# Patient Record
Sex: FEMALE | Race: WHITE | NOT HISPANIC OR LATINO | Employment: OTHER | URBAN - METROPOLITAN AREA
[De-identification: names, ages, dates, MRNs, and addresses within clinical notes are randomized per-mention and may not be internally consistent; named-entity substitution may affect disease eponyms.]

---

## 2017-01-20 RX ORDER — MELOXICAM 7.5 MG/1
TABLET ORAL
Qty: 90 TABLET | Refills: 1 | Status: SHIPPED | OUTPATIENT
Start: 2017-01-20 | End: 2017-06-28 | Stop reason: SDUPTHER

## 2017-06-28 RX ORDER — MELOXICAM 7.5 MG/1
TABLET ORAL
Qty: 90 TABLET | Refills: 1 | Status: SHIPPED | OUTPATIENT
Start: 2017-06-28 | End: 2018-01-15 | Stop reason: SDUPTHER

## 2017-07-19 ENCOUNTER — OFFICE VISIT (OUTPATIENT)
Dept: SURGERY | Facility: CLINIC | Age: 67
End: 2017-07-19
Payer: COMMERCIAL

## 2017-07-19 VITALS
DIASTOLIC BLOOD PRESSURE: 66 MMHG | BODY MASS INDEX: 34.01 KG/M2 | WEIGHT: 204.38 LBS | SYSTOLIC BLOOD PRESSURE: 148 MMHG | HEART RATE: 70 BPM

## 2017-07-19 DIAGNOSIS — Z85.038 HISTORY OF COLON CANCER: Primary | ICD-10-CM

## 2017-07-19 DIAGNOSIS — K43.2 INCISIONAL HERNIA, WITHOUT OBSTRUCTION OR GANGRENE: ICD-10-CM

## 2017-07-19 DIAGNOSIS — T81.89XA WOUND, SURGICAL, NONHEALING, INITIAL ENCOUNTER: ICD-10-CM

## 2017-07-19 PROCEDURE — 99214 OFFICE O/P EST MOD 30 MIN: CPT | Mod: S$GLB,,, | Performed by: COLON & RECTAL SURGERY

## 2017-07-19 PROCEDURE — 99999 PR PBB SHADOW E&M-EST. PATIENT-LVL III: CPT | Mod: PBBFAC,,, | Performed by: COLON & RECTAL SURGERY

## 2017-07-19 RX ORDER — ACETAMINOPHEN 325 MG/1
325 TABLET ORAL EVERY 6 HOURS PRN
COMMUNITY
End: 2018-05-23

## 2017-07-19 NOTE — PROGRESS NOTES
HISTORY OF PRESENT ILLNESS:  66 year old female patient presents today for f/u of rectal cancer and umbilical hernia    Had repair and wound excision by Dr Muse 2/2014 and 7/2105. 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   Colonoscopy 3/30/07, 2010  and 8/13/16 .  normal post op   CEA Feb 0.7 CEA 1.0 11/12/09 = 2.0                                Has history of constipation. Takes Miralax  1 capful daily and fiber tablets. Ducolax weekly to initiate BM.   PAST SURGICAL HISTORY: TAHBSO, Needle biopsy of breast. As above    SOCIAL HISTORY: Lives with spouse.    FAMILY HISTORY: Denies family history of colon cancer.     ROS:  GENERAL: No fever, chills, has had weight gain.  CHEST: Denies SAN, cyanosis, wheezing, cough and sputum production.  CARDIOVASCULAR: Denies chest pain, PND, orthopnea or reduced exercise tolerance.    PE:   APPEARANCE: Well nourished, well developed, in no acute distress.   CHEST: Lungs clear. Normal respiratory effort.  CARDIOVASCULAR: Normal S1, S2. No rubs, murmurs or gallops. No edema.  ABDOMEN: Soft. Healed incision. No masses or tenderness. Small umbilical hernia, reducable, slightly larger than last visit  RECTAL : NST no masses or tenderness    IMP: History of rectal cancer Stage 2, umbilical hernia and non healing wound  PLAN;DIScusse doptions. Recommend wound excision and repair. Patient will call back to schedule.

## 2017-08-22 RX ORDER — ESTRADIOL 1 MG/1
TABLET ORAL
Qty: 30 TABLET | Refills: 2 | Status: SHIPPED | OUTPATIENT
Start: 2017-08-22 | End: 2018-01-15 | Stop reason: SDUPTHER

## 2018-01-15 ENCOUNTER — OFFICE VISIT (OUTPATIENT)
Dept: INTERNAL MEDICINE | Facility: CLINIC | Age: 68
End: 2018-01-15
Attending: INTERNAL MEDICINE
Payer: MEDICARE

## 2018-01-15 ENCOUNTER — LAB VISIT (OUTPATIENT)
Dept: LAB | Facility: OTHER | Age: 68
End: 2018-01-15
Attending: INTERNAL MEDICINE
Payer: MEDICARE

## 2018-01-15 VITALS
SYSTOLIC BLOOD PRESSURE: 120 MMHG | HEIGHT: 65 IN | HEART RATE: 98 BPM | WEIGHT: 196.88 LBS | DIASTOLIC BLOOD PRESSURE: 78 MMHG | BODY MASS INDEX: 32.8 KG/M2

## 2018-01-15 DIAGNOSIS — Z12.39 BREAST CANCER SCREENING: ICD-10-CM

## 2018-01-15 DIAGNOSIS — R79.9 ABNORMAL BLOOD CHEMISTRY: ICD-10-CM

## 2018-01-15 DIAGNOSIS — M79.642 BILATERAL HAND PAIN: ICD-10-CM

## 2018-01-15 DIAGNOSIS — H53.2 DIPLOPIA: ICD-10-CM

## 2018-01-15 DIAGNOSIS — Z01.419 WELL WOMAN EXAM: ICD-10-CM

## 2018-01-15 DIAGNOSIS — S01.01XS LACERATION OF SCALP, SEQUELA: ICD-10-CM

## 2018-01-15 DIAGNOSIS — Z00.00 ANNUAL PHYSICAL EXAM: ICD-10-CM

## 2018-01-15 DIAGNOSIS — N95.1 HOT FLASHES DUE TO MENOPAUSE: ICD-10-CM
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
## 2018-01-15 DIAGNOSIS — Z00.00 ANNUAL PHYSICAL EXAM: Primary | ICD-10-CM

## 2018-01-15 DIAGNOSIS — M79.641 BILATERAL HAND PAIN: ICD-10-CM

## 2018-01-15 LAB
ALBUMIN SERPL BCP-MCNC: 4.2 G/DL
ALP SERPL-CCNC: 87 U/L
ALT SERPL W/O P-5'-P-CCNC: 19 U/L
ANION GAP SERPL CALC-SCNC: 10 MMOL/L
AST SERPL-CCNC: 26 U/L
BASOPHILS # BLD AUTO: 0.01 K/UL
BASOPHILS NFR BLD: 0.2 %
BILIRUB SERPL-MCNC: 0.4 MG/DL
BUN SERPL-MCNC: 17 MG/DL
CALCIUM SERPL-MCNC: 9.8 MG/DL
CCP AB SER IA-ACNC: <0.5 U/ML
CHLORIDE SERPL-SCNC: 103 MMOL/L
CHOLEST SERPL-MCNC: 195 MG/DL
CHOLEST/HDLC SERPL: 3.8 {RATIO}
CO2 SERPL-SCNC: 26 MMOL/L
CREAT SERPL-MCNC: 1 MG/DL
DIFFERENTIAL METHOD: ABNORMAL
EOSINOPHIL # BLD AUTO: 0.1 K/UL
EOSINOPHIL NFR BLD: 2.6 %
ERYTHROCYTE [DISTWIDTH] IN BLOOD BY AUTOMATED COUNT: 13.3 %
EST. GFR  (AFRICAN AMERICAN): >60 ML/MIN/1.73 M^2
EST. GFR  (NON AFRICAN AMERICAN): 59 ML/MIN/1.73 M^2
ESTIMATED AVG GLUCOSE: 105 MG/DL
GLUCOSE SERPL-MCNC: 103 MG/DL
HBA1C MFR BLD HPLC: 5.3 %
HCT VFR BLD AUTO: 44.1 %
HDLC SERPL-MCNC: 51 MG/DL
HDLC SERPL: 26.2 %
HGB BLD-MCNC: 14.1 G/DL
LDLC SERPL CALC-MCNC: 124.6 MG/DL
LYMPHOCYTES # BLD AUTO: 0.8 K/UL
LYMPHOCYTES NFR BLD: 17.3 %
MCH RBC QN AUTO: 28.8 PG
MCHC RBC AUTO-ENTMCNC: 32 G/DL
MCV RBC AUTO: 90 FL
MONOCYTES # BLD AUTO: 0.5 K/UL
MONOCYTES NFR BLD: 10.2 %
NEUTROPHILS # BLD AUTO: 3.2 K/UL
NEUTROPHILS NFR BLD: 69.7 %
NONHDLC SERPL-MCNC: 144 MG/DL
PLATELET # BLD AUTO: 172 K/UL
PMV BLD AUTO: 10.6 FL
POTASSIUM SERPL-SCNC: 4.7 MMOL/L
PROT SERPL-MCNC: 7.7 G/DL
RBC # BLD AUTO: 4.89 M/UL
RHEUMATOID FACT SERPL-ACNC: <10 IU/ML
SODIUM SERPL-SCNC: 139 MMOL/L
TRIGL SERPL-MCNC: 97 MG/DL
TSH SERPL DL<=0.005 MIU/L-ACNC: 3.85 UIU/ML
WBC # BLD AUTO: 4.62 K/UL

## 2018-01-15 PROCEDURE — 99397 PER PM REEVAL EST PAT 65+ YR: CPT | Mod: S$GLB,,, | Performed by: INTERNAL MEDICINE

## 2018-01-15 PROCEDURE — 80061 LIPID PANEL: CPT

## 2018-01-15 PROCEDURE — 84443 ASSAY THYROID STIM HORMONE: CPT

## 2018-01-15 PROCEDURE — 85025 COMPLETE CBC W/AUTO DIFF WBC: CPT

## 2018-01-15 PROCEDURE — 99999 PR PBB SHADOW E&M-EST. PATIENT-LVL IV: CPT | Mod: PBBFAC,,, | Performed by: INTERNAL MEDICINE

## 2018-01-15 PROCEDURE — 36415 COLL VENOUS BLD VENIPUNCTURE: CPT

## 2018-01-15 PROCEDURE — 86200 CCP ANTIBODY: CPT

## 2018-01-15 PROCEDURE — 80053 COMPREHEN METABOLIC PANEL: CPT

## 2018-01-15 PROCEDURE — 83036 HEMOGLOBIN GLYCOSYLATED A1C: CPT

## 2018-01-15 PROCEDURE — 86431 RHEUMATOID FACTOR QUANT: CPT

## 2018-01-15 RX ORDER — ESTRADIOL 1 MG/1
1 TABLET ORAL EVERY MORNING
Qty: 90 TABLET | Refills: 0 | Status: SHIPPED | OUTPATIENT
Start: 2018-01-15 | End: 2018-04-16 | Stop reason: SDUPTHER

## 2018-01-15 RX ORDER — MELOXICAM 7.5 MG/1
TABLET ORAL
Qty: 90 TABLET | Refills: 0 | Status: SHIPPED | OUTPATIENT
Start: 2018-01-15 | End: 2018-04-12 | Stop reason: SDUPTHER

## 2018-01-15 NOTE — PROGRESS NOTES
Subjective:       Patient ID: Tanesha Davis is a 67 y.o. female.    Chief Complaint: Fall (left head stitches); Head Injury (x 1 week); Abdominal Pain; Temporomandibular Joint Pain; and Dizziness    Here for annual visit    1 week ago after she stopped taking her PO estrogen she developed frequent hot flashes and in the middle of one became dizzy and fell and struck her head and left eye. She went to ED and had CT scan and sutures placed. She reports HA on and off since then. They are becoming less intense, respond longer to OTC meds. She denies any current blurry vision, eye pain, dizziness. Hot flashes continue. Her OBGYN retired and she was unable to get her estrogen refilled so she was taking days off to help mitigate this.     Her nocturnal muscle cramps of bilateral calves were much improved but then have become more frequent the past 6-8 weeks. Was once a month now once a week. She denies any change in water intake, medication adherence. She reports she performs daily stretches of calves with resistance bands daily.     Pain of bilateral hands that has become worse lately. All 5 fingers and wrist pain. Pain is most concentrated at base of thumb, MP and PIP. She denies significant swelling erythema, changes to overlying skin, stiffess > 1 hr, family hx of RA/auto immune disorder. She denies nocturnal symptoms of numbness/tingling. She has not had OT for this in past.           Review of Systems   Constitutional: Positive for activity change and unexpected weight change.   HENT: Negative for hearing loss, rhinorrhea and trouble swallowing.    Eyes: Negative for discharge and visual disturbance.   Respiratory: Negative for chest tightness and wheezing.    Cardiovascular: Negative for chest pain and palpitations.   Gastrointestinal: Positive for constipation. Negative for blood in stool, diarrhea and vomiting.   Endocrine: Positive for polyuria. Negative for polydipsia.   Genitourinary: Negative for difficulty  "urinating, dysuria, hematuria and menstrual problem.   Musculoskeletal: Positive for arthralgias, joint swelling and neck pain.   Neurological: Positive for weakness and headaches.   Psychiatric/Behavioral: Negative for confusion and dysphoric mood.       Objective:      Vitals:    01/15/18 1003   BP: 120/78   BP Location: Left arm   Patient Position: Sitting   BP Method: Large (Manual)   Pulse: 98   Weight: 89.3 kg (196 lb 13.9 oz)   Height: 5' 5" (1.651 m)      Physical Exam   Constitutional: She is oriented to person, place, and time. She appears well-developed and well-nourished. No distress.   HENT:   Head: Normocephalic and atraumatic.   Mouth/Throat: Oropharynx is clear and moist. No oropharyngeal exudate.   Eyes: Conjunctivae and EOM are normal. Pupils are equal, round, and reactive to light. No scleral icterus.   Neck: No thyromegaly present.   Cardiovascular: Normal rate, regular rhythm and normal heart sounds.    No murmur heard.  Pulmonary/Chest: Effort normal and breath sounds normal. She has no wheezes. She has no rales.   Abdominal: Soft. She exhibits no distension. There is no tenderness.   Musculoskeletal: She exhibits no edema or tenderness.        Right hand: She exhibits no tenderness, no bony tenderness, normal capillary refill, no deformity and no swelling.        Left hand: She exhibits no tenderness, no bony tenderness, normal capillary refill, no deformity and no swelling.   Lymphadenopathy:     She has no cervical adenopathy.   Neurological: She is alert and oriented to person, place, and time.   Skin: Skin is warm and dry.   Psychiatric: She has a normal mood and affect. Her behavior is normal.       Assessment:       1. Annual physical exam    2. Fall, sequela    3. Bilateral hand pain    4. Hot flashes due to menopause    5. Abnormal blood chemistry    6. Well woman exam    7. Diplopia     8. Breast cancer screening    9. Laceration of scalp, sequela        Plan:       Tanesha was seen " today for fall, head injury, abdominal pain, temporomandibular joint pain and dizziness.    Diagnoses and all orders for this visit:    Annual physical exam  -     Comprehensive metabolic panel; Future  -     Lipid panel; Future  -     TSH; Future  -     CBC auto differential; Future  -     Hemoglobin A1c; Future    Fall, sequela    Bilateral hand pain  -     CYCLIC CITRUL PEPTIDE ANTIBODY, IGG; Future  -     RHEUMATOID FACTOR; Future  -     JUAN ALBERTO; Future  -     Ambulatory consult to Occupational Therapy  -     JUAN ALBERTO    Hot flashes due to menopause  -     Ambulatory Referral to Obstetrics / Gynecology  -     TSH; Future   --     estradiol (ESTRACE) 1 MG tablet; Take 1 tablet (1 mg total) by mouth every morning.    Abnormal blood chemistry  -     Comprehensive metabolic panel; Future  -     Lipid panel; Future  -     TSH; Future  -     CBC auto differential; Future  -     Hemoglobin A1c; Future    Well woman exam  -     Ambulatory Referral to Obstetrics / Gynecology    Diplopia   -     TSH; Future    Breast cancer screening  -     Mammo Digital Screening Bilat with CAD; Future    Laceration of scalp, sequela  -4 sutures removed without complication                     Side effects of medication(s) were discussed in detail and patient voiced understanding.  Patient will call back for any issues or complications.

## 2018-01-30 ENCOUNTER — CLINICAL SUPPORT (OUTPATIENT)
Dept: REHABILITATION | Facility: HOSPITAL | Age: 68
End: 2018-01-30
Attending: INTERNAL MEDICINE
Payer: MEDICARE

## 2018-01-30 DIAGNOSIS — M79.642 BILATERAL HAND PAIN: Primary | ICD-10-CM

## 2018-01-30 DIAGNOSIS — M79.641 BILATERAL HAND PAIN: Primary | ICD-10-CM

## 2018-01-30 PROCEDURE — G8985 CARRY GOAL STATUS: HCPCS | Mod: CJ,PN

## 2018-01-30 PROCEDURE — G8984 CARRY CURRENT STATUS: HCPCS | Mod: CJ,PN

## 2018-01-30 PROCEDURE — 97165 OT EVAL LOW COMPLEX 30 MIN: CPT | Mod: PN

## 2018-01-30 NOTE — PATIENT INSTRUCTIONS
"Flexor Tendon Gliding (Active Hook Fist)        With fingers and knuckles straight, bend middle and tip joints. Do not bend large knuckles.  Repeat __10__ times. Do _1___ sessions per day.    Copyright © I. All rights reserved.   Flexor Tendon Gliding (Active Full Fist)        Straighten all fingers, then make a fist, bending all joints.  Repeat __10__ times. Do _1___ sessions per day.    Copyright © I. All rights reserved.   Flexor Tendon Gliding (Active Straight Fist)        Start with fingers straight. Bend knuckles and middle joints. Keep fingertip joints straight to touch base of palm.  Repeat __10__ times. Do _3___ sessions per day.    Copyright © I. All rights reserved.     Opposition (Active)        Touch tip of thumb to nail tip of SMALL finger in turn, making an "O" shape.  Repeat _10___ times. Do _3___ sessions per day.    Copyright © [facility]. All rights reserved.   Composite Flexion (Active)        Bend both joints of thumb as far as possible. Try to touch base of little finger.  Repeat __10__ times. Do __3__ sessions per day.    Copyright © I. All rights reserved.   Abduction / Adduction (Active)        With hand flat on table, spread all fingers apart, then bring them together as close as possible.  Repeat _10__ times. Do _3___ sessions per day.    Copyright © I. All rights reserved.   Extension (Active With Finger Extension)        With forearm on table and wrist over edge, lift hand with fingers straight. Hold __2__ seconds.  Repeat _10___ times. Do __3__ sessions per day.    Copyright © I. All rights reserved.   Extension (Active With Finger Flexion)        With fingers curled, bend hand back at wrist. Hold _2___ seconds.  Repeat _10___ times. Do __3__ sessions per day.    Copyright © I. All rights reserved.     "

## 2018-01-30 NOTE — PLAN OF CARE
Occupational Therapy Evaluation    Patient: Tanesha Davis  Date of Evaluation: 01/30/2018  Referring Physician: Caleb Hernandez MD  Diagnosis:   Encounter Diagnosis   Name Primary?    Bilateral hand pain Yes       Referral Orders: Eval and treat    Start Time: 10:05 am  End Time: 11:15 am  Total Time: 70 min  Group Time: -    Age: 67 y.o.  Sex: female  Hand dominance: right    Occupation: retired dance teacher- doing administrative work  Working presently: Yes  Last time worked: yesterday  Workmen's Compensation: No    Date of Injury: 3 weeks ago  Date of Surgery: n/a  Involved areas: right and left thumb    Mechanism of Injury: Pt fell and irritated her thumbs by a fall she had landing on her hands.  Past Medical History:   Diagnosis Date    Anemia     Arthritis     Cancer     colon cancer    Colon polyp     Draining cutaneous sinus tract     Headache(784.0)     PONV (postoperative nausea and vomiting)     with last surgery per patient    Rectosigmoid cancer      Current Outpatient Prescriptions   Medication Sig    acetaminophen (TYLENOL) 325 MG tablet Take 325 mg by mouth every 6 (six) hours as needed for Pain.    bisacodyl (DULCOLAX) 10 mg Supp Place 1 suppository (10 mg total) rectally daily as needed.    cyanocobalamin 500 MCG tablet Take 500 mcg by mouth once daily.      docusate sodium (COLACE) 100 MG capsule Take 1 capsule (100 mg total) by mouth 2 (two) times daily.    ERGOCALCIFEROL, VITAMIN D2, (VITAMIN D ORAL) Take by mouth once daily.     estradiol (ESTRACE) 1 MG tablet Take 1 tablet (1 mg total) by mouth every morning.    fiber Chew Take 2 tablets by mouth once daily.      L GASSERI/B BIFIDUM/B LONGUM ([facility] ORAL) Take 1 tablet by mouth every evening.     magnesium oxide-pyridoxine (BEELITH) 362-20 mg Tab Take 1 tablet by mouth once daily.    meloxicam (MOBIC) 7.5 MG tablet TAKE ONE TABLET BY MOUTH EVERY DAY    VITAMIN E ACETATE (VITAMIN E ORAL) Take by  mouth.     No current facility-administered medications for this visit.      Review of patient's allergies indicates:   Allergen Reactions    Amoxil [amoxicillin] Hives    Penicillins Hives and Swelling    Sulfa (sulfonamide antibiotics) Hives and Itching     X-Ray Results: Both hands show mild arthritic change in the fingers and thumbs and wrists  MRI Results: n/a  EMG Results: n/a    Subjective  Tanesha reports the pain is getting better over the past 3 weeks.  Pt reports she has had more pain in her thumbs over the past year and the meloxicam does not seem to help.  Pt reports numbness in her small fingers    Functional Pain Scale Rating 0-10: 4/10 at present, 2-3/10 at best, 9/10 at worst  Location: thumb MP joints  Description: Burning and stiffness  Activities which increase pain: to much rest or to much use  Activities which decrease pain: nothing    Tanesha's goals for therapy are: To learn how to manage my pain without taking pain medicine.    Objective    Observation: enlarged MP and CMC  joints of bilateral thumbs  Sensation: Median and ulnar Nerve Intact  [place] Celina Monofilament Test: Yes  Results: 2.83  Stereognosis: n/t    Special Tests:   Tinels  negative and Phalens  Positive but at small fingers    Edema: Circumferential measurements: no overt swelling noted    Range of Motion: right and left Active  (Ext/Flex) Thumb left Thumb right   MP 0/60 0/55   DIP +10/40 +10/60     Thumb Opposition: to all tips and 5th MCP  Palmar Abduction: left 45*  Right 40*  Radial Abduction: left 40*  Right 35*    Wrist Ext/Flex: WFL/WFL  Wrist RD/UD: WFL/WFL  Supination/Pronation: WFL/WFL     Strength: (HAYDEE Dynamometer in lbs.) Average 3 trials, Position II  Right: 35, 35 , 30  avg 33.3#  Left: 25, 25, 30 avg 26.6#    Pinch Strength: (Pinch Gauge in psi's), Average 3 trials  [place] Pinch R) 8,9,7  avg 8psi's   L) 3,4,4 avg 3.6psi's  3pt Pinch   R) 6,7,8  avg 7psi's  L) 5,3,4  avg 4  psi's    Cultural/Spiritual/Education Needs: none noted or reported  Barriers to Learning: none noted or reported    Functional Limitations: Patient presents with the following functional Limitations:   Self Care / ADL: able to complete self care    Work/Activities: difficulty with opening jars, lifting and carrying, lifting pots, able to sweep and mop with rest between    Leisure: movies, dinner, enjoys the children at the studio    Treatment included: OT evaluation and instruction in written HEP including (Hand Therapy/Finger Exercises) tendon glides, opposition of thumb to small, composite thumb flexion, finger abd/adduction, wrist ext/flexion with fingers flexed and extended.    Repetitions 10 each   Frequency 3* per day    Education re: joint protection, written information re: joint protection and energy conservation provided.    Assessment  Treatment Diagnosis/ Problem List BUE Decreased  strength, Decreased pinch strength, Increased pain and Joint Stiffness    Goals to be met in 4 weeks:   Patient to be IND with HEP and modalities for pain/edema managment.   Decrease complaints of pain to  3 out of 10 to increase functional hand use for ADL/work/leisure activities.    Pt to report joint protection techniques with her home and work activiites    G CODE TOOL: FOTO  handling  Current Score  = 65 or 35% impaired   Goal at Discharge Score 68 or 32% impaired   Discharge status Score = - or -% impaired     Score interpretation is as follows:     TEST SCORE  Modifier  Impairment Limitation Restriction    0%  CH  0 % impaired, limited or restricted    1-19%  CI  @ least 1% but less than 20% impaired, limited or restricted    20-39%  CJ  @ least 20%<40% impaired, limited or restricted    40-59%  CK  @ least 40%<60% impaired, limited or restricted    60-79%  CL  @ least 60% <80% impaired, limited or restricted    80-99%  CM  @ least 80%<100% impaired limited or restricted    100%  CN  100% impaired,  limited or restricted     Profile and History Assessment of Occupational Performance Level of Clinical Decision Making Complexity Score   Occupational Profile:   Tanesha Davis is a 67 y.o. female who lives with their spouse and is currently self-employed as dance instructor. Tanesha Davis has difficulty with  opening jars, bottles  housework/household chores  affecting his/her daily functional abilities. His/her main goal for therapy is To learn how to manage my pain without taking pain medicine..     Comorbidities:   none noted    Medical and Therapy History Review:   Brief               Performance Deficits    Physical:  Pain  hand weakness    Cognitive:  No Deficits    Psychosocial:    No Deficits     Clinical Decision Making:  low    Assessment Process:  Problem-Focused Assessments    Modification/Need for Assistance:  Not Necessary    Intervention Selection:  Several Treatment Options       low  Based on PMHX, co morbidities , data from assessments and functional level of assistance required with task and clinical presentation directly impacting function.       Plan  Tanesha Davis to be treated by Occupational Therapy 1 times per week for prn weeks during the certification period from 1/30/18 to 2/27/18 to achieve the established goals.     Treatment to include: Therapeutic exercises/activities., Joint Protection and Energy Conservation, as well as any other treatments deemed necessary based on the patient's needs or progress.     I certify the need for these services furnished under this plan of treatment and while under my care.     ____________________________________                         __________________  Physician/Referring Practitioner                                               Date of Signature

## 2018-02-06 ENCOUNTER — CLINICAL SUPPORT (OUTPATIENT)
Dept: REHABILITATION | Facility: HOSPITAL | Age: 68
End: 2018-02-06
Attending: INTERNAL MEDICINE
Payer: MEDICARE

## 2018-02-06 DIAGNOSIS — M79.642 BILATERAL HAND PAIN: Primary | ICD-10-CM

## 2018-02-06 DIAGNOSIS — M79.641 BILATERAL HAND PAIN: Primary | ICD-10-CM

## 2018-02-06 PROCEDURE — 97110 THERAPEUTIC EXERCISES: CPT | Mod: PN

## 2018-02-06 PROCEDURE — 97018 PARAFFIN BATH THERAPY: CPT | Mod: PN

## 2018-02-20 ENCOUNTER — CLINICAL SUPPORT (OUTPATIENT)
Dept: REHABILITATION | Facility: HOSPITAL | Age: 68
End: 2018-02-20
Attending: INTERNAL MEDICINE
Payer: MEDICARE

## 2018-02-20 DIAGNOSIS — M79.642 BILATERAL HAND PAIN: Primary | ICD-10-CM

## 2018-02-20 DIAGNOSIS — M79.641 BILATERAL HAND PAIN: Primary | ICD-10-CM

## 2018-02-20 PROCEDURE — G8985 CARRY GOAL STATUS: HCPCS | Mod: CJ,PN

## 2018-02-20 PROCEDURE — 97110 THERAPEUTIC EXERCISES: CPT | Mod: PN

## 2018-02-20 PROCEDURE — 97018 PARAFFIN BATH THERAPY: CPT | Mod: PN

## 2018-02-20 PROCEDURE — G8986 CARRY D/C STATUS: HCPCS | Mod: CJ,PN

## 2018-02-20 NOTE — PROGRESS NOTES
Progress Note/ D/C Summary    Patient:  Tanesha Davis  Clinic #:  [id number]   Date of Note: 2/20/2018  Referring Physician: Caleb Hernandez MD    Diagnosis:    Encounter Diagnosis   Name Primary?    Bilateral hand pain Yes     Start Time: 10:10 am  End Time: 10:50am  Total Time: 40  min  Group Time: -    3 of 20 visits expires 12/31/18    Subjective:   Pt reports her hands are still very painful  Pain: 7 out of 10     Objective:   Patient seen by OT this session. Treatment  consist of the following:  Paraffin and Therapeutic exercises    Treatment: Paraffin x 8 min and Therapeutic exercises x 32 min  Patient received paraffin bath to bilateral hand(s) for 8 minutes to increase blood flow, circulation, pain management and for tissue elasticity prior to therex. Pt was engaged in isolated hook, flat and regular fists x 10 reps. Pt provided with yellow therapy putty to engage in  strengthening at home.  Pt demonstrated proper gripping with putty in the clinic.   Pt was able to express joint protection principles to therapist in clinic and reports her hands do feel somewhat better for a little while after paraffin application.  Pt was provided with information to acquire paraffin to begin using her home unit.     Patient is a 67 y.o. female referred to Occupational Therapy by  with a referral for eval and treat.  Patient received initial evaluation on 1/30/18 and returned for 2 treatment sessions. Patient had 0 missed visits. Patient's treatment included paraffin and education with provision of educational materials and HEP.     Assessment:  Pt with slight decrease in hand discomfort after therapy. Pt ellen to express joint protection principles.  Goals to be met in 4 weeks:   Patient to be IND with HEP and modalities for pain/edema management- met   Decrease complaints of pain to  3 out of 10 to increase functional hand use for ADL/work/leisure activities- met at most times   Pt to report joint  protection techniques with her home and work activiites- met    G CODE TOOL: FOTO  handling  Goal at Discharge Score 68 or 32% impaired  exceeded  Discharge status Score = 69 or 31% impaired     Plan:  Pt will be d/c from formal therapy at this time due to meeting established goals.  Pt instructed to contact therapist with any additional questions that may arise.

## 2018-02-20 NOTE — PROGRESS NOTES
Progress Note/ D/C Summary    Patient:  Tanesha Davis  Clinic #:  [id number]   Date of Note: 2/20/2018  Referring Physician: Caleb Hernandez MD    Diagnosis:    Encounter Diagnosis   Name Primary?    Bilateral hand pain Yes     Start Time: 10:10 am  End Time: 10:50am  Total Time: 40  min  Group Time: -    3 of 20 visits expires 12/31/18    Subjective:   Pt reports her hands are still very painful  Pain: 7 out of 10     Objective:   Patient seen by OT this session. Treatment  consist of the following:  Paraffin and Therapeutic exercises    Treatment: Paraffin x 8 min and Therapeutic exercises x 32 min  Patient received paraffin bath to bilateral hand(s) for 8 minutes to increase blood flow, circulation, pain management and for tissue elasticity prior to therex. Pt was engaged in isolated hook, flat and regular fists x 10 reps. Pt provided with yellow therapy putty to engage in  strengthening at home.  Pt demonstrated proper gripping with putty in the clinic.   Pt was able to express joint protection principles to therapist in clinic and reports her hands do feel somewhat better for a little while after paraffin application.  Pt was provided with information to acquire paraffin to begin using her home unit.     Patient is a 67 y.o. female referred to Occupational Therapy by  with a referral for eval and treat.  Patient received initial evaluation on 1/30/18 and returned for 2 treatment sessions. Patient had 0 missed visits. Patient's treatment included paraffin and education with provision of educational materials and HEP.     Assessment:  Pt with slight decrease in hand discomfort after therapy. Pt ellen to express joint protection principles.  Goals to be met in 4 weeks:   Patient to be IND with HEP and modalities for pain/edema management- met   Decrease complaints of pain to  3 out of 10 to increase functional hand use for ADL/work/leisure activities- met at most times   Pt to report joint  protection techniques with her home and work activiites- met    G CODE TOOL: FOTO  handling  Goal at Discharge Score 68 or 32% impaired  exceeded  Discharge status Score = 69 or 31% impaired     Plan:  Pt will be d/c from formal therapy at this time due to meeting established goals.  Pt instructed to contact therapist with any additional questions that may arise.

## 2018-03-07 ENCOUNTER — TELEPHONE (OUTPATIENT)
Dept: SURGERY | Facility: CLINIC | Age: 68
End: 2018-03-07

## 2018-03-07 NOTE — TELEPHONE ENCOUNTER
----- Message from Vania Clark sent at 3/7/2018  1:28 PM CST -----  Contact: Pt:[phone] or Cell:[phone]  Pt called and states she was told to call the office when she was ready to schedule her hernia repair surgery. Pt states she is ready to schedule her surgery. Pt would like to speak with the nurse.

## 2018-03-22 DIAGNOSIS — K43.2 INCISIONAL HERNIA WITHOUT OBSTRUCTION OR GANGRENE: ICD-10-CM

## 2018-03-22 DIAGNOSIS — K43.2 INCISIONAL HERNIA, WITHOUT OBSTRUCTION OR GANGRENE: Primary | ICD-10-CM

## 2018-03-22 RX ORDER — METRONIDAZOLE 500 MG/100ML
500 INJECTION, SOLUTION INTRAVENOUS
Status: CANCELLED | OUTPATIENT
Start: 2018-03-22

## 2018-03-22 RX ORDER — ACETAMINOPHEN 10 MG/ML
1000 INJECTION, SOLUTION INTRAVENOUS
Status: CANCELLED | OUTPATIENT
Start: 2018-03-22 | End: 2018-03-22

## 2018-03-22 RX ORDER — MUPIROCIN 20 MG/G
OINTMENT TOPICAL
Status: CANCELLED | OUTPATIENT
Start: 2018-03-22

## 2018-03-22 RX ORDER — SODIUM CHLORIDE 9 MG/ML
INJECTION, SOLUTION INTRAVENOUS CONTINUOUS
Status: CANCELLED | OUTPATIENT
Start: 2018-03-22

## 2018-03-22 NOTE — H&P
HISTORY OF PRESENT ILLNESS:  66 year old female patient presents today for f/u of rectal cancer and umbilical hernia     Had repair and wound excision by Dr Muse 2/2014 and 7/2105. 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   Colonoscopy 3/30/07, 2010  and 8/13/16 .  normal post op   CEA Feb 0.7 CEA 1.0 11/12/09 = 2.0     Has history of constipation. Takes Miralax  1 capful daily and fiber tablets. Ducolax weekly to initiate BM.   PAST SURGICAL HISTORY: TAHBSO, Needle biopsy of breast. As above    SOCIAL HISTORY: Lives with spouse.    FAMILY HISTORY: Denies family history of colon cancer.     ROS:  GENERAL: No fever, chills, has had weight gain.  CHEST: Denies ASN, cyanosis, wheezing, cough and sputum production.  CARDIOVASCULAR: Denies chest pain, PND, orthopnea or reduced exercise tolerance.    PE:   APPEARANCE: Well nourished, well developed, in no acute distress.   CHEST: Lungs clear. Normal respiratory effort.  CARDIOVASCULAR: Normal S1, S2. No rubs, murmurs or gallops. No edema.  ABDOMEN: Soft. Healed incision. No masses or tenderness. Small umbilical hernia, reducable, slightly larger than last visit  RECTAL : NST no masses or tenderness    IMP: History of rectal cancer Stage 2, umbilical hernia and non healing wound  PLAN;DIScusse doptions. Recommend wound excision and repair.  I have explained the procedure including indications, alternatives, expected outcomes and potential complications. The patient appears to understand and gives informed consent. The patient is medically ready for surgery.

## 2018-03-23 DIAGNOSIS — K43.9 HERNIA OF ABDOMINAL WALL: Primary | ICD-10-CM

## 2018-03-26 ENCOUNTER — HOSPITAL ENCOUNTER (OUTPATIENT)
Facility: HOSPITAL | Age: 68
LOS: 1 days | Discharge: HOME | End: 2018-03-27
Attending: COLON & RECTAL SURGERY | Admitting: COLON & RECTAL SURGERY
Payer: MEDICARE

## 2018-03-26 ENCOUNTER — ANESTHESIA (OUTPATIENT)
Dept: SURGERY | Facility: HOSPITAL | Age: 68
End: 2018-03-26
Payer: MEDICARE

## 2018-03-26 ENCOUNTER — DOCUMENTATION ONLY (OUTPATIENT)
Dept: REHABILITATION | Facility: HOSPITAL | Age: 68
End: 2018-03-26

## 2018-03-26 ENCOUNTER — SURGERY (OUTPATIENT)
Age: 68
End: 2018-03-26

## 2018-03-26 ENCOUNTER — ANESTHESIA EVENT (OUTPATIENT)
Dept: SURGERY | Facility: HOSPITAL | Age: 68
End: 2018-03-26
Payer: MEDICARE

## 2018-03-26 DIAGNOSIS — K43.2 INCISIONAL HERNIA, WITHOUT OBSTRUCTION OR GANGRENE: ICD-10-CM

## 2018-03-26 DIAGNOSIS — K43.2 INCISIONAL HERNIA WITHOUT OBSTRUCTION OR GANGRENE: ICD-10-CM

## 2018-03-26 PROCEDURE — 49565 PR REPAIR RECURR INCIS HERNIA,REDUC: CPT | Mod: ,,, | Performed by: COLON & RECTAL SURGERY

## 2018-03-26 PROCEDURE — 25000003 PHARM REV CODE 250: Performed by: COLON & RECTAL SURGERY

## 2018-03-26 PROCEDURE — 71000039 HC RECOVERY, EACH ADD'L HOUR: Performed by: COLON & RECTAL SURGERY

## 2018-03-26 PROCEDURE — 63600175 PHARM REV CODE 636 W HCPCS: Performed by: COLON & RECTAL SURGERY

## 2018-03-26 PROCEDURE — C9290 INJ, BUPIVACAINE LIPOSOME: HCPCS | Performed by: COLON & RECTAL SURGERY

## 2018-03-26 PROCEDURE — 63600175 PHARM REV CODE 636 W HCPCS: Performed by: NURSE ANESTHETIST, CERTIFIED REGISTERED

## 2018-03-26 PROCEDURE — 37000008 HC ANESTHESIA 1ST 15 MINUTES: Performed by: COLON & RECTAL SURGERY

## 2018-03-26 PROCEDURE — D9220A PRA ANESTHESIA: Mod: ANES,,, | Performed by: ANESTHESIOLOGY

## 2018-03-26 PROCEDURE — 88300 SURGICAL PATH GROSS: CPT | Performed by: PATHOLOGY

## 2018-03-26 PROCEDURE — 37000009 HC ANESTHESIA EA ADD 15 MINS: Performed by: COLON & RECTAL SURGERY

## 2018-03-26 PROCEDURE — G0378 HOSPITAL OBSERVATION PER HR: HCPCS

## 2018-03-26 PROCEDURE — 36000707: Performed by: COLON & RECTAL SURGERY

## 2018-03-26 PROCEDURE — 11005 DBRDMT SKIN ABDOMINAL WALL: CPT | Mod: 51,,, | Performed by: COLON & RECTAL SURGERY

## 2018-03-26 PROCEDURE — 36000706: Performed by: COLON & RECTAL SURGERY

## 2018-03-26 PROCEDURE — 25000003 PHARM REV CODE 250: Performed by: NURSE ANESTHETIST, CERTIFIED REGISTERED

## 2018-03-26 PROCEDURE — 71000033 HC RECOVERY, INTIAL HOUR: Performed by: COLON & RECTAL SURGERY

## 2018-03-26 PROCEDURE — 94761 N-INVAS EAR/PLS OXIMETRY MLT: CPT

## 2018-03-26 PROCEDURE — D9220A PRA ANESTHESIA: Mod: CRNA,,, | Performed by: NURSE ANESTHETIST, CERTIFIED REGISTERED

## 2018-03-26 PROCEDURE — 27000221 HC OXYGEN, UP TO 24 HOURS

## 2018-03-26 PROCEDURE — 88300 SURGICAL PATH GROSS: CPT | Mod: 26,,, | Performed by: PATHOLOGY

## 2018-03-26 PROCEDURE — 11008 RMV PRSTC MTRL/MESH ABD WALL: CPT | Mod: ,,, | Performed by: COLON & RECTAL SURGERY

## 2018-03-26 PROCEDURE — 63600175 PHARM REV CODE 636 W HCPCS: Performed by: ANESTHESIOLOGY

## 2018-03-26 PROCEDURE — S0030 INJECTION, METRONIDAZOLE: HCPCS | Performed by: COLON & RECTAL SURGERY

## 2018-03-26 RX ORDER — NEOSTIGMINE METHYLSULFATE 1 MG/ML
INJECTION, SOLUTION INTRAVENOUS
Status: DISCONTINUED | OUTPATIENT
Start: 2018-03-26 | End: 2018-03-26

## 2018-03-26 RX ORDER — OXYCODONE HYDROCHLORIDE 5 MG/1
TABLET ORAL
Status: DISPENSED
Start: 2018-03-26 | End: 2018-03-27

## 2018-03-26 RX ORDER — SODIUM CHLORIDE 9 MG/ML
INJECTION, SOLUTION INTRAVENOUS CONTINUOUS
Status: DISCONTINUED | OUTPATIENT
Start: 2018-03-26 | End: 2018-03-26

## 2018-03-26 RX ORDER — CALCIUM CARBONATE 200(500)MG
1000 TABLET,CHEWABLE ORAL EVERY 8 HOURS PRN
Status: DISCONTINUED | OUTPATIENT
Start: 2018-03-26 | End: 2018-03-27 | Stop reason: HOSPADM

## 2018-03-26 RX ORDER — ACETAMINOPHEN 10 MG/ML
1000 INJECTION, SOLUTION INTRAVENOUS EVERY 8 HOURS
Status: COMPLETED | OUTPATIENT
Start: 2018-03-26 | End: 2018-03-27

## 2018-03-26 RX ORDER — NALOXONE HCL 0.4 MG/ML
0.02 VIAL (ML) INJECTION
Status: DISCONTINUED | OUTPATIENT
Start: 2018-03-26 | End: 2018-03-27 | Stop reason: HOSPADM

## 2018-03-26 RX ORDER — MELOXICAM 7.5 MG/1
TABLET ORAL
Status: DISPENSED
Start: 2018-03-26 | End: 2018-03-27

## 2018-03-26 RX ORDER — BUPIVACAINE HYDROCHLORIDE 2.5 MG/ML
INJECTION, SOLUTION EPIDURAL; INFILTRATION; INTRACAUDAL
Status: DISCONTINUED | OUTPATIENT
Start: 2018-03-26 | End: 2018-03-26

## 2018-03-26 RX ORDER — DIPHENHYDRAMINE HYDROCHLORIDE 50 MG/ML
12.5 INJECTION INTRAMUSCULAR; INTRAVENOUS EVERY 6 HOURS PRN
Status: DISCONTINUED | OUTPATIENT
Start: 2018-03-26 | End: 2018-03-27 | Stop reason: HOSPADM

## 2018-03-26 RX ORDER — SODIUM CHLORIDE 9 MG/ML
INJECTION, SOLUTION INTRAVENOUS CONTINUOUS PRN
Status: DISCONTINUED | OUTPATIENT
Start: 2018-03-26 | End: 2018-03-26

## 2018-03-26 RX ORDER — ONDANSETRON 2 MG/ML
4 INJECTION INTRAMUSCULAR; INTRAVENOUS EVERY 6 HOURS PRN
Status: DISCONTINUED | OUTPATIENT
Start: 2018-03-26 | End: 2018-03-27 | Stop reason: HOSPADM

## 2018-03-26 RX ORDER — METOCLOPRAMIDE HYDROCHLORIDE 5 MG/ML
5 INJECTION INTRAMUSCULAR; INTRAVENOUS EVERY 6 HOURS PRN
Status: DISCONTINUED | OUTPATIENT
Start: 2018-03-26 | End: 2018-03-27 | Stop reason: HOSPADM

## 2018-03-26 RX ORDER — IBUPROFEN 400 MG/1
800 TABLET ORAL EVERY 8 HOURS
Status: DISCONTINUED | OUTPATIENT
Start: 2018-03-26 | End: 2018-03-27 | Stop reason: HOSPADM

## 2018-03-26 RX ORDER — FENTANYL CITRATE 50 UG/ML
INJECTION, SOLUTION INTRAMUSCULAR; INTRAVENOUS
Status: DISPENSED
Start: 2018-03-26 | End: 2018-03-27

## 2018-03-26 RX ORDER — LIDOCAINE HYDROCHLORIDE 10 MG/ML
INJECTION, SOLUTION INTRAVENOUS
Status: DISCONTINUED | OUTPATIENT
Start: 2018-03-26 | End: 2018-03-26

## 2018-03-26 RX ORDER — ONDANSETRON 2 MG/ML
INJECTION INTRAMUSCULAR; INTRAVENOUS
Status: DISCONTINUED | OUTPATIENT
Start: 2018-03-26 | End: 2018-03-26

## 2018-03-26 RX ORDER — DOCUSATE SODIUM 100 MG/1
100 CAPSULE, LIQUID FILLED ORAL 2 TIMES DAILY
Status: DISCONTINUED | OUTPATIENT
Start: 2018-03-26 | End: 2018-03-27 | Stop reason: HOSPADM

## 2018-03-26 RX ORDER — MELOXICAM 7.5 MG/1
7.5 TABLET ORAL DAILY
Status: DISCONTINUED | OUTPATIENT
Start: 2018-03-26 | End: 2018-03-26

## 2018-03-26 RX ORDER — ACETAMINOPHEN 10 MG/ML
1000 INJECTION, SOLUTION INTRAVENOUS
Status: COMPLETED | OUTPATIENT
Start: 2018-03-26 | End: 2018-03-26

## 2018-03-26 RX ORDER — PROPOFOL 10 MG/ML
VIAL (ML) INTRAVENOUS
Status: DISCONTINUED | OUTPATIENT
Start: 2018-03-26 | End: 2018-03-26

## 2018-03-26 RX ORDER — MIDAZOLAM HYDROCHLORIDE 1 MG/ML
INJECTION, SOLUTION INTRAMUSCULAR; INTRAVENOUS
Status: DISCONTINUED | OUTPATIENT
Start: 2018-03-26 | End: 2018-03-26

## 2018-03-26 RX ORDER — MEPERIDINE HYDROCHLORIDE 50 MG/ML
25 INJECTION INTRAMUSCULAR; INTRAVENOUS; SUBCUTANEOUS EVERY 4 HOURS PRN
Status: DISCONTINUED | OUTPATIENT
Start: 2018-03-26 | End: 2018-03-27 | Stop reason: HOSPADM

## 2018-03-26 RX ORDER — GLYCOPYRROLATE 0.2 MG/ML
INJECTION INTRAMUSCULAR; INTRAVENOUS
Status: DISCONTINUED | OUTPATIENT
Start: 2018-03-26 | End: 2018-03-26

## 2018-03-26 RX ORDER — OXYCODONE HYDROCHLORIDE 5 MG/1
5 TABLET ORAL EVERY 4 HOURS PRN
Status: DISCONTINUED | OUTPATIENT
Start: 2018-03-26 | End: 2018-03-27 | Stop reason: HOSPADM

## 2018-03-26 RX ORDER — SODIUM CHLORIDE, SODIUM LACTATE, POTASSIUM CHLORIDE, CALCIUM CHLORIDE 600; 310; 30; 20 MG/100ML; MG/100ML; MG/100ML; MG/100ML
INJECTION, SOLUTION INTRAVENOUS CONTINUOUS
Status: DISCONTINUED | OUTPATIENT
Start: 2018-03-26 | End: 2018-03-27 | Stop reason: HOSPADM

## 2018-03-26 RX ORDER — SIMETHICONE 80 MG
1 TABLET,CHEWABLE ORAL EVERY 8 HOURS PRN
Status: DISCONTINUED | OUTPATIENT
Start: 2018-03-26 | End: 2018-03-27 | Stop reason: HOSPADM

## 2018-03-26 RX ORDER — OXYCODONE HYDROCHLORIDE 5 MG/1
10 TABLET ORAL EVERY 4 HOURS PRN
Status: DISCONTINUED | OUTPATIENT
Start: 2018-03-26 | End: 2018-03-27 | Stop reason: HOSPADM

## 2018-03-26 RX ORDER — FENTANYL CITRATE 50 UG/ML
25 INJECTION, SOLUTION INTRAMUSCULAR; INTRAVENOUS EVERY 5 MIN PRN
Status: DISCONTINUED | OUTPATIENT
Start: 2018-03-26 | End: 2018-03-26 | Stop reason: HOSPADM

## 2018-03-26 RX ORDER — ONDANSETRON 2 MG/ML
4 INJECTION INTRAMUSCULAR; INTRAVENOUS DAILY PRN
Status: DISCONTINUED | OUTPATIENT
Start: 2018-03-26 | End: 2018-03-26 | Stop reason: HOSPADM

## 2018-03-26 RX ORDER — ROCURONIUM BROMIDE 10 MG/ML
INJECTION, SOLUTION INTRAVENOUS
Status: DISCONTINUED | OUTPATIENT
Start: 2018-03-26 | End: 2018-03-26

## 2018-03-26 RX ORDER — MUPIROCIN 20 MG/G
OINTMENT TOPICAL
Status: DISCONTINUED | OUTPATIENT
Start: 2018-03-26 | End: 2018-03-26

## 2018-03-26 RX ORDER — METRONIDAZOLE 500 MG/100ML
500 INJECTION, SOLUTION INTRAVENOUS
Status: COMPLETED | OUTPATIENT
Start: 2018-03-26 | End: 2018-03-26

## 2018-03-26 RX ORDER — FENTANYL CITRATE 50 UG/ML
INJECTION, SOLUTION INTRAMUSCULAR; INTRAVENOUS
Status: DISCONTINUED | OUTPATIENT
Start: 2018-03-26 | End: 2018-03-26

## 2018-03-26 RX ADMIN — FENTANYL CITRATE 150 MCG: 50 INJECTION, SOLUTION INTRAMUSCULAR; INTRAVENOUS at 11:03

## 2018-03-26 RX ADMIN — SODIUM CHLORIDE, POTASSIUM CHLORIDE, SODIUM LACTATE AND CALCIUM CHLORIDE: 600; 310; 30; 20 INJECTION, SOLUTION INTRAVENOUS at 12:03

## 2018-03-26 RX ADMIN — LIDOCAINE HYDROCHLORIDE 100 MG: 10 INJECTION, SOLUTION INTRAVENOUS at 11:03

## 2018-03-26 RX ADMIN — OXYCODONE HYDROCHLORIDE 10 MG: 5 TABLET ORAL at 12:03

## 2018-03-26 RX ADMIN — SODIUM CHLORIDE: 0.9 INJECTION, SOLUTION INTRAVENOUS at 08:03

## 2018-03-26 RX ADMIN — MUPIROCIN: 20 OINTMENT TOPICAL at 08:03

## 2018-03-26 RX ADMIN — ACETAMINOPHEN 1000 MG: 10 INJECTION, SOLUTION INTRAVENOUS at 03:03

## 2018-03-26 RX ADMIN — IBUPROFEN 800 MG: 400 TABLET, FILM COATED ORAL at 10:03

## 2018-03-26 RX ADMIN — METRONIDAZOLE 500 MG: 500 SOLUTION INTRAVENOUS at 11:03

## 2018-03-26 RX ADMIN — FENTANYL CITRATE 50 MCG: 50 INJECTION, SOLUTION INTRAMUSCULAR; INTRAVENOUS at 11:03

## 2018-03-26 RX ADMIN — FENTANYL CITRATE 25 MCG: 50 INJECTION, SOLUTION INTRAMUSCULAR; INTRAVENOUS at 12:03

## 2018-03-26 RX ADMIN — GENTAMICIN SULFATE 349.5 MG: 40 INJECTION, SOLUTION INTRAMUSCULAR; INTRAVENOUS at 11:03

## 2018-03-26 RX ADMIN — DIPHENHYDRAMINE HYDROCHLORIDE 12.5 MG: 50 INJECTION, SOLUTION INTRAMUSCULAR; INTRAVENOUS at 09:03

## 2018-03-26 RX ADMIN — BUPIVACAINE 20 ML: 13.3 INJECTION, SUSPENSION, LIPOSOMAL INFILTRATION at 11:03

## 2018-03-26 RX ADMIN — PROPOFOL 50 MG: 10 INJECTION, EMULSION INTRAVENOUS at 11:03

## 2018-03-26 RX ADMIN — PROPOFOL 150 MG: 10 INJECTION, EMULSION INTRAVENOUS at 11:03

## 2018-03-26 RX ADMIN — DOCUSATE SODIUM 100 MG: 100 CAPSULE, LIQUID FILLED ORAL at 09:03

## 2018-03-26 RX ADMIN — IBUPROFEN 800 MG: 800 INJECTION INTRAVENOUS at 08:03

## 2018-03-26 RX ADMIN — MIDAZOLAM HYDROCHLORIDE 2 MG: 1 INJECTION, SOLUTION INTRAMUSCULAR; INTRAVENOUS at 11:03

## 2018-03-26 RX ADMIN — GLYCOPYRROLATE 0.4 MG: 0.2 INJECTION, SOLUTION INTRAMUSCULAR; INTRAVENOUS at 12:03

## 2018-03-26 RX ADMIN — ROCURONIUM BROMIDE 40 MG: 10 INJECTION, SOLUTION INTRAVENOUS at 11:03

## 2018-03-26 RX ADMIN — FENTANYL CITRATE 50 MCG: 50 INJECTION, SOLUTION INTRAMUSCULAR; INTRAVENOUS at 12:03

## 2018-03-26 RX ADMIN — SODIUM CHLORIDE: 0.9 INJECTION, SOLUTION INTRAVENOUS at 11:03

## 2018-03-26 RX ADMIN — ONDANSETRON 4 MG: 2 INJECTION INTRAMUSCULAR; INTRAVENOUS at 11:03

## 2018-03-26 RX ADMIN — BUPIVACAINE HYDROCHLORIDE 30 ML: 2.5 INJECTION, SOLUTION EPIDURAL; INFILTRATION; INTRACAUDAL; PERINEURAL at 11:03

## 2018-03-26 RX ADMIN — OXYCODONE HYDROCHLORIDE 5 MG: 5 TABLET ORAL at 04:03

## 2018-03-26 RX ADMIN — ACETAMINOPHEN 1000 MG: 10 INJECTION, SOLUTION INTRAVENOUS at 08:03

## 2018-03-26 RX ADMIN — MELOXICAM 7.5 MG: 7.5 TABLET ORAL at 12:03

## 2018-03-26 RX ADMIN — NEOSTIGMINE METHYLSULFATE 4 MG: 1 INJECTION INTRAVENOUS at 12:03

## 2018-03-26 RX ADMIN — SODIUM CHLORIDE, SODIUM GLUCONATE, SODIUM ACETATE, POTASSIUM CHLORIDE, MAGNESIUM CHLORIDE, SODIUM PHOSPHATE, DIBASIC, AND POTASSIUM PHOSPHATE: .53; .5; .37; .037; .03; .012; .00082 INJECTION, SOLUTION INTRAVENOUS at 11:03

## 2018-03-26 NOTE — INTERVAL H&P NOTE
The patient has been examined and the H&P has been reviewed:    I concur with the findings and no changes have occurred since H&P was written.    Anesthesia/Surgery risks, benefits and alternative options discussed and understood by patient/family.          Active Hospital Problems    Diagnosis  POA    Incisional hernia without obstruction or gangrene [K43.2]  Yes      Resolved Hospital Problems    Diagnosis Date Resolved POA   No resolved problems to display.

## 2018-03-26 NOTE — NURSING TRANSFER
Nursing Transfer Note      3/26/2018     Transfer To: [address]B    Transfer via stretcher    Transfer with IV pole    Transported by PCT    Medicines sent: none    Chart send with patient: Yes    Notified: unable to reach - sent text notifying of room number    Patient reassessed at: 0722

## 2018-03-26 NOTE — ANESTHESIA PREPROCEDURE EVALUATION
03/26/2018  Tanesha Davis is a 67 y.o., female here for hernia repair    Patient Active Problem List   Diagnosis    Insomnia    Reflux    Postmenopausal status    Postmenopausal hormone replacement therapy    History of colon cancer    Functional constipation    Incisional hernia    Draining cutaneous sinus tract    Bilateral knee pain    Nocturnal leg cramps    Knee pain, bilateral    Screening    Wound, surgical, nonhealing    Incisional hernia without obstruction or gangrene         Anesthesia Evaluation    I have reviewed the Patient Summary Reports.    I have reviewed the Nursing Notes.   I have reviewed the Medications.     Review of Systems  Anesthesia Hx:  Hx of Anesthetic complications PONV   Cardiovascular:  Cardiovascular Normal     Pulmonary:  Pulmonary Normal    Neurological:   Headaches    Endocrine:  Endocrine Normal        Physical Exam  General:  Well nourished    Airway/Jaw/Neck:  Airway Findings: Mouth Opening: Normal Tongue: Normal  General Airway Assessment: Adult  Mallampati: II  TM Distance: Normal, at least 6 cm       Chest/Lungs:  Chest/Lungs Findings: Clear to auscultation, Normal Respiratory Rate     Heart/Vascular:  Heart Findings: Rate: Normal  Rhythm: Regular Rhythm             Anesthesia Plan  Type of Anesthesia, risks & benefits discussed:  Anesthesia Type:  general  Patient's Preference:   Intra-op Monitoring Plan: standard ASA monitors  Intra-op Monitoring Plan Comments:   Post Op Pain Control Plan: IV/PO Opioids PRN  Post Op Pain Control Plan Comments:   Induction:   IV  Beta Blocker:  Patient is not currently on a Beta-Blocker (No further documentation required).       Informed Consent: Patient understands risks and agrees with Anesthesia plan.  Questions answered. Anesthesia consent signed with patient.  ASA Score: 2     Day of Surgery Review of History &  Physical:        Anesthesia Plan Notes: Given sublingual ondansetron and scopolamine patch in DOSC        Ready For Surgery From Anesthesia Perspective.

## 2018-03-26 NOTE — TRANSFER OF CARE
"Anesthesia Transfer of Care Note    Patient: Tanesha Davis    Procedure(s) Performed: Procedure(s) (LRB):  REPAIR-HERNIA-INCISIONAL-RECURRENT (N/A)    Patient location: PACU    Anesthesia Type: general    Transport from OR: Transported from OR on room air with adequate spontaneous ventilation. Transported from OR on 100% O2 by closed face mask with adequate spontaneous ventilation    Post pain: adequate analgesia    Post assessment: tolerated procedure well and no apparent anesthetic complications    Post vital signs: stable    Level of consciousness: awake and alert    Nausea/Vomiting: no nausea/vomiting    Complications: none    Transfer of care protocol was followed      Last vitals:   Visit Vitals  BP (!) 143/63 (BP Location: Right arm, Patient Position: Lying)   Pulse 75   Temp 36.5 °C (97.7 °F) (Oral)   Resp 18   Ht 5' 5" (1.651 m)   Wt 88.5 kg (195 lb)   SpO2 98%   Breastfeeding? No   BMI 32.45 kg/m²     "

## 2018-03-26 NOTE — H&P (VIEW-ONLY)
HISTORY OF PRESENT ILLNESS:  66 year old female patient presents today for f/u of rectal cancer and umbilical hernia     Had repair and wound excision by Dr Muse 2/2014 and 7/2105. 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   Colonoscopy 3/30/07, 2010  and 8/13/16 .  normal post op   CEA Feb 0.7 CEA 1.0 11/12/09 = 2.0     Has history of constipation. Takes Miralax  1 capful daily and fiber tablets. Ducolax weekly to initiate BM.   PAST SURGICAL HISTORY: TAHBSO, Needle biopsy of breast. As above    SOCIAL HISTORY: Lives with spouse.    FAMILY HISTORY: Denies family history of colon cancer.     ROS:  GENERAL: No fever, chills, has had weight gain.  CHEST: Denies SAN, cyanosis, wheezing, cough and sputum production.  CARDIOVASCULAR: Denies chest pain, PND, orthopnea or reduced exercise tolerance.    PE:   APPEARANCE: Well nourished, well developed, in no acute distress.   CHEST: Lungs clear. Normal respiratory effort.  CARDIOVASCULAR: Normal S1, S2. No rubs, murmurs or gallops. No edema.  ABDOMEN: Soft. Healed incision. No masses or tenderness. Small umbilical hernia, reducable, slightly larger than last visit  RECTAL : NST no masses or tenderness    IMP: History of rectal cancer Stage 2, umbilical hernia and non healing wound  PLAN;DIScusse doptions. Recommend wound excision and repair.  I have explained the procedure including indications, alternatives, expected outcomes and potential complications. The patient appears to understand and gives informed consent. The patient is medically ready for surgery.

## 2018-03-26 NOTE — ANESTHESIA POSTPROCEDURE EVALUATION
"Anesthesia Post Evaluation    Patient: Tanesha Davis    Procedure(s) Performed: Procedure(s) (LRB):  REPAIR-HERNIA-INCISIONAL-RECURRENT (N/A)    Final Anesthesia Type: general  Patient location during evaluation: PACU  Patient participation: Yes- Able to Participate  Level of consciousness: awake and alert  Post-procedure vital signs: reviewed and stable  Pain management: adequate  Airway patency: patent  PONV status at discharge: No PONV  Anesthetic complications: no      Cardiovascular status: blood pressure returned to baseline  Respiratory status: nasal airway  Hydration status: euvolemic  Follow-up not needed.        Visit Vitals  BP (!) 117/56   Pulse 71   Temp 36.2 °C (97.2 °F) (Temporal)   Resp 16   Ht 5' 5" (1.651 m)   Wt 88.5 kg (195 lb)   SpO2 100%   Breastfeeding? No   BMI 32.45 kg/m²       Pain/Alondra Score: Pain Assessment Performed: Yes (3/26/2018  2:00 PM)  Presence of Pain: complains of pain/discomfort (3/26/2018  2:00 PM)  Pain Rating Prior to Med Admin: 7 (3/26/2018 12:55 PM)  Alondra Score: 9 (3/26/2018  2:00 PM)      "

## 2018-03-26 NOTE — BRIEF OP NOTE
[facility]  Colon and Rectal Surgery  Operative Note    SUMMARY     Date of Procedure: 3/26/2018     Procedure: Procedure(s) (LRB):  REPAIR-HERNIA-INCISIONAL-RECURRENT (N/A)     Surgeon(s) and Role:     * Cuco Meraz MD - Primary    Assisting Surgeon: None    Pre-Operative Diagnosis: Incisional hernia, without obstruction or gangrene [K43.2]    Post-Operative Diagnosis: Post-Op Diagnosis Codes:     * Incisional hernia, without obstruction or gangrene [K43.2]    Anesthesia: General, Exparel 266 mg, Marcaine 0.25% (75 mg) Saline 10 cc preperironeal injection      Technical Procedures Used: excison of suture granuloma and infected mesh, primary closure  Description of the Findings of the Procedure:  suture granuloma and infected mesh, imncisonal hernia 4 cm latera;l defecte with omentum incarcerated  Significant Surgical Tasks Conducted by the Assistant(s), if Applicable: n/a    Complications: No    Estimated Blood Loss (EBL): 150 mL           Implants: * No implants in log *    Specimens:   Specimen (12h ago through future)    Start     Ordered    Pending  Specimen to Pathology - Surgery  Once     Comments:  1. Old Mesh - perm      Pending           Condition: Good    Disposition: PACU - hemodynamically stable.    Attestation: I was present and scrubbed for the entire procedure.

## 2018-03-27 VITALS
TEMPERATURE: 97 F | SYSTOLIC BLOOD PRESSURE: 120 MMHG | DIASTOLIC BLOOD PRESSURE: 58 MMHG | HEART RATE: 75 BPM | RESPIRATION RATE: 15 BRPM | HEIGHT: 65 IN | OXYGEN SATURATION: 95 % | BODY MASS INDEX: 31.65 KG/M2 | WEIGHT: 190 LBS

## 2018-03-27 PROCEDURE — 25000003 PHARM REV CODE 250: Performed by: COLON & RECTAL SURGERY

## 2018-03-27 PROCEDURE — 63600175 PHARM REV CODE 636 W HCPCS: Performed by: COLON & RECTAL SURGERY

## 2018-03-27 RX ORDER — OXYCODONE HYDROCHLORIDE 5 MG/1
5 CAPSULE ORAL EVERY 4 HOURS PRN
Qty: 30 CAPSULE | Refills: 0 | Status: SHIPPED | OUTPATIENT
Start: 2018-03-27 | End: 2018-04-04 | Stop reason: SDUPTHER

## 2018-03-27 RX ADMIN — OXYCODONE HYDROCHLORIDE 10 MG: 5 TABLET ORAL at 01:03

## 2018-03-27 RX ADMIN — ACETAMINOPHEN 1000 MG: 10 INJECTION, SOLUTION INTRAVENOUS at 08:03

## 2018-03-27 RX ADMIN — ACETAMINOPHEN 1000 MG: 10 INJECTION, SOLUTION INTRAVENOUS at 12:03

## 2018-03-27 RX ADMIN — DIPHENHYDRAMINE HYDROCHLORIDE 12.5 MG: 50 INJECTION, SOLUTION INTRAMUSCULAR; INTRAVENOUS at 11:03

## 2018-03-27 RX ADMIN — OXYCODONE HYDROCHLORIDE 10 MG: 5 TABLET ORAL at 09:03

## 2018-03-27 RX ADMIN — IBUPROFEN 800 MG: 400 TABLET, FILM COATED ORAL at 05:03

## 2018-03-27 RX ADMIN — OXYCODONE HYDROCHLORIDE 10 MG: 5 TABLET ORAL at 05:03

## 2018-03-27 RX ADMIN — DOCUSATE SODIUM 100 MG: 100 CAPSULE, LIQUID FILLED ORAL at 08:03

## 2018-03-27 NOTE — HOSPITAL COURSE
She had a normal post op course, on discharge day she is maryam low fiber diet, voiding without diff, positive for flatus,, inc line healing well, amb in [place] without diff, adequate pain control with oral meds, VS stable and AF, fu 2 weeks with Dr. Meraz

## 2018-03-27 NOTE — PLAN OF CARE
Problem: Patient Care Overview  Goal: Plan of Care Review  Outcome: Ongoing (interventions implemented as appropriate)  Pt AAOX4, VSS. Surgical site CDI. No family at bedside. Pt is resting quietly. IVF infusing. No s/s infection, skin breakdown/pressure ulcers - pt moves independently well. Pain managed with scheduled medications. Breathing exercises encouraged. SCD's on and functioning. Fall precautions maintained. Will monitor.

## 2018-03-27 NOTE — DISCHARGE SUMMARY
[facility]  Colorectal Surgery  Discharge Summary      Patient Name: Tanesha Davis  MRN: [id number]  Admission Date: 3/26/2018  Hospital Length of Stay: 1 days  Discharge Date and Time: 3/27/2018  1:52 PM  Attending Physician: No att. providers found   Discharging Provider: Jenni Hess NP  Primary Care Provider: Caleb Hernandez MD     HPI:  The patient is a 67-year-old female who originally   had a rectal cancer, T3N0, underwent a low anterior resection in 2006.  She   subsequently had incisional hernia and underwent a repair, wound excision by Dr. Howard in 2014 and 2015.  She now has a chronically draining sinus in her   midline wound.  She presented to us for possible repair of her hernia.  Risks,   benefits and alternatives were discussed with the patient.  Consent was obtained   for repair of incisional hernia.    Procedure(s) (LRB):  REPAIR-HERNIA-INCISIONAL-RECURRENT (N/A)     Hospital Course:  She had a normal post op course, on discharge day she is maryam low fiber diet, voiding without diff, positive for flatus,, inc line healing well, amb in [place] without diff, adequate pain control with oral meds, VS stable and AF, fu 2 weeks with Dr. Meraz        Significant Diagnostic Studies: Labs: BMP: No results for input(s): GLU, NA, K, CL, CO2, BUN, CREATININE, CALCIUM, MG in the last 48 hours. and CBC No results for input(s): WBC, HGB, HCT, PLT in the last 48 hours.    Pending Diagnostic Studies:     None        Final Active Diagnoses:    Diagnosis Date Noted POA    PRINCIPAL PROBLEM:  Incisional hernia without obstruction or gangrene [K43.2] 03/26/2018 Yes      Problems Resolved During this Admission:    Diagnosis Date Noted Date Resolved POA      Discharged Condition: good    Disposition: Home or Self Care    Follow Up:  Follow-up Information     Cuco Meraz MD In 2 weeks.    Specialty:  Colon and Rectal Surgery  Contact information:  [address]  [facility]  [id number]  [phone]                 Patient Instructions:     Diet Adult Regular   Order Comments: Low fiber diet, no fresh fruit or fresh vegetables. No nuts, popcorn, grapes or raisins for approx 6 weeks     Lifting restrictions   Order Comments: No lifting anything greater than 5 pounds     Call MD for:  persistent nausea and vomiting or diarrhea     Call MD for:  severe uncontrolled pain     Call MD for:  redness, tenderness, or signs of infection (pain, swelling, redness, odor or green/yellow discharge around incision site)     Call MD for:  difficulty breathing or increased cough     Call MD for:  severe persistent headache     Call MD for:  worsening rash     Call MD for:  persistent dizziness, light-headedness, or visual disturbances     Call MD for:  increased confusion or weakness     Call MD for:   Order Comments: Call for temp above 101       Medications:  Reconciled Home Medications:      Medication List      START taking these medications    oxyCODONE 5 mg Cap  Commonly known as:  OXY-IR  Take 1 capsule (5 mg total) by mouth every 4 (four) hours as needed for Pain.        CONTINUE taking these medications    acetaminophen 325 MG tablet  Commonly known as:  TYLENOL     bisacodyl 10 mg Supp  Commonly known as:  DULCOLAX  Place 1 suppository (10 mg total) rectally daily as needed.     cyanocobalamin 500 MCG tablet     docusate sodium 100 MG capsule  Commonly known as:  COLACE  Take 1 capsule (100 mg total) by mouth 2 (two) times daily.     estradiol 1 MG tablet  Commonly known as:  ESTRACE  Take 1 tablet (1 mg total) by mouth every morning.     fiber Chew     magnesium oxide-pyridoxine 362-20 mg Tab  Commonly known as:  BEELITH  Take 1 tablet by mouth once daily.     meloxicam 7.5 MG tablet  Commonly known as:  MOBIC  TAKE ONE TABLET BY MOUTH EVERY DAY     [facility] ORAL     VITAMIN D ORAL     VITAMIN E ORAL           Where to Get Your Medications      You can get these medications from any  pharmacy    Bring a paper prescription for each of these medications  · oxyCODONE 5 mg Cap         Jenni Hess NP  Colorectal Surgery  [facility]

## 2018-03-27 NOTE — OP NOTE
DATE OF PROCEDURE:  03/26/2018.    PREOPERATIVE DIAGNOSIS:  Incisional hernia with chronic draining sinus.    POSTOPERATIVE DIAGNOSIS:  Incisional hernia with chronic draining sinus.    PROCEDURES PERFORMED:  Exploratory laparotomy, lysis of adhesions, excision of   mesh, primary repair of incisional hernia.    SURGEON:  Cuco Meraz M.D.    ASSISTANTS:  Ray Christian M.D., Fellow, and Winston Gonzales, PGY4.    ANESTHESIA:  General.    ESTIMATED BLOOD LOSS:  150 mL.    SPECIMENS:  Old mesh.    FINDINGS:  1.  Chronic draining sinus at the patient's midline incision around the   umbilicus.  2.  Old permanent mesh found with middle of mesh watered up and communicating   with draining sinus.  3.  Lateral edges of the mesh looked normal and uninfected.    INDICATIONS FOR PROCEDURE:  The patient is a 67-year-old female who originally   had a rectal cancer, T3N0, underwent a low anterior resection in 2006.  She   subsequently had incisional hernia and underwent a repair, wound excision by Dr. Howard in 2014 and 2015.  She now has a chronically draining sinus in her   midline wound.  She presented to us for possible repair of her hernia.  Risks,   benefits and alternatives were discussed with the patient.  Consent was obtained   for repair of incisional hernia.    DETAILS OF PROCEDURE:  The patient was brought to the Operating Room and was   placed under general anesthesia after SCDs were on and pumping.  A Jeffrey   catheter was placed in the sterile manner.  The patient was prepped and draped   in the standard sterile technique, and prior to incision, a timeout was   performed confirming the correct patient, procedure, surgeon and perioperative   medications.  Using a #10 blade scalpel, the patient's midline scar was excised   and this was brought down to the level of the fascia.  The patient had a   preexisting chronic draining sinus just inferior to the umbilicus.  The   abdominal cavity was entered and dense adhesions of  the patient's omentum were   taken down off of the anterior abdominal wall.  The mesh was visualized and the   associated portion of mesh with the sinus was excised out to lateral edges,   which were found to be clean and without evidence of infection.  We continued to   clear off the anterior abdominal wall and  the subcutaneous tissues   off of the anterior fascia to create laxity to aid with our repair.  Once we had   shaved off the infected-appearing mesh back to healthy-appearing tissue, we   then went about beginning to reapproximate the fascial edges to repair the   hernia.  Prior to this, we did do a lap count, which was correct and also did   bilateral TAP blocks using Exparel solution.  We also ensured that we had good   hemostasis in the abdominal cavity.  Once this was done, we then closed the   midline defect using #1 non-looped PDS sutures from the superior and inferior   aspect in a running fashion.  The skin was then closed using 4-0 Monocryl in a   subcuticular fashion.  Dermabond was applied, and the patient was transferred to   the PACU in stable condition.  Dr. Meraz was present for all aspects of the   surgery.    DICTATED BY:  Ray Christian M.D., Fellow.      GRACIELA  dd: 03/27/2018 08:14:55 (CDT)  td: 03/27/2018 10:51:05 (CDT)  Doc ID   #[id number]  Job ID #[id number]    CC:

## 2018-03-27 NOTE — PROGRESS NOTES
Pt discharged to home via wheelchair. Pt denies pain at the present time. Condition stable. Follows commands. Pt given prescriptions and copy of discharge home care instructions. Pt verbalized understanding of activity limitations and s/s of when to call the Dr. Pt also given information on followup appointment. All belongings with the pt. Pt verbalized understanding of all discharge instructions.     Iv removed and catheter intact. Pt waiting for transport. Will cont to monitor.

## 2018-03-27 NOTE — HPI
The patient is a 67-year-old female who originally   had a rectal cancer, T3N0, underwent a low anterior resection in 2006.  She   subsequently had incisional hernia and underwent a repair, wound excision by Dr. Howard in 2014 and 2015.  She now has a chronically draining sinus in her   midline wound.  She presented to us for possible repair of her hernia.  Risks,   benefits and alternatives were discussed with the patient.  Consent was obtained   for repair of incisional hernia.

## 2018-04-02 ENCOUNTER — PATIENT MESSAGE (OUTPATIENT)
Dept: SURGERY | Facility: CLINIC | Age: 68
End: 2018-04-02

## 2018-04-02 ENCOUNTER — TELEPHONE (OUTPATIENT)
Dept: SURGERY | Facility: CLINIC | Age: 68
End: 2018-04-02

## 2018-04-02 NOTE — TELEPHONE ENCOUNTER
----- Message from Grecia Faith MA sent at 4/2/2018  9:58 AM CDT -----  Contact: [phone]  Post op pt has an incision that oozing bloody mucus and would like to come in and have it looked at by Dr Meraz. No open time available till 4/26.  Please advise.     [phone]

## 2018-04-02 NOTE — TELEPHONE ENCOUNTER
Spoke with pt and scheduled appt on 4/4. Pt denies fever. Instructed pt to go to the ER if temp 101 or >. Pt verbalized understanding.

## 2018-04-04 ENCOUNTER — OFFICE VISIT (OUTPATIENT)
Dept: SURGERY | Facility: CLINIC | Age: 68
End: 2018-04-04
Payer: MEDICARE

## 2018-04-04 VITALS
WEIGHT: 196 LBS | HEART RATE: 83 BPM | SYSTOLIC BLOOD PRESSURE: 147 MMHG | HEIGHT: 65 IN | DIASTOLIC BLOOD PRESSURE: 67 MMHG | BODY MASS INDEX: 32.65 KG/M2

## 2018-04-04 DIAGNOSIS — K43.2 INCISIONAL HERNIA, WITHOUT OBSTRUCTION OR GANGRENE: Primary | ICD-10-CM

## 2018-04-04 PROCEDURE — 99999 PR PBB SHADOW E&M-EST. PATIENT-LVL III: CPT | Mod: PBBFAC,,, | Performed by: COLON & RECTAL SURGERY

## 2018-04-04 PROCEDURE — 99024 POSTOP FOLLOW-UP VISIT: CPT | Mod: S$GLB,,, | Performed by: COLON & RECTAL SURGERY

## 2018-04-04 RX ORDER — OXYCODONE HYDROCHLORIDE 5 MG/1
5 CAPSULE ORAL EVERY 4 HOURS PRN
Qty: 30 CAPSULE | Refills: 0 | Status: SHIPPED | OUTPATIENT
Start: 2018-04-04 | End: 2018-05-23

## 2018-04-04 RX ORDER — CLINDAMYCIN HYDROCHLORIDE 150 MG/1
150 CAPSULE ORAL 3 TIMES DAILY
Qty: 30 CAPSULE | Refills: 0 | Status: SHIPPED | OUTPATIENT
Start: 2018-04-04 | End: 2018-05-23

## 2018-04-04 NOTE — PROGRESS NOTES
HISTORY OF PRESENT ILLNESS:  66 year old female patient presents today for f/u of rectal cancer and umbilical hernia    3/26/18 Umbilical hernia repair., has some dtainage    Had repair and wound excision by Dr Muse 2/2014 and 7/2105. 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   Colonoscopy 3/30/07, 2010  and 8/13/16 .  normal post op   CEA Feb 0.7 CEA 1.0 11/12/09 = 2.0                                Has history of constipation. Takes Miralax  1 capful daily and fiber tablets. Ducolax weekly to initiate BM.   PAST SURGICAL HISTORY: TAHBSO, Needle biopsy of breast. As above    SOCIAL HISTORY: Lives with spouse.    FAMILY HISTORY: Denies family history of colon cancer.     ROS:  GENERAL: No fever, chills, has had weight gain.  CHEST: Denies SAN, cyanosis, wheezing, cough and sputum production.  CARDIOVASCULAR: Denies chest pain, PND, orthopnea or reduced exercise tolerance.    PE:   APPEARANCE: Well nourished, well developed, in no acute distress.   CHEST: Lungs clear. Normal respiratory effort.  CARDIOVASCULAR: Normal S1, S2. No rubs, murmurs or gallops. No edema.  ABDOMEN: Soft. Healing incision. Small amount pf sengous drainage, minimal eerythremia around incision  RECTAL :normal skin  IMP: s/p hernia repair  PLAN;Clindomycin for 10 days, wound care, RTc 3-4 weeks

## 2018-04-12 RX ORDER — MELOXICAM 7.5 MG/1
TABLET ORAL
Qty: 90 TABLET | Refills: 1 | Status: SHIPPED | OUTPATIENT
Start: 2018-04-12 | End: 2018-10-04 | Stop reason: SDUPTHER

## 2018-04-12 RX ORDER — ESTRADIOL 1 MG/1
TABLET ORAL
Qty: 90 TABLET | OUTPATIENT
Start: 2018-04-12

## 2018-04-16 ENCOUNTER — PATIENT MESSAGE (OUTPATIENT)
Dept: INTERNAL MEDICINE | Facility: CLINIC | Age: 68
End: 2018-04-16

## 2018-04-16 DIAGNOSIS — Z79.890 HORMONE REPLACEMENT THERAPY: Primary | ICD-10-CM

## 2018-04-16 RX ORDER — ESTRADIOL 1 MG/1
1 TABLET ORAL EVERY MORNING
Qty: 90 TABLET | Refills: 0 | Status: SHIPPED | OUTPATIENT
Start: 2018-04-16 | End: 2018-07-25 | Stop reason: ALTCHOICE

## 2018-05-02 ENCOUNTER — OFFICE VISIT (OUTPATIENT)
Dept: SURGERY | Facility: CLINIC | Age: 68
End: 2018-05-02
Payer: MEDICARE

## 2018-05-02 VITALS
HEIGHT: 65 IN | DIASTOLIC BLOOD PRESSURE: 63 MMHG | HEART RATE: 68 BPM | BODY MASS INDEX: 32.21 KG/M2 | SYSTOLIC BLOOD PRESSURE: 147 MMHG | WEIGHT: 193.31 LBS

## 2018-05-02 DIAGNOSIS — T81.89XD NON-HEALING SURGICAL WOUND, SUBSEQUENT ENCOUNTER: ICD-10-CM

## 2018-05-02 DIAGNOSIS — K43.2 INCISIONAL HERNIA, WITHOUT OBSTRUCTION OR GANGRENE: Primary | ICD-10-CM

## 2018-05-02 PROCEDURE — 99024 POSTOP FOLLOW-UP VISIT: CPT | Mod: S$GLB,,, | Performed by: COLON & RECTAL SURGERY

## 2018-05-02 PROCEDURE — 99999 PR PBB SHADOW E&M-EST. PATIENT-LVL III: CPT | Mod: PBBFAC,,, | Performed by: COLON & RECTAL SURGERY

## 2018-05-02 NOTE — PROGRESS NOTES
HISTORY OF PRESENT ILLNESS:  66 year old female patient presents today for f/u of rectal cancer and umbilical hernia    3/26/18 Umbilical hernia repair., has some dtainage. Small area not healed some drainage. No fevers, no errythremia    Had repair and wound excision by Dr Muse 2/2014 and 7/2105. 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   Colonoscopy 3/30/07, 2010  and 8/13/16 .  normal post op   CEA Feb 0.7 CEA 1.0 11/12/09 = 2.0                                Has history of constipation. Takes Miralax  1 capful daily and fiber tablets. Ducolax weekly to initiate BM.   PAST SURGICAL HISTORY: TAHBSO, Needle biopsy of breast. As above    SOCIAL HISTORY: Lives with spouse.    FAMILY HISTORY: Denies family history of colon cancer.     ROS:  GENERAL: No fever, chills, has had weight gain.  CHEST: Denies SAN, cyanosis, wheezing, cough and sputum production.  CARDIOVASCULAR: Denies chest pain, PND, orthopnea or reduced exercise tolerance.    PE:   APPEARANCE: Well nourished, well developed, in no acute distress.   CHEST: Lungs clear. Normal respiratory effort.  CARDIOVASCULAR: Normal S1, S2. No rubs, murmurs or gallops. No edema.  ABDOMEN: Soft. Healing incision. Small amount pf sengous drainage,no erythremia around incision  RECTAL :normal skin  IMP: s/p hernia repair  PLAN;wound care. RTC 3 weeks if no improvement may need reexcision of mesh

## 2018-05-23 ENCOUNTER — OFFICE VISIT (OUTPATIENT)
Dept: SURGERY | Facility: CLINIC | Age: 68
End: 2018-05-23
Payer: MEDICARE

## 2018-05-23 VITALS
BODY MASS INDEX: 32.87 KG/M2 | SYSTOLIC BLOOD PRESSURE: 150 MMHG | DIASTOLIC BLOOD PRESSURE: 67 MMHG | WEIGHT: 197.56 LBS | HEART RATE: 71 BPM

## 2018-05-23 DIAGNOSIS — T81.89XD NON-HEALING SURGICAL WOUND, SUBSEQUENT ENCOUNTER: Primary | ICD-10-CM

## 2018-05-23 PROCEDURE — 99999 PR PBB SHADOW E&M-EST. PATIENT-LVL III: CPT | Mod: PBBFAC,,, | Performed by: COLON & RECTAL SURGERY

## 2018-05-23 PROCEDURE — 99024 POSTOP FOLLOW-UP VISIT: CPT | Mod: S$GLB,,, | Performed by: COLON & RECTAL SURGERY

## 2018-05-23 PROCEDURE — 3008F BODY MASS INDEX DOCD: CPT | Mod: CPTII,S$GLB,, | Performed by: COLON & RECTAL SURGERY

## 2018-05-23 RX ORDER — SULFAMETHOXAZOLE AND TRIMETHOPRIM 800; 160 MG/1; MG/1
1 TABLET ORAL 2 TIMES DAILY
Qty: 60 TABLET | Refills: 0 | Status: SHIPPED | OUTPATIENT
Start: 2018-05-23 | End: 2018-06-28

## 2018-05-23 NOTE — PROGRESS NOTES
HISTORY OF PRESENT ILLNESS:  67 year old female patient presents today for f/u of rectal cancer and umbilical hernia. Continues with low grade infection and drainage from middle of midline wound. Offered operaton but patient would like to try  another course of antibiotics    3/26/18 Umbilical hernia repair., has some dtainage. Small area not healed some drainage. No fevers, no errythremia    Had repair and wound excision by Dr Muse 2/2014 and 7/2105. 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   Colonoscopy 3/30/07, 2010  and 8/13/16 .  normal post op   CEA Feb 0.7 CEA 1.0 11/12/09 = 2.0                                Has history of constipation. Takes Miralax  1 capful daily and fiber tablets. Ducolax weekly to initiate BM.   PAST SURGICAL HISTORY: TAHBSO, Needle biopsy of breast. As above    SOCIAL HISTORY: Lives with spouse.    FAMILY HISTORY: Denies family history of colon cancer.     ROS:  GENERAL: No fever, chills, has had weight gain.  CHEST: Denies SAN, cyanosis, wheezing, cough and sputum production.  CARDIOVASCULAR: Denies chest pain, PND, orthopnea or reduced exercise tolerance.    PE:   APPEARANCE: Well nourished, well developed, in no acute distress.   CHEST: Lungs clear. Normal respiratory effort.  CARDIOVASCULAR: Normal S1, S2. No rubs, murmurs or gallops. No edema.  ABDOMEN: Soft. Healing incision. Small amount pf sengous drainage,no erythremia around incision  RECTAL :normal skin  IMP: s/p hernia repair  PLAN;wound care . Bactrim RTC 3 weeks if no improvement may need reexcision of mesh

## 2018-06-28 ENCOUNTER — OFFICE VISIT (OUTPATIENT)
Dept: SURGERY | Facility: CLINIC | Age: 68
End: 2018-06-28
Payer: MEDICARE

## 2018-06-28 VITALS
BODY MASS INDEX: 32.76 KG/M2 | HEART RATE: 70 BPM | SYSTOLIC BLOOD PRESSURE: 140 MMHG | WEIGHT: 196.63 LBS | DIASTOLIC BLOOD PRESSURE: 56 MMHG | HEIGHT: 65 IN

## 2018-06-28 DIAGNOSIS — T81.89XD NON-HEALING SURGICAL WOUND, SUBSEQUENT ENCOUNTER: Primary | ICD-10-CM

## 2018-06-28 DIAGNOSIS — T81.89XA: ICD-10-CM

## 2018-06-28 PROCEDURE — 99024 POSTOP FOLLOW-UP VISIT: CPT | Mod: S$GLB,,, | Performed by: COLON & RECTAL SURGERY

## 2018-06-28 PROCEDURE — 99999 PR PBB SHADOW E&M-EST. PATIENT-LVL III: CPT | Mod: PBBFAC,,, | Performed by: COLON & RECTAL SURGERY

## 2018-06-28 RX ORDER — MUPIROCIN 20 MG/G
OINTMENT TOPICAL
Status: CANCELLED | OUTPATIENT
Start: 2018-06-28

## 2018-06-28 RX ORDER — METRONIDAZOLE 500 MG/100ML
500 INJECTION, SOLUTION INTRAVENOUS
Status: CANCELLED | OUTPATIENT
Start: 2018-06-28

## 2018-06-28 RX ORDER — ACETAMINOPHEN 10 MG/ML
1000 INJECTION, SOLUTION INTRAVENOUS
Status: CANCELLED | OUTPATIENT
Start: 2018-06-28 | End: 2018-06-28

## 2018-06-28 RX ORDER — SODIUM CHLORIDE 9 MG/ML
INJECTION, SOLUTION INTRAVENOUS CONTINUOUS
Status: CANCELLED | OUTPATIENT
Start: 2018-06-28

## 2018-06-28 NOTE — H&P
HISTORY OF PRESENT ILLNESS:  67 year old female patient presents today for f/u of rectal cancer and umbilical hernia. Continues with low grade infection and drainage from middle of midline wound.  HAs not healed despite antibiotics3/26/18 Umbilical hernia repair., has some dtainage. Small area not healed some drainage. No fevers, no errythremia    Had repair and wound excision by Dr Muse 2/2014 and 7/2105. 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   Colonoscopy 3/30/07, 2010  and 8/13/16 .  normal post op   CEA Feb 0.7 CEA 1.0 11/12/09 = 2.0                                Has history of constipation. Takes Miralax  1 capful daily and fiber tablets. Ducolax weekly to initiate BM.   PAST SURGICAL HISTORY: TAHBSO, Needle biopsy of breast. As above  Past Surgical History:   Procedure Laterality Date    BREAST BIOPSY      CHOLECYSTECTOMY      COLECTOMY      COLON SURGERY      COLONOSCOPY N/A 8/23/2016    Procedure: COLONOSCOPY;  Surgeon: Cuco Meraz MD;  Location: [address];  Service: Endoscopy;  Laterality: N/A;    HERNIA REPAIR      HYSTERECTOMY      KNEE ARTHROSCOPY      KNEE SURGERY      right and left knee repair    OOPHORECTOMY           SOCIAL HISTORY: Lives with spouse.    FAMILY HISTORY: Denies family history of colon cancer.     ROS:  GENERAL: No fever, chills, has had weight gain.  CHEST: Denies SAN, cyanosis, wheezing, cough and sputum production.  CARDIOVASCULAR: Denies chest pain, PND, orthopnea or reduced exercise tolerance.    PE:   APPEARANCE: Well nourished, well developed, in no acute distress.   CHEST: Lungs clear. Normal respiratory effort.  CARDIOVASCULAR: Normal S1, S2. No rubs, murmurs or gallops. No edema.  ABDOMEN: Soft. Healing incision. 1-2 cm pocket above umbilicus    RECTAL :normal skin  IMP: non healing wound possible   PLAN;wound exploration and possible  reexcision of mesh.  I have explained the procedure including indications, alternatives, expected outcomes and potential complications. The patient appears to understand and gives informed consent. The patient is medically ready for surgery.

## 2018-06-28 NOTE — PROGRESS NOTES
HISTORY OF PRESENT ILLNESS:  67 year old female patient presents today for f/u of rectal cancer and umbilical hernia. Continues with low grade infection and drainage from middle of midline wound.  HAs not healed despite antibiotics3/26/18 Umbilical hernia repair., has some dtainage. Small area not healed some drainage. No fevers, no errythremia    Had repair and wound excision by Dr Muse 2/2014 and 7/2105. 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   Colonoscopy 3/30/07, 2010  and 8/13/16 .  normal post op   CEA Feb 0.7 CEA 1.0 11/12/09 = 2.0                                Has history of constipation. Takes Miralax  1 capful daily and fiber tablets. Ducolax weekly to initiate BM.   PAST SURGICAL HISTORY: TAHBSO, Needle biopsy of breast. As above  Past Surgical History:   Procedure Laterality Date    BREAST BIOPSY      CHOLECYSTECTOMY      COLECTOMY      COLON SURGERY      COLONOSCOPY N/A 8/23/2016    Procedure: COLONOSCOPY;  Surgeon: Cuco Meraz MD;  Location: [address];  Service: Endoscopy;  Laterality: N/A;    HERNIA REPAIR      HYSTERECTOMY      KNEE ARTHROSCOPY      KNEE SURGERY      right and left knee repair    OOPHORECTOMY           SOCIAL HISTORY: Lives with spouse.    FAMILY HISTORY: Denies family history of colon cancer.     ROS:  GENERAL: No fever, chills, has had weight gain.  CHEST: Denies SAN, cyanosis, wheezing, cough and sputum production.  CARDIOVASCULAR: Denies chest pain, PND, orthopnea or reduced exercise tolerance.    PE:   APPEARANCE: Well nourished, well developed, in no acute distress.   CHEST: Lungs clear. Normal respiratory effort.  CARDIOVASCULAR: Normal S1, S2. No rubs, murmurs or gallops. No edema.  ABDOMEN: Soft. Healing incision. 1-2 cm pocket above umbilicus    RECTAL :normal skin  IMP: non healing wound possible   PLAN;wound exploration and possible  reexcision of mesh.  I have explained the procedure including indications, alternatives, expected outcomes and potential complications. The patient appears to understand and gives informed consent. The patient is medically ready for surgery.

## 2018-07-09 ENCOUNTER — ANESTHESIA EVENT (OUTPATIENT)
Dept: SURGERY | Facility: HOSPITAL | Age: 68
DRG: 857 | End: 2018-07-09
Payer: MEDICARE

## 2018-07-09 ENCOUNTER — TELEPHONE (OUTPATIENT)
Dept: SURGERY | Facility: CLINIC | Age: 68
End: 2018-07-09

## 2018-07-09 NOTE — PRE-PROCEDURE INSTRUCTIONS
"Spoke with Patient.  NPO, medication, and pre-op instructions reviewed.  Arrival time 1100.  Instructed that she can eat and drink until 0300, have clear liquids between 0300 - 0830, and then to remain NPO after 0830.  Had "severe" PONV with Anesthesia 2-27-14.  Has had surgery since without incident.  Stated that the skin around the hernia is sensitive, "turns rashy, then peels, and stays red."  Vitamins and Mobic are on Hold.  Verbalized understanding of instructions.    "

## 2018-07-09 NOTE — ANESTHESIA PREPROCEDURE EVALUATION
"                                                                                                             07/09/2018  Tanesha Davis is a 67 y.o., female.    Anesthesia Evaluation         Review of Systems  Anesthesia Hx:  Personal Hx of Anesthesia complications, Post-Operative Nausea/Vomiting, in the past, but not with recent anesthetics / prophylaxis       Physical Exam  General:  Well nourished    Airway/Jaw/Neck:  Airway Findings: Mouth Opening: Normal Tongue: Normal  General Airway Assessment: Adult  Mallampati: II  Improves to II with phonation.  TM Distance: Normal, at least 6 cm      Dental:  Dental Findings: In tact   Chest/Lungs:  Chest/Lungs Findings: Clear to auscultation     Heart/Vascular:  Heart Findings: Rate: Normal  Rhythm: Regular Rhythm  Sounds: Normal        Mental Status:  Mental Status Findings:  Cooperative, Alert and Oriented      Had "severe" PONV with Anesthesia 2-27-14.  Has had surgery since without incident.      Anesthesia Plan  Type of Anesthesia, risks & benefits discussed:  Anesthesia Type:  general  Patient's Preference: General  Intra-op Monitoring Plan: standard ASA monitors  Intra-op Monitoring Plan Comments: Standard ASA monitors.   Post Op Pain Control Plan: per primary service following discharge from PACU  Post Op Pain Control Plan Comments: Per primary service.     Induction:   IV  Beta Blocker:  Patient is not currently on a Beta-Blocker (No further documentation required).       Informed Consent: Patient understands risks and agrees with Anesthesia plan.  Questions answered. Anesthesia consent signed with patient.  ASA Score: 2     Day of Surgery Review of History & Physical:    H&P update referred to the surgeon.     Anesthesia Plan Notes: Chart reviewed, patient interviewed and examined.  The plan for general anesthesia was explained.  Questions were answered and the consent was signed.  Mariah GARCÍA         Ready For Surgery From Anesthesia Perspective.       "

## 2018-07-10 ENCOUNTER — HOSPITAL ENCOUNTER (INPATIENT)
Facility: HOSPITAL | Age: 68
LOS: 1 days | Discharge: HOME | DRG: 857 | End: 2018-07-11
Attending: COLON & RECTAL SURGERY | Admitting: COLON & RECTAL SURGERY
Payer: MEDICARE

## 2018-07-10 ENCOUNTER — ANESTHESIA (OUTPATIENT)
Dept: SURGERY | Facility: HOSPITAL | Age: 68
DRG: 857 | End: 2018-07-10
Payer: MEDICARE

## 2018-07-10 DIAGNOSIS — T81.89XA: Primary | ICD-10-CM

## 2018-07-10 DIAGNOSIS — T81.89XD NON-HEALING SURGICAL WOUND, SUBSEQUENT ENCOUNTER: ICD-10-CM

## 2018-07-10 PROCEDURE — 71000039 HC RECOVERY, EACH ADD'L HOUR: Performed by: COLON & RECTAL SURGERY

## 2018-07-10 PROCEDURE — 63600175 PHARM REV CODE 636 W HCPCS: Performed by: COLON & RECTAL SURGERY

## 2018-07-10 PROCEDURE — D9220A PRA ANESTHESIA: Mod: ANES,,, | Performed by: ANESTHESIOLOGY

## 2018-07-10 PROCEDURE — 36000706: Performed by: COLON & RECTAL SURGERY

## 2018-07-10 PROCEDURE — 88305 TISSUE EXAM BY PATHOLOGIST: CPT | Mod: 26,,, | Performed by: PATHOLOGY

## 2018-07-10 PROCEDURE — 25000003 PHARM REV CODE 250: Performed by: STUDENT IN AN ORGANIZED HEALTH CARE EDUCATION/TRAINING PROGRAM

## 2018-07-10 PROCEDURE — 63600175 PHARM REV CODE 636 W HCPCS: Performed by: ANESTHESIOLOGY

## 2018-07-10 PROCEDURE — D9220A PRA ANESTHESIA: Mod: CRNA,,, | Performed by: NURSE ANESTHETIST, CERTIFIED REGISTERED

## 2018-07-10 PROCEDURE — 20600001 HC STEP DOWN PRIVATE ROOM

## 2018-07-10 PROCEDURE — 25000003 PHARM REV CODE 250: Performed by: COLON & RECTAL SURGERY

## 2018-07-10 PROCEDURE — 88305 TISSUE EXAM BY PATHOLOGIST: CPT | Performed by: PATHOLOGY

## 2018-07-10 PROCEDURE — 37000008 HC ANESTHESIA 1ST 15 MINUTES: Performed by: COLON & RECTAL SURGERY

## 2018-07-10 PROCEDURE — 25000003 PHARM REV CODE 250: Performed by: NURSE ANESTHETIST, CERTIFIED REGISTERED

## 2018-07-10 PROCEDURE — 27000221 HC OXYGEN, UP TO 24 HOURS

## 2018-07-10 PROCEDURE — 0WQF0ZZ REPAIR ABDOMINAL WALL, OPEN APPROACH: ICD-10-PCS | Performed by: COLON & RECTAL SURGERY

## 2018-07-10 PROCEDURE — 22999 UNLISTED PX ABDOMEN MUSCSKEL: CPT | Mod: ,,, | Performed by: COLON & RECTAL SURGERY

## 2018-07-10 PROCEDURE — C9290 INJ, BUPIVACAINE LIPOSOME: HCPCS | Performed by: COLON & RECTAL SURGERY

## 2018-07-10 PROCEDURE — 71000033 HC RECOVERY, INTIAL HOUR: Performed by: COLON & RECTAL SURGERY

## 2018-07-10 PROCEDURE — 63600175 PHARM REV CODE 636 W HCPCS: Performed by: NURSE ANESTHETIST, CERTIFIED REGISTERED

## 2018-07-10 PROCEDURE — S0030 INJECTION, METRONIDAZOLE: HCPCS | Performed by: COLON & RECTAL SURGERY

## 2018-07-10 PROCEDURE — 94761 N-INVAS EAR/PLS OXIMETRY MLT: CPT

## 2018-07-10 PROCEDURE — 36000707: Performed by: COLON & RECTAL SURGERY

## 2018-07-10 PROCEDURE — 37000009 HC ANESTHESIA EA ADD 15 MINS: Performed by: COLON & RECTAL SURGERY

## 2018-07-10 PROCEDURE — 0WPF0JZ REMOVAL OF SYNTHETIC SUBSTITUTE FROM ABDOMINAL WALL, OPEN APPROACH: ICD-10-PCS | Performed by: COLON & RECTAL SURGERY

## 2018-07-10 PROCEDURE — G0378 HOSPITAL OBSERVATION PER HR: HCPCS

## 2018-07-10 RX ORDER — SODIUM CHLORIDE 9 MG/ML
INJECTION, SOLUTION INTRAVENOUS CONTINUOUS
Status: DISCONTINUED | OUTPATIENT
Start: 2018-07-10 | End: 2018-07-10

## 2018-07-10 RX ORDER — DIPHENHYDRAMINE HYDROCHLORIDE 50 MG/ML
25 INJECTION INTRAMUSCULAR; INTRAVENOUS EVERY 6 HOURS PRN
Status: DISCONTINUED | OUTPATIENT
Start: 2018-07-10 | End: 2018-07-10

## 2018-07-10 RX ORDER — RAMELTEON 8 MG/1
8 TABLET ORAL NIGHTLY PRN
Status: DISCONTINUED | OUTPATIENT
Start: 2018-07-10 | End: 2018-07-11 | Stop reason: HOSPADM

## 2018-07-10 RX ORDER — SODIUM CHLORIDE 9 MG/ML
INJECTION, SOLUTION INTRAVENOUS CONTINUOUS
Status: DISCONTINUED | OUTPATIENT
Start: 2018-07-10 | End: 2018-07-11

## 2018-07-10 RX ORDER — OXYCODONE HYDROCHLORIDE 5 MG/1
5 TABLET ORAL EVERY 4 HOURS PRN
Status: DISCONTINUED | OUTPATIENT
Start: 2018-07-10 | End: 2018-07-11 | Stop reason: HOSPADM

## 2018-07-10 RX ORDER — ROCURONIUM BROMIDE 10 MG/ML
INJECTION, SOLUTION INTRAVENOUS
Status: DISCONTINUED | OUTPATIENT
Start: 2018-07-10 | End: 2018-07-10

## 2018-07-10 RX ORDER — ACETAMINOPHEN 325 MG/1
650 TABLET ORAL EVERY 4 HOURS PRN
Status: DISCONTINUED | OUTPATIENT
Start: 2018-07-10 | End: 2018-07-11 | Stop reason: HOSPADM

## 2018-07-10 RX ORDER — DIPHENHYDRAMINE HYDROCHLORIDE 50 MG/ML
25 INJECTION INTRAMUSCULAR; INTRAVENOUS EVERY 4 HOURS PRN
Status: DISCONTINUED | OUTPATIENT
Start: 2018-07-10 | End: 2018-07-11 | Stop reason: HOSPADM

## 2018-07-10 RX ORDER — DOCUSATE SODIUM 100 MG/1
100 CAPSULE, LIQUID FILLED ORAL 2 TIMES DAILY
Status: DISCONTINUED | OUTPATIENT
Start: 2018-07-10 | End: 2018-07-11 | Stop reason: HOSPADM

## 2018-07-10 RX ORDER — ONDANSETRON 8 MG/1
8 TABLET, ORALLY DISINTEGRATING ORAL EVERY 8 HOURS PRN
Status: DISCONTINUED | OUTPATIENT
Start: 2018-07-10 | End: 2018-07-11 | Stop reason: HOSPADM

## 2018-07-10 RX ORDER — FENTANYL CITRATE 50 UG/ML
INJECTION, SOLUTION INTRAMUSCULAR; INTRAVENOUS
Status: DISCONTINUED | OUTPATIENT
Start: 2018-07-10 | End: 2018-07-10

## 2018-07-10 RX ORDER — OXYCODONE HYDROCHLORIDE 5 MG/1
5 TABLET ORAL EVERY 4 HOURS PRN
Status: DISCONTINUED | OUTPATIENT
Start: 2018-07-10 | End: 2018-07-10

## 2018-07-10 RX ORDER — FENTANYL CITRATE 50 UG/ML
25 INJECTION, SOLUTION INTRAMUSCULAR; INTRAVENOUS EVERY 5 MIN PRN
Status: COMPLETED | OUTPATIENT
Start: 2018-07-10 | End: 2018-07-10

## 2018-07-10 RX ORDER — ACETAMINOPHEN 10 MG/ML
INJECTION, SOLUTION INTRAVENOUS
Status: DISCONTINUED | OUTPATIENT
Start: 2018-07-10 | End: 2018-07-10

## 2018-07-10 RX ORDER — BUPIVACAINE HYDROCHLORIDE 2.5 MG/ML
INJECTION, SOLUTION EPIDURAL; INFILTRATION; INTRACAUDAL
Status: DISCONTINUED | OUTPATIENT
Start: 2018-07-10 | End: 2018-07-10 | Stop reason: HOSPADM

## 2018-07-10 RX ORDER — CYANOCOBALAMIN (VITAMIN B-12) 250 MCG
500 TABLET ORAL EVERY MORNING
Status: DISCONTINUED | OUTPATIENT
Start: 2018-07-11 | End: 2018-07-11 | Stop reason: HOSPADM

## 2018-07-10 RX ORDER — DEXAMETHASONE SODIUM PHOSPHATE 4 MG/ML
INJECTION, SOLUTION INTRA-ARTICULAR; INTRALESIONAL; INTRAMUSCULAR; INTRAVENOUS; SOFT TISSUE
Status: DISCONTINUED | OUTPATIENT
Start: 2018-07-10 | End: 2018-07-10

## 2018-07-10 RX ORDER — OXYCODONE HYDROCHLORIDE 5 MG/1
5 TABLET ORAL EVERY 4 HOURS PRN
Qty: 30 TABLET | Refills: 0 | Status: SHIPPED | OUTPATIENT
Start: 2018-07-10 | End: 2018-07-18

## 2018-07-10 RX ORDER — ONDANSETRON 2 MG/ML
INJECTION INTRAMUSCULAR; INTRAVENOUS
Status: DISCONTINUED | OUTPATIENT
Start: 2018-07-10 | End: 2018-07-10

## 2018-07-10 RX ORDER — ESTRADIOL 0.5 MG/1
1 TABLET ORAL EVERY MORNING
Status: DISCONTINUED | OUTPATIENT
Start: 2018-07-11 | End: 2018-07-11 | Stop reason: HOSPADM

## 2018-07-10 RX ORDER — ACETAMINOPHEN 10 MG/ML
1000 INJECTION, SOLUTION INTRAVENOUS
Status: COMPLETED | OUTPATIENT
Start: 2018-07-10 | End: 2018-07-10

## 2018-07-10 RX ORDER — RAMELTEON 8 MG/1
8 TABLET ORAL NIGHTLY PRN
Status: DISCONTINUED | OUTPATIENT
Start: 2018-07-10 | End: 2018-07-10 | Stop reason: SDUPTHER

## 2018-07-10 RX ORDER — SODIUM CHLORIDE 0.9 % (FLUSH) 0.9 %
3 SYRINGE (ML) INJECTION
Status: DISCONTINUED | OUTPATIENT
Start: 2018-07-10 | End: 2018-07-11 | Stop reason: HOSPADM

## 2018-07-10 RX ORDER — GLYCOPYRROLATE 0.2 MG/ML
INJECTION INTRAMUSCULAR; INTRAVENOUS
Status: DISCONTINUED | OUTPATIENT
Start: 2018-07-10 | End: 2018-07-10

## 2018-07-10 RX ORDER — BISACODYL 10 MG
10 SUPPOSITORY, RECTAL RECTAL DAILY PRN
Status: DISCONTINUED | OUTPATIENT
Start: 2018-07-10 | End: 2018-07-11 | Stop reason: HOSPADM

## 2018-07-10 RX ORDER — MIDAZOLAM HYDROCHLORIDE 1 MG/ML
INJECTION, SOLUTION INTRAMUSCULAR; INTRAVENOUS
Status: DISCONTINUED | OUTPATIENT
Start: 2018-07-10 | End: 2018-07-10

## 2018-07-10 RX ORDER — ACETAMINOPHEN 325 MG/1
650 TABLET ORAL EVERY 8 HOURS PRN
Status: DISCONTINUED | OUTPATIENT
Start: 2018-07-10 | End: 2018-07-11 | Stop reason: HOSPADM

## 2018-07-10 RX ORDER — SODIUM CHLORIDE 0.9 % (FLUSH) 0.9 %
3 SYRINGE (ML) INJECTION
Status: DISCONTINUED | OUTPATIENT
Start: 2018-07-10 | End: 2018-07-10

## 2018-07-10 RX ORDER — PROPOFOL 10 MG/ML
VIAL (ML) INTRAVENOUS
Status: DISCONTINUED | OUTPATIENT
Start: 2018-07-10 | End: 2018-07-10

## 2018-07-10 RX ORDER — ACETAMINOPHEN 325 MG/1
650 TABLET ORAL EVERY 8 HOURS PRN
Status: DISCONTINUED | OUTPATIENT
Start: 2018-07-10 | End: 2018-07-10

## 2018-07-10 RX ORDER — NEOSTIGMINE METHYLSULFATE 1 MG/ML
INJECTION, SOLUTION INTRAVENOUS
Status: DISCONTINUED | OUTPATIENT
Start: 2018-07-10 | End: 2018-07-10

## 2018-07-10 RX ORDER — ONDANSETRON 8 MG/1
8 TABLET, ORALLY DISINTEGRATING ORAL EVERY 8 HOURS PRN
Status: DISCONTINUED | OUTPATIENT
Start: 2018-07-10 | End: 2018-07-10 | Stop reason: SDUPTHER

## 2018-07-10 RX ORDER — LIDOCAINE HCL/PF 100 MG/5ML
SYRINGE (ML) INTRAVENOUS
Status: DISCONTINUED | OUTPATIENT
Start: 2018-07-10 | End: 2018-07-10

## 2018-07-10 RX ORDER — DIPHENHYDRAMINE HYDROCHLORIDE 50 MG/ML
25 INJECTION INTRAMUSCULAR; INTRAVENOUS EVERY 4 HOURS PRN
Status: DISCONTINUED | OUTPATIENT
Start: 2018-07-10 | End: 2018-07-10 | Stop reason: SDUPTHER

## 2018-07-10 RX ORDER — MUPIROCIN 20 MG/G
OINTMENT TOPICAL
Status: DISCONTINUED | OUTPATIENT
Start: 2018-07-10 | End: 2018-07-10

## 2018-07-10 RX ORDER — OXYCODONE HYDROCHLORIDE 5 MG/1
10 TABLET ORAL EVERY 4 HOURS PRN
Status: DISCONTINUED | OUTPATIENT
Start: 2018-07-10 | End: 2018-07-11 | Stop reason: HOSPADM

## 2018-07-10 RX ORDER — MELOXICAM 7.5 MG/1
7.5 TABLET ORAL DAILY
Status: DISCONTINUED | OUTPATIENT
Start: 2018-07-11 | End: 2018-07-11 | Stop reason: HOSPADM

## 2018-07-10 RX ORDER — METRONIDAZOLE 500 MG/100ML
500 INJECTION, SOLUTION INTRAVENOUS
Status: COMPLETED | OUTPATIENT
Start: 2018-07-10 | End: 2018-07-10

## 2018-07-10 RX ADMIN — PROPOFOL 150 MG: 10 INJECTION, EMULSION INTRAVENOUS at 02:07

## 2018-07-10 RX ADMIN — LIDOCAINE HYDROCHLORIDE 100 MG: 20 INJECTION, SOLUTION INTRAVENOUS at 02:07

## 2018-07-10 RX ADMIN — DOCUSATE SODIUM 100 MG: 100 CAPSULE, LIQUID FILLED ORAL at 09:07

## 2018-07-10 RX ADMIN — NEOSTIGMINE METHYLSULFATE 5 MG: 1 INJECTION INTRAVENOUS at 03:07

## 2018-07-10 RX ADMIN — FENTANYL CITRATE 25 MCG: 50 INJECTION, SOLUTION INTRAMUSCULAR; INTRAVENOUS at 04:07

## 2018-07-10 RX ADMIN — IBUPROFEN 800 MG: 800 INJECTION INTRAVENOUS at 11:07

## 2018-07-10 RX ADMIN — DEXAMETHASONE SODIUM PHOSPHATE 4 MG: 4 INJECTION, SOLUTION INTRAMUSCULAR; INTRAVENOUS at 02:07

## 2018-07-10 RX ADMIN — ACETAMINOPHEN 1000 MG: 10 INJECTION, SOLUTION INTRAVENOUS at 11:07

## 2018-07-10 RX ADMIN — FENTANYL CITRATE 100 MCG: 50 INJECTION, SOLUTION INTRAMUSCULAR; INTRAVENOUS at 02:07

## 2018-07-10 RX ADMIN — ONDANSETRON 4 MG: 2 INJECTION INTRAMUSCULAR; INTRAVENOUS at 02:07

## 2018-07-10 RX ADMIN — GENTAMICIN SULFATE 349.5 MG: 40 INJECTION, SOLUTION INTRAMUSCULAR; INTRAVENOUS at 02:07

## 2018-07-10 RX ADMIN — ROCURONIUM BROMIDE 40 MG: 10 INJECTION, SOLUTION INTRAVENOUS at 02:07

## 2018-07-10 RX ADMIN — OXYCODONE HYDROCHLORIDE 5 MG: 5 TABLET ORAL at 06:07

## 2018-07-10 RX ADMIN — GLYCOPYRROLATE 0.4 MG: 0.2 INJECTION, SOLUTION INTRAMUSCULAR; INTRAVENOUS at 03:07

## 2018-07-10 RX ADMIN — ACETAMINOPHEN 1000 MG: 10 INJECTION, SOLUTION INTRAVENOUS at 03:07

## 2018-07-10 RX ADMIN — GLYCOPYRROLATE 0.2 MG: 0.2 INJECTION, SOLUTION INTRAMUSCULAR; INTRAVENOUS at 02:07

## 2018-07-10 RX ADMIN — SODIUM CHLORIDE: 0.9 INJECTION, SOLUTION INTRAVENOUS at 09:07

## 2018-07-10 RX ADMIN — METRONIDAZOLE 500 MG: 500 SOLUTION INTRAVENOUS at 02:07

## 2018-07-10 RX ADMIN — MUPIROCIN: 20 OINTMENT TOPICAL at 11:07

## 2018-07-10 RX ADMIN — OXYCODONE HYDROCHLORIDE 10 MG: 5 TABLET ORAL at 09:07

## 2018-07-10 RX ADMIN — MIDAZOLAM HYDROCHLORIDE 2 MG: 1 INJECTION, SOLUTION INTRAMUSCULAR; INTRAVENOUS at 02:07

## 2018-07-10 RX ADMIN — SODIUM CHLORIDE: 0.9 INJECTION, SOLUTION INTRAVENOUS at 11:07

## 2018-07-10 NOTE — H&P (VIEW-ONLY)
HISTORY OF PRESENT ILLNESS:  67 year old female patient presents today for f/u of rectal cancer and umbilical hernia. Continues with low grade infection and drainage from middle of midline wound.  HAs not healed despite antibiotics3/26/18 Umbilical hernia repair., has some dtainage. Small area not healed some drainage. No fevers, no errythremia    Had repair and wound excision by Dr Muse 2/2014 and 7/2105. 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   Colonoscopy 3/30/07, 2010  and 8/13/16 .  normal post op   CEA Feb 0.7 CEA 1.0 11/12/09 = 2.0                                Has history of constipation. Takes Miralax  1 capful daily and fiber tablets. Ducolax weekly to initiate BM.   PAST SURGICAL HISTORY: TAHBSO, Needle biopsy of breast. As above  Past Surgical History:   Procedure Laterality Date    BREAST BIOPSY      CHOLECYSTECTOMY      COLECTOMY      COLON SURGERY      COLONOSCOPY N/A 8/23/2016    Procedure: COLONOSCOPY;  Surgeon: Cuco Meraz MD;  Location: [address];  Service: Endoscopy;  Laterality: N/A;    HERNIA REPAIR      HYSTERECTOMY      KNEE ARTHROSCOPY      KNEE SURGERY      right and left knee repair    OOPHORECTOMY           SOCIAL HISTORY: Lives with spouse.    FAMILY HISTORY: Denies family history of colon cancer.     ROS:  GENERAL: No fever, chills, has had weight gain.  CHEST: Denies SAN, cyanosis, wheezing, cough and sputum production.  CARDIOVASCULAR: Denies chest pain, PND, orthopnea or reduced exercise tolerance.    PE:   APPEARANCE: Well nourished, well developed, in no acute distress.   CHEST: Lungs clear. Normal respiratory effort.  CARDIOVASCULAR: Normal S1, S2. No rubs, murmurs or gallops. No edema.  ABDOMEN: Soft. Healing incision. 1-2 cm pocket above umbilicus    RECTAL :normal skin  IMP: non healing wound possible   PLAN;wound exploration and possible  reexcision of mesh.  I have explained the procedure including indications, alternatives, expected outcomes and potential complications. The patient appears to understand and gives informed consent. The patient is medically ready for surgery.

## 2018-07-10 NOTE — BRIEF OP NOTE
[facility]  Colon and Rectal Surgery  Operative Note    SUMMARY     Date of Procedure: 7/10/2018     Procedure: Procedure(s):  REPAIR, HERNIA, INCISIONAL, RECURRENT ,. Repair enterotomy (inherent in procedure)    Surgeon(s) and Role:     * Cuco Meraz MD - Primary    Assisting Surgeon: None    Pre-Operative Diagnosis: Non-healing surgical wound, subsequent encounter [T81.89XD]    Post-Operative Diagnosis: Post-Op Diagnosis Codes:     * Non-healing surgical wound, subsequent encounter [T81.89XD]    Anesthesia: GET, Exparel 266 mg, Marcaine 0.25% (75 mg)  preperironeal injection    Technical Procedures Used:removal infectewd mesh and small incisional hernia  Description of the Findings of the Procedure: Infected mesh with sutures adherent small bowel, small enterotomt repaired  Significant Surgical Tasks Conducted by the Assistant(s), if Applicable:n/a  Complications: No    Estimated Blood Loss (EBL): 30 cc         Implants: * No implants in log *    Specimens:   Specimen (12h ago through future)    Start     Ordered    07/10/18 1528  Specimen to Pathology - Surgery  Once     Comments:  1. Incision and infected mesh- permanent      07/10/18 1527           Condition: Good    Disposition: PACU - hemodynamically stable.    Attestation: I was present and scrubbed for the entire procedure.

## 2018-07-10 NOTE — INTERVAL H&P NOTE
The patient has been examined and the H&P has been reviewed:    I concur with the findings and no changes have occurred since H&P was written.    Anesthesia/Surgery risks, benefits and alternative options discussed and understood by patient/family.          Active Hospital Problems    Diagnosis  POA    Wound, surgical, nonhealing [T81.89XA]  Yes      Resolved Hospital Problems    Diagnosis Date Resolved POA   No resolved problems to display.

## 2018-07-10 NOTE — TRANSFER OF CARE
"Anesthesia Transfer of Care Note    Patient: Tanesha Davis    Procedure(s) Performed: Procedure(s):  REPAIR, HERNIA, INCISIONAL, RECURRENT    Patient location: PACU    Anesthesia Type: general    Transport from OR: Transported from OR on 6-10 L/min O2 by face mask with adequate spontaneous ventilation    Post pain: adequate analgesia    Post assessment: no apparent anesthetic complications and tolerated procedure well    Post vital signs: stable    Level of consciousness: sedated and responds to stimulation    Nausea/Vomiting: no nausea/vomiting    Complications: none    Transfer of care protocol was followed      Last vitals:   Visit Vitals  BP (!) 149/69 (BP Location: Right arm, Patient Position: Lying)   Pulse 68   Temp 36.5 °C (97.7 °F) (Temporal)   Resp 16   Ht 5' 5" (1.651 m)   Wt 88.5 kg (195 lb)   SpO2 98%   Breastfeeding? No   BMI 32.45 kg/m²     "

## 2018-07-11 VITALS
RESPIRATION RATE: 16 BRPM | HEIGHT: 65 IN | SYSTOLIC BLOOD PRESSURE: 122 MMHG | WEIGHT: 195 LBS | HEART RATE: 57 BPM | OXYGEN SATURATION: 91 % | DIASTOLIC BLOOD PRESSURE: 58 MMHG | TEMPERATURE: 96 F | BODY MASS INDEX: 32.49 KG/M2

## 2018-07-11 LAB
ANION GAP SERPL CALC-SCNC: 7 MMOL/L
ANION GAP SERPL CALC-SCNC: 7 MMOL/L
BASOPHILS # BLD AUTO: 0.04 K/UL
BASOPHILS # BLD AUTO: 0.04 K/UL
BASOPHILS NFR BLD: 0.4 %
BASOPHILS NFR BLD: 0.4 %
BUN SERPL-MCNC: 10 MG/DL
BUN SERPL-MCNC: 10 MG/DL
CALCIUM SERPL-MCNC: 9 MG/DL
CALCIUM SERPL-MCNC: 9 MG/DL
CHLORIDE SERPL-SCNC: 106 MMOL/L
CHLORIDE SERPL-SCNC: 106 MMOL/L
CO2 SERPL-SCNC: 26 MMOL/L
CO2 SERPL-SCNC: 26 MMOL/L
CREAT SERPL-MCNC: 0.7 MG/DL
CREAT SERPL-MCNC: 0.7 MG/DL
DIFFERENTIAL METHOD: ABNORMAL
DIFFERENTIAL METHOD: ABNORMAL
EOSINOPHIL # BLD AUTO: 0 K/UL
EOSINOPHIL # BLD AUTO: 0 K/UL
EOSINOPHIL NFR BLD: 0.1 %
EOSINOPHIL NFR BLD: 0.1 %
ERYTHROCYTE [DISTWIDTH] IN BLOOD BY AUTOMATED COUNT: 14 %
ERYTHROCYTE [DISTWIDTH] IN BLOOD BY AUTOMATED COUNT: 14 %
EST. GFR  (AFRICAN AMERICAN): >60 ML/MIN/1.73 M^2
EST. GFR  (AFRICAN AMERICAN): >60 ML/MIN/1.73 M^2
EST. GFR  (NON AFRICAN AMERICAN): >60 ML/MIN/1.73 M^2
EST. GFR  (NON AFRICAN AMERICAN): >60 ML/MIN/1.73 M^2
GLUCOSE SERPL-MCNC: 103 MG/DL
GLUCOSE SERPL-MCNC: 103 MG/DL
HCT VFR BLD AUTO: 36.5 %
HCT VFR BLD AUTO: 36.5 %
HGB BLD-MCNC: 11.7 G/DL
HGB BLD-MCNC: 11.7 G/DL
IMM GRANULOCYTES # BLD AUTO: 0.03 K/UL
IMM GRANULOCYTES # BLD AUTO: 0.03 K/UL
IMM GRANULOCYTES NFR BLD AUTO: 0.3 %
IMM GRANULOCYTES NFR BLD AUTO: 0.3 %
LYMPHOCYTES # BLD AUTO: 1.2 K/UL
LYMPHOCYTES # BLD AUTO: 1.2 K/UL
LYMPHOCYTES NFR BLD: 11.9 %
LYMPHOCYTES NFR BLD: 11.9 %
MAGNESIUM SERPL-MCNC: 2.4 MG/DL
MAGNESIUM SERPL-MCNC: 2.4 MG/DL
MCH RBC QN AUTO: 28.8 PG
MCH RBC QN AUTO: 28.8 PG
MCHC RBC AUTO-ENTMCNC: 32.1 G/DL
MCHC RBC AUTO-ENTMCNC: 32.1 G/DL
MCV RBC AUTO: 90 FL
MCV RBC AUTO: 90 FL
MONOCYTES # BLD AUTO: 0.6 K/UL
MONOCYTES # BLD AUTO: 0.6 K/UL
MONOCYTES NFR BLD: 5.4 %
MONOCYTES NFR BLD: 5.4 %
NEUTROPHILS # BLD AUTO: 8.3 K/UL
NEUTROPHILS # BLD AUTO: 8.3 K/UL
NEUTROPHILS NFR BLD: 81.9 %
NEUTROPHILS NFR BLD: 81.9 %
NRBC BLD-RTO: 0 /100 WBC
NRBC BLD-RTO: 0 /100 WBC
PHOSPHATE SERPL-MCNC: 3.3 MG/DL
PHOSPHATE SERPL-MCNC: 3.3 MG/DL
PLATELET # BLD AUTO: 210 K/UL
PLATELET # BLD AUTO: 210 K/UL
PMV BLD AUTO: 10.6 FL
PMV BLD AUTO: 10.6 FL
POTASSIUM SERPL-SCNC: 4.5 MMOL/L
POTASSIUM SERPL-SCNC: 4.5 MMOL/L
RBC # BLD AUTO: 4.06 M/UL
RBC # BLD AUTO: 4.06 M/UL
SODIUM SERPL-SCNC: 139 MMOL/L
SODIUM SERPL-SCNC: 139 MMOL/L
WBC # BLD AUTO: 10.17 K/UL
WBC # BLD AUTO: 10.17 K/UL

## 2018-07-11 PROCEDURE — 25000003 PHARM REV CODE 250: Performed by: STUDENT IN AN ORGANIZED HEALTH CARE EDUCATION/TRAINING PROGRAM

## 2018-07-11 PROCEDURE — 85025 COMPLETE CBC W/AUTO DIFF WBC: CPT

## 2018-07-11 PROCEDURE — 84100 ASSAY OF PHOSPHORUS: CPT

## 2018-07-11 PROCEDURE — 36415 COLL VENOUS BLD VENIPUNCTURE: CPT

## 2018-07-11 PROCEDURE — 80048 BASIC METABOLIC PNL TOTAL CA: CPT

## 2018-07-11 PROCEDURE — 83735 ASSAY OF MAGNESIUM: CPT

## 2018-07-11 RX ORDER — OXYCODONE HYDROCHLORIDE 10 MG/1
10 TABLET ORAL EVERY 4 HOURS PRN
Qty: 30 TABLET | Refills: 0 | Status: SHIPPED | OUTPATIENT
Start: 2018-07-11 | End: 2018-07-18

## 2018-07-11 RX ORDER — ONDANSETRON 8 MG/1
8 TABLET, ORALLY DISINTEGRATING ORAL EVERY 8 HOURS PRN
Qty: 30 TABLET | Refills: 1 | Status: SHIPPED | OUTPATIENT
Start: 2018-07-11 | End: 2018-07-18

## 2018-07-11 RX ADMIN — MELOXICAM 7.5 MG: 7.5 TABLET ORAL at 09:07

## 2018-07-11 RX ADMIN — OXYCODONE HYDROCHLORIDE 10 MG: 5 TABLET ORAL at 10:07

## 2018-07-11 RX ADMIN — CYANOCOBALAMIN TAB 250 MCG 500 MCG: 250 TAB at 07:07

## 2018-07-11 RX ADMIN — OXYCODONE HYDROCHLORIDE 10 MG: 5 TABLET ORAL at 06:07

## 2018-07-11 RX ADMIN — DOCUSATE SODIUM 100 MG: 100 CAPSULE, LIQUID FILLED ORAL at 09:07

## 2018-07-11 RX ADMIN — ESTRADIOL 1 MG: 0.5 TABLET ORAL at 06:07

## 2018-07-11 NOTE — HOSPITAL COURSE
Pt underwent above procedure, she had a normal post op course.  On discharge day she is maryam low fiber diet, inc line healing well, adequate pain control with oral meds, amb in [place] without diff, VS stable and AF, fu Dr. Meraz 2-3 weeks

## 2018-07-11 NOTE — NURSING
Patient d/c to home today accompanied by . D/C teaching performed with patient and .  Follow up appointments confirmed with patient and . IV d/c prior to discharge, no bleeding, redness, swelling noted, site dressing CDI. Medication teaching performed, med list confirmed with and hard copy Rx to patient. Instructed on activity restrictions in d/c plan such as no lifting.  Instructed on s/s which warrant calling MD such as temp greater than 100.4, headache, new onset and/or severe pain, swelling/ redness at incision sites. [facility] in [facility]/c instructions. Family questions answered. Both patient and  verbalized understanding.

## 2018-07-11 NOTE — HPI
The patient is a 67-year-old female who originally   had a rectal cancer, T3N0, underwent a low anterior resection in 2006.  She   subsequently had incisional hernia and underwent a repair, wound excision by Dr. Howard in 2014 and 2015.  She then developed a chronically draining sinus in her   midline wound which was repaired with mesh in March 2018..  She continued to have drainage and chronic wound infection, here to discuss surgical intervention.

## 2018-07-11 NOTE — DISCHARGE SUMMARY
[facility]  Colorectal Surgery  Discharge Summary      Patient Name: Tanesha Davis  MRN: [id number]  Admission Date: 7/10/2018  Hospital Length of Stay: 1 days  Discharge Date and Time: 7/11/2018 11:23 AM  Attending Physician: No att. providers found   Discharging Provider: Jenni Hess NP  Primary Care Provider: Caleb Hernandez MD     HPI:  The patient is a 67-year-old female who originally   had a rectal cancer, T3N0, underwent a low anterior resection in 2006.  She   subsequently had incisional hernia and underwent a repair, wound excision by Dr. Howard in 2014 and 2015.  She then developed a chronically draining sinus in her   midline wound which was repaired with mesh in March 2018..  She continued to have drainage and chronic wound infection, here to discuss surgical intervention.    Procedure(s):  REPAIR, HERNIA, INCISIONAL, RECURRENT     Hospital Course:  Pt underwent above procedure, she had a normal post op course.  On discharge day she is maryam low fiber diet, inc line healing well, adequate pain control with oral meds, amb in [place] without diff, VS stable and AF, fu Dr. Meraz 2-3 weeks        Significant Diagnostic Studies: Labs:   CBC   Recent Labs  Lab 07/11/18  0518   WBC 10.17  10.17   HGB 11.7*  11.7*   HCT 36.5*  36.5*     210       Pending Diagnostic Studies:     None        Final Active Diagnoses:    Diagnosis Date Noted POA    PRINCIPAL PROBLEM:  Wound, surgical, nonhealing [T81.89XA] 07/19/2017 Yes      Problems Resolved During this Admission:    Diagnosis Date Noted Date Resolved POA      Discharged Condition: good    Disposition: Home or Self Care    Follow Up:  Follow-up Information     Cuco Meraz MD In 3 weeks.    Specialty:  Colon and Rectal Surgery  Why:  For wound re-check  Contact information:  [address]  [facility] [id number] [phone]                 Patient Instructions:     Diet Adult Regular     Diet Adult Regular     Diet  Adult Regular   Order Comments: Low fiber diet, no fresh fruit or fresh vegetables. No nuts, popcorn, grapes or raisins for approx 6 weeks     Notify your health care provider if you experience any of the following:  temperature >100.4     Notify your health care provider if you experience any of the following:  persistent nausea and vomiting or diarrhea     Notify your health care provider if you experience any of the following:  severe uncontrolled pain     Notify your health care provider if you experience any of the following:  redness, tenderness, or signs of infection (pain, swelling, redness, odor or green/yellow discharge around incision site)     No dressing needed     Lifting restrictions   Order Comments: No lifting heavier than 10 lbs     Notify your health care provider if you experience any of the following:  temperature >100.4     Notify your health care provider if you experience any of the following:  persistent nausea and vomiting or diarrhea     Notify your health care provider if you experience any of the following:  severe uncontrolled pain     Notify your health care provider if you experience any of the following:  redness, tenderness, or signs of infection (pain, swelling, redness, odor or green/yellow discharge around incision site)     No dressing needed     Lifting restrictions   Order Comments: No lifting anything greater than 5 pounds     Call MD for:  persistent nausea and vomiting or diarrhea     Call MD for:  severe uncontrolled pain     Call MD for:  redness, tenderness, or signs of infection (pain, swelling, redness, odor or green/yellow discharge around incision site)     Call MD for:  difficulty breathing or increased cough     Call MD for:  severe persistent headache     Call MD for:  worsening rash     Call MD for:  persistent dizziness, light-headedness, or visual disturbances     Call MD for:  increased confusion or weakness     Call MD for:   Order Comments: Call for temp  above 101     Activity as tolerated     Shower on day dressing removed (No bath)   Order Comments: Ok to shower. Do not submerge wound (bath, hot tub, pool, etc) for 2 weeks.     Activity as tolerated     Shower on day dressing removed (No bath)   Order Comments: Ok to shower. Do not submerge wound in bath, hot tub, pool, etc for two weeks.       Medications:  Reconciled Home Medications:      Medication List      START taking these medications    ondansetron 8 MG Tbdl  Commonly known as:  ZOFRAN-ODT  Take 1 tablet (8 mg total) by mouth every 8 (eight) hours as needed.     * oxyCODONE 5 MG immediate release tablet  Commonly known as:  ROXICODONE  Take 1 tablet (5 mg total) by mouth every 4 (four) hours as needed (for moderate to severe pain).     * oxyCODONE 10 mg Tab immediate release tablet  Commonly known as:  ROXICODONE  Take 1 tablet (10 mg total) by mouth every 4 (four) hours as needed.        * This list has 2 medication(s) that are the same as other medications prescribed for you. Read the directions carefully, and ask your doctor or other care provider to review them with you.            CHANGE how you take these medications    docusate sodium 100 MG capsule  Commonly known as:  COLACE  Take 1 capsule (100 mg total) by mouth 2 (two) times daily.  What changed:  · when to take this  · reasons to take this     magnesium oxide-pyridoxine 362-20 mg Tab  Commonly known as:  BEELITH  Take 1 tablet by mouth once daily.  What changed:  when to take this     meloxicam 7.5 MG tablet  Commonly known as:  MOBIC  TAKE ONE TABLET BY MOUTH EVERY DAY  What changed:  See the new instructions.        CONTINUE taking these medications    bisacodyl 10 mg Supp  Commonly known as:  DULCOLAX  Place 1 suppository (10 mg total) rectally daily as needed.     cyanocobalamin 500 MCG tablet  Take 500 mcg by mouth every morning.     estradiol 1 MG tablet  Commonly known as:  ESTRACE  Take 1 tablet (1 mg total) by mouth every morning.      MENDENHALL COLON HEALTH ORAL  Take 1 tablet by mouth 2 (two) times daily.     VITAMIN D ORAL  Take 1 capsule by mouth every morning.     VITAMIN E ORAL  Take 1 capsule by mouth every morning.            Jenni Hess NP  Colorectal Surgery  [facility]

## 2018-07-11 NOTE — PROGRESS NOTES
Assisted Patient to restroom, patient voided independently, dressing to Midline CDI, patient visited with  and Son. Pending transfer to room. tm

## 2018-07-11 NOTE — PROGRESS NOTES
Report called to ABBY Barksdale on the 6th floor. ABBY Stock to assume care in PACU until room 602 is clean and ready. Report given; questions answered. Safety maintained.

## 2018-07-11 NOTE — PLAN OF CARE
Problem: Patient Care Overview  Goal: Plan of Care Review  Plan of care reviewed with patient. Patient verbalized understanding. Patient is oriented x4. Patient tolerated clear liquid diet. Patient ambulated to bathroom with no assist. Patient received prn pain meds per mar. Patient remained free of any falls or acute events. VSS, will continue to monitor.

## 2018-07-11 NOTE — NURSING TRANSFER
Nursing Transfer Note      7/10/2018     Transfer To: 602 from PACU    Transfer via stretcher    Transfer with IV pole    Transported by Transport    Medicines sent: NS infusing    Chart send with patient: Yes    Notified: ABBY Barksdale    Patient reassessed at: 7/10/18 23:00

## 2018-07-12 NOTE — OP NOTE
[facility]  Colon and Rectal Surgery  Operative Note       Surgery Date: 7/10/2018     Surgeon(s) and Role:     * Cuco Meraz MD - Primary    Pre-op Diagnosis:  Non-healing surgical wound, subsequent encounter [T81.89XD]    Post-op Diagnosis:  Post-Op Diagnosis Codes:     * Non-healing surgical wound, subsequent encounter [T81.89XD]    Procedure(s):  REPAIR, HERNIA, INCISIONAL, RECURRENT    Anesthesia: GET, Exparel 266 mg, Marcaine 0.25% (75 mg)  preperironeal injection    Procedure in Detail/Findings: Patient was identified and time out performed. Patient was marked with skin marker around site of draining wounds. Initial incision made with #10 blade, hemostasis achieved with electrocautery. Infected skin tissue was dissected and removed. Dissection was made down to the infected mesh. Upon opening of the mesh, small bowel was found to be adhered to the mesh. Upon dissection, a small enterotomy was formed. There was no spillage of enteral contents. The enterotomy was primarily closed with vicryl suture and over-sewn with 3-0 ethibond. Using short scissors, the edges of the frayed mesh were trimmed approximately 5mm. Running suture with 2-0 PDS was used to close the mesh defect. The wound was irrigated with approximately 100cc of normal saline. Skin was closed with 4-0 monocryl running suture and dermabond.     Complications: No    Estimated Blood Loss: 30cc    Specimens: Incision and infected mesh - permanent    Implants: * No implants in log *           Disposition: PACU - hemodynamically stable. Admit to Obs.           Condition: Good    Jeanette Chavez MD  General Surgery Resident, PGY 1  Pager [phone]      I was present and serubbed for the entire case.    Cuco Meraz MD

## 2018-07-13 NOTE — ANESTHESIA POSTPROCEDURE EVALUATION
"Anesthesia Post Evaluation    Patient: Tanesha Davis    Procedure(s) Performed: Procedure(s):  REPAIR, HERNIA, INCISIONAL, RECURRENT    Final Anesthesia Type: general  Patient location during evaluation: PACU  Patient participation: Yes- Able to Participate  Level of consciousness: awake and alert  Post-procedure vital signs: reviewed and stable  Pain management: adequate  Airway patency: patent  PONV status at discharge: No PONV  Anesthetic complications: no      Cardiovascular status: blood pressure returned to baseline  Respiratory status: unassisted  Hydration status: euvolemic  Follow-up not needed.        Visit Vitals  BP (!) 122/58 (BP Location: Left arm, Patient Position: Lying)   Pulse (!) 57   Temp 35.6 °C (96 °F) (Oral)   Resp 16   Ht 5' 5" (1.651 m)   Wt 88.5 kg (195 lb)   SpO2 (!) 91%   Breastfeeding? No   BMI 32.45 kg/m²       Pain/Alondra Score: No Data Recorded      "

## 2018-07-13 NOTE — ANESTHESIA RELEASE NOTE
"Anesthesia Release from PACU Note    Patient: Tanesha Davis    Procedure(s) Performed: Procedure(s):  REPAIR, HERNIA, INCISIONAL, RECURRENT    Anesthesia type: general    Post pain: Adequate analgesia    Post assessment: no apparent anesthetic complications    Last Vitals:   Visit Vitals  BP (!) 122/58 (BP Location: Left arm, Patient Position: Lying)   Pulse (!) 57   Temp 35.6 °C (96 °F) (Oral)   Resp 16   Ht 5' 5" (1.651 m)   Wt 88.5 kg (195 lb)   SpO2 (!) 91%   Breastfeeding? No   BMI 32.45 kg/m²       Post vital signs: stable    Level of consciousness: awake, alert  and oriented    Nausea/Vomiting: no nausea/no vomiting    Complications: none    Airway Patency: patent    Respiratory: unassisted    Cardiovascular: stable and blood pressure at baseline    Hydration: euvolemic  "

## 2018-07-16 ENCOUNTER — TELEPHONE (OUTPATIENT)
Dept: SURGERY | Facility: CLINIC | Age: 68
End: 2018-07-16

## 2018-07-16 NOTE — TELEPHONE ENCOUNTER
----- Message from Rashmi Hernandez sent at 7/16/2018  3:06 PM CDT -----  Contact: pt#[phone] or [phone]  Needs Advice    Reason for call:    Pt states that incision has not stop draining blood since surgery   Communication Preference:callback   Additional Information:

## 2018-07-18 ENCOUNTER — OFFICE VISIT (OUTPATIENT)
Dept: SURGERY | Facility: CLINIC | Age: 68
End: 2018-07-18
Payer: MEDICARE

## 2018-07-18 VITALS
HEIGHT: 65 IN | SYSTOLIC BLOOD PRESSURE: 137 MMHG | HEART RATE: 86 BPM | BODY MASS INDEX: 32.29 KG/M2 | WEIGHT: 193.81 LBS | DIASTOLIC BLOOD PRESSURE: 60 MMHG

## 2018-07-18 DIAGNOSIS — T81.89XD NON-HEALING SURGICAL WOUND, SUBSEQUENT ENCOUNTER: Primary | ICD-10-CM

## 2018-07-18 PROCEDURE — 99024 POSTOP FOLLOW-UP VISIT: CPT | Mod: S$GLB,,, | Performed by: COLON & RECTAL SURGERY

## 2018-07-18 PROCEDURE — 99999 PR PBB SHADOW E&M-EST. PATIENT-LVL III: CPT | Mod: PBBFAC,,, | Performed by: COLON & RECTAL SURGERY

## 2018-07-18 NOTE — PROGRESS NOTES
HISTORY OF PRESENT ILLNESS:  67 year old female patient presents today for f/u of rectal cancer and umbilical hernia.     7/10/18 Wound exploration removal of mesh and repair of enterotomy for low grade infection and drainage from middle of midline wound.  Post op had area near umbilicus open and hematoma expressed.      HAs not healed despite antibiotics3/26/18 Umbilical hernia repair., has some dtainage. Small area not healed some drainage. No fevers, no errythremia    Had repair and wound excision by Dr Muse 2/2014 and 7/2105. 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   Colonoscopy 3/30/07, 2010  and 8/13/16 .  normal post op   CEA Feb 0.7 CEA 1.0 11/12/09 = 2.0                                Has history of constipation. Takes Miralax  1 capful daily and fiber tablets. Ducolax weekly to initiate BM.   PAST SURGICAL HISTORY: TAHBSO, Needle biopsy of breast. As above  Past Surgical History:   Procedure Laterality Date    BREAST BIOPSY      CHOLECYSTECTOMY      COLECTOMY      COLON SURGERY      COLONOSCOPY N/A 8/23/2016    Procedure: COLONOSCOPY;  Surgeon: Cuco Meraz MD;  Location: [facility] (4TH FLR);  Service: Endoscopy;  Laterality: N/A;    HERNIA REPAIR      HYSTERECTOMY      KNEE ARTHROSCOPY      KNEE SURGERY      right and left knee repair    OOPHORECTOMY      REPAIR OF RECURRENT INCISIONAL HERNIA  7/10/2018    Procedure: REPAIR, HERNIA, INCISIONAL, RECURRENT;  Surgeon: Cuco Meraz MD;  [facility];  Service: Colon and Rectal;;         SOCIAL HISTORY: Lives with spouse.    FAMILY HISTORY: Denies family history of colon cancer.     ROS:  GENERAL: No fever, chills, has had weight gain.  CHEST: Denies SAN, cyanosis, wheezing, cough and sputum production.  CARDIOVASCULAR: Denies chest pain, PND, orthopnea or reduced exercise tolerance.    PE:   APPEARANCE: Well nourished,  well developed, in no acute distress.   CHEST: Lungs clear. Normal respiratory effort.  CARDIOVASCULAR: Normal S1, S2. No rubs, murmurs or gallops. No edema.  ABDOMEN: Soft. Healing incision. 1 cm pocket at umbilicus, granulating, no induration or errythrema    RECTAL :normal skin  IMP: healing wound    PLAN; wound care. Keep scheduled appointment.

## 2018-07-18 NOTE — LETTER
July 18, 2018      Caleb Hernandez MD  [address]  [address]  [facility] [address]           [facility]  [address]  [facility] [address]  Phone: [phone]          Patient: Tanesha Davis   MR Number: [id number]   YOB: 1950   Date of Visit: 7/18/2018       Dear Dr. Caleb Hernandez:    Thank you for referring Tanesha Davis to me for evaluation. Attached you will find relevant portions of my assessment and plan of care.    If you have questions, please do not hesitate to call me. I look forward to following Tanesha Davis along with you.    Sincerely,    Cuco Meraz MD    Enclosure  CC:  No Recipients    If you would like to receive this communication electronically, please contact [email or web address] or [phone] to request more information on [facility] Link access.    For providers and/or their staff who would like to refer a patient to [facility], please contact us through our one-stop-shop provider referral line, [facility], at [phone].    If you feel you have received this communication in error or would no longer like to receive these types of communications, please e-mail [email or web address]

## 2018-07-25 ENCOUNTER — OFFICE VISIT (OUTPATIENT)
Dept: OBSTETRICS AND GYNECOLOGY | Facility: CLINIC | Age: 68
End: 2018-07-25
Payer: MEDICARE

## 2018-07-25 VITALS — WEIGHT: 195.31 LBS | SYSTOLIC BLOOD PRESSURE: 128 MMHG | BODY MASS INDEX: 32.5 KG/M2 | DIASTOLIC BLOOD PRESSURE: 76 MMHG

## 2018-07-25 DIAGNOSIS — Z01.419 WELL WOMAN EXAM WITH ROUTINE GYNECOLOGICAL EXAM: Primary | ICD-10-CM

## 2018-07-25 DIAGNOSIS — Z78.0 MENOPAUSE: ICD-10-CM

## 2018-07-25 DIAGNOSIS — Z12.4 PAP SMEAR FOR CERVICAL CANCER SCREENING: ICD-10-CM

## 2018-07-25 PROCEDURE — 99397 PER PM REEVAL EST PAT 65+ YR: CPT | Mod: S$GLB,,, | Performed by: OBSTETRICS & GYNECOLOGY

## 2018-07-25 PROCEDURE — 88142 CYTOPATH C/V THIN LAYER: CPT

## 2018-07-25 PROCEDURE — 99999 PR PBB SHADOW E&M-EST. PATIENT-LVL III: CPT | Mod: PBBFAC,,, | Performed by: OBSTETRICS & GYNECOLOGY

## 2018-07-25 RX ORDER — ESTERIFIED ESTROGEN AND METHYLTESTOSTERONE .625; 1.25 MG/1; MG/1
1 TABLET ORAL DAILY
Qty: 30 TABLET | Refills: 5 | Status: SHIPPED | OUTPATIENT
Start: 2018-07-25 | End: 2019-01-21 | Stop reason: SDUPTHER

## 2018-07-25 RX ORDER — ESTERIFIED ESTROGEN AND METHYLTESTOSTERONE .625; 1.25 MG/1; MG/1
1 TABLET ORAL DAILY
Qty: 30 TABLET | Refills: 5 | Status: SHIPPED | OUTPATIENT
Start: 2018-07-25 | End: 2018-07-25 | Stop reason: SDUPTHER

## 2018-07-25 NOTE — PROGRESS NOTES
Subjective:       Patient ID: Tanesha Davis is a 67 y.o. female.    Chief Complaint:  No chief complaint on file.      History of Present Illness  HPI  This 67 yr old p2 female pt was pt of [facility] and has had hyst BSO.  Is on estrace 1mg but was on estratest in the past and we dicussed and will change back.  She is not having pain but for her recent hernia surgery.  (history of colon ca).  She is also due for her mammogram.  Mother with breast ca.  No pain with intercourse.  She hadn normal BMd in 2016    GYN & OB History  No LMP recorded. Patient has had a hysterectomy.   Date of Last Pap: No result found    OB History    Para Term  AB Living   2 2           SAB TAB Ectopic Multiple Live Births                  # Outcome Date GA Lbr Ray/2nd Weight Sex Delivery Anes PTL Lv   2 Para            1 Para                   Review of Systems  Review of Systems   Constitutional: Negative for chills and fever.   Respiratory: Negative for shortness of breath.    Cardiovascular: Negative for chest pain.   Gastrointestinal: Positive for abdominal pain. Negative for constipation, diarrhea, nausea and vomiting.   Genitourinary: Negative for difficulty urinating, dyspareunia, dysuria, flank pain, frequency, genital sores, hematuria, menstrual problem, pelvic pain, urgency, vaginal bleeding, vaginal discharge and vaginal pain.   Musculoskeletal: Positive for back pain.   Skin: Negative for rash and wound.   Neurological: Negative for headaches.   Hematological: Negative for adenopathy.           Objective:    Physical Exam:   Constitutional: She is oriented to person, place, and time. She appears well-developed and well-nourished.    HENT:   Head: Normocephalic.    Eyes: EOM are normal.    Neck: Normal range of motion.    Cardiovascular: Normal rate.     Pulmonary/Chest: Effort normal. She exhibits no mass and no tenderness. Right breast exhibits no inverted nipple, no mass, no skin change and no tenderness.  Left breast exhibits no inverted nipple, no mass, no skin change and no tenderness.        Abdominal: Soft. She exhibits no distension. There is no tenderness.     Genitourinary: Vagina normal. There is no rash, tenderness or lesion on the right labia. There is no rash, tenderness or lesion on the left labia. Uterus is absent. Uterus is not tender. Cervix is normal. Right adnexum displays no mass, no tenderness and no fullness. Left adnexum displays no mass, no tenderness and no fullness. Cervix exhibits no discharge.           Musculoskeletal: Normal range of motion.       Neurological: She is alert and oriented to person, place, and time.    Skin: Skin is warm and dry.    Psychiatric: She has a normal mood and affect.          Assessment:        1. Menopause    2. Well woman exam with routine gynecological exam    3. Pap smear for cervical cancer screening               Plan:      Pap prn  Mammogram yearly  Can go to every other yr gyn exam now.  Exercise.

## 2018-07-25 NOTE — PROGRESS NOTES
Subjective:       Patient ID: Tanesha Davis is a 67 y.o. female.    Chief Complaint:  No chief complaint on file.      History of Present Illness  HPI  This 67 yr old P2 female is here for routine exam and was pt of [facility].  She has had a hyst and is on HRT.  She has normal bone density in 2016. She has no complaints    GYN & OB History  No LMP recorded. Patient has had a hysterectomy.   Date of Last Pap: No result found    OB History    Para Term  AB Living   2 2           SAB TAB Ectopic Multiple Live Births                  # Outcome Date GA Lbr Ray/2nd Weight Sex Delivery Anes PTL Lv   2 Para            1 Para                   Review of Systems  Review of Systems   Constitutional: Negative for chills and fever.   Respiratory: Negative for shortness of breath.    Cardiovascular: Negative for chest pain.   Gastrointestinal: Positive for abdominal pain. Negative for constipation, diarrhea, nausea and vomiting.   Genitourinary: Negative for difficulty urinating, dyspareunia, dysuria, flank pain, frequency, genital sores, hematuria, menstrual problem, pelvic pain, urgency, vaginal bleeding, vaginal discharge and vaginal pain.   Musculoskeletal: Positive for back pain.   Skin: Negative for rash and wound.   Neurological: Negative for headaches.   Hematological: Negative for adenopathy.           Objective:    Physical Exam:   Constitutional: She is oriented to person, place, and time. She appears well-developed and well-nourished.    HENT:   Head: Normocephalic.    Eyes: EOM are normal.    Neck: Normal range of motion.    Cardiovascular: Normal rate.     Pulmonary/Chest: Effort normal. She exhibits no mass and no tenderness. Right breast exhibits no inverted nipple, no mass, no skin change and no tenderness. Left breast exhibits no inverted nipple, no mass, no skin change and no tenderness.        Abdominal: Soft. She exhibits no distension. There is no tenderness.     Genitourinary: Vagina  normal. There is no rash, tenderness or lesion on the right labia. There is no rash, tenderness or lesion on the left labia. Uterus is absent. Uterus is not tender. Cervix is normal. Right adnexum displays no mass, no tenderness and no fullness. Left adnexum displays no mass, no tenderness and no fullness. Cervix exhibits no discharge.           Musculoskeletal: Normal range of motion.       Neurological: She is alert and oriented to person, place, and time.    Skin: Skin is warm and dry.    Psychiatric: She has a normal mood and affect.          Assessment:        1. Well woman exam with routine gynecological exam    2. Menopause    3. Pap smear for cervical cancer screening               Plan:      Continue HRT  Mammogram yearly  Pap every 5 yrs or so.  Exercise three times per week aerobic for 30 min and add weights after month.  This can be one to two lb weights for weight bearing on upper extremities.  If walking, increase speed of walking over time.  Take in three helpings of calcium with vitamin D per day.  One can be in supplement but studies show that three times per day with calcium supplement (non dietary) can increase risk for kidney stones.  Oral intake is better in the form of glass of milk, piece of cheese, yogart, broccoli, or orange juice fortified with calcium and vitamin D

## 2018-08-01 ENCOUNTER — HOSPITAL ENCOUNTER (OUTPATIENT)
Dept: RADIOLOGY | Facility: HOSPITAL | Age: 68
Discharge: HOME | End: 2018-08-01
Attending: INTERNAL MEDICINE
Payer: MEDICARE

## 2018-08-01 ENCOUNTER — OFFICE VISIT (OUTPATIENT)
Dept: SURGERY | Facility: CLINIC | Age: 68
End: 2018-08-01
Payer: MEDICARE

## 2018-08-01 VITALS
DIASTOLIC BLOOD PRESSURE: 56 MMHG | BODY MASS INDEX: 32.87 KG/M2 | HEART RATE: 66 BPM | SYSTOLIC BLOOD PRESSURE: 127 MMHG | WEIGHT: 197.56 LBS

## 2018-08-01 DIAGNOSIS — Z12.39 BREAST CANCER SCREENING: ICD-10-CM

## 2018-08-01 DIAGNOSIS — T81.89XD NON-HEALING SURGICAL WOUND, SUBSEQUENT ENCOUNTER: Primary | ICD-10-CM

## 2018-08-01 PROCEDURE — 77067 SCR MAMMO BI INCL CAD: CPT | Mod: 26,,, | Performed by: RADIOLOGY

## 2018-08-01 PROCEDURE — 77063 BREAST TOMOSYNTHESIS BI: CPT | Mod: 26,,, | Performed by: RADIOLOGY

## 2018-08-01 PROCEDURE — 77063 BREAST TOMOSYNTHESIS BI: CPT | Mod: TC

## 2018-08-01 PROCEDURE — 99999 PR PBB SHADOW E&M-EST. PATIENT-LVL III: CPT | Mod: PBBFAC,,, | Performed by: COLON & RECTAL SURGERY

## 2018-08-01 PROCEDURE — 99024 POSTOP FOLLOW-UP VISIT: CPT | Mod: S$GLB,,, | Performed by: COLON & RECTAL SURGERY

## 2018-08-01 NOTE — PROGRESS NOTES
HISTORY OF PRESENT ILLNESS:  67 year old female patient presents today for f/u of rectal cancer and umbilical hernia.    HAd post op wound infection/seperation. Has been doing wound care.    7/10/18 Wound exploration removal of mesh and repair of enterotomy for low grade infection and drainage from middle of midline wound.  Post op had area near umbilicus open and hematoma expressed.      HAs not healed despite antibiotics3/26/18 Umbilical hernia repair., has some dtainage. Small area not healed some drainage. No fevers, no errythremia    Had repair and wound excision by Dr Muse 2/2014 and 7/2105. 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   Colonoscopy 3/30/07, 2010  and 8/13/16 .  normal post op   CEA Feb 0.7 CEA 1.0 11/12/09 = 2.0                                Has history of constipation. Takes Miralax  1 capful daily and fiber tablets. Ducolax weekly to initiate BM.   PAST SURGICAL HISTORY: TAHBSO, Needle biopsy of breast. As above  Past Surgical History:   Procedure Laterality Date    BREAST BIOPSY      CHOLECYSTECTOMY      COLECTOMY      COLON SURGERY      COLONOSCOPY N/A 8/23/2016    Procedure: COLONOSCOPY;  Surgeon: Cuco Meraz MD;  Location: [facility] (4TH FLR);  Service: Endoscopy;  Laterality: N/A;    HERNIA REPAIR      HYSTERECTOMY      KNEE ARTHROSCOPY      KNEE SURGERY      right and left knee repair    OOPHORECTOMY      REPAIR OF RECURRENT INCISIONAL HERNIA  7/10/2018    Procedure: REPAIR, HERNIA, INCISIONAL, RECURRENT;  Surgeon: Cuco Meraz MD;  [facility];  Service: Colon and Rectal;;         SOCIAL HISTORY: Lives with spouse.    FAMILY HISTORY: Denies family history of colon cancer.     ROS:  GENERAL: No fever, chills, has had weight gain.  CHEST: Denies SAN, cyanosis, wheezing, cough and sputum production.  CARDIOVASCULAR: Denies chest pain, PND, orthopnea or  reduced exercise tolerance.    PE:   APPEARANCE: Well nourished, well developed, in no acute distress.   CHEST: Lungs clear. Normal respiratory effort.  CARDIOVASCULAR: Normal S1, S2. No rubs, murmurs or gallops. No edema.  ABDOMEN: Soft. Healing incision. 4 mm pocket at umbilicus, granulating, no induration or errythrema    RECTAL :normal skin  IMP: healing wound    PLAN; wound care. RTC 2-3 weeks.

## 2018-08-01 NOTE — LETTER
August 1, 2018      Caleb Hernandez MD  [address]  [address]  [facility] [address]           [facility]  [address]  [facility] [address]  Phone: [phone]          Patient: Tanesha Davis   MR Number: [id number]   YOB: 1950   Date of Visit: 8/1/2018       Dear Dr. Caleb Hernandez:    Thank you for referring Tanseha Davis to me for evaluation. Attached you will find relevant portions of my assessment and plan of care.    If you have questions, please do not hesitate to call me. I look forward to following Tanesha Davis along with you.    Sincerely,    Cuco Meraz MD    Enclosure  CC:  No Recipients    If you would like to receive this communication electronically, please contact [email or web address] or [phone] to request more information on [facility] Link access.    For providers and/or their staff who would like to refer a patient to [facility], please contact us through our one-stop-shop provider referral line, [facility], at [phone].    If you feel you have received this communication in error or would no longer like to receive these types of communications, please e-mail [email or web address]

## 2018-08-14 ENCOUNTER — PATIENT MESSAGE (OUTPATIENT)
Dept: OBSTETRICS AND GYNECOLOGY | Facility: CLINIC | Age: 68
End: 2018-08-14

## 2018-09-12 ENCOUNTER — OFFICE VISIT (OUTPATIENT)
Dept: SURGERY | Facility: CLINIC | Age: 68
End: 2018-09-12
Payer: MEDICARE

## 2018-09-12 VITALS
HEART RATE: 67 BPM | BODY MASS INDEX: 32.65 KG/M2 | DIASTOLIC BLOOD PRESSURE: 63 MMHG | SYSTOLIC BLOOD PRESSURE: 120 MMHG | WEIGHT: 196.19 LBS

## 2018-09-12 DIAGNOSIS — T81.89XD NON-HEALING SURGICAL WOUND, SUBSEQUENT ENCOUNTER: Primary | ICD-10-CM

## 2018-09-12 PROCEDURE — 99999 PR PBB SHADOW E&M-EST. PATIENT-LVL III: CPT | Mod: PBBFAC,,, | Performed by: COLON & RECTAL SURGERY

## 2018-09-12 PROCEDURE — 99213 OFFICE O/P EST LOW 20 MIN: CPT | Mod: PBBFAC | Performed by: COLON & RECTAL SURGERY

## 2018-09-12 PROCEDURE — 99024 POSTOP FOLLOW-UP VISIT: CPT | Mod: ,,, | Performed by: COLON & RECTAL SURGERY

## 2018-09-12 NOTE — PROGRESS NOTES
HISTORY OF PRESENT ILLNESS:  67 year old female patient presents today for f/u of rectal cancer and umbilical hernia.    HAd post op wound infection/seperation. Has been doing wound care. Not quite healed.    7/10/18 Wound exploration removal of mesh and repair of enterotomy for low grade infection and drainage from middle of midline wound.  Post op had area near umbilicus open and hematoma expressed.      HAs not healed despite antibiotics3/26/18 Umbilical hernia repair., has some dtainage. Small area not healed some drainage. No fevers, no errythremia    Had repair and wound excision by Dr Muse 2/2014 and 7/2105. 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   Colonoscopy 3/30/07, 2010  and 8/13/16 .  normal post op   CEA Feb 0.7 CEA 1.0 11/12/09 = 2.0                                Has history of constipation. Takes Miralax  1 capful daily and fiber tablets. Ducolax weekly to initiate BM.   PAST SURGICAL HISTORY: TAHBSO, Needle biopsy of breast. As above  Past Surgical History:   Procedure Laterality Date    BREAST BIOPSY      CHOLECYSTECTOMY      CHOLECYSTECTOMY N/A 2/27/2014    Performed by Willy [facility] at [facility] OR 2ND FLR    COLECTOMY      COLON SURGERY      COLONOSCOPY N/A 8/23/2016    Procedure: COLONOSCOPY;  Surgeon: Cuco Meraz MD;  Location: [facility] (4TH FLR);  Service: Endoscopy;  Laterality: N/A;    COLONOSCOPY N/A 8/23/2016    Performed by Cuco Meraz MD at [facility] (4TH FLR)    COLONOSCOPY N/A 5/10/2013    Performed by Cuco Meraz MD at [facility] (4TH FLR)    EXCISION-LESION -skin lesion and drainage tract-umbilicus/epigastrum N/A 7/13/2015    Performed by [facility] MD at [facility] OR 1ST FLR    HERNIA REPAIR      HYSTERECTOMY      KNEE ARTHROSCOPY      KNEE SURGERY      right and left knee repair    OOPHORECTOMY      REPAIR OF RECURRENT INCISIONAL HERNIA   7/10/2018    Procedure: REPAIR, HERNIA, INCISIONAL, RECURRENT;  Surgeon: Cuco Meraz MD;  Location: [facility] OR [address] [place] [address];  Service: Colon and Rectal;;    REPAIR, HERNIA, INCISIONAL, RECURRENT  7/10/2018    [facility]    REPAIR-HERNIA-INCISIONAL-INITIAL N/A 2/27/2014    Performed by Willy Muse MD at [facility] OR [address] [place] [address]    REPAIR-HERNIA-INCISIONAL-RECURRENT N/A 3/26/2018    Performed by Cuco Meraz MD at [facility] OR [address] [place] [address]         SOCIAL HISTORY: Lives with spouse.    FAMILY HISTORY: Denies family history of colon cancer.     ROS:  GENERAL: No fever, chills, has had weight gain.  CHEST: Denies SAN, cyanosis, wheezing, cough and sputum production.  CARDIOVASCULAR: Denies chest pain, PND, orthopnea or reduced exercise tolerance.    PE:   APPEARANCE: Well nourished, well developed, in no acute distress.   CHEST: Lungs clear. Normal respiratory effort.  CARDIOVASCULAR: Normal S1, S2. No rubs, murmurs or gallops. No edema.  ABDOMEN: Soft. 2  2 mm area ont epithelized. No induration or inflammation  RECTAL :normal skin  IMP: healing wound    PLAN; wound care. RTC 3 weeks.

## 2018-10-03 ENCOUNTER — OFFICE VISIT (OUTPATIENT)
Dept: SURGERY | Facility: CLINIC | Age: 68
End: 2018-10-03
Payer: MEDICARE

## 2018-10-03 VITALS
DIASTOLIC BLOOD PRESSURE: 64 MMHG | WEIGHT: 198.38 LBS | SYSTOLIC BLOOD PRESSURE: 142 MMHG | HEART RATE: 73 BPM | BODY MASS INDEX: 33.05 KG/M2 | HEIGHT: 65 IN

## 2018-10-03 DIAGNOSIS — T81.89XD NON-HEALING SURGICAL WOUND, SUBSEQUENT ENCOUNTER: Primary | ICD-10-CM

## 2018-10-03 PROCEDURE — 99213 OFFICE O/P EST LOW 20 MIN: CPT | Mod: PBBFAC | Performed by: COLON & RECTAL SURGERY

## 2018-10-03 PROCEDURE — 99999 PR PBB SHADOW E&M-EST. PATIENT-LVL III: CPT | Mod: PBBFAC,,, | Performed by: COLON & RECTAL SURGERY

## 2018-10-03 PROCEDURE — 99024 POSTOP FOLLOW-UP VISIT: CPT | Mod: ,,, | Performed by: COLON & RECTAL SURGERY

## 2018-10-03 RX ORDER — NYSTATIN 100000 U/G
CREAM TOPICAL 2 TIMES DAILY
Qty: 15 G | Refills: 1 | Status: SHIPPED | OUTPATIENT
Start: 2018-10-03 | End: 2019-01-03

## 2018-10-03 NOTE — PROGRESS NOTES
HISTORY OF PRESENT ILLNESS:  67 year old female patient presents today for f/u of rectal cancer and umbilical hernia.    HAd post op wound infection/seperation. Wound healed but has rash    7/10/18 Wound exploration removal of mesh and repair of enterotomy for low grade infection and drainage from middle of midline wound.  Post op had area near umbilicus open and hematoma expressed.      HAs not healed despite antibiotics3/26/18 Umbilical hernia repair., has some dtainage. Small area not healed some drainage. No fevers, no errythremia    Had repair and wound excision by Dr Muse 2/2014 and 7/2105. 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   Colonoscopy 3/30/07, 2010  and 8/13/16 .  normal post op   CEA Feb 0.7 CEA 1.0 11/12/09 = 2.0                                Has history of constipation. Takes Miralax  1 capful daily and fiber tablets. Ducolax weekly to initiate BM.   PAST SURGICAL HISTORY: TAHBSO, Needle biopsy of breast. As above  Past Surgical History:   Procedure Laterality Date    BREAST BIOPSY      CHOLECYSTECTOMY      CHOLECYSTECTOMY N/A 2/27/2014    Performed by [facility] at [facility] 2ND FLR    COLECTOMY      COLON SURGERY      COLONOSCOPY N/A 8/23/2016    Procedure: COLONOSCOPY;  Surgeon: Cuco Meraz MD;  Location: [facility] (4TH FLR);  Service: Endoscopy;  Laterality: N/A;    COLONOSCOPY N/A 8/23/2016    Performed by Cuco Meraz MD at [facility] (4TH FLR)    COLONOSCOPY N/A 5/10/2013    Performed by Cuco Meraz MD at [facility] (4TH FLR)    EXCISION-LESION -skin lesion and drainage tract-umbilicus/epigastrum N/A 7/13/2015    Performed by Willy Muse MD at [facility] OR 1ST FLR    HERNIA REPAIR      HYSTERECTOMY      KNEE ARTHROSCOPY      KNEE SURGERY      right and left knee repair    OOPHORECTOMY      REPAIR OF RECURRENT INCISIONAL HERNIA  7/10/2018     Procedure: REPAIR, HERNIA, INCISIONAL, RECURRENT;  Surgeon: Cuco Meraz MD;  Location: [facility] OR [address] [place] [address];  Service: Colon and Rectal;;    REPAIR, HERNIA, INCISIONAL, RECURRENT  7/10/2018    Performed by Cuco Meraz MD at [facility] OR [address] [place] [address]    REPAIR-HERNIA-INCISIONAL-INITIAL N/A 2/27/2014    Performed by Willy Muse MD at [facility] OR [address] [place] [address]    REPAIR-HERNIA-INCISIONAL-RECURRENT N/A 3/26/2018    Performed by Cuco Meraz MD at [facility] OR [address] [place] [address]         SOCIAL HISTORY: Lives with spouse.    FAMILY HISTORY: Denies family history of colon cancer.     ROS:  GENERAL: No fever, chills, has had weight gain.  CHEST: Denies SAN, cyanosis, wheezing, cough and sputum production.  CARDIOVASCULAR: Denies chest pain, PND, orthopnea or reduced exercise tolerance.    PE:   APPEARANCE: Well nourished, well developed, in no acute distress.   CHEST: Lungs clear. Normal respiratory effort.  CARDIOVASCULAR: Normal S1, S2. No rubs, murmurs or gallops. No edema.  ABDOMEN: Soft.wound healed but small errethrematous rectal lesion    RECTAL :normal skin  IMP: cabdida  PLAN; Nystantin cream . RTC 1 month

## 2018-10-04 RX ORDER — MELOXICAM 7.5 MG/1
TABLET ORAL
Qty: 90 TABLET | Refills: 1 | Status: SHIPPED | OUTPATIENT
Start: 2018-10-04 | End: 2019-03-20 | Stop reason: SDUPTHER

## 2018-10-31 ENCOUNTER — OFFICE VISIT (OUTPATIENT)
Dept: SURGERY | Facility: CLINIC | Age: 68
End: 2018-10-31
Payer: MEDICARE

## 2018-10-31 VITALS
BODY MASS INDEX: 32.69 KG/M2 | HEART RATE: 71 BPM | SYSTOLIC BLOOD PRESSURE: 153 MMHG | HEIGHT: 65 IN | WEIGHT: 196.19 LBS | DIASTOLIC BLOOD PRESSURE: 69 MMHG

## 2018-10-31 DIAGNOSIS — K59.04 FUNCTIONAL CONSTIPATION: Primary | ICD-10-CM

## 2018-10-31 PROCEDURE — 99214 OFFICE O/P EST MOD 30 MIN: CPT | Mod: S$PBB,,, | Performed by: COLON & RECTAL SURGERY

## 2018-10-31 PROCEDURE — 3008F BODY MASS INDEX DOCD: CPT | Mod: CPTII,,, | Performed by: COLON & RECTAL SURGERY

## 2018-10-31 PROCEDURE — 99999 PR PBB SHADOW E&M-EST. PATIENT-LVL III: CPT | Mod: PBBFAC,,, | Performed by: COLON & RECTAL SURGERY

## 2018-10-31 PROCEDURE — 99213 OFFICE O/P EST LOW 20 MIN: CPT | Mod: PBBFAC | Performed by: COLON & RECTAL SURGERY

## 2018-10-31 PROCEDURE — 1101F PT FALLS ASSESS-DOCD LE1/YR: CPT | Mod: CPTII,,, | Performed by: COLON & RECTAL SURGERY

## 2018-10-31 RX ORDER — LUBIPROSTONE 24 UG/1
24 CAPSULE ORAL 2 TIMES DAILY WITH MEALS
Qty: 60 CAPSULE | Refills: 3 | Status: SHIPPED | OUTPATIENT
Start: 2018-10-31 | End: 2019-03-20

## 2018-10-31 NOTE — PROGRESS NOTES
HISTORY OF PRESENT ILLNESS:  67 year old female patient presents today for f/u of rectal cancer and umbilical hernia. Skin problems resolved. REmaons constipation    Had post op wound infection/seperation. Wound healed but has rash. Treated last visit with nystantin.    7/10/18 Wound exploration removal of mesh and repair of enterotomy for low grade infection and drainage from middle of midline wound.  Post op had area near umbilicus open and hematoma expressed.      HAs not healed despite antibiotics3/26/18 Umbilical hernia repair., has some dtainage. Small area not healed some drainage. No fevers, no errythremia    Had repair and wound excision by Dr Muse 2/2014 and 7/2105. 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   Colonoscopy 3/30/07, 2010  and 8/13/16 .  normal post op   CEA Feb 0.7 CEA 1.0 11/12/09 = 2.0                                Has history of constipation. Takes Miralax  1 capful daily and fiber tablets. Ducolax weekly to initiate BM.   PAST SURGICAL HISTORY: TAHBSO, Needle biopsy of breast. As above  Past Surgical History:   Procedure Laterality Date    BREAST BIOPSY      CHOLECYSTECTOMY      CHOLECYSTECTOMY N/A 2/27/2014    Performed by [facility] at [facility] OR 2ND FLR    COLECTOMY      COLON SURGERY      COLONOSCOPY N/A 8/23/2016    Procedure: COLONOSCOPY;  Surgeon: Cuco Meraz MD;  Location: [facility] ([address] [place] [address]);  Service: Endoscopy;  Laterality: N/A;    COLONOSCOPY N/A 8/23/2016    Performed by Cuco Meraz MD at [facility] (4TH FLR)    COLONOSCOPY N/A 5/10/2013    Performed by Cuco Meraz MD at [facility] (4TH FLR)    EXCISION-LESION -skin lesion and drainage tract-umbilicus/epigastrum N/A 7/13/2015    Performed by [facility] at [facility] OR 1ST FLR    HERNIA REPAIR      HYSTERECTOMY      KNEE ARTHROSCOPY      KNEE SURGERY      right and left knee repair     OOPHORECTOMY      REPAIR OF RECURRENT INCISIONAL HERNIA  7/10/2018    Procedure: REPAIR, HERNIA, INCISIONAL, RECURRENT;  Surgeon: Cuco Meraz MD;  Location: [facility] OR [address] [place] [address];  Service: Colon and Rectal;;    REPAIR, HERNIA, INCISIONAL, RECURRENT  7/10/2018    Performed by Cuco Meraz MD at [facility] OR [address] [place] [address]    REPAIR-HERNIA-INCISIONAL-INITIAL N/A 2/27/2014    Performed by Willy Muse MD at [facility] OR [address] [place] [address]    REPAIR-HERNIA-INCISIONAL-RECURRENT N/A 3/26/2018    Performed by Cuco Meraz MD at [facility] OR [address] [place] [address]         SOCIAL HISTORY: Lives with spouse.    FAMILY HISTORY: Denies family history of colon cancer.     ROS:  GENERAL: No fever, chills, has had weight gain.  CHEST: Denies SAN, cyanosis, wheezing, cough and sputum production.  CARDIOVASCULAR: Denies chest pain, PND, orthopnea or reduced exercise tolerance.    PE:   APPEARANCE: Well nourished, well developed, in no acute distress.   CHEST: Lungs clear. Normal respiratory effort.  CARDIOVASCULAR: Normal S1, S2. No rubs, murmurs or gallops. No edema.  ABDOMEN: Soft.wound healed  RECTAL :normal skin  IMP: constuipation  PLAN; Amatoza  RTC 1 month

## 2018-12-05 ENCOUNTER — OFFICE VISIT (OUTPATIENT)
Dept: SURGERY | Facility: CLINIC | Age: 68
End: 2018-12-05
Payer: MEDICARE

## 2018-12-05 VITALS
DIASTOLIC BLOOD PRESSURE: 62 MMHG | WEIGHT: 200.63 LBS | HEART RATE: 70 BPM | BODY MASS INDEX: 33.43 KG/M2 | SYSTOLIC BLOOD PRESSURE: 150 MMHG | HEIGHT: 65 IN

## 2018-12-05 DIAGNOSIS — K59.04 FUNCTIONAL CONSTIPATION: Primary | ICD-10-CM

## 2018-12-05 PROBLEM — T81.89XA WOUND, SURGICAL, NONHEALING: Status: RESOLVED | Noted: 2017-07-19 | Resolved: 2018-12-05

## 2018-12-05 PROBLEM — K43.2 INCISIONAL HERNIA WITHOUT OBSTRUCTION OR GANGRENE: Status: RESOLVED | Noted: 2018-03-26 | Resolved: 2018-12-05

## 2018-12-05 PROCEDURE — 1101F PT FALLS ASSESS-DOCD LE1/YR: CPT | Mod: CPTII,HCNC,S$GLB, | Performed by: COLON & RECTAL SURGERY

## 2018-12-05 PROCEDURE — 99214 OFFICE O/P EST MOD 30 MIN: CPT | Mod: HCNC,S$GLB,, | Performed by: COLON & RECTAL SURGERY

## 2018-12-05 PROCEDURE — 99999 PR PBB SHADOW E&M-EST. PATIENT-LVL III: CPT | Mod: PBBFAC,HCNC,, | Performed by: COLON & RECTAL SURGERY

## 2018-12-05 RX ORDER — LUBIPROSTONE 8 UG/1
8 CAPSULE ORAL 2 TIMES DAILY WITH MEALS
Qty: 60 CAPSULE | Refills: 3 | Status: SHIPPED | OUTPATIENT
Start: 2018-12-05 | End: 2019-03-20 | Stop reason: ALTCHOICE

## 2018-12-05 NOTE — PROGRESS NOTES
HISTORY OF PRESENT ILLNESS:  67 year old female patient presents today for f/u of rectal cancer and umbilical hernia. Skin problems resolved. Meds gave her diarrhea.      Had post op wound infection/seperation. Wound healed but has rash. Treated last visit with nystantin.    7/10/18 Wound exploration removal of mesh and repair of enterotomy for low grade infection and drainage from middle of midline wound.  Post op had area near umbilicus open and hematoma expressed.      HAs not healed despite antibiotics3/26/18 Umbilical hernia repair., has some dtainage. Small area not healed some drainage. No fevers, no errythremia    Had repair and wound excision by Dr Muse 2/2014 and 7/2105. 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   Colonoscopy 3/30/07, 2010  and 8/13/16 .  normal post op   CEA Feb 0.7 CEA 1.0 11/12/09 = 2.0     FAMILY HISTORY: Denies family history of colon cancer.     ROS:  GENERAL: No fever, chills, has had weight gain.  CHEST: Denies SAN, cyanosis, wheezing, cough and sputum production.  CARDIOVASCULAR: Denies chest pain, PND, orthopnea or reduced exercise tolerance.    PE:   APPEARANCE: Well nourished, well developed, in no acute distress.   CHEST: Lungs clear. Normal respiratory effort.  CARDIOVASCULAR: Normal S1, S2. No rubs, murmurs or gallops. No edema.  ABDOMEN: Soft.wound healed  RECTAL :normal skin  IMP: constuipation  PLAN; Smaller dose of Amatoza  RTC in March to see Dr Curry.

## 2019-01-04 ENCOUNTER — OFFICE VISIT (OUTPATIENT)
Dept: INTERNAL MEDICINE | Facility: CLINIC | Age: 69
End: 2019-01-04
Attending: INTERNAL MEDICINE
Payer: MEDICARE

## 2019-01-04 ENCOUNTER — HOSPITAL ENCOUNTER (OUTPATIENT)
Dept: RADIOLOGY | Facility: OTHER | Age: 69
Discharge: HOME | End: 2019-01-04
Attending: INTERNAL MEDICINE
Payer: MEDICARE

## 2019-01-04 VITALS
WEIGHT: 196.44 LBS | HEART RATE: 76 BPM | DIASTOLIC BLOOD PRESSURE: 80 MMHG | SYSTOLIC BLOOD PRESSURE: 118 MMHG | HEIGHT: 65 IN | OXYGEN SATURATION: 95 % | BODY MASS INDEX: 32.73 KG/M2

## 2019-01-04 DIAGNOSIS — Z78.0 POST-MENOPAUSAL: ICD-10-CM

## 2019-01-04 DIAGNOSIS — R10.13 EPIGASTRIC PAIN: Primary | ICD-10-CM

## 2019-01-04 DIAGNOSIS — K59.04 CHRONIC IDIOPATHIC CONSTIPATION: ICD-10-CM

## 2019-01-04 PROCEDURE — 99999 PR PBB SHADOW E&M-EST. PATIENT-LVL IV: CPT | Mod: PBBFAC,HCNC,, | Performed by: INTERNAL MEDICINE

## 2019-01-04 PROCEDURE — 77080 DXA BONE DENSITY AXIAL: CPT | Mod: TC,HCNC

## 2019-01-04 PROCEDURE — 77080 DEXA BONE DENSITY SPINE HIP: ICD-10-PCS | Mod: 26,HCNC,, | Performed by: INTERNAL MEDICINE

## 2019-01-04 PROCEDURE — 99214 PR OFFICE/OUTPT VISIT, EST, LEVL IV, 30-39 MIN: ICD-10-PCS | Mod: HCNC,S$GLB,, | Performed by: INTERNAL MEDICINE

## 2019-01-04 PROCEDURE — 77080 DXA BONE DENSITY AXIAL: CPT | Mod: 26,HCNC,, | Performed by: INTERNAL MEDICINE

## 2019-01-04 PROCEDURE — 1101F PR PT FALLS ASSESS DOC 0-1 FALLS W/OUT INJ PAST YR: ICD-10-PCS | Mod: CPTII,HCNC,S$GLB, | Performed by: INTERNAL MEDICINE

## 2019-01-04 PROCEDURE — 99999 PR PBB SHADOW E&M-EST. PATIENT-LVL IV: ICD-10-PCS | Mod: PBBFAC,HCNC,, | Performed by: INTERNAL MEDICINE

## 2019-01-04 PROCEDURE — 1101F PT FALLS ASSESS-DOCD LE1/YR: CPT | Mod: CPTII,HCNC,S$GLB, | Performed by: INTERNAL MEDICINE

## 2019-01-04 PROCEDURE — 99214 OFFICE O/P EST MOD 30 MIN: CPT | Mod: HCNC,S$GLB,, | Performed by: INTERNAL MEDICINE

## 2019-01-04 NOTE — PATIENT INSTRUCTIONS
Gastritis (Adult)    Gastritis is inflammation and irritation of the stomach lining. It can be present for a short time (acute) or be long lasting (chronic). Gastritis is often caused by infection with bacteria called H pylori. More than a third of people in the [place] have this bacteria in their bodies. In many cases, H pylori causes no problems or symptoms. In some people, though, the infection irritates the stomach lining and causes gastritis. Other causes of stomach irritation include drinking alcohol or taking pain-relieving medicines called NSAIDs (such as aspirin or ibuprofen).   Symptoms of gastritis can include:  · Abdominal pain or bloating  · Loss of appetite  · Nausea or vomiting  · Vomiting blood or having black stools  · Feeling more tired than usual  An inflamed and irritated stomach lining is more likely to develop a sore called an ulcer. To help prevent this, gastritis should be treated.  Home care  If needed, medicines may be prescribed. If you have H pylori infection, treating it will likely relieve your symptoms. Other changes can help reduce stomach irritation and help it heal.  · If you have been prescribed medicines for H pylori infection, take them as directed. Take all of the medicine until it is finished or your healthcare provider tells you to stop, even if you feel better.  · Your healthcare provider may recommend avoiding NSAIDs. If you take daily aspirin for your heart or other medical reasons, do not stop without talking to your healthcare provider first.  · Avoid drinking alcohol.  · Stop smoking. Smoking can irritate the stomach and delay healing. As much as possible, stay away from second hand smoke.  Follow-up care  Follow up with your healthcare provider, or as advised by our staff. Testing may be needed to check for inflammation or an ulcer.  When to seek medical advice  Call your healthcare provider for any of the following:  · Stomach pain that gets worse or moves to the lower  right abdomen (appendix area)  · Chest pain that appears or gets worse, or spreads to the back, neck, shoulder, or arm  · Frequent vomiting (cant keep down liquids)  · Blood in the stool or vomit (red or black in color)  · Feeling weak or dizzy  · Fever of 100.4ºF (38ºC) or higher, or as directed by your healthcare provider  Date Last Reviewed: 6/22/2015  © 5793-4710 [employer]. [address] [place] [address]. All rights reserved. This information is not intended as a substitute for professional medical care. Always follow your healthcare professional's instructions.

## 2019-01-04 NOTE — PROGRESS NOTES
Subjective:       Patient ID: Tanesha Davis is a 68 y.o. female.    Chief Complaint: Abdominal Pain    Here for urgent visit    4 week Hx of grabbing, epigastric abdominal pain that occurs within 15-30 minutes after eating. She reports symptoms will last for several hours, resolve spontaneously, but will reoccur again later in the day. She reports waking in the middle of the night due to symptoms. She east dinner at 7pm and lies down around 12-1pm. She is not taking anything for symptoms. Long hx of constipation. She was started on lubiprostone by Dr Meraz in colorectal. She was suffering from a chronic draining abdominal incision site since 2014 that has been corrected. She continues to have a BM every 5-7 days. Denies unexplained weight loss, dysphagia or sensation of things sticking in throat, BRBPR, melena, night sweats.         Abdominal Pain   This is a recurrent problem. The current episode started 1 to 4 weeks ago. The onset quality is sudden. The problem occurs daily. The most recent episode lasted 4 hours. The problem has been waxing and waning. The pain is located in the generalized abdominal region. The pain is at a severity of 8/10. The pain is moderate. The quality of the pain is cramping. Associated symptoms include arthralgias, constipation, flatus, frequency, headaches, myalgias and nausea. Pertinent negatives include no anorexia, belching, diarrhea, dysuria, fever, hematochezia, hematuria, melena, vomiting or weight loss. The pain is aggravated by being still, bowel movement, certain positions, eating and movement. The pain is relieved by nothing, certain positions and palpation. She has tried antacids for the symptoms. The treatment provided no relief. Prior diagnostic workup includes surgery. Her past medical history is significant for abdominal surgery, colon cancer and gallstones. There is no history of Crohn's disease, GERD, irritable bowel syndrome, pancreatitis, PUD or ulcerative colitis.  "Patient's medical history does not include kidney stones and UTI.       Review of Systems   Constitutional: Negative for fever and weight loss.   Gastrointestinal: Positive for abdominal pain, constipation, flatus and nausea. Negative for anorexia, diarrhea, hematochezia, melena and vomiting.   Genitourinary: Positive for frequency. Negative for dysuria and hematuria.   Musculoskeletal: Positive for arthralgias and myalgias.   Neurological: Positive for headaches.       Objective:      Vitals:    01/04/19 1043   BP: 118/80   Pulse: 76   SpO2: 95%   Weight: 89.1 kg (196 lb 6.9 oz)   Height: 5' 5" (1.651 m)      Physical Exam   Constitutional: She is oriented to person, place, and time. She appears well-developed and well-nourished. She does not have a sickly appearance. No distress.   HENT:   Head: Normocephalic and atraumatic.   Eyes: Conjunctivae and EOM are normal. Right eye exhibits no discharge. Left eye exhibits no discharge. No scleral icterus.   Pulmonary/Chest: Effort normal. No respiratory distress.   Abdominal: Normal appearance. She exhibits no distension. There is no hepatosplenomegaly. There is tenderness ("sore") in the epigastric area. There is no rigidity, no rebound, no guarding, no CVA tenderness and negative Peres's sign.   Neurological: She is alert and oriented to person, place, and time.   Skin: Skin is warm and dry. She is not diaphoretic.   Psychiatric: She has a normal mood and affect. Her speech is normal.       Assessment:       1. Epigastric pain    2. Post-menopausal    3. Chronic idiopathic constipation        Plan:       Tanesha was seen today for abdominal pain.    Diagnoses and all orders for this visit:    Epigastric pain   -no red flags. stomach vs colonic. Start with daily antacid for 6-8 weeks while continuing to address chronic constipation.    Post-menopausal  -     DXA Bone Density Spine And Hip; Future    Chronic idiopathic constipation   Continue lubiprostone     "           Side effects of medication(s) were discussed in detail and patient voiced understanding.  Patient will call back for any issues or complications.

## 2019-01-09 ENCOUNTER — IMMUNIZATION (OUTPATIENT)
Dept: PHARMACY | Facility: CLINIC | Age: 69
End: 2019-01-09
Payer: MEDICARE

## 2019-01-21 DIAGNOSIS — Z78.0 MENOPAUSE: ICD-10-CM

## 2019-01-22 RX ORDER — ESTERIFIED ESTROGEN AND METHYLTESTOSTERONE .625; 1.25 MG/1; MG/1
TABLET ORAL
Qty: 30 TABLET | Refills: 5 | Status: SHIPPED | OUTPATIENT
Start: 2019-01-22 | End: 2019-07-29 | Stop reason: SDUPTHER

## 2019-03-20 ENCOUNTER — OFFICE VISIT (OUTPATIENT)
Dept: SURGERY | Facility: CLINIC | Age: 69
End: 2019-03-20
Payer: MEDICARE

## 2019-03-20 VITALS
HEIGHT: 65 IN | DIASTOLIC BLOOD PRESSURE: 73 MMHG | BODY MASS INDEX: 33.65 KG/M2 | SYSTOLIC BLOOD PRESSURE: 150 MMHG | WEIGHT: 201.94 LBS | HEART RATE: 73 BPM

## 2019-03-20 DIAGNOSIS — Z85.048 ENCOUNTER FOR FOLLOW-UP SURVEILLANCE OF RECTAL CANCER: ICD-10-CM

## 2019-03-20 DIAGNOSIS — K59.04 FUNCTIONAL CONSTIPATION: ICD-10-CM

## 2019-03-20 DIAGNOSIS — Z85.038 HISTORY OF COLON CANCER: Primary | ICD-10-CM

## 2019-03-20 DIAGNOSIS — Z08 ENCOUNTER FOR FOLLOW-UP SURVEILLANCE OF RECTAL CANCER: ICD-10-CM

## 2019-03-20 PROCEDURE — 99999 PR PBB SHADOW E&M-EST. PATIENT-LVL III: CPT | Mod: PBBFAC,HCNC,, | Performed by: COLON & RECTAL SURGERY

## 2019-03-20 PROCEDURE — 99999 PR PBB SHADOW E&M-EST. PATIENT-LVL III: ICD-10-PCS | Mod: PBBFAC,HCNC,, | Performed by: COLON & RECTAL SURGERY

## 2019-03-20 PROCEDURE — 99213 OFFICE O/P EST LOW 20 MIN: CPT | Mod: HCNC,S$GLB,, | Performed by: COLON & RECTAL SURGERY

## 2019-03-20 PROCEDURE — 1101F PT FALLS ASSESS-DOCD LE1/YR: CPT | Mod: HCNC,CPTII,S$GLB, | Performed by: COLON & RECTAL SURGERY

## 2019-03-20 PROCEDURE — 99213 PR OFFICE/OUTPT VISIT, EST, LEVL III, 20-29 MIN: ICD-10-PCS | Mod: HCNC,S$GLB,, | Performed by: COLON & RECTAL SURGERY

## 2019-03-20 PROCEDURE — 1101F PR PT FALLS ASSESS DOC 0-1 FALLS W/OUT INJ PAST YR: ICD-10-PCS | Mod: HCNC,CPTII,S$GLB, | Performed by: COLON & RECTAL SURGERY

## 2019-03-20 RX ORDER — MELOXICAM 7.5 MG/1
TABLET ORAL
Qty: 90 TABLET | Refills: 1 | Status: SHIPPED | OUTPATIENT
Start: 2019-03-20 | End: 2019-09-23 | Stop reason: SDUPTHER

## 2019-03-20 NOTE — PROGRESS NOTES
"CRS Office Visit History and Physical    Referring Md:   No referring provider defined for this encounter.    SUBJECTIVE:     Chief Complaint: follow up hx rectal cancer / constipation    History of Present Illness:  The patient is an established patient to this practice but new to Dr. Curry (seen by Kt in the past).   Course is as follows:  Patient is a 68 y.o. female presents with chronic constipation. Has a history of T3N0 rectal cancer treated with anterior resection in 2006. This was complicated by a wound infection / separation which was treated with multiple operations most recently 7/10/18. This has healed well but patient continues to have severe constipation with intermittent abdominal cramping. She takes metamucil, magnesium, amitiza, and intermittent miralax. Currently has a bowel movement about every 5 days and goes "all day."    Last Colonoscopy: 8/23/2016    Review of patient's allergies indicates:   Allergen Reactions    Adhesive Rash     Asking that we use Paper Tape,  Uses "Sensitive Skin" band aids    Amoxil [amoxicillin] Hives    Penicillins Hives and Swelling     Swelling of ears and neck    Sulfa (sulfonamide antibiotics) Hives and Itching       Past Medical History:   Diagnosis Date    Anemia     Arthritis     Cancer     colon cancer    Colon polyp     Draining cutaneous sinus tract     Headache(784.0)     Incisional hernia     Insomnia     Nocturnal leg cramps     Nonhealing surgical wound     PONV (postoperative nausea and vomiting)     with last surgery per patient, "Severe" PONV 2-27-14    Postmenopausal status     Rectosigmoid cancer      Past Surgical History:   Procedure Laterality Date    BREAST BIOPSY      CHOLECYSTECTOMY      CHOLECYSTECTOMY N/A 2/27/2014    [facility]formed [facility] at [facility] OR 2ND FLR    COLECTOMY      COLON SURGERY      COLONOSCOPY N/A 8/23/2016    Performed by Cuco Meraz [facility] at [facility] ENDO (4TH FLR)    COLONOSCOPY N/A 5/10/2013 " "   [facility] ENDO (4TH FLR)    EXCISION-LESION -skin lesion and drainage tract-umbilicus/epigastrum N/A 7/13/2015    [facility] at [facility] OR 1ST FLR    HERNIA REPAIR      HYSTERECTOMY      KNEE ARTHROSCOPY      KNEE SURGERY      right and left knee repair    OOPHORECTOMY      REPAIR, HERNIA, INCISIONAL, RECURRENT  7/10/2018    [facility] at [facility] OR 2ND FLR    REPAIR-HERNIA-INCISIONAL-INITIAL N/A 2/27/2014    [facility] at [facility] OR 2ND FLR    REPAIR-HERNIA-INCISIONAL-RECURRENT N/A 3/26/2018    [facility] at [facility] OR 2ND FLR     Family History   Problem Relation Age of Onset    Breast cancer Mother     Cancer Mother         BREAST    Heart disease Father     Heart disease Maternal Grandmother     Diabetes Brother     Colon polyps Neg Hx     Ovarian cancer Neg Hx     Anesthesia problems Neg Hx      Social History     Tobacco Use    Smoking status: Never Smoker    Smokeless tobacco: Never Used   Substance Use Topics    Alcohol use: Yes     Alcohol/week: 0.6 oz     Types: 1 Glasses of wine per week     Comment: socially    Drug use: No        Review of Systems:  ROS    OBJECTIVE:     Vital Signs (Most Recent)  BP (!) 150/73 (BP Location: Right arm, Patient Position: Sitting, BP Method: Large (Automatic))   Pulse 73   Ht 5' 5" (1.651 m)   Wt 91.6 kg (201 lb 15.1 oz)   BMI 33.60 kg/m²     Physical Exam:  General: White female in no distress   Neuro: alert and oriented x 4.  Moves all extremities.     HEENT: no icterus.  Trachea midline  Respiratory: respirations are even and unlabored  Cardiac: regular rate  Abdomen: soft, nt, nd. Midline incision well healed, no hernia appreciated  Extremities: Warm dry and intact  Skin: no rashes    Labs: none    Imaging: none      ASSESSMENT/PLAN:     Tanesha was seen today for follow-up and hernia.    Diagnoses and all orders for this " visit:    History of colon cancer    Functional constipation    Other orders  -     linaclotide (LINZESS) 145 mcg Cap capsule; Take 1 capsule (145 mcg total) by mouth once daily.      Stop amitiza and start Linzess. Continue metamucil but stop other constipation meds while taking linzess  Follow up 6 weeks. If not doing better with linzess will consider GI referral  Next colonoscopy 2021 (5 years from 2016 scope)    Corky Kingsley MD  Colon and Rectal Surgery Fellow      I have personally obtained a history and performed a physical exam with and independent to my resident and discussed the findings and plan with the patient.  I agree with the above findings and plan with the following exceptions:  None    H, Cuco Curry MD, FACS, FASCRS  Staff Surgeon  Dept of Colon and Rectal Surgery  [facility] [place], [place]

## 2019-03-21 ENCOUNTER — PATIENT MESSAGE (OUTPATIENT)
Dept: SURGERY | Facility: CLINIC | Age: 69
End: 2019-03-21

## 2019-05-01 ENCOUNTER — OFFICE VISIT (OUTPATIENT)
Dept: SURGERY | Facility: CLINIC | Age: 69
End: 2019-05-01
Payer: MEDICARE

## 2019-05-01 VITALS
WEIGHT: 195.56 LBS | BODY MASS INDEX: 32.58 KG/M2 | HEART RATE: 70 BPM | SYSTOLIC BLOOD PRESSURE: 168 MMHG | DIASTOLIC BLOOD PRESSURE: 62 MMHG | HEIGHT: 65 IN

## 2019-05-01 DIAGNOSIS — K59.04 FUNCTIONAL CONSTIPATION: Primary | ICD-10-CM

## 2019-05-01 PROCEDURE — 1101F PR PT FALLS ASSESS DOC 0-1 FALLS W/OUT INJ PAST YR: ICD-10-PCS | Mod: HCNC,CPTII,S$GLB, | Performed by: COLON & RECTAL SURGERY

## 2019-05-01 PROCEDURE — 99999 PR PBB SHADOW E&M-EST. PATIENT-LVL III: ICD-10-PCS | Mod: PBBFAC,HCNC,, | Performed by: COLON & RECTAL SURGERY

## 2019-05-01 PROCEDURE — 1101F PT FALLS ASSESS-DOCD LE1/YR: CPT | Mod: HCNC,CPTII,S$GLB, | Performed by: COLON & RECTAL SURGERY

## 2019-05-01 PROCEDURE — 99999 PR PBB SHADOW E&M-EST. PATIENT-LVL III: CPT | Mod: PBBFAC,HCNC,, | Performed by: COLON & RECTAL SURGERY

## 2019-05-01 PROCEDURE — 99213 PR OFFICE/OUTPT VISIT, EST, LEVL III, 20-29 MIN: ICD-10-PCS | Mod: HCNC,S$GLB,, | Performed by: COLON & RECTAL SURGERY

## 2019-05-01 PROCEDURE — 99213 OFFICE O/P EST LOW 20 MIN: CPT | Mod: HCNC,S$GLB,, | Performed by: COLON & RECTAL SURGERY

## 2019-05-01 NOTE — PROGRESS NOTES
"HPI:  Tanesha Davis is a 68 y.o. female with history of constipation following colon cancer surgery. She was started on Linzess 145 mcg daily.  She was unable to take this every day because it gave her too much diarrhea.  It definitely helped.  She has not had the cramping abdominal pains that she had with Amitiza.  She generally moves her bowels 4 hr after her dose.  She has been taking it a few times a week.          Past Medical History:   Diagnosis Date    Anemia     Arthritis     Cancer     colon cancer    Colon polyp     Draining cutaneous sinus tract     Headache(784.0)     Incisional hernia     Insomnia     Nocturnal leg cramps     Nonhealing surgical wound     PONV (postoperative nausea and vomiting)     with last surgery per patient, "Severe" PONV 2-27-14    Postmenopausal status     Rectosigmoid cancer         Past Surgical History:   Procedure Laterality Date    BREAST BIOPSY      CHOLECYSTECTOMY      CHOLECYSTECTOMY N/A 2/27/2014    [facility] at [facility] OR 2ND FLR    COLECTOMY      COLON SURGERY      COLONOSCOPY N/A 8/23/2016    Performed by Cuco Meraz MD at [facility] ENDO (4TH FLR)    COLONOSCOPY N/A 5/10/2013    Performed by Cuco Meraz MD at [facility] ENDO (4TH FLR)    EXCISION-LESION -skin lesion and drainage tract-umbilicus/epigastrum N/A 7/13/2015    Performed by Willy Muse MD at [facility] OR 1ST FLR    HERNIA REPAIR      HYSTERECTOMY      KNEE ARTHROSCOPY      KNEE SURGERY      right and left knee repair    OOPHORECTOMY      REPAIR, HERNIA, INCISIONAL, RECURRENT  7/10/2018    Performed by [facility]o Meraz [facility] at [facility] OR 2ND FLR    REPAIR-HERNIA-INCISIONAL-INITIAL N/A 2/27/2014    Performed by Willy Muse MD at [facility] OR 2ND FLR    REPAIR-HERNIA-INCISIONAL-RECURRENT N/A 3/26/2018    Performed by Cuco eMraz MD at [facility] OR 2ND [facility]       Review of patient's allergies indicates:   Allergen Reactions    Adhesive Rash     Asking that we use " "Paper Tape,  Uses "Sensitive Skin" band aids    Amoxil [amoxicillin] Hives    Penicillins Hives and Swelling     Swelling of ears and neck    Sulfa (sulfonamide antibiotics) Hives and Itching       Family History   Problem Relation Age of Onset    Breast cancer Mother     Cancer Mother         BREAST    Heart disease Father     Heart disease Maternal Grandmother     Diabetes Brother     Colon polyps Neg Hx     Ovarian cancer Neg Hx     Anesthesia problems Neg Hx        Social History     Socioeconomic History    Marital status:      Spouse name: francis    Number of children: 2    Years of education: Not on file    Highest education level: Not on file   Occupational History    Occupation: Retired     Employer: Self   Social Needs    Financial resource strain: Not on file    Food insecurity:     Worry: Not on file     Inability: Not on file    Transportation needs:     Medical: Not on file     Non-medical: Not on file   Tobacco Use    Smoking status: Never Smoker    Smokeless tobacco: Never Used   Substance and Sexual Activity    Alcohol use: Yes     Alcohol/week: 0.6 oz     Types: 1 Glasses of wine per week     Comment: socially    Drug use: No    Sexual activity: Yes     Partners: Male     Birth control/protection: Post-menopausal   Lifestyle    Physical activity:     Days per week: Not on file     Minutes per session: Not on file    Stress: Not on file   Relationships    Social connections:     Talks on phone: Not on file     Gets together: Not on file     Attends Bahai service: Not on file     Active member of club or organization: Not on file     Attends meetings of clubs or organizations: Not on file     Relationship status: Not on file   Other Topics Concern    Not on file   Social History Narrative    Not on file       ROS:  GENERAL: No fever, chills, fatigability or weight loss.  Integument: No rashes, redness, icterus  CHEST: Denies SAN, cyanosis, wheezing, cough and " "sputum production.  CARDIOVASCULAR: Denies chest pain, PND, orthopnea or reduced exercise tolerance.  GI: Denies abd pain, dysphagia, nausea, vomiting, no hematemesis   : Denies burning on urination, no hematuria, no bacteriuria  MSK: No deformities, swelling, joint pain swelling  Neurologic: No HAs, seizures, weakness, paresthesias, gait problems    PE:  General appearance nontoxic  BP (!) 168/62 (BP Location: Left arm, Patient Position: Sitting, BP Method: Large (Automatic))   Pulse 70   Ht 5' 5" (1.651 m)   Wt 88.7 kg (195 lb 8.8 oz)   BMI 32.54 kg/m²   Sclera/ Skin anicteric  AT NC EOMI  Neck supple trachea midline   Chest symmetric, nl excursion, no retractions, breathing comfortably  EXT - no CCE  Neuro:  Mood/ affect nl, alert and oriented x 3, moves all ext's, gait nl      Assessment:  Chronic constipation, improved with Linzess    Plan:  Return to clinic 3 months  Will try decrease dose  72 mcg daily to see if this will reduce the diarrhea aspect    "

## 2019-07-29 DIAGNOSIS — Z78.0 MENOPAUSE: ICD-10-CM

## 2019-07-30 RX ORDER — ESTERIFIED ESTROGEN AND METHYLTESTOSTERONE .625; 1.25 MG/1; MG/1
TABLET ORAL
Qty: 30 TABLET | Refills: 5 | Status: SHIPPED | OUTPATIENT
Start: 2019-07-30 | End: 2019-10-02 | Stop reason: ALTCHOICE

## 2019-07-31 ENCOUNTER — HOSPITAL ENCOUNTER (OUTPATIENT)
Dept: CARDIOLOGY | Facility: CLINIC | Age: 69
Discharge: HOME | End: 2019-07-31
Attending: INTERNAL MEDICINE
Payer: MEDICARE

## 2019-07-31 ENCOUNTER — HOSPITAL ENCOUNTER (OUTPATIENT)
Dept: RADIOLOGY | Facility: HOSPITAL | Age: 69
Discharge: HOME | End: 2019-07-31
Attending: INTERNAL MEDICINE
Payer: MEDICARE

## 2019-07-31 ENCOUNTER — OFFICE VISIT (OUTPATIENT)
Dept: INTERNAL MEDICINE | Facility: CLINIC | Age: 69
End: 2019-07-31
Attending: INTERNAL MEDICINE
Payer: MEDICARE

## 2019-07-31 ENCOUNTER — OFFICE VISIT (OUTPATIENT)
Dept: SURGERY | Facility: CLINIC | Age: 69
End: 2019-07-31
Payer: MEDICARE

## 2019-07-31 VITALS
DIASTOLIC BLOOD PRESSURE: 72 MMHG | HEIGHT: 62 IN | SYSTOLIC BLOOD PRESSURE: 142 MMHG | BODY MASS INDEX: 37.69 KG/M2 | HEART RATE: 65 BPM | OXYGEN SATURATION: 95 % | WEIGHT: 204.81 LBS

## 2019-07-31 VITALS
DIASTOLIC BLOOD PRESSURE: 68 MMHG | BODY MASS INDEX: 37.77 KG/M2 | HEART RATE: 66 BPM | HEIGHT: 62 IN | SYSTOLIC BLOOD PRESSURE: 159 MMHG | WEIGHT: 205.25 LBS

## 2019-07-31 DIAGNOSIS — R07.9 CHEST PAIN: ICD-10-CM

## 2019-07-31 DIAGNOSIS — Z78.0 POSTMENOPAUSAL STATUS: ICD-10-CM

## 2019-07-31 DIAGNOSIS — R06.09 DOE (DYSPNEA ON EXERTION): ICD-10-CM

## 2019-07-31 DIAGNOSIS — K58.1 IRRITABLE BOWEL SYNDROME WITH CONSTIPATION: Primary | ICD-10-CM

## 2019-07-31 DIAGNOSIS — K59.09 CHRONIC CONSTIPATION: Primary | ICD-10-CM

## 2019-07-31 DIAGNOSIS — R79.9 ABNORMAL FINDING OF BLOOD CHEMISTRY: ICD-10-CM

## 2019-07-31 DIAGNOSIS — Z12.39 BREAST CANCER SCREENING: ICD-10-CM

## 2019-07-31 DIAGNOSIS — R61 NIGHT SWEATS: ICD-10-CM

## 2019-07-31 DIAGNOSIS — Z11.4 ENCOUNTER FOR SCREENING FOR HIV: ICD-10-CM

## 2019-07-31 DIAGNOSIS — R53.83 FATIGUE, UNSPECIFIED TYPE: ICD-10-CM

## 2019-07-31 DIAGNOSIS — R06.02 SHORTNESS OF BREATH: ICD-10-CM

## 2019-07-31 DIAGNOSIS — R06.00 DYSPNEA, UNSPECIFIED TYPE: ICD-10-CM

## 2019-07-31 LAB
CV STRESS BASE HR: 70 BPM
DIASTOLIC BLOOD PRESSURE: 62 MMHG
OHS CV CPX 1 MINUTE RECOVERY HEART RATE: 112 BPM
OHS CV CPX 85 PERCENT MAX PREDICTED HEART RATE MALE: 126
OHS CV CPX ESTIMATED METS: 9
OHS CV CPX MAX PREDICTED HEART RATE: 148
OHS CV CPX PATIENT IS FEMALE: 1
OHS CV CPX PATIENT IS MALE: 0
OHS CV CPX PEAK DIASTOLIC BLOOD PRESSURE: 94 MMHG
OHS CV CPX PEAK HEAR RATE: 150 BPM
OHS CV CPX PEAK RATE PRESSURE PRODUCT: NORMAL
OHS CV CPX PEAK SYSTOLIC BLOOD PRESSURE: 221 MMHG
OHS CV CPX PERCENT MAX PREDICTED HEART RATE ACHIEVED: 101
OHS CV CPX RATE PRESSURE PRODUCT PRESENTING: NORMAL
STRESS ECHO POST EXERCISE DUR MIN: 5 MINUTES
STRESS ECHO POST EXERCISE DUR SEC: 31 SECONDS
STRESS ECHO TARGET HR: 130.9 BPM
SYSTOLIC BLOOD PRESSURE: 161 MMHG

## 2019-07-31 PROCEDURE — 93017 CV STRESS TEST TRACING ONLY: CPT | Mod: HCNC

## 2019-07-31 PROCEDURE — 71046 X-RAY EXAM CHEST 2 VIEWS: CPT | Mod: 26,HCNC,, | Performed by: RADIOLOGY

## 2019-07-31 PROCEDURE — 99214 PR OFFICE/OUTPT VISIT, EST, LEVL IV, 30-39 MIN: ICD-10-PCS | Mod: HCNC,S$GLB,, | Performed by: INTERNAL MEDICINE

## 2019-07-31 PROCEDURE — 99999 PR PBB SHADOW E&M-EST. PATIENT-LVL III: ICD-10-PCS | Mod: PBBFAC,HCNC,, | Performed by: COLON & RECTAL SURGERY

## 2019-07-31 PROCEDURE — 93010 ELECTROCARDIOGRAM REPORT: CPT | Mod: HCNC,S$GLB,, | Performed by: INTERNAL MEDICINE

## 2019-07-31 PROCEDURE — 1101F PR PT FALLS ASSESS DOC 0-1 FALLS W/OUT INJ PAST YR: ICD-10-PCS | Mod: HCNC,CPTII,S$GLB, | Performed by: INTERNAL MEDICINE

## 2019-07-31 PROCEDURE — 99213 PR OFFICE/OUTPT VISIT, EST, LEVL III, 20-29 MIN: ICD-10-PCS | Mod: HCNC,S$GLB,, | Performed by: COLON & RECTAL SURGERY

## 2019-07-31 PROCEDURE — 99999 PR PBB SHADOW E&M-EST. PATIENT-LVL V: ICD-10-PCS | Mod: PBBFAC,HCNC,, | Performed by: INTERNAL MEDICINE

## 2019-07-31 PROCEDURE — 93005 EKG 12-LEAD: ICD-10-PCS | Mod: HCNC,S$GLB,, | Performed by: INTERNAL MEDICINE

## 2019-07-31 PROCEDURE — 99213 OFFICE O/P EST LOW 20 MIN: CPT | Mod: HCNC,S$GLB,, | Performed by: COLON & RECTAL SURGERY

## 2019-07-31 PROCEDURE — 93018 CV STRESS TEST I&R ONLY: CPT | Mod: HCNC,,, | Performed by: INTERNAL MEDICINE

## 2019-07-31 PROCEDURE — 99214 OFFICE O/P EST MOD 30 MIN: CPT | Mod: HCNC,S$GLB,, | Performed by: INTERNAL MEDICINE

## 2019-07-31 PROCEDURE — 71046 XR CHEST PA AND LATERAL: ICD-10-PCS | Mod: 26,HCNC,, | Performed by: RADIOLOGY

## 2019-07-31 PROCEDURE — 93018 TREADMILL STRESS TEST (CUPID ONLY): ICD-10-PCS | Mod: HCNC,,, | Performed by: INTERNAL MEDICINE

## 2019-07-31 PROCEDURE — 1101F PR PT FALLS ASSESS DOC 0-1 FALLS W/OUT INJ PAST YR: ICD-10-PCS | Mod: HCNC,CPTII,S$GLB, | Performed by: COLON & RECTAL SURGERY

## 2019-07-31 PROCEDURE — 93010 EKG 12-LEAD: ICD-10-PCS | Mod: HCNC,S$GLB,, | Performed by: INTERNAL MEDICINE

## 2019-07-31 PROCEDURE — 71046 X-RAY EXAM CHEST 2 VIEWS: CPT | Mod: TC,HCNC

## 2019-07-31 PROCEDURE — 93016 CV STRESS TEST SUPVJ ONLY: CPT | Mod: HCNC,,, | Performed by: INTERNAL MEDICINE

## 2019-07-31 PROCEDURE — 93016 TREADMILL STRESS TEST (CUPID ONLY): ICD-10-PCS | Mod: HCNC,,, | Performed by: INTERNAL MEDICINE

## 2019-07-31 PROCEDURE — 1101F PT FALLS ASSESS-DOCD LE1/YR: CPT | Mod: HCNC,CPTII,S$GLB, | Performed by: COLON & RECTAL SURGERY

## 2019-07-31 PROCEDURE — 99999 PR PBB SHADOW E&M-EST. PATIENT-LVL V: CPT | Mod: PBBFAC,HCNC,, | Performed by: INTERNAL MEDICINE

## 2019-07-31 PROCEDURE — 93005 ELECTROCARDIOGRAM TRACING: CPT | Mod: HCNC,S$GLB,, | Performed by: INTERNAL MEDICINE

## 2019-07-31 PROCEDURE — 1101F PT FALLS ASSESS-DOCD LE1/YR: CPT | Mod: HCNC,CPTII,S$GLB, | Performed by: INTERNAL MEDICINE

## 2019-07-31 PROCEDURE — 99999 PR PBB SHADOW E&M-EST. PATIENT-LVL III: CPT | Mod: PBBFAC,HCNC,, | Performed by: COLON & RECTAL SURGERY

## 2019-07-31 RX ORDER — HYOSCYAMINE SULFATE 0.38 MG/1
0.38 TABLET, EXTENDED RELEASE ORAL EVERY 12 HOURS
Qty: 60 TABLET | Refills: 2 | Status: SHIPPED | OUTPATIENT
Start: 2019-07-31 | End: 2021-02-03

## 2019-07-31 NOTE — LETTER
August 11, 2019      Caleb Hernandez MD  [address]  [address]  [facility] [address]           [facility]  [address]  [facility] [address]  Phone: [phone]          Patient: Tanesha Davis   MR Number: [id number]   YOB: 1950   Date of Visit: 7/31/2019       Dear Dr. Caleb Hernandez:    Thank you for referring Tanesha Davis to me for evaluation. Attached you will find relevant portions of my assessment and plan of care.    If you have questions, please do not hesitate to call me. I look forward to following Tanesha Davis along with you.    Sincerely,    KISHORE uCrry MD    Enclosure  CC:  No Recipients    If you would like to receive this communication electronically, please contact [email or web address] or [phone] to request more information on [employer] Link access.    For providers and/or their staff who would like to refer a patient to [facility], please contact us through our one-stop-shop provider referral line, [facility], at [phone].    If you feel you have received this communication in error or would no longer like to receive these types of communications, please e-mail [email or web address]

## 2019-07-31 NOTE — PROGRESS NOTES
"HPI:  Tanesha Davis is a 68 y.o. female with history of constipation following colon cancer surgery. She was started on Linzess 145 mcg daily.  She was unable to take this every day because it gave her too much diarrhea. So the dose was lowered to 75 mcg daily.   Symptoms have improved, bowel movements every other day.   She generally moves her bowels 4hrs after her dose.   Continues to have cramping abdominal pain in the LLQ and RLQ, that gets better after defecation.         Past Medical History:   Diagnosis Date    Anemia     Arthritis     Cancer     colon cancer    Colon polyp     Draining cutaneous sinus tract     Headache(784.0)     Incisional hernia     Insomnia     Nocturnal leg cramps     Nonhealing surgical wound     PONV (postoperative nausea and vomiting)     with last surgery per patient, "Severe" PONV 2-27-14    Postmenopausal status     Rectosigmoid cancer         Past Surgical History:   Procedure Laterality Date    BREAST BIOPSY      CHOLECYSTECTOMY      CHOLECYSTECTOMY N/A 2/27/2014    Performed by Willy Muse MD at [facility] OR 2ND FLR    COLECTOMY      COLON SURGERY      COLONOSCOPY N/A 8/23/2016    Performed by Cuco Meraz MD at [facility] ENDO (4TH FLR)    COLONOSCOPY N/A 5/10/2013    Performed by Cuco Meraz MD at [facility] ENDO (4TH FLR)    EXCISION-LESION -skin lesion and drainage tract-umbilicus/epigastrum N/A 7/13/2015    Performed by Willy Muse MD at [facility] OR 1ST FLR    HERNIA REPAIR      HYSTERECTOMY      KNEE ARTHROSCOPY      KNEE SURGERY      right and left knee repair    OOPHORECTOMY      REPAIR, HERNIA, INCISIONAL, RECURRENT  7/10/2018    Performed by Cuco Meraz MD at [facility] OR 2ND FLR    REPAIR-HERNIA-INCISIONAL-INITIAL N/A 2/27/2014    Performed by Willy Muse MD at [facility] OR 2ND FLR    REPAIR-HERNIA-INCISIONAL-RECURRENT N/A 3/26/2018    Performed by Cuco Meraz MD at [facility] OR [address] [place] [address]       Review of patient's allergies indicates: " "  Allergen Reactions    Adhesive Rash     Asking that we use Paper Tape,  Uses "Sensitive Skin" band aids    Amoxil [amoxicillin] Hives    Penicillins Hives and Swelling     Swelling of ears and neck    Sulfa (sulfonamide antibiotics) Hives and Itching       Family History   Problem Relation Age of Onset    Breast cancer Mother     Cancer Mother         BREAST    Heart disease Father     Heart disease Maternal Grandmother     Diabetes Brother     Colon polyps Neg Hx     Ovarian cancer Neg Hx     Anesthesia problems Neg Hx        Social History     Socioeconomic History    Marital status:      Spouse name: francis    Number of children: 2    Years of education: Not on file    Highest education level: Not on file   Occupational History    Occupation: Retired     Employer: Self   Social Needs    Financial resource strain: Not on file    Food insecurity:     Worry: Not on file     Inability: Not on file    Transportation needs:     Medical: No     Non-medical: No   Tobacco Use    Smoking status: Never Smoker    Smokeless tobacco: Never Used   Substance and Sexual Activity    Alcohol use: Yes     Alcohol/week: 0.6 oz     Types: 1 Glasses of wine per week     Frequency: Monthly or less     Drinks per session: 1 or 2     Binge frequency: Never     Comment: socially    Drug use: No    Sexual activity: Yes     Partners: Male     Birth control/protection: Post-menopausal   Lifestyle    Physical activity:     Days per week: 4 days     Minutes per session: 60 min    Stress: Only a little   Relationships    Social connections:     Talks on phone: More than three times a week     Gets together: Three times a week     Attends Scientologist service: Not on file     Active member of club or organization: No     Attends meetings of clubs or organizations: Never     Relationship status:    Other Topics Concern    Not on file   Social History Narrative    Not on file       ROS:  GENERAL: No fever, " "chills, fatigability or weight loss.  Integument: No rashes, redness, icterus  CHEST: Denies SAN, cyanosis, wheezing, cough and sputum production.  CARDIOVASCULAR: Denies chest pain, PND, orthopnea or reduced exercise tolerance.  GI: Denies abd pain, dysphagia, nausea, vomiting, no hematemesis   : Denies burning on urination, no hematuria, no bacteriuria  MSK: No deformities, swelling, joint pain swelling  Neurologic: No HAs, seizures, weakness, paresthesias, gait problems    PE:  General appearance healthy  BP (!) 159/68 (BP Location: Right arm, Patient Position: Sitting, BP Method: Large (Automatic))   Pulse 66   Ht 5' 2" (1.575 m)   Wt 93.1 kg (205 lb 4 oz)   BMI 37.54 kg/m²   Sclera/ Skin nonicteric  LN nopalpable  AT NC EOMI  Neck supple trachea midline   Chest symmetric, nl excursion, no retractions, breathing comfortably  Abdomen  ND soft NT.  no masses, no organomegaly  EXT - no CCE  Neuro:  Mood/ affect nl, alert and oriented x 3, moves all ext's, gait nl      Assessment:  Chronic constipation with abdominal pain that improves after defecation  IBD?     Plan:  Continue with linzess 75mcg  Levbid 2 times daily  Follow up in 3 months     I have personally obtained a history and performed a physical exam with and independent to my resident and discussed the findings and plan with the patient.  I agree with the above findings and plan with the following exceptions:  None    H, Cuco Curry MD, FACS, FASCRS  Staff Surgeon  Dept of Colon and Rectal Surgery  [facility] [place], [place]      "

## 2019-07-31 NOTE — PROGRESS NOTES
Subjective:       Patient ID: Tanesha Davis is a 68 y.o. female.    Chief Complaint: Abdominal Pain and Night Sweats    Here for urgent     Ctoninued RLQ and LUQ abdominal pains. Her LUQ pain is cramping nature and can often be triggered by reaching above her head or bending over and touching her feet. Sometimes the pain will wake her at night and walking around her room and it subsides. She had a complicated post op course after her colonic anastomosis for colon CA. Saw colorectal this morning and Rx hyocyamine. She continues to suffer from chronic constipation. She takes linzess and within 2-4 hours it typically causes bloating and fecal urgency, so she often will not take this if she is leaving the house that day.     Return of nights sweats for the past month. Hx of post menopausal night sweats that responded well to HRT. Currently on without recent dosage change.   RLQ pain at night. She reports CP and SOB associated with her night sweats. She endorses occasional bitter taste, but denies sour taste in mouth epigastric pains esophageal pains, excessive belching. She reports a 2 month hx of decrease in her stamina due to chest tightness and SOB resulting in a near 50% reduction in her stamina.  No chronic cough, rash, dysuria, change in urination, vaginal discharge,       Review of Systems   Constitutional: Positive for chills and diaphoresis. Negative for fatigue, fever and unexpected weight change.   HENT: Negative for ear pain, hearing loss, postnasal drip, tinnitus, trouble swallowing and voice change.    Respiratory: Positive for chest tightness and shortness of breath. Negative for cough and wheezing.    Cardiovascular: Negative for chest pain, palpitations and leg swelling.   Gastrointestinal: Positive for abdominal distention, abdominal pain and constipation. Negative for blood in stool, diarrhea, nausea and vomiting.   Endocrine: Negative for polydipsia, polyphagia and polyuria.   Genitourinary:  "Negative for difficulty urinating, dysuria, hematuria and vaginal bleeding.   Musculoskeletal: Positive for arthralgias.   Skin: Negative for rash.   Allergic/Immunologic: Negative for food allergies.   Neurological: Negative for dizziness, numbness and headaches.   Hematological: Does not bruise/bleed easily.   Psychiatric/Behavioral: The patient is not nervous/anxious.        Objective:      Vitals:    07/31/19 1055   BP: (!) 142/72   Pulse: 65   SpO2: 95%   Weight: 92.9 kg (204 lb 12.9 oz)   Height: 5' 2" (1.575 m)      Physical Exam   Constitutional: She is oriented to person, place, and time. She appears well-developed and well-nourished. No distress.   HENT:   Head: Normocephalic and atraumatic.   Mouth/Throat: Oropharynx is clear and moist. No oropharyngeal exudate.   Eyes: Pupils are equal, round, and reactive to light. Conjunctivae and EOM are normal. No scleral icterus.   Neck: No thyromegaly present.   Cardiovascular: Normal rate, regular rhythm and normal heart sounds.   No murmur heard.  Pulmonary/Chest: Effort normal and breath sounds normal. She has no wheezes. She has no rales.   Abdominal: Soft. She exhibits no distension. There is no tenderness.   Musculoskeletal: She exhibits no edema or tenderness.   Lymphadenopathy:     She has no cervical adenopathy.   Neurological: She is alert and oriented to person, place, and time.   Skin: Skin is warm and dry.   Psychiatric: She has a normal mood and affect. Her behavior is normal.       Assessment:       1. Chronic constipation    2. Night sweats    3. Fatigue, unspecified type    4. SAN (dyspnea on exertion)    5. Postmenopausal status    6. Breast cancer screening    7. Abnormal finding of blood chemistry     8. Encounter for screening for HIV     9. Body mass index (BMI) of 37.0-37.9 in adult     10. Chest pain    11. Dyspnea, unspecified type    12. Shortness of breath         Plan:       Tanesha was seen today for abdominal pain and night " sweats.    Diagnoses and all orders for this visit:    Chronic constipation   linzess decreased. Discussed consistency, allowing GI tract to normalize and monitoring.   -     Ambulatory Referral to Gastroenterology    Night sweats   Hx of colon CA with last normal colonoscopy 3 yrs prior. Due for update. Follow with colorectal and is going to see GI. Needs to get UTD. Will get UTd on MMG and GYN visit. Stop meloxicam for 2 weeks to see if this is the culprit.   -     IN OFFICE EKG 12-LEAD (to Muse)  -     URINALYSIS; Future  -     Comprehensive metabolic panel; Future  -     TSH; Future  -     CBC auto differential; Future  -     Hemoglobin A1c; Future  -     HIV 1/2 Ag/Ab (4th Gen); Future  -     Hepatitis C antibody; Future  -     Lactate dehydrogenase; Future    Fatigue, unspecified type  -     URINALYSIS; Future  -     Comprehensive metabolic panel; Future  -     HIV 1/2 Ag/Ab (4th Gen); Future  -     Hepatitis C antibody; Future    SAN (dyspnea on exertion)  Need to r/o cardiac. She has insomnia, RLS, nocturia, night time abdominal symptoms, fatigue, BMI of 37-may need to investigate for BETTY if no source of night sweats, SAN, and fatigue found. Start with EKG, stress test, routine labs for anemia, thyroid, lytes, etc,  -     IN OFFICE EKG 12-LEAD (to Muse)  -     X-Ray Chest PA And Lateral; Future    Postmenopausal status  -     Lipid panel; Future  -     Vitamin D; Future    Breast cancer screening  -     Mammo Digital Screening Bilat w/ Julius; Future    Abnormal finding of blood chemistry   -     Lipid panel; Future  -     Hemoglobin A1c; Future    Encounter for screening for HIV   -     HIV 1/2 Ag/Ab (4th Gen); Future    Body mass index (BMI) of 37.0-37.9 in adult   -     Vitamin D; Future    Chest pain  -     IN OFFICE EKG 12-LEAD (to Muse)    Dyspnea, unspecified type  -     NM Myocardial Perfusion Spect Multi Exer; Future  -     Nuc Tread Stress test - Radiologist interpreted; Future    Shortness of breath    -     NM Myocardial Perfusion Spect Multi Exer; Future         RTC in 4 weeks. Office and Emergency Department prompts discussed.    Caleb Ceballos MD  Internal Medicine-[facility]        Side effects of medication(s) were discussed in detail and patient voiced understanding.  Patient will call back for any issues or complications.     3 months of low back pain  Answers for HPI/ROS submitted by the patient on 7/29/2019   Abdominal pain  Chronicity: recurrent  Onset: more than 1 year ago  Onset quality: gradual  Frequency: constantly  Progression since onset: unchanged  Pain location: generalized abdominal region  Pain - numeric: 7/10  Pain quality: aching, sharp  anorexia: No  arthralgias: Yes  belching: No  constipation: Yes  diarrhea: Yes  dysuria: No  fever: No  flatus: Yes  frequency: Yes  headaches: No  hematochezia: No  hematuria: No  melena: No  myalgias: Yes  nausea: Yes  weight loss: No  vomiting: No  Aggravated by: bowel movement, certain positions, coughing, movement  Relieved by: nothing, palpation, recumbency  Diagnostic workup: lower endoscopy, surgery  Pain severity: moderate  Treatments tried: nothing  Improvement on treatment: no relief  abdominal surgery: Yes  colon cancer: Yes  Crohn's disease: No  gallstones: Yes  GERD: Yes  irritable bowel syndrome: Yes  kidney stones: No  pancreatitis: No  PUD: No  ulcerative colitis: No  UTI: No

## 2019-08-15 ENCOUNTER — HOSPITAL ENCOUNTER (OUTPATIENT)
Dept: RADIOLOGY | Facility: HOSPITAL | Age: 69
Discharge: HOME | End: 2019-08-15
Attending: INTERNAL MEDICINE
Payer: MEDICARE

## 2019-08-15 DIAGNOSIS — Z12.39 BREAST CANCER SCREENING: ICD-10-CM

## 2019-08-15 PROCEDURE — 77063 BREAST TOMOSYNTHESIS BI: CPT | Mod: 26,HCNC,, | Performed by: RADIOLOGY

## 2019-08-15 PROCEDURE — 77063 MAMMO DIGITAL SCREENING BILAT WITH TOMOSYNTHESIS_CAD: ICD-10-PCS | Mod: 26,HCNC,, | Performed by: RADIOLOGY

## 2019-08-15 PROCEDURE — 77067 SCR MAMMO BI INCL CAD: CPT | Mod: TC,HCNC

## 2019-08-15 PROCEDURE — 77067 SCR MAMMO BI INCL CAD: CPT | Mod: 26,HCNC,, | Performed by: RADIOLOGY

## 2019-08-15 PROCEDURE — 77067 MAMMO DIGITAL SCREENING BILAT WITH TOMOSYNTHESIS_CAD: ICD-10-PCS | Mod: 26,HCNC,, | Performed by: RADIOLOGY

## 2019-08-28 ENCOUNTER — OFFICE VISIT (OUTPATIENT)
Dept: INTERNAL MEDICINE | Facility: CLINIC | Age: 69
End: 2019-08-28
Attending: INTERNAL MEDICINE
Payer: MEDICARE

## 2019-08-28 ENCOUNTER — LAB VISIT (OUTPATIENT)
Dept: LAB | Facility: OTHER | Age: 69
End: 2019-08-28
Attending: INTERNAL MEDICINE
Payer: MEDICARE

## 2019-08-28 VITALS
WEIGHT: 204.56 LBS | HEART RATE: 69 BPM | BODY MASS INDEX: 34.08 KG/M2 | DIASTOLIC BLOOD PRESSURE: 86 MMHG | OXYGEN SATURATION: 97 % | HEIGHT: 65 IN | SYSTOLIC BLOOD PRESSURE: 144 MMHG

## 2019-08-28 DIAGNOSIS — Z11.4 ENCOUNTER FOR SCREENING FOR HIV: ICD-10-CM

## 2019-08-28 DIAGNOSIS — R61 NIGHT SWEATS: Primary | ICD-10-CM

## 2019-08-28 DIAGNOSIS — R61 NIGHT SWEATS: ICD-10-CM

## 2019-08-28 LAB
BASOPHILS # BLD AUTO: 0.07 K/UL (ref 0–0.2)
BASOPHILS NFR BLD: 1 % (ref 0–1.9)
CRP SERPL-MCNC: 7.6 MG/L (ref 0–8.2)
DIFFERENTIAL METHOD: ABNORMAL
EOSINOPHIL # BLD AUTO: 0.2 K/UL (ref 0–0.5)
EOSINOPHIL NFR BLD: 3.2 % (ref 0–8)
ERYTHROCYTE [DISTWIDTH] IN BLOOD BY AUTOMATED COUNT: 13.7 % (ref 11.5–14.5)
ERYTHROCYTE [SEDIMENTATION RATE] IN BLOOD: 4 MM/HR (ref 0–20)
HCT VFR BLD AUTO: 42 % (ref 37–48.5)
HGB BLD-MCNC: 12.9 G/DL (ref 12–16)
IMM GRANULOCYTES # BLD AUTO: 0.02 K/UL (ref 0–0.04)
IMM GRANULOCYTES NFR BLD AUTO: 0.3 % (ref 0–0.5)
LYMPHOCYTES # BLD AUTO: 1.6 K/UL (ref 1–4.8)
LYMPHOCYTES NFR BLD: 24.1 % (ref 18–48)
MCH RBC QN AUTO: 26.3 PG (ref 27–31)
MCHC RBC AUTO-ENTMCNC: 30.7 G/DL (ref 32–36)
MCV RBC AUTO: 86 FL (ref 82–98)
MONOCYTES # BLD AUTO: 0.5 K/UL (ref 0.3–1)
MONOCYTES NFR BLD: 7.4 % (ref 4–15)
NEUTROPHILS # BLD AUTO: 4.3 K/UL (ref 1.8–7.7)
NEUTROPHILS NFR BLD: 64 % (ref 38–73)
NRBC BLD-RTO: 0 /100 WBC
PLATELET # BLD AUTO: 233 K/UL (ref 150–350)
PMV BLD AUTO: 10.9 FL (ref 9.2–12.9)
RBC # BLD AUTO: 4.91 M/UL (ref 4–5.4)
WBC # BLD AUTO: 6.77 K/UL (ref 3.9–12.7)

## 2019-08-28 PROCEDURE — 99999 PR PBB SHADOW E&M-EST. PATIENT-LVL III: ICD-10-PCS | Mod: PBBFAC,HCNC,, | Performed by: INTERNAL MEDICINE

## 2019-08-28 PROCEDURE — 84165 PATHOLOGIST INTERPRETATION SPE: ICD-10-PCS | Mod: 26,HCNC,, | Performed by: PATHOLOGY

## 2019-08-28 PROCEDURE — 99999 PR PBB SHADOW E&M-EST. PATIENT-LVL III: CPT | Mod: PBBFAC,HCNC,, | Performed by: INTERNAL MEDICINE

## 2019-08-28 PROCEDURE — 1101F PR PT FALLS ASSESS DOC 0-1 FALLS W/OUT INJ PAST YR: ICD-10-PCS | Mod: HCNC,CPTII,S$GLB, | Performed by: INTERNAL MEDICINE

## 2019-08-28 PROCEDURE — 99214 OFFICE O/P EST MOD 30 MIN: CPT | Mod: HCNC,S$GLB,, | Performed by: INTERNAL MEDICINE

## 2019-08-28 PROCEDURE — 99214 PR OFFICE/OUTPT VISIT, EST, LEVL IV, 30-39 MIN: ICD-10-PCS | Mod: HCNC,S$GLB,, | Performed by: INTERNAL MEDICINE

## 2019-08-28 PROCEDURE — 86140 C-REACTIVE PROTEIN: CPT | Mod: HCNC

## 2019-08-28 PROCEDURE — 86803 HEPATITIS C AB TEST: CPT | Mod: HCNC

## 2019-08-28 PROCEDURE — 85025 COMPLETE CBC W/AUTO DIFF WBC: CPT | Mod: HCNC

## 2019-08-28 PROCEDURE — 86703 HIV-1/HIV-2 1 RESULT ANTBDY: CPT | Mod: HCNC

## 2019-08-28 PROCEDURE — 1101F PT FALLS ASSESS-DOCD LE1/YR: CPT | Mod: HCNC,CPTII,S$GLB, | Performed by: INTERNAL MEDICINE

## 2019-08-28 PROCEDURE — 84165 PROTEIN E-PHORESIS SERUM: CPT | Mod: HCNC

## 2019-08-28 PROCEDURE — 85651 RBC SED RATE NONAUTOMATED: CPT | Mod: HCNC

## 2019-08-28 PROCEDURE — 84165 PROTEIN E-PHORESIS SERUM: CPT | Mod: 26,HCNC,, | Performed by: PATHOLOGY

## 2019-08-28 NOTE — PROGRESS NOTES
"Subjective:       Patient ID: Tanesha Davis is a 68 y.o. female.    Chief Complaint: Follow-up; Leg Pain; and Chest Pain    Here for f/u    Stopped meloxicam for 2 weeks and night sweats at 4am continue. She is adherent with estrogen replacement. She reports continued abdominal pains. She continues to work with colorectal for chronic constipation with titration of her linzess and fiber supplementation. Hx of colon CA. Her GYN appt was rescheduled to get UTD with PAP, manual exam, and discussion of estrogen adjustment. She only takes her vitamins of vitamin E, complete vitamin prior to bed. Her weight is increasing.       Review of Systems   Constitutional: Positive for unexpected weight change. Negative for activity change.   HENT: Negative for hearing loss, rhinorrhea and trouble swallowing.    Eyes: Negative for discharge and visual disturbance.   Respiratory: Positive for chest tightness. Negative for wheezing.    Cardiovascular: Negative for chest pain and palpitations.   Gastrointestinal: Positive for constipation. Negative for blood in stool, diarrhea and vomiting.   Endocrine: Negative for polydipsia and polyuria.   Genitourinary: Negative for difficulty urinating, dysuria, hematuria and menstrual problem.   Musculoskeletal: Positive for arthralgias and joint swelling. Negative for neck pain.   Neurological: Negative for weakness and headaches.   Psychiatric/Behavioral: Negative for confusion and dysphoric mood.       Objective:      Vitals:    08/28/19 1055   BP: (!) 144/86   Pulse: 69   SpO2: 97%   Weight: 92.8 kg (204 lb 9.4 oz)   Height: 5' 5" (1.651 m)      Physical Exam   Constitutional: She is oriented to person, place, and time. She appears well-developed and well-nourished. No distress.   HENT:   Head: Normocephalic and atraumatic.   Mouth/Throat: Oropharynx is clear and moist. No oropharyngeal exudate.   Eyes: Pupils are equal, round, and reactive to light. Conjunctivae and EOM are normal. No " scleral icterus.   Neck: No thyromegaly present.   Cardiovascular: Normal rate, regular rhythm and normal heart sounds.   No murmur heard.  Pulmonary/Chest: Effort normal and breath sounds normal. She has no wheezes. She has no rales.   Abdominal: Soft. She exhibits no distension. There is no tenderness.   Musculoskeletal: She exhibits no edema or tenderness.   Lymphadenopathy:        Head (right side): No submental, no submandibular, no tonsillar, no preauricular and no posterior auricular adenopathy present.        Head (left side): No submental, no submandibular, no tonsillar, no preauricular and no posterior auricular adenopathy present.     She has no cervical adenopathy.        Right cervical: No superficial cervical adenopathy present.       Left cervical: No superficial cervical adenopathy present.     She has no axillary adenopathy.   Neurological: She is alert and oriented to person, place, and time.   Skin: Skin is warm and dry.   Psychiatric: She has a normal mood and affect. Her behavior is normal.       Assessment:       1. Night sweats    2. Encounter for screening for HIV         Plan:       Tanesha was seen today for follow-up, leg pain and chest pain.    Diagnoses and all orders for this visit:    Night sweats  -     HIV 1/2 Ag/Ab (4th Gen); Future  -     Hepatitis C antibody; Future  -     Protein electrophoresis, serum; Future  -     CBC auto differential; Future  -     C-reactive protein; Future  -     Sedimentation rate; Future  -     QUANTIFERON GOLD TB; Future    Encounter for screening for HIV   -     HIV 1/2 Ag/Ab (4th Gen); Future           Caleb Ceballos MD  Internal Medicine-[facility]        Side effects of medication(s) were discussed in detail and patient voiced understanding.  Patient will call back for any issues or complications.

## 2019-08-29 ENCOUNTER — TELEPHONE (OUTPATIENT)
Dept: INTERNAL MEDICINE | Facility: CLINIC | Age: 69
End: 2019-08-29

## 2019-08-29 LAB
ALBUMIN SERPL ELPH-MCNC: 4.05 G/DL (ref 3.35–5.55)
ALPHA1 GLOB SERPL ELPH-MCNC: 0.27 G/DL (ref 0.17–0.41)
ALPHA2 GLOB SERPL ELPH-MCNC: 0.7 G/DL (ref 0.43–0.99)
B-GLOBULIN SERPL ELPH-MCNC: 0.93 G/DL (ref 0.5–1.1)
GAMMA GLOB SERPL ELPH-MCNC: 0.85 G/DL (ref 0.67–1.58)
HCV AB SERPL QL IA: NEGATIVE
HIV 1+2 AB+HIV1 P24 AG SERPL QL IA: NEGATIVE
PATHOLOGIST INTERPRETATION SPE: NORMAL
PROT SERPL-MCNC: 6.8 G/DL (ref 6–8.4)

## 2019-08-30 ENCOUNTER — TELEPHONE (OUTPATIENT)
Dept: SURGERY | Facility: CLINIC | Age: 69
End: 2019-08-30

## 2019-08-30 DIAGNOSIS — K59.00 CONSTIPATION, UNSPECIFIED CONSTIPATION TYPE: ICD-10-CM

## 2019-08-30 DIAGNOSIS — R10.9 ABDOMINAL PAIN, UNSPECIFIED ABDOMINAL LOCATION: ICD-10-CM

## 2019-08-30 DIAGNOSIS — Z85.038 HISTORY OF COLON CANCER: Primary | ICD-10-CM

## 2019-08-30 NOTE — TELEPHONE ENCOUNTER
----- Message from KISHORE Curry MD sent at 8/29/2019  5:16 PM CDT -----  Regarding: needs colonoscopy  cna you move this up please    ----- Message -----  From: Caleb Ceballos MD  Sent: 8/29/2019   3:39 PM  To: KISHORE Curry MD    Ah the very much subjective complaint of night sweats in the [facility]. Unfortunately Mrs Davis's sound significant and consistent. Her auto immune screening and simple infectious is normal. With her Hx of colon CA I was wondering if we could move up her colonoscopy so we can take this off the table. She is getting UTD with GYN for eval, PAP, and possible hormonal adjustment and hopefully this is refractory post menopausal symptoms peaking through, but would like to remove all malignancy we should. Thanks for your time. Thanks for your help as always.    Respectfully,  Caleb Ceballos

## 2019-08-30 NOTE — TELEPHONE ENCOUNTER
----- Message from KISHORE Curry MD sent at 8/29/2019  5:15 PM CDT -----  Yes not a  problem    ----- Message -----  From: Caleb Ceballos MD  Sent: 8/29/2019   3:39 PM  To: KISHORE Curry MD    Ah the very much subjective complaint of night sweats in the [facility]. Unfortunately Mrs Davis's sound significant and consistent. Her auto immune screening and simple infectious is normal. With her Hx of colon CA I was wondering if we could move up her colonoscopy so we can take this off the table. She is getting UTD with GYN for eval, PAP, and possible hormonal adjustment and hopefully this is refractory post menopausal symptoms peaking through, but would like to remove all malignancy we should. Thanks for your time. Thanks for your help as always.    Respectfully,  Caleb Ceballos

## 2019-08-30 NOTE — TELEPHONE ENCOUNTER
Spoke to Ms. Davis to inform her to contact Dr. Curry office to schedule screening colonoscopy.  Patient states understanding with no further questions or concerns.

## 2019-09-17 ENCOUNTER — PATIENT OUTREACH (OUTPATIENT)
Dept: ADMINISTRATIVE | Facility: OTHER | Age: 69
End: 2019-09-17

## 2019-09-19 ENCOUNTER — OFFICE VISIT (OUTPATIENT)
Dept: OBSTETRICS AND GYNECOLOGY | Facility: CLINIC | Age: 69
End: 2019-09-19
Attending: OBSTETRICS & GYNECOLOGY
Payer: MEDICARE

## 2019-09-19 ENCOUNTER — LAB VISIT (OUTPATIENT)
Dept: LAB | Facility: OTHER | Age: 69
End: 2019-09-19
Attending: OBSTETRICS & GYNECOLOGY
Payer: MEDICARE

## 2019-09-19 VITALS
SYSTOLIC BLOOD PRESSURE: 140 MMHG | BODY MASS INDEX: 33.82 KG/M2 | WEIGHT: 203.25 LBS | DIASTOLIC BLOOD PRESSURE: 68 MMHG

## 2019-09-19 DIAGNOSIS — Z78.0 MENOPAUSE: Primary | ICD-10-CM

## 2019-09-19 DIAGNOSIS — Z78.0 MENOPAUSE: ICD-10-CM

## 2019-09-19 LAB — ESTRADIOL SERPL-MCNC: 39 PG/ML

## 2019-09-19 PROCEDURE — 99213 PR OFFICE/OUTPT VISIT, EST, LEVL III, 20-29 MIN: ICD-10-PCS | Mod: S$GLB,,, | Performed by: OBSTETRICS & GYNECOLOGY

## 2019-09-19 PROCEDURE — 99213 OFFICE O/P EST LOW 20 MIN: CPT | Mod: S$GLB,,, | Performed by: OBSTETRICS & GYNECOLOGY

## 2019-09-19 PROCEDURE — 1101F PT FALLS ASSESS-DOCD LE1/YR: CPT | Mod: CPTII,S$GLB,, | Performed by: OBSTETRICS & GYNECOLOGY

## 2019-09-19 PROCEDURE — 82670 ASSAY OF TOTAL ESTRADIOL: CPT | Mod: HCNC

## 2019-09-19 PROCEDURE — 1101F PR PT FALLS ASSESS DOC 0-1 FALLS W/OUT INJ PAST YR: ICD-10-PCS | Mod: CPTII,S$GLB,, | Performed by: OBSTETRICS & GYNECOLOGY

## 2019-09-19 PROCEDURE — 84402 ASSAY OF FREE TESTOSTERONE: CPT | Mod: HCNC

## 2019-09-19 RX ORDER — ESTRADIOL 1 MG/1
1 TABLET ORAL DAILY
Qty: 90 TABLET | Refills: 3 | Status: SHIPPED | OUTPATIENT
Start: 2019-09-19 | End: 2019-10-02 | Stop reason: ALTCHOICE

## 2019-09-19 NOTE — PROGRESS NOTES
Subjective:       Patient ID: Tanesha Davis is a 68 y.o. female.    Chief Complaint:  Follow-up (discuss hormones)      History of Present Illness  HPI  This 68 yr old P4 female is here for discussion of HRT.  She was changed from estrace to estratest last  Yr.  She has had hyst BSO and is up to date with her pap and mammogram.  She is on generic estratest and for last few months has started having hot flashes again.  Timing is odd.  We discussed and testosterone not doing anything for libido or energy and is expensive so will change back to plain estrace.  No other complaints.      GYN & OB History  No LMP recorded. Patient has had a hysterectomy.   Date of Last Pap: 2018    OB History    Para Term  AB Living   4 4 4         SAB TAB Ectopic Multiple Live Births                  # Outcome Date GA Lbr Ray/2nd Weight Sex Delivery Anes PTL Lv   4 Term            3 Term            2 Term            1 Term                Review of Systems  Review of Systems   Constitutional: Positive for unexpected weight change. Negative for chills and fever.   Respiratory: Negative for shortness of breath.    Cardiovascular: Negative for chest pain.   Gastrointestinal: Negative for abdominal pain, nausea and vomiting.   Endocrine: Positive for heat intolerance.   Genitourinary: Negative for difficulty urinating, dyspareunia, genital sores, menstrual problem, pelvic pain, vaginal bleeding, vaginal discharge and vaginal pain.   Skin: Negative for wound.   Hematological: Negative for adenopathy.           Objective:   Physical Exam       Assessment:        1. Menopause               Plan:      Will get labs but also will change back to estrace 1mg but may go to 2 if still having hot flashes  We spent over 15 min and all in counseling

## 2019-09-23 LAB — TESTOST FREE SERPL-MCNC: 0.7 PG/ML

## 2019-09-23 RX ORDER — MELOXICAM 7.5 MG/1
TABLET ORAL
Qty: 90 TABLET | Refills: 1 | Status: SHIPPED | OUTPATIENT
Start: 2019-09-23 | End: 2020-03-30

## 2019-09-25 ENCOUNTER — PATIENT MESSAGE (OUTPATIENT)
Dept: OBSTETRICS AND GYNECOLOGY | Facility: CLINIC | Age: 69
End: 2019-09-25

## 2019-09-25 DIAGNOSIS — Z78.0 MENOPAUSE: Primary | ICD-10-CM

## 2019-10-02 RX ORDER — ESTERIFIED ESTROGEN AND METHYLTESTOSTERONE 1.25; 2.5 MG/1; MG/1
1 TABLET ORAL DAILY
Qty: 30 TABLET | Refills: 5 | Status: SHIPPED | OUTPATIENT
Start: 2019-10-02 | End: 2020-04-29

## 2019-11-04 ENCOUNTER — PATIENT OUTREACH (OUTPATIENT)
Dept: ADMINISTRATIVE | Facility: OTHER | Age: 69
End: 2019-11-04

## 2019-11-06 ENCOUNTER — TELEPHONE (OUTPATIENT)
Dept: SURGERY | Facility: CLINIC | Age: 69
End: 2019-11-06

## 2019-11-06 ENCOUNTER — OFFICE VISIT (OUTPATIENT)
Dept: SURGERY | Facility: CLINIC | Age: 69
End: 2019-11-06
Payer: MEDICARE

## 2019-11-06 ENCOUNTER — TELEPHONE (OUTPATIENT)
Dept: ENDOSCOPY | Facility: HOSPITAL | Age: 69
End: 2019-11-06

## 2019-11-06 VITALS
BODY MASS INDEX: 33.61 KG/M2 | DIASTOLIC BLOOD PRESSURE: 65 MMHG | SYSTOLIC BLOOD PRESSURE: 141 MMHG | WEIGHT: 201.75 LBS | HEIGHT: 65 IN | HEART RATE: 72 BPM

## 2019-11-06 DIAGNOSIS — R10.9 ABDOMINAL PAIN, UNSPECIFIED ABDOMINAL LOCATION: ICD-10-CM

## 2019-11-06 DIAGNOSIS — Z12.11 SPECIAL SCREENING FOR MALIGNANT NEOPLASMS, COLON: Primary | ICD-10-CM

## 2019-11-06 DIAGNOSIS — K43.2 INCISIONAL HERNIA, WITHOUT OBSTRUCTION OR GANGRENE: Primary | ICD-10-CM

## 2019-11-06 DIAGNOSIS — K59.00 CONSTIPATION, UNSPECIFIED CONSTIPATION TYPE: ICD-10-CM

## 2019-11-06 PROCEDURE — 1101F PR PT FALLS ASSESS DOC 0-1 FALLS W/OUT INJ PAST YR: ICD-10-PCS | Mod: HCNC,CPTII,S$GLB, | Performed by: COLON & RECTAL SURGERY

## 2019-11-06 PROCEDURE — 99999 PR PBB SHADOW E&M-EST. PATIENT-LVL III: CPT | Mod: PBBFAC,HCNC,, | Performed by: COLON & RECTAL SURGERY

## 2019-11-06 PROCEDURE — 1101F PT FALLS ASSESS-DOCD LE1/YR: CPT | Mod: HCNC,CPTII,S$GLB, | Performed by: COLON & RECTAL SURGERY

## 2019-11-06 PROCEDURE — 99213 PR OFFICE/OUTPT VISIT, EST, LEVL III, 20-29 MIN: ICD-10-PCS | Mod: HCNC,S$GLB,, | Performed by: COLON & RECTAL SURGERY

## 2019-11-06 PROCEDURE — 99213 OFFICE O/P EST LOW 20 MIN: CPT | Mod: HCNC,S$GLB,, | Performed by: COLON & RECTAL SURGERY

## 2019-11-06 PROCEDURE — 99999 PR PBB SHADOW E&M-EST. PATIENT-LVL III: ICD-10-PCS | Mod: PBBFAC,HCNC,, | Performed by: COLON & RECTAL SURGERY

## 2019-11-06 RX ORDER — SODIUM, POTASSIUM,MAG SULFATES 17.5-3.13G
1 SOLUTION, RECONSTITUTED, ORAL ORAL DAILY
Qty: 1 KIT | Refills: 0 | Status: SHIPPED | OUTPATIENT
Start: 2019-11-06 | End: 2019-11-08

## 2019-11-06 NOTE — TELEPHONE ENCOUNTER
----- Message from Shiela Hickey MA sent at 11/6/2019  9:59 AM CST -----  Good morning,      Can your staff please reach out to MsGwendolyn Ryan to schedule an appointment in your clinic, per Dr. Curry. Her dx is incisional hernia. Thank you, Have a good day.    Nallely SANDERS MA

## 2019-11-06 NOTE — PROGRESS NOTES
CRS Office Visit    SUBJECTIVE:     Chief Complaint: follow up for constipation    History of Present Illness:  Patient is a 68 y.o. female presents for constipation follow up. The patient is a known patient to this practice with a history of rectal cancer and constipation.  7/31/19: She was started on Linzess 145 mcg daily.  She was unable to take this every day because it gave her too much diarrhea. So the dose was lowered to 75 mcg daily.   Symptoms have improved, bowel movements every other day.   She generally moves her bowels 4hrs after her dose.   Continues to have cramping abdominal pain in the LLQ and RLQ, that gets better after defecation.     11/6/19: Today she states that her constipation has greatly improved with the decrease Linzess with the added hyoscyamine. She takes hyoscyamine every other day to aid in abdominal cramping because she feels that if taken daily, it worsens her constipation. She has a soft formed bowel movement every other day. This has greatly improved the abdominal cramping before bowel movements.   Approximately 6-8 months ago she started having night sweats that her obgyn has tried treating with two different HRTs without improvement. Will order repeat colonoscopy d/t personal history of rectal cancer. She has also started having 4-5/10 abdominal pain and 2 small incisional hernias recurr. D/t her history of difficult hernia repair and delayed wound healing, will refer to general surgery for repair as this has started causing her discomfort.     Last colonoscopy: 8/23/16   The perianal and digital rectal examinations were normal.       There was evidence of a prior end-to-end colo-rectal anastomosis in        the rectum. This was patent. This was characterized by healthy        appearing mucosa. This was traversed.       The exam was otherwise without abnormality.      Review of patient's allergies indicates:   Allergen Reactions    Adhesive Rash     Asking that we use Paper  "Tape,  Uses "Sensitive Skin" band aids    Amoxil [amoxicillin] Hives    Penicillins Hives and Swelling     Swelling of ears and neck    Sulfa (sulfonamide antibiotics) Hives and Itching       Past Medical History:   Diagnosis Date    Anemia     Arthritis     Cancer     colon cancer    Colon polyp     Draining cutaneous sinus tract     Headache(784.0)     Incisional hernia     Insomnia     Nocturnal leg cramps     Nonhealing surgical wound     PONV (postoperative nausea and vomiting)     with last surgery per patient, "Severe" PONV 2-27-14    Postmenopausal status     Rectosigmoid cancer      Past Surgical History:   Procedure Laterality Date    BREAST BIOPSY      CHOLECYSTECTOMY      COLECTOMY      COLON SURGERY      COLONOSCOPY N/A 8/23/2016    Procedure: COLONOSCOPY;  Surgeon: Cuco Meraz MD;  Location: [facility] (4TH FLR);  Service: Endoscopy;  Laterality: N/A;    HERNIA REPAIR      HYSTERECTOMY      KNEE ARTHROSCOPY      KNEE SURGERY      right and left knee repair    OOPHORECTOMY      REPAIR OF RECURRENT INCISIONAL HERNIA  7/10/2018    Procedure: REPAIR, HERNIA, INCISIONAL, RECURRENT;  Surgeon: Cuco Meraz MD;  Location: [facility] OR 2ND FLR;  Service: Colon and Rectal;;     Family History   Problem Relation Age of Onset    Breast cancer Mother     Cancer Mother         BREAST    Heart disease Father     Heart disease Maternal Grandmother     Diabetes Brother     Colon polyps Neg Hx     Ovarian cancer Neg Hx     Anesthesia problems Neg Hx      Social History     Tobacco Use    Smoking status: Never Smoker    Smokeless tobacco: Never Used   Substance Use Topics    Alcohol use: Yes     Alcohol/week: 1.0 standard drinks     Types: 1 Glasses of wine per week     Frequency: Monthly or less     Drinks per session: 1 or 2     Binge frequency: Never     Comment: socially    Drug use: No        Review of Systems:    GENERAL: No fever, chills, fatigability or weight " loss.  Integument: No rashes, redness, icterus  CHEST: Denies SAN, cyanosis, wheezing, cough and sputum production.  CARDIOVASCULAR: Denies chest pain, PND, orthopnea or reduced exercise tolerance.  GI: Denies abd pain, dysphagia, nausea, vomiting, no hematemesis   : Denies burning on urination, no hematuria, no bacteriuria  MSK: No deformities, swelling, joint pain swelling  Neurologic: No HAs, seizures, weakness, paresthesias, gait problems     OBJECTIVE:     Vital Signs (Most Recent)  Pulse: 72 (11/06/19 0940)  BP: (Abnormal) 141/65 (11/06/19 0940)    Physical Exam:  General: White female in NAD sitting in chair in clinic  Neuro: aaox4 maex4 perrl  Respiratory: resps even unlabored  Cardiac: cap refill <2 sec  Abdomen: x2 incisional hernia noted above umbilicus  Extremities: Warm dry and intact  Anorectal:deferred    ASSESSMENT/PLAN:     Tanesha was seen today for constipation.    Diagnoses and all orders for this visit:    Incisional hernia, without obstruction or gangrene  -     Ambulatory consult to General Surgery    Constipation, unspecified constipation type    Abdominal pain, unspecified abdominal location    Colonoscopy order already in Epic. Pt will schedule.     Assessment  Constipation improved  Ventral hernia, incisional    Plan  Referral to general surgery for consideration of repair of ventral hernia  Continue Linzess  Colonoscopy for screening schedule    Cuco CONLEY MD, FACS, FASCRS  Staff Surgeon  Dept of Colon and Rectal Surgery  [facility] [place], [place]

## 2019-11-07 ENCOUNTER — IMMUNIZATION (OUTPATIENT)
Dept: PHARMACY | Facility: CLINIC | Age: 69
End: 2019-11-07
Payer: MEDICARE

## 2019-11-11 ENCOUNTER — PATIENT OUTREACH (OUTPATIENT)
Dept: ADMINISTRATIVE | Facility: OTHER | Age: 69
End: 2019-11-11

## 2019-11-12 ENCOUNTER — HOSPITAL ENCOUNTER (OUTPATIENT)
Dept: RADIOLOGY | Facility: HOSPITAL | Age: 69
Discharge: HOME | End: 2019-11-12
Attending: ORTHOPAEDIC SURGERY
Payer: MEDICARE

## 2019-11-12 ENCOUNTER — OFFICE VISIT (OUTPATIENT)
Dept: SPORTS MEDICINE | Facility: CLINIC | Age: 69
End: 2019-11-12
Payer: MEDICARE

## 2019-11-12 VITALS
WEIGHT: 201 LBS | HEART RATE: 67 BPM | DIASTOLIC BLOOD PRESSURE: 74 MMHG | SYSTOLIC BLOOD PRESSURE: 131 MMHG | BODY MASS INDEX: 33.49 KG/M2 | HEIGHT: 65 IN

## 2019-11-12 DIAGNOSIS — M25.562 PAIN IN BOTH KNEES, UNSPECIFIED CHRONICITY: ICD-10-CM

## 2019-11-12 DIAGNOSIS — M17.11 OSTEOARTHRITIS OF RIGHT KNEE: ICD-10-CM

## 2019-11-12 DIAGNOSIS — M25.561 PAIN IN BOTH KNEES, UNSPECIFIED CHRONICITY: ICD-10-CM

## 2019-11-12 DIAGNOSIS — M25.561 PAIN IN BOTH KNEES, UNSPECIFIED CHRONICITY: Primary | ICD-10-CM

## 2019-11-12 DIAGNOSIS — M25.562 PAIN IN BOTH KNEES, UNSPECIFIED CHRONICITY: Primary | ICD-10-CM

## 2019-11-12 PROCEDURE — 1101F PT FALLS ASSESS-DOCD LE1/YR: CPT | Mod: HCNC,CPTII,S$GLB, | Performed by: ORTHOPAEDIC SURGERY

## 2019-11-12 PROCEDURE — 20610 DRAIN/INJ JOINT/BURSA W/O US: CPT | Mod: 50,HCNC,S$GLB, | Performed by: ORTHOPAEDIC SURGERY

## 2019-11-12 PROCEDURE — 1101F PR PT FALLS ASSESS DOC 0-1 FALLS W/OUT INJ PAST YR: ICD-10-PCS | Mod: HCNC,CPTII,S$GLB, | Performed by: ORTHOPAEDIC SURGERY

## 2019-11-12 PROCEDURE — 99999 PR PBB SHADOW E&M-EST. PATIENT-LVL III: CPT | Mod: PBBFAC,HCNC,, | Performed by: ORTHOPAEDIC SURGERY

## 2019-11-12 PROCEDURE — 20610 LARGE JOINT ASPIRATION/INJECTION: L KNEE, R KNEE: ICD-10-PCS | Mod: 50,HCNC,S$GLB, | Performed by: ORTHOPAEDIC SURGERY

## 2019-11-12 PROCEDURE — 99999 PR PBB SHADOW E&M-EST. PATIENT-LVL III: ICD-10-PCS | Mod: PBBFAC,HCNC,, | Performed by: ORTHOPAEDIC SURGERY

## 2019-11-12 PROCEDURE — 99203 PR OFFICE/OUTPT VISIT, NEW, LEVL III, 30-44 MIN: ICD-10-PCS | Mod: 25,HCNC,S$GLB, | Performed by: ORTHOPAEDIC SURGERY

## 2019-11-12 PROCEDURE — 99203 OFFICE O/P NEW LOW 30 MIN: CPT | Mod: 25,HCNC,S$GLB, | Performed by: ORTHOPAEDIC SURGERY

## 2019-11-12 PROCEDURE — 73564 X-RAY EXAM KNEE 4 OR MORE: CPT | Mod: 26,50,HCNC, | Performed by: RADIOLOGY

## 2019-11-12 PROCEDURE — 73564 X-RAY EXAM KNEE 4 OR MORE: CPT | Mod: TC,50,HCNC

## 2019-11-12 PROCEDURE — 73564 XR KNEE ORTHO BILAT WITH FLEXION: ICD-10-PCS | Mod: 26,50,HCNC, | Performed by: RADIOLOGY

## 2019-11-12 RX ORDER — TRIAMCINOLONE ACETONIDE 40 MG/ML
40 INJECTION, SUSPENSION INTRA-ARTICULAR; INTRAMUSCULAR
Status: DISCONTINUED | OUTPATIENT
Start: 2019-11-12 | End: 2019-11-12 | Stop reason: HOSPADM

## 2019-11-12 RX ADMIN — TRIAMCINOLONE ACETONIDE 40 MG: 40 INJECTION, SUSPENSION INTRA-ARTICULAR; INTRAMUSCULAR at 01:11

## 2019-11-12 NOTE — PROGRESS NOTES
"CC: Right knee pain    68 y.o. Female with a history of right knee pain without new WAGNER worsening over the last year.  History of right knee scope in 2010 and left knee scope in 2007.    She reports that the pain and weakness. It also bothers her at night.    + mechanical symptoms, - instability    Is affecting ADLs.  Pain is 7/10 at it's worst.    REVIEW OF SYSTEMS:   Constitution: Negative. Negative for chills, fever and night sweats.   HENT: Negative for congestion and headaches.    Eyes: Negative for blurred vision, left vision loss and right vision loss.   Cardiovascular: Negative for chest pain and syncope.   Respiratory: Negative for cough and shortness of breath.    Endocrine: Negative for polydipsia, polyphagia and polyuria.   Hematologic/Lymphatic: Negative for bleeding problem. Does not bruise/bleed easily.   Skin: Negative for dry skin, itching and rash.   Musculoskeletal: Negative for falls. Positive for right knee pain and  muscle weakness.   Gastrointestinal: Negative for abdominal pain and bowel incontinence.   Genitourinary: Negative for bladder incontinence and nocturia.   Neurological: Negative for disturbances in coordination, loss of balance and seizures.   Psychiatric/Behavioral: Negative for depression. The patient does not have insomnia.    Allergic/Immunologic: Negative for hives and persistent infections.     PAST MEDICAL HISTORY:   Past Medical History:   Diagnosis Date    Anemia     Arthritis     Cancer     colon cancer    Colon polyp     Draining cutaneous sinus tract     Headache(784.0)     Incisional hernia     Insomnia     Nocturnal leg cramps     Nonhealing surgical wound     PONV (postoperative nausea and vomiting)     with last surgery per patient, "Severe" PONV 2-27-14    Postmenopausal status     Rectosigmoid cancer        PAST SURGICAL HISTORY:   Past Surgical History:   Procedure Laterality Date    BREAST BIOPSY      CHOLECYSTECTOMY      COLECTOMY      COLON " SURGERY      COLONOSCOPY N/A 8/23/2016    Procedure: COLONOSCOPY;  Surgeon: Cuco Meraz MD;  Location: [facility] (4TH FLR);  Service: Endoscopy;  Laterality: N/A;    HERNIA REPAIR      HYSTERECTOMY      KNEE ARTHROSCOPY      KNEE SURGERY      right and left knee repair    OOPHORECTOMY      REPAIR OF RECURRENT INCISIONAL HERNIA  7/10/2018    Procedure: REPAIR, HERNIA, INCISIONAL, RECURRENT;  Surgeon: Cuco Meraz MD;  [facility];  Service: Colon and Rectal;;       FAMILY HISTORY:   Family History   Problem Relation Age of Onset    Breast cancer Mother     Cancer Mother         BREAST    Heart disease Father     Heart disease Maternal Grandmother     Diabetes Brother     Colon polyps Neg Hx     Ovarian cancer Neg Hx     Anesthesia problems Neg Hx        SOCIAL HISTORY:   Social History     Socioeconomic History    Marital status:      Spouse name: francis    Number of children: 2    Years of education: Not on file    Highest education level: Not on file   Occupational History    Occupation: Retired     Employer: Self   Social Needs    Financial resource strain: Not on file    Food insecurity:     Worry: Not on file     Inability: Not on file    Transportation needs:     Medical: No     Non-medical: No   Tobacco Use    Smoking status: Never Smoker    Smokeless tobacco: Never Used   Substance and Sexual Activity    Alcohol use: Yes     Alcohol/week: 1.0 standard drinks     Types: 1 Glasses of wine per week     Frequency: Monthly or less     Drinks per session: 1 or 2     Binge frequency: Never     Comment: socially    Drug use: No    Sexual activity: Yes     Partners: Male     Birth control/protection: Post-menopausal   Lifestyle    Physical activity:     Days per week: 4 days     Minutes per session: 60 min    Stress: Only a little   Relationships    Social connections:     Talks on phone: More than three times a week     Gets together: Three times a week      "Attends Scientology service: Not on file     Active member of club or organization: No     Attends meetings of clubs or organizations: Never     Relationship status:    Other Topics Concern    Not on file   Social History Narrative    Not on file       MEDICATIONS:     Current Outpatient Medications:     ERGOCALCIFEROL, VITAMIN D2, (VITAMIN D ORAL), Take 1 capsule by mouth every morning. , Disp: , Rfl:     estrogens,conjugated,-methyltestosterone 1.25-2.5mg (ESTRATEST) 1.25-2.5 mg per tablet, Take 1 tablet by mouth once daily., Disp: 30 tablet, Rfl: 5    L GASSERI/B BIFIDUM/B LONGUM ([employer] [facility] ORAL), Take 1 tablet by mouth 2 (two) times daily. , Disp: , Rfl:     linaclotide (LINZESS) 72 mcg Cap, Take 1 capsule (72 mcg total) by mouth once daily., Disp: 30 capsule, Rfl: 5    magnesium oxide-pyridoxine (BEELITH) 362-20 mg Tab, Take 1 tablet by mouth once daily. (Patient taking differently: Take 1 tablet by mouth every morning. ), Disp: 90 tablet, Rfl: 1    meloxicam (MOBIC) 7.5 MG tablet, TAKE ONE TABLET BY MOUTH EVERY DAY, Disp: 90 tablet, Rfl: 1    VITAMIN E ACETATE (VITAMIN E ORAL), Take 1 capsule by mouth every morning. , Disp: , Rfl:     cyanocobalamin 500 MCG tablet, Take 500 mcg by mouth every morning. , Disp: , Rfl:     hyoscyamine (LEVBID) 0.375 mg Tb12, Take 1 tablet (0.375 mg total) by mouth every 12 (twelve) hours., Disp: 60 tablet, Rfl: 2    ALLERGIES:   Review of patient's allergies indicates:   Allergen Reactions    Adhesive Rash     Asking that we use Paper Tape,  Uses "Sensitive Skin" band aids    Amoxil [amoxicillin] Hives    Penicillins Hives and Swelling     Swelling of ears and neck    Sulfa (sulfonamide antibiotics) Hives and Itching       VITAL SIGNS:   /74   Pulse 67   Ht 5' 5" (1.651 m)   Wt 91.2 kg (201 lb)   BMI 33.45 kg/m²      PHYSICAL EXAMINATION:  General:  The patient is alert and oriented x 3.  Mood is pleasant.  Observation of ears, eyes and " nose reveal no gross abnormalities.  No labored breathing observed.    RIGHT KNEE EXAMINATION     OBSERVATION / INSPECTION   Gait:   Antalgic   Alignment:  Neutral   Scars:   None   Muscle atrophy: Mild  Effusion:  Trace  Warmth:  None   Discoloration:   none     TENDERNESS / CREPITUS (T / C):          T / C      T / C   Patella   - / -   Lateral joint line   - / -   Peripatellar medial  -  Medial joint line    + / -   Peripatellar lateral -  Medial plica   - / -   Patellar tendon -   Popliteal fossa   - / -   Quad tendon   -   Gastrocnemius   -   Prepatellar Bursa - / -   Quadricep   -   Tibial tubercle  -  Thigh/hamstring  -   Pes anserine/HS -  Fibula    -   ITB   - / -  Tibia     -   Tib/fib joint  - / -  LCL    -     MFC   - / -   MCL: Proximal  -    LFC   - / -    Distal   -          ROM: (* = pain)  PASSIVE   ACTIVE    Left :   5 / 0 / 135   5 / 0 / 135     Right :    5 / 0 / 130   5 / 0 / 130    Patellofemoral examination:  See above noted areas of tenderness.   Patella position    Subluxation / dislocation: Centered           Sup. / Inf;   Normal   Crepitus (PF):    Absent   Patellar Mobility:       Medial-lateral:   Normal    Superior-inferior:  Normal    Inferior tilt   Normal    Patellar tendon:  Normal   Lateral tilt:    Normal   J-sign:     None   Patellofemoral grind:   No pain       MENISCAL SIGNS:     Pain on terminal extension:  -  Pain on terminal flexion:  +  Marcias maneuver:  + (for pain)  Squat     + (for pain)    LIGAMENT EXAMINATION:  ACL / Lachman:  normal (-1 to 2mm)    PCL-Post.  drawer: normal 0 to 2mm  MCL- Valgus:  normal 0 to 2mm  LCL- Varus:  normal 0 to 2mm  Pivot shift: normal (Equal)   Dial Test: difference c/w other side   At 30° flexion: normal (< 5°)    At 90° flexion: normal (< 5°)   Reverse Pivot Shift:   normal (Equal)     STRENGTH: (* = with pain) PAINFUL SIDE   Quadricep   5/5   Hamstrin/5    EXTREMITY NEURO-VASCULAR EXAMINATION:   Sensation:  Grossly intact  to light touch all dermatomal regions.   Motor Function:  Fully intact motor function at hip, knee, foot and ankle    DTRs;  quadriceps and  achilles 2+.  No clonus and downgoing Babinski.    Vascular status:  DP and PT pulses 2+, brisk capillary refill, symmetric.     XRAY (11/12/19):   Right: There is mild-moderate DJD and a varus deformity.  No fracture dislocation bone destruction or OCD seen.    Left: There is mild-moderate DJD and a varus deformity.  No fracture dislocation bone destruction or OCD seen.      ASSESSMENT:    Right knee pain, DJD    PLAN:   1. Bilateral steroid injections today  2. Follow up 2 months

## 2019-11-12 NOTE — PROCEDURES
Large Joint Aspiration/Injection: L knee, R knee  Date/Time: 11/12/2019 1:30 PM  Performed by: Devon Rivera MD  Authorized by: Devon Rivera MD     Consent Done?:  Yes (Verbal)  Indications:  Pain  Procedure site marked: Yes    Timeout: Prior to procedure the correct patient, procedure, and site was verified      Location:  Knee  Site:  L knee and R knee  Prep: Patient was prepped and draped in usual sterile fashion    Needle size:  22 G  Ultrasonic Guidance for needle placement: No  Approach:  Superior  Medications:  40 mg triamcinolone acetonide 40 mg/mL; 40 mg triamcinolone acetonide 40 mg/mL  Patient tolerance:  Patient tolerated the procedure well with no immediate complications

## 2019-11-13 ENCOUNTER — OFFICE VISIT (OUTPATIENT)
Dept: SURGERY | Facility: CLINIC | Age: 69
End: 2019-11-13
Payer: MEDICARE

## 2019-11-13 VITALS
BODY MASS INDEX: 33.84 KG/M2 | HEIGHT: 65 IN | TEMPERATURE: 98 F | WEIGHT: 203.13 LBS | HEART RATE: 71 BPM | SYSTOLIC BLOOD PRESSURE: 157 MMHG | DIASTOLIC BLOOD PRESSURE: 72 MMHG

## 2019-11-13 DIAGNOSIS — K43.2 INCISIONAL HERNIA, WITHOUT OBSTRUCTION OR GANGRENE: Primary | ICD-10-CM

## 2019-11-13 DIAGNOSIS — Z85.038 HISTORY OF COLON CANCER: ICD-10-CM

## 2019-11-13 PROCEDURE — 1101F PT FALLS ASSESS-DOCD LE1/YR: CPT | Mod: HCNC,CPTII,S$GLB, | Performed by: SURGERY

## 2019-11-13 PROCEDURE — 99205 PR OFFICE/OUTPT VISIT, NEW, LEVL V, 60-74 MIN: ICD-10-PCS | Mod: HCNC,S$GLB,, | Performed by: SURGERY

## 2019-11-13 PROCEDURE — 1101F PR PT FALLS ASSESS DOC 0-1 FALLS W/OUT INJ PAST YR: ICD-10-PCS | Mod: HCNC,CPTII,S$GLB, | Performed by: SURGERY

## 2019-11-13 PROCEDURE — 99999 PR PBB SHADOW E&M-EST. PATIENT-LVL III: ICD-10-PCS | Mod: PBBFAC,HCNC,, | Performed by: SURGERY

## 2019-11-13 PROCEDURE — 99205 OFFICE O/P NEW HI 60 MIN: CPT | Mod: HCNC,S$GLB,, | Performed by: SURGERY

## 2019-11-13 PROCEDURE — 99999 PR PBB SHADOW E&M-EST. PATIENT-LVL III: CPT | Mod: PBBFAC,HCNC,, | Performed by: SURGERY

## 2019-11-13 NOTE — PROGRESS NOTES
History & Physical  General Surgery Clinic    SUBJECTIVE:     Chief complaint: Incisional Hernia    History of Present Illness:  Patient is a 68 y.o. female with history of partial colectomy for colon cancer complicated by wound infection and delayed wound healing. She has 2 small incisional hernias at the site repaired by Dr. Muse with mesh, but has had several other abdominal surgeries since this repair that were done through the same incision. She presents to clinic today on referral from Dr. Curry for recurrence of her incisional hernia and abdominal wall laxity. She is experiencing incisional pain described as a 7/10 stabbing pain at the incision with movement and bending over. At the end of the day, she notices an enlarged bulge at the incision, the skin surrounding turns pink with some pain. The bulge is resolved in the morning. Of note, she does have chronic constipation treated with linzess and has regular soft, formed stools while on this medication.    Review of Systems   Constitutional: Positive for malaise/fatigue. Negative for chills, fever and weight loss.   HENT: Negative.    Eyes: Negative.    Respiratory: Negative for cough, shortness of breath and wheezing.    Cardiovascular: Negative for chest pain, palpitations and leg swelling.   Gastrointestinal: Positive for abdominal pain, constipation, diarrhea and nausea. Negative for blood in stool, heartburn and vomiting.   Genitourinary: Negative for dysuria, frequency and urgency.   Musculoskeletal: Positive for back pain and joint pain. Negative for myalgias.   Skin: Negative for rash.   Neurological: Negative for weakness and headaches.   Endo/Heme/Allergies: Does not bruise/bleed easily.   Psychiatric/Behavioral: Negative.         Past Medical History:  Past Medical History:   Diagnosis Date    Anemia     Arthritis     Cancer     colon cancer    Colon polyp     Draining cutaneous sinus tract     Headache(784.0)     Incisional hernia      "Insomnia     Nocturnal leg cramps     Nonhealing surgical wound     PONV (postoperative nausea and vomiting)     with last surgery per patient, "Severe" PONV 2-27-14    Postmenopausal status     Rectosigmoid cancer        Past Surgical History:  Past Surgical History:   Procedure Laterality Date    BREAST BIOPSY      CHOLECYSTECTOMY      COLECTOMY      COLON SURGERY      COLONOSCOPY N/A 8/23/2016    Procedure: COLONOSCOPY;  Surgeon: Cuco Meraz MD;  Location: [facility] (4TH FLR);  Service: Endoscopy;  Laterality: N/A;    HERNIA REPAIR      HYSTERECTOMY      KNEE ARTHROSCOPY      KNEE SURGERY      right and left knee repair    OOPHORECTOMY      REPAIR OF RECURRENT INCISIONAL HERNIA  7/10/2018    Procedure: REPAIR, HERNIA, INCISIONAL, RECURRENT;  Surgeon: Cuco Meraz MD;  Location: [facility] OR 2ND FLR;  Service: Colon and Rectal;;       Family History:  Family History   Problem Relation Age of Onset    Breast cancer Mother     Cancer Mother         BREAST    Heart disease Father     Heart disease Maternal Grandmother     Diabetes Brother     Colon polyps Neg Hx     Ovarian cancer Neg Hx     Anesthesia problems Neg Hx        Social History:  Social History     Socioeconomic History    Marital status:      Spouse name: francis    Number of children: 2    Years of education: Not on file    Highest education level: Not on file   Occupational History    Occupation: Retired     Employer: Self   Social Needs    Financial resource strain: Not on file    Food insecurity:     Worry: Not on file     Inability: Not on file    Transportation needs:     Medical: No     Non-medical: No   Tobacco Use    Smoking status: Never Smoker    Smokeless tobacco: Never Used   Substance and Sexual Activity    Alcohol use: Yes     Alcohol/week: 1.0 standard drinks     Types: 1 Glasses of wine per week     Frequency: Monthly or less     Drinks per session: 1 or 2     Binge frequency: Never     Comment: " "socially    Drug use: No    Sexual activity: Yes     Partners: Male     Birth control/protection: Post-menopausal   Lifestyle    Physical activity:     Days per week: 4 days     Minutes per session: 60 min    Stress: Only a little   Relationships    Social connections:     Talks on phone: More than three times a week     Gets together: Three times a week     Attends Holiness service: Not on file     Active member of club or organization: No     Attends meetings of clubs or organizations: Never     Relationship status:    Other Topics Concern    Not on file   Social History Narrative    Not on file       Medications:  Current Outpatient Medications   Medication Sig Dispense Refill    cyanocobalamin 500 MCG tablet Take 500 mcg by mouth every morning.       ERGOCALCIFEROL, VITAMIN D2, (VITAMIN D ORAL) Take 1 capsule by mouth every morning.       estrogens,conjugated,-methyltestosterone 1.25-2.5mg (ESTRATEST) 1.25-2.5 mg per tablet Take 1 tablet by mouth once daily. 30 tablet 5    L GASSERI/B BIFIDUM/B LONGUM ([employer] [facility] ORAL) Take 1 tablet by mouth 2 (two) times daily.       linaclotide (LINZESS) 72 mcg Cap Take 1 capsule (72 mcg total) by mouth once daily. 30 capsule 5    magnesium oxide-pyridoxine (BEELITH) 362-20 mg Tab Take 1 tablet by mouth once daily. (Patient taking differently: Take 1 tablet by mouth every morning. ) 90 tablet 1    meloxicam (MOBIC) 7.5 MG tablet TAKE ONE TABLET BY MOUTH EVERY DAY 90 tablet 1    VITAMIN E ACETATE (VITAMIN E ORAL) Take 1 capsule by mouth every morning.       hyoscyamine (LEVBID) 0.375 mg Tb12 Take 1 tablet (0.375 mg total) by mouth every 12 (twelve) hours. 60 tablet 2     No current facility-administered medications for this visit.        Allergies:  Review of patient's allergies indicates:   Allergen Reactions    Adhesive Rash     Asking that we use Paper Tape,  Uses "Sensitive Skin" band aids    Amoxil [amoxicillin] Hives    Penicillins " "Hives and Swelling     Swelling of ears and neck    Sulfa (sulfonamide antibiotics) Hives and Itching       OBJECTIVE:     BP (!) 157/72   Pulse 71   Temp 98 °F (36.7 °C)   Ht 5' 5" (1.651 m)   Wt 92.1 kg (203 lb 2.5 oz)   BMI 33.81 kg/m²     Physical Exam   Constitutional: She is oriented to person, place, and time and well-developed, well-nourished, and in no distress.   Cardiovascular: Normal rate and intact distal pulses.   Pulmonary/Chest: Effort normal. No respiratory distress.   Abdominal: Soft. She exhibits no distension. There is no tenderness.   Midline incision with moderate scarring closed and well-healed. 2 cm incisional hernia, easily reducible located 3 cm above umbilicus on midline incision. Second incisional hernia that easily reduces approximately 10 cm superior to umbilicus on midline incision.   Musculoskeletal: She exhibits no edema or tenderness.   Neurological: She is alert and oriented to person, place, and time.   Skin: Skin is warm and dry. No rash noted.   Nursing note and vitals reviewed.        ASSESSMENT/PLAN:     68 y.o. female with incisional hernia (x2) from prior midline laparotomy with course complicated by wound infection and delayed wound healing.    - CT abdomen pelvis to assess anatomy  - Follow up after imaging to discuss surgical planning    "

## 2019-11-13 NOTE — LETTER
November 18, 2019      KISHORE Curry MD  [address] [facility] [address]           [facility] - General Surgery  [address]  [place] [address]  Phone: [phone]          Patient: Tanesha Davis   MR Number: [id number]   YOB: 1950   Date of Visit: 11/13/2019       Dear Dr. KISHORE Curry:    Thank you for referring Tanesha Davis to me for evaluation. Attached you will find relevant portions of my assessment and plan of care.    If you have questions, please do not hesitate to call me. I look forward to following Tanesha Davis along with you.    Sincerely,    Jody Ramírez MD    Enclosure  CC:  Caleb Hernandez MD    If you would like to receive this communication electronically, please contact [email or web address] or [phone] to request more information on [employer] access.    For providers and/or their staff who would like to refer a patient to [facility], please contact us through our one-stop-shop provider referral line, [facility], at [phone].    If you feel you have received this communication in error or would no longer like to receive these types of communications, please e-mail [email or web address]

## 2019-12-04 ENCOUNTER — HOSPITAL ENCOUNTER (OUTPATIENT)
Dept: RADIOLOGY | Facility: HOSPITAL | Age: 69
Discharge: HOME | End: 2019-12-04
Attending: SURGERY
Payer: MEDICARE

## 2019-12-04 DIAGNOSIS — K43.2 INCISIONAL HERNIA, WITHOUT OBSTRUCTION OR GANGRENE: ICD-10-CM

## 2019-12-04 PROCEDURE — 74177 CT ABDOMEN PELVIS WITH CONTRAST: ICD-10-PCS | Mod: 26,HCNC,, | Performed by: RADIOLOGY

## 2019-12-04 PROCEDURE — 25500020 PHARM REV CODE 255: Mod: HCNC | Performed by: SURGERY

## 2019-12-04 PROCEDURE — 74177 CT ABD & PELVIS W/CONTRAST: CPT | Mod: 26,HCNC,, | Performed by: RADIOLOGY

## 2019-12-04 PROCEDURE — 74177 CT ABD & PELVIS W/CONTRAST: CPT | Mod: TC,HCNC

## 2019-12-04 RX ADMIN — IOHEXOL 100 ML: 350 INJECTION, SOLUTION INTRAVENOUS at 10:12

## 2019-12-04 RX ADMIN — IOHEXOL 15 ML: 350 INJECTION, SOLUTION INTRAVENOUS at 08:12

## 2019-12-04 RX ADMIN — IOHEXOL 15 ML: 350 INJECTION, SOLUTION INTRAVENOUS at 09:12

## 2019-12-09 ENCOUNTER — ANESTHESIA (OUTPATIENT)
Dept: ENDOSCOPY | Facility: HOSPITAL | Age: 69
End: 2019-12-09
Payer: MEDICARE

## 2019-12-09 ENCOUNTER — ANESTHESIA EVENT (OUTPATIENT)
Dept: ENDOSCOPY | Facility: HOSPITAL | Age: 69
End: 2019-12-09
Payer: MEDICARE

## 2019-12-09 ENCOUNTER — PATIENT OUTREACH (OUTPATIENT)
Dept: ADMINISTRATIVE | Facility: OTHER | Age: 69
End: 2019-12-09

## 2019-12-09 ENCOUNTER — HOSPITAL ENCOUNTER (OUTPATIENT)
Facility: HOSPITAL | Age: 69
Discharge: HOME | End: 2019-12-09
Attending: COLON & RECTAL SURGERY | Admitting: COLON & RECTAL SURGERY
Payer: MEDICARE

## 2019-12-09 VITALS
DIASTOLIC BLOOD PRESSURE: 63 MMHG | HEART RATE: 64 BPM | BODY MASS INDEX: 33.49 KG/M2 | WEIGHT: 201 LBS | RESPIRATION RATE: 17 BRPM | SYSTOLIC BLOOD PRESSURE: 132 MMHG | TEMPERATURE: 98 F | OXYGEN SATURATION: 99 % | HEIGHT: 65 IN

## 2019-12-09 DIAGNOSIS — Z85.038 HISTORY OF COLON CANCER: Primary | ICD-10-CM

## 2019-12-09 PROCEDURE — 63600175 PHARM REV CODE 636 W HCPCS: Mod: HCNC | Performed by: COLON & RECTAL SURGERY

## 2019-12-09 PROCEDURE — G0105 COLORECTAL SCRN; HI RISK IND: ICD-10-PCS | Mod: HCNC,,, | Performed by: COLON & RECTAL SURGERY

## 2019-12-09 PROCEDURE — G0105 COLORECTAL SCRN; HI RISK IND: HCPCS | Mod: HCNC,,, | Performed by: COLON & RECTAL SURGERY

## 2019-12-09 PROCEDURE — G0105 COLORECTAL SCRN; HI RISK IND: HCPCS | Mod: HCNC | Performed by: COLON & RECTAL SURGERY

## 2019-12-09 PROCEDURE — 37000008 HC ANESTHESIA 1ST 15 MINUTES: Mod: HCNC | Performed by: COLON & RECTAL SURGERY

## 2019-12-09 PROCEDURE — E9220 PRA ENDO ANESTHESIA: HCPCS | Mod: HCNC,,, | Performed by: NURSE ANESTHETIST, CERTIFIED REGISTERED

## 2019-12-09 PROCEDURE — 63600175 PHARM REV CODE 636 W HCPCS: Mod: HCNC | Performed by: NURSE ANESTHETIST, CERTIFIED REGISTERED

## 2019-12-09 PROCEDURE — E9220 PRA ENDO ANESTHESIA: ICD-10-PCS | Mod: HCNC,,, | Performed by: NURSE ANESTHETIST, CERTIFIED REGISTERED

## 2019-12-09 PROCEDURE — 37000009 HC ANESTHESIA EA ADD 15 MINS: Mod: HCNC | Performed by: COLON & RECTAL SURGERY

## 2019-12-09 RX ORDER — PROPOFOL 10 MG/ML
VIAL (ML) INTRAVENOUS CONTINUOUS PRN
Status: DISCONTINUED | OUTPATIENT
Start: 2019-12-09 | End: 2019-12-09

## 2019-12-09 RX ORDER — LIDOCAINE HCL/PF 100 MG/5ML
SYRINGE (ML) INTRAVENOUS
Status: DISCONTINUED | OUTPATIENT
Start: 2019-12-09 | End: 2019-12-09

## 2019-12-09 RX ORDER — SODIUM CHLORIDE 9 MG/ML
INJECTION, SOLUTION INTRAVENOUS CONTINUOUS
Status: DISCONTINUED | OUTPATIENT
Start: 2019-12-09 | End: 2019-12-09 | Stop reason: HOSPADM

## 2019-12-09 RX ORDER — PROPOFOL 10 MG/ML
VIAL (ML) INTRAVENOUS
Status: DISCONTINUED | OUTPATIENT
Start: 2019-12-09 | End: 2019-12-09

## 2019-12-09 RX ADMIN — PROPOFOL 80 MG: 10 INJECTION, EMULSION INTRAVENOUS at 10:12

## 2019-12-09 RX ADMIN — SODIUM CHLORIDE: 0.9 INJECTION, SOLUTION INTRAVENOUS at 10:12

## 2019-12-09 RX ADMIN — LIDOCAINE HYDROCHLORIDE 60 MG: 20 INJECTION, SOLUTION INTRAVENOUS at 10:12

## 2019-12-09 RX ADMIN — PROPOFOL 150 MCG/KG/MIN: 10 INJECTION, EMULSION INTRAVENOUS at 10:12

## 2019-12-09 RX ADMIN — SODIUM CHLORIDE: 0.9 INJECTION, SOLUTION INTRAVENOUS at 09:12

## 2019-12-09 NOTE — TRANSFER OF CARE
"Anesthesia Transfer of Care Note    Patient: Tanesha Davis    Procedure(s) Performed: Procedure(s) (LRB):  COLONOSCOPY (N/A)    Patient location: GI    Anesthesia Type: general    Transport from OR: Transported from OR on room air with adequate spontaneous ventilation    Post pain: adequate analgesia    Post assessment: no apparent anesthetic complications and tolerated procedure well    Post vital signs: stable    Level of consciousness: awake, alert and oriented    Nausea/Vomiting: no nausea/vomiting    Complications: none    Transfer of care protocol was followed      Last vitals:   Visit Vitals  BP (!) 102/52 (BP Location: Left arm, Patient Position: Lying)   Pulse 68   Temp 36.4 °C (97.5 °F) (Temporal)   Resp 18   Ht 5' 5" (1.651 m)   Wt 91.2 kg (201 lb)   SpO2 97%   BMI 33.45 kg/m²     "

## 2019-12-09 NOTE — DISCHARGE INSTRUCTIONS

## 2019-12-09 NOTE — ANESTHESIA PREPROCEDURE EVALUATION
"                                                                                                             12/09/2019  Tanesha Davis is a 69 y.o., female.  Past Medical History:   Diagnosis Date    Arthritis     Draining cutaneous sinus tract     Headache(784.0)     Incisional hernia     Insomnia     Nocturnal leg cramps     Nonhealing surgical wound     PONV (postoperative nausea and vomiting)     with last surgery per patient, "Severe" PONV 2-27-14    Postmenopausal status     Rectosigmoid cancer      Past Surgical History:   Procedure Laterality Date    BREAST BIOPSY      CHOLECYSTECTOMY  02/24/2014    with incisional hernia repair with mesh    COLONOSCOPY N/A 8/23/2016    Procedure: COLONOSCOPY;  Surgeon: Cuco Meraz MD;  Location: [facility] ([facility]);  Service: Endoscopy;  Laterality: N/A;    HYSTERECTOMY      KNEE ARTHROSCOPY      KNEE SURGERY      right and left knee repair    OOPHORECTOMY      REPAIR OF RECURRENT INCISIONAL HERNIA  7/10/2018    Procedure: REPAIR, HERNIA, INCISIONAL, RECURRENT;  Surgeon: Cuco Meraz MD;  Location: [facility] OR [address] [place] [address];  Service: Colon and Rectal;;    SIGMOIDECTOMY  03/20/2006    LAR         Anesthesia Evaluation    I have reviewed the Patient Summary Reports.     I have reviewed the Medications.     Review of Systems  Anesthesia Hx:  No problems with previous Anesthesia Denies Hx of Anesthetic complications  Denies Family Hx of Anesthesia complications.   Denies Personal Hx of Anesthesia complications.   Hematology/Oncology:  Hematology Normal   Oncology Normal     EENT/Dental:EENT/Dental Normal   Cardiovascular:  Cardiovascular Normal     Pulmonary:  Pulmonary Normal    Renal/:  Renal/ Normal     Hepatic/GI:  Hepatic/GI Normal    Musculoskeletal:  Musculoskeletal Normal    Neurological:  Neurology Normal    Endocrine:  Endocrine Normal    Dermatological:  Skin Normal    Psych:  Psychiatric Normal           Physical Exam  General:  Well nourished  "   Airway/Jaw/Neck:  Airway Findings: Mouth Opening: Normal Tongue: Normal  General Airway Assessment: Adult  Mallampati: III  Improves to II with phonation.  TM Distance: Normal, at least 6 cm  Jaw/Neck Findings:  Neck ROM: Normal ROM  Neck Findings: Normal    Eyes/Ears/Nose:  EYES/EARS/NOSE FINDINGS: Normal   Dental:  Dental Findings: In tact   Chest/Lungs:  Chest/Lungs Findings: Clear to auscultation, Normal Respiratory Rate     Heart/Vascular:  Heart Findings: Rate: Normal     Abdomen:  Abdomen Findings: Normal    Musculoskeletal:  Musculoskeletal Findings: Normal   Skin:  Skin Findings: Normal    Mental Status:  Mental Status Findings:  Alert and Oriented, Cooperative         Anesthesia Plan  Type of Anesthesia, risks & benefits discussed:  Anesthesia Type:  general  Patient's Preference: General  Intra-op Monitoring Plan: standard ASA monitors  Intra-op Monitoring Plan Comments:   Post Op Pain Control Plan:   Post Op Pain Control Plan Comments:   Induction:   IV  Beta Blocker:  Patient is not currently on a Beta-Blocker (No further documentation required).       Informed Consent: Patient understands risks and agrees with Anesthesia plan.  Questions answered. Anesthesia consent signed with patient.  ASA Score: 2     Day of Surgery Review of History & Physical:    H&P update referred to the provider.         Ready For Surgery From Anesthesia Perspective.

## 2019-12-09 NOTE — [employer] PATIENT INSTRUCTIONS
Discharge Summary/Instructions after an Endoscopic Procedure  Patient Name: Tanesha Davis  Patient MRN: [id number]  Patient YOB: 1950 Monday, December 09, 2019  Cuco Curry MD  RESTRICTIONS:  During your procedure today, you received medications for sedation.  These   medications may affect your judgment, balance and coordination.  Therefore,   for 24 hours, you have the following restrictions:   - DO NOT drive a car, operate machinery, make legal/financial decisions,   sign important papers or drink alcohol.    ACTIVITY:  Today: no heavy lifting, straining or running due to procedural   sedation/anesthesia.  The following day: return to full activity including work.  DIET:  Eat and drink normally unless instructed otherwise.     TREATMENT FOR COMMON SIDE EFFECTS:  - Mild abdominal pain, nausea, belching, bloating or excessive gas:  rest,   eat lightly and use a heating pad.  - Sore Throat: treat with throat lozenges and/or gargle with warm salt   water.  - Because air was used during the procedure, expelling large amounts of air   from your rectum or belching is normal.  - If a bowel prep was taken, you may not have a bowel movement for 1-3 days.    This is normal.  SYMPTOMS TO WATCH FOR AND REPORT TO YOUR PHYSICIAN:  1. Abdominal pain or bloating, other than gas cramps.  2. Chest pain.  3. Back pain.  4. Signs of infection such as: chills or fever occurring within 24 hours   after the procedure.  5. Rectal bleeding, which would show as bright red, maroon, or black stools.   (A tablespoon of blood from the rectum is not serious, especially if   hemorrhoids are present.)  6. Vomiting.  7. Weakness or dizziness.  GO DIRECTLY TO THE NEAREST EMERGENCY ROOM IF YOU HAVE ANY OF THE FOLLOWING:      Difficulty breathing              Chills and/or fever over 101 F   Persistent vomiting and/or vomiting blood   Severe abdominal pain   Severe chest pain   Black, tarry stools   Bleeding- more than one  tablespoon   Any other symptom or condition that you feel may need urgent attention  Your doctor recommends these additional instructions:  If any biopsies were taken, your doctors clinic will contact you in 1 to 2   weeks with any results.  - Discharge patient to home (ambulatory).   - Patient has a contact number available for emergencies.  The signs and   symptoms of potential delayed complications were discussed with the   patient.  Return to normal activities tomorrow.  Written discharge   instructions were provided to the patient.   - Resume previous diet.   - Continue present medications.   - Repeat colonoscopy in 3 years for surveillance.   - Return to my office as previously scheduled.  For questions, problems or results please call your physician - Cuco Curry MD at Work:  [phone].  [facility], EMERGENCY ROOM PHONE NUMBER: [phone]  IF A COMPLICATION OR EMERGENCY SITUATION ARISES AND YOU ARE UNABLE TO REACH   YOUR PHYSICIAN - GO DIRECTLY TO THE EMERGENCY ROOM.  Cuco Curry MD  12/9/2019 10:53:32 AM  This report has been verified and signed electronically.  [employer]

## 2019-12-09 NOTE — ANESTHESIA POSTPROCEDURE EVALUATION
Anesthesia Post Evaluation    Patient: Tanesha Davis    Procedure(s) Performed: Procedure(s) (LRB):  COLONOSCOPY (N/A)    Final Anesthesia Type: general    Patient location during evaluation: GI PACU  Patient participation: Yes- Able to Participate  Level of consciousness: awake and alert and oriented  Post-procedure vital signs: reviewed and stable  Pain management: adequate  Airway patency: patent    PONV status at discharge: No PONV  Anesthetic complications: no      Cardiovascular status: hemodynamically stable  Respiratory status: unassisted, spontaneous ventilation and room air  Hydration status: euvolemic  Follow-up not needed.          Vitals Value Taken Time   /63 12/9/2019 11:20 AM   Temp 36.4 °C (97.5 °F) 12/9/2019 10:50 AM   Pulse 64 12/9/2019 11:20 AM   Resp 17 12/9/2019 11:20 AM   SpO2 99 % 12/9/2019 11:20 AM         Event Time     Out of Recovery 11:35:07          Pain/Alondra Score: Alondra Score: 10 (12/9/2019 11:20 AM)

## 2019-12-09 NOTE — H&P
"                                                                       Endoscopy H&P    Procedure : Colonoscopy      asymptomatic screening exam and personal history of colon cancer      Past Medical History:   Diagnosis Date    Arthritis     Draining cutaneous sinus tract     Headache(784.0)     Incisional hernia     Insomnia     Nocturnal leg cramps     Nonhealing surgical wound     PONV (postoperative nausea and vomiting)     with last surgery per patient, "Severe" PONV 2-27-14    Postmenopausal status     Rectosigmoid cancer      Sedation Problems: NO  Family History   Problem Relation Age of Onset    Breast cancer Mother     Cancer Mother         BREAST    Heart disease Father     Heart disease Maternal Grandmother     Diabetes Brother     Colon polyps Neg Hx     Ovarian cancer Neg Hx     Anesthesia problems Neg Hx      Fam Hx of Sedation Problems: NO  Social History     Socioeconomic History    Marital status:      Spouse name: francis    Number of children: 2    Years of education: Not on file    Highest education level: Not on file   Occupational History    Occupation: Retired     Employer: Self   Social Needs    Financial resource strain: Not on file    Food insecurity:     Worry: Not on file     Inability: Not on file    Transportation needs:     Medical: No     Non-medical: No   Tobacco Use    Smoking status: Never Smoker    Smokeless tobacco: Never Used   Substance and Sexual Activity    Alcohol use: Yes     Alcohol/week: 1.0 standard drinks     Types: 1 Glasses of wine per week     Frequency: Monthly or less     Drinks per session: 1 or 2     Binge frequency: Never     Comment: socially    Drug use: No    Sexual activity: Yes     Partners: Male     Birth control/protection: Post-menopausal   Lifestyle    Physical activity:     Days per week: 4 days     Minutes per session: 60 min    Stress: Only a little   Relationships    Social connections:     Talks on phone: " More than three times a week     Gets together: Three times a week     Attends Mosque service: Not on file     Active member of club or organization: No     Attends meetings of clubs or organizations: Never     Relationship status:    Other Topics Concern    Not on file   Social History Narrative    Not on file         Review of Systems  Respiratory ROS: no cough, shortness of breath, or wheezing  Cardiovascular ROS: no chest pain or dyspnea on exertion  Gastrointestinal ROS: no abdominal pain, change in bowel habits, or black or bloody stools  Musculoskeletal ROS: negative  Neurological ROS: no TIA or stroke symptoms        Physical Exam:  General: well developed, well nourished, no distress  Head: normocephalic  Mallampati Score   Neck: supple, symmetrical, trachea midline  Lungs:  clear to auscultation bilaterally and normal respiratory effort  Heart: regular rate and rhythm, S1, S2 normal, no murmur, rub or gallop  Abdomen: soft, non-tender non-distented; bowel sounds normal; no masses,  no organomegaly  Extremities: no cyanosis or edema, or clubbing    Sedation Plan : per anesthesia    II    Colonoscopy - details of the procedure, risks discussed with pt

## 2019-12-09 NOTE — PLAN OF CARE
Plan of care discussed with patient. All questions and concerns addressed and answered. Free of pain or distress. PIV removed without complications. VS stable. Procedure report and discharge instructions gone over and patient aware of restrictions and when to resume medications. Patient in agreement.    [id number] Dr. Curry at bedside

## 2019-12-11 ENCOUNTER — OFFICE VISIT (OUTPATIENT)
Dept: SURGERY | Facility: CLINIC | Age: 69
End: 2019-12-11
Payer: MEDICARE

## 2019-12-11 VITALS
WEIGHT: 201 LBS | HEART RATE: 63 BPM | DIASTOLIC BLOOD PRESSURE: 66 MMHG | BODY MASS INDEX: 33.49 KG/M2 | HEIGHT: 65 IN | SYSTOLIC BLOOD PRESSURE: 151 MMHG

## 2019-12-11 DIAGNOSIS — T81.89XD NON-HEALING SURGICAL WOUND, SUBSEQUENT ENCOUNTER: ICD-10-CM

## 2019-12-11 DIAGNOSIS — Z85.038 HISTORY OF COLON CANCER: ICD-10-CM

## 2019-12-11 DIAGNOSIS — S31.109S CHRONIC WOUND INFECTION OF ABDOMEN, SEQUELA: ICD-10-CM

## 2019-12-11 DIAGNOSIS — K43.2 INCISIONAL HERNIA, WITHOUT OBSTRUCTION OR GANGRENE: Primary | ICD-10-CM

## 2019-12-11 DIAGNOSIS — L08.9 CHRONIC WOUND INFECTION OF ABDOMEN, SEQUELA: ICD-10-CM

## 2019-12-11 PROCEDURE — 99215 OFFICE O/P EST HI 40 MIN: CPT | Mod: HCNC,S$GLB,, | Performed by: SURGERY

## 2019-12-11 PROCEDURE — 1159F MED LIST DOCD IN RCRD: CPT | Mod: HCNC,S$GLB,, | Performed by: SURGERY

## 2019-12-11 PROCEDURE — 99215 PR OFFICE/OUTPT VISIT, EST, LEVL V, 40-54 MIN: ICD-10-PCS | Mod: HCNC,S$GLB,, | Performed by: SURGERY

## 2019-12-11 PROCEDURE — 1159F PR MEDICATION LIST DOCUMENTED IN MEDICAL RECORD: ICD-10-PCS | Mod: HCNC,S$GLB,, | Performed by: SURGERY

## 2019-12-11 PROCEDURE — 1101F PT FALLS ASSESS-DOCD LE1/YR: CPT | Mod: HCNC,CPTII,S$GLB, | Performed by: SURGERY

## 2019-12-11 PROCEDURE — 99999 PR PBB SHADOW E&M-EST. PATIENT-LVL III: ICD-10-PCS | Mod: PBBFAC,HCNC,, | Performed by: SURGERY

## 2019-12-11 PROCEDURE — 99999 PR PBB SHADOW E&M-EST. PATIENT-LVL III: CPT | Mod: PBBFAC,HCNC,, | Performed by: SURGERY

## 2019-12-11 PROCEDURE — 1101F PR PT FALLS ASSESS DOC 0-1 FALLS W/OUT INJ PAST YR: ICD-10-PCS | Mod: HCNC,CPTII,S$GLB, | Performed by: SURGERY

## 2019-12-12 NOTE — PROGRESS NOTES
GENERAL SURGERY  Clinic Note        SUBJECTIVE:     HISTORY OF PRESENT ILLNESS:  Tanesha Davis is a 69 y.o. female with PMH arthritis, rectosigmoid cancer (T3N0M) s/p open LAR (3/20/16) by Dr. Meraz, open incisional hernia repair and cholecystectomy with underlay mesh by Dr. Muse in 2/14/14  who has been referred to surgery clinic for recurrent ventral hernia repair. Her post-operative course from her initial hernia repair in 2014 was complicated by mesh infection and chronic draining situs tract with multiple debridement and closures 7/13/15, 3/20/19, 7/10/18 with repair of an open enterotomy. She reports mild discomfort and a bulge superior to her umbilicus since the initial hernia repair, but has not increased in size or severity of discomfort. She reports chronic constipation from her LAR, no interval change in bowel or bladder function. She notes no other symptoms. She does not have cardiopulmonary disease. CT scan 12/4/19 with ventral hernia with mesenteric fat and multiple small bowel loops, partially covered with mesh.         MEDICATIONS:  Home Medications:  Current Outpatient Medications on File Prior to Visit   Medication Sig Dispense Refill    cyanocobalamin 500 MCG tablet Take 500 mcg by mouth every morning.       ERGOCALCIFEROL, VITAMIN D2, (VITAMIN D ORAL) Take 1 capsule by mouth every morning.       estrogens,conjugated,-methyltestosterone 1.25-2.5mg (ESTRATEST) 1.25-2.5 mg per tablet Take 1 tablet by mouth once daily. 30 tablet 5    hyoscyamine (LEVBID) 0.375 mg Tb12 Take 1 tablet (0.375 mg total) by mouth every 12 (twelve) hours. 60 tablet 2    L GASSERI/B BIFIDUM/B LONGUM ([employer] HEALTH ORAL) Take 1 tablet by mouth 2 (two) times daily.       linaclotide (LINZESS) 72 mcg Cap Take 1 capsule (72 mcg total) by mouth once daily. 30 capsule 5    magnesium oxide-pyridoxine (BEELITH) 362-20 mg Tab Take 1 tablet by mouth once daily. (Patient taking differently: Take 1 tablet by mouth  "every morning. ) 90 tablet 1    meloxicam (MOBIC) 7.5 MG tablet TAKE ONE TABLET BY MOUTH EVERY DAY 90 tablet 1    VITAMIN E ACETATE (VITAMIN E ORAL) Take 1 capsule by mouth every morning.        No current facility-administered medications on file prior to visit.        ALLERGIES:    Review of patient's allergies indicates:   Allergen Reactions    Adhesive Rash     Asking that we use Paper Tape,  Uses "Sensitive Skin" band aids    Amoxil [amoxicillin] Hives    Penicillins Hives and Swelling     Swelling of ears and neck    Sulfa (sulfonamide antibiotics) Hives and Itching       PAST MEDICAL HISTORY:    Past Medical History:   Diagnosis Date    Arthritis     Draining cutaneous sinus tract     Headache(784.0)     Incisional hernia     Insomnia     Nocturnal leg cramps     Nonhealing surgical wound     PONV (postoperative nausea and vomiting)     with last surgery per patient, "Severe" PONV 2-27-14    Postmenopausal status     Rectosigmoid cancer        SURGICAL HISTORY:  Past Surgical History:   Procedure Laterality Date    BREAST BIOPSY      CHOLECYSTECTOMY  02/24/2014    with incisional hernia repair with mesh    COLONOSCOPY N/A 8/23/2016    Procedure: COLONOSCOPY;  Surgeon: Cuco Meraz MD;  Location: [facility] ([address] [place] [address]);  Service: Endoscopy;  Laterality: N/A;    COLONOSCOPY N/A 12/9/2019    Procedure: COLONOSCOPY;  Surgeon: KISHORE Curry MD;  [facility]  Service: Endoscopy;  Laterality: N/A;  ADHESIVE Allergy    HYSTERECTOMY      KNEE ARTHROSCOPY      KNEE SURGERY      right and left knee repair    OOPHORECTOMY      REPAIR OF RECURRENT INCISIONAL HERNIA  7/10/2018    Procedure: REPAIR, HERNIA, INCISIONAL, RECURRENT;  Surgeon: Cuco Meraz MD;  Location: [facility] OR [address] [place] [address];  Service: Colon and Rectal;;    SIGMOIDECTOMY  03/20/2006    LAR       FAMILY HISTORY:  Family History   Problem Relation Age of Onset    Breast cancer Mother     Cancer Mother         BREAST "    Heart disease Father     Heart disease Maternal Grandmother     Diabetes Brother     Colon polyps Neg Hx     Ovarian cancer Neg Hx     Anesthesia problems Neg Hx        SOCIAL HISTORY:  Social History     Tobacco Use    Smoking status: Never Smoker    Smokeless tobacco: Never Used   Substance Use Topics    Alcohol use: Yes     Alcohol/week: 1.0 standard drinks     Types: 1 Glasses of wine per week     Frequency: Monthly or less     Drinks per session: 1 or 2     Binge frequency: Never     Comment: socially    Drug use: No        REVIEW OF SYSTEMS:  Review of Systems   Constitutional: Negative.    Eyes: Negative.    Respiratory: Negative.    Cardiovascular: Negative.    Gastrointestinal: Positive for abdominal pain (mild around hernia). Negative for diarrhea and nausea.        Ventral hernia   Endocrine: Negative.    Genitourinary: Negative.    Musculoskeletal: Negative.    All other systems reviewed and are negative.        OBJECTIVE:     Most Recent Vitals:  Pulse: 63 (12/11/19 1304)  BP: (!) 151/66 (12/11/19 1304)      PHYSICAL EXAM:  Physical Exam   Constitutional: She is well-developed, well-nourished, and in no distress.   Overweight female sitting in chair in NAD   Cardiovascular: Normal rate, regular rhythm, normal heart sounds and intact distal pulses.   Pulmonary/Chest: Effort normal and breath sounds normal. No respiratory distress. She has no wheezes.   Abdominal: Soft. Bowel sounds are normal. She exhibits no distension. There is no tenderness.   2 separate ventral hernias, each reducible and superior to umbilicus. Slightly tender around inferior umbilicus. No overlying skin changes.   Nursing note and vitals reviewed.        LABORATORY VALUES:  Lab Results   Component Value Date    WBC 6.77 08/28/2019    HGB 12.9 08/28/2019    HCT 42.0 08/28/2019     08/28/2019    HGBA1C 5.5 07/31/2019     Lab Results   Component Value Date     07/31/2019    K 4.2 07/31/2019      07/31/2019    CO2 27 07/31/2019    BUN 13 07/31/2019    CREATININE 0.7 11/13/2019    GLU 88 07/31/2019    CALCIUM 9.5 07/31/2019    MG 2.4 07/11/2018    MG 2.4 07/11/2018    PHOS 3.3 07/11/2018    PHOS 3.3 07/11/2018    AST 20 07/31/2019    ALT 18 07/31/2019    ALKPHOS 89 07/31/2019    BILITOT 0.5 07/31/2019    PROT 7.2 07/31/2019    ALBUMIN 4.1 07/31/2019     Lab Results   Component Value Date    INR 1.0 02/27/2014     Lab Results   Component Value Date    TSH 3.384 07/31/2019    CEA 1.9 12/01/2014    CEA 2.4 10/24/2012    CEA 2.0 11/12/2009         DIAGNOSTIC STUDIES:  EXAMINATION:  CT ABDOMEN PELVIS WITH CONTRAST    CLINICAL HISTORY:  incisional hernia;Incisional hernia without obstruction or gangrene    Incisional hernia status post repair complicated by multiple infections and chronic sinus draining tract for which she subsequent underwent additional tract excision and mesh debridement.    TECHNIQUE:  Low dose axial images, sagittal and coronal reformations were obtained from the lung bases to the pubic symphysis following the IV administration of 100 mL of Omnipaque 350 and the oral administration of 30 mL of Omnipaque 350..    COMPARISON:  CT abdomen pelvis 10/13/2015    FINDINGS:  ABDOMEN:    - Heart: Normal size without pericardial effusion.    - Lung bases: No significant consolidation or pleural effusion.    - Liver: Normal size with redemonstration numerous scattered hepatic hypodensities with Hounsfield units compatible with simple cysts.  No new mass.  Portal vein remains patent.    - Gallbladder: Surgically absent.    - Bile Ducts: No evidence of intra or extra hepatic biliary ductal dilation.    - Spleen: Normal size without focal abnormality.    - Kidneys: Normal in size and location with numerous bilateral hypodensities, similar to prior exam, which demonstrate Hounsfield units compatible with simple cysts.  No hydroureteronephrosis or suspicious renal mass.  Urinary bladder is well distended  without focal abnormality.    - Adrenals: Unremarkable.    - Pancreas: No mass or peripancreatic fat stranding.    - Retroperitoneum:  No significant adenopathy.    - Vascular: Abdominal aorta maintains normal caliber and contour with minimal calcific atherosclerosis.    - Abdominal wall:  Recurrent ventral abdominal hernia with defect measuring approximately 6.6 cm on axial imaging (series 2, image 117) and 11.9 cm on sagittal imaging (series 602, image 97).  Hernia contains mesenteric fat and numerous loops of small bowel without associated evidence of obstruction or incarceration/strangulation.  Trace fascia and/or repair mesh covers a portion of the hernia.    PELVIS:    Status post hysterectomy.  No abnormal pelvic mass, lymphadenopathy, or free fluid.    BOWEL/MESENTERY:    Postoperative change status post LAR.  Remaining bowel demonstrates normal caliber and appearance without evidence of high-grade bowel obstruction or active inflammatory process.    BONES:  Stable degenerative change including involve grade 1 anterolisthesis of L4 on L5.  No acute fracture or aggressive osseous lesion.      Impression       Recurrent large ventral abdominal hernia containing mesenteric fat and loops of small bowel without associated complication.    Stable hepatic and renal cysts.    Status post LAR, hysterectomy, and cholecystectomy.    Additional, stable findings as above.    This report was flagged in Epic as abnormal.    Electronically signed by resident: Obie Lema  Date: 12/04/2019  Time: 10:10    Electronically signed by: Valdo Egan MD  Date: 12/04/2019  Time: 10:36             Last Resulted: 12/04/19 10:36 Order Details View Encounter Lab and Collection Details Routing Result History            External Result Report     External Result Report   Narrative     EXAMINATION:  CT ABDOMEN PELVIS WITH CONTRAST    CLINICAL HISTORY:  incisional hernia;Incisional hernia without obstruction or gangrene    Incisional  hernia status post repair complicated by multiple infections and chronic sinus draining tract for which she subsequent underwent additional tract excision and mesh debridement.    TECHNIQUE:  Low dose axial images, sagittal and coronal reformations were obtained from the lung bases to the pubic symphysis following the IV administration of 100 mL of Omnipaque 350 and the oral administration of 30 mL of Omnipaque 350..    COMPARISON:  CT abdomen pelvis 10/13/2015    FINDINGS:  ABDOMEN:    - Heart: Normal size without pericardial effusion.    - Lung bases: No significant consolidation or pleural effusion.    - Liver: Normal size with redemonstration numerous scattered hepatic hypodensities with Hounsfield units compatible with simple cysts.  No new mass.  Portal vein remains patent.    - Gallbladder: Surgically absent.    - Bile Ducts: No evidence of intra or extra hepatic biliary ductal dilation.    - Spleen: Normal size without focal abnormality.    - Kidneys: Normal in size and location with numerous bilateral hypodensities, similar to prior exam, which demonstrate Hounsfield units compatible with simple cysts.  No hydroureteronephrosis or suspicious renal mass.  Urinary bladder is well distended without focal abnormality.    - Adrenals: Unremarkable.    - Pancreas: No mass or peripancreatic fat stranding.    - Retroperitoneum:  No significant adenopathy.    - Vascular: Abdominal aorta maintains normal caliber and contour with minimal calcific atherosclerosis.    - Abdominal wall:  Recurrent ventral abdominal hernia with defect measuring approximately 6.6 cm on axial imaging (series 2, image 117) and 11.9 cm on sagittal imaging (series 602, image 97).  Hernia contains mesenteric fat and numerous loops of small bowel without associated evidence of obstruction or incarceration/strangulation.  Trace fascia and/or repair mesh covers a portion of the hernia.    PELVIS:    Status post hysterectomy.  No abnormal pelvic  mass, lymphadenopathy, or free fluid.    BOWEL/MESENTERY:    Postoperative change status post LAR.  Remaining bowel demonstrates normal caliber and appearance without evidence of high-grade bowel obstruction or active inflammatory process.    BONES:  Stable degenerative change including involve grade 1 anterolisthesis of L4 on L5.  No acute fracture or aggressive osseous lesion.   Impression       Recurrent large ventral abdominal hernia containing mesenteric fat and loops of small bowel without associated complication.    Stable hepatic and renal cysts.    Status post LAR, hysterectomy, and cholecystectomy.    Additional, stable findings as above.          ASSESSMENT:     Tanesha Davis is a 69 y.o. female s/p cholecystectomy and open incisional hernia repair 2/14/14 with a complicated post-operative course of infected mesh and multiple debridements (most recent 2018) presents for recurrent ventral hernia. CT imaging reveals large ventral defect with multiple loops of bowel and mesenteric fat. Patient reports only mild discomfort. Hernia repair was discussed, however given previous history she would like to wait at this time.     -  Return to clinic prn     -Olga Nails M.D  General Surgery PGY1

## 2020-01-10 ENCOUNTER — PATIENT OUTREACH (OUTPATIENT)
Dept: ADMINISTRATIVE | Facility: OTHER | Age: 70
End: 2020-01-10

## 2020-01-14 ENCOUNTER — OFFICE VISIT (OUTPATIENT)
Dept: SPORTS MEDICINE | Facility: CLINIC | Age: 70
End: 2020-01-14
Payer: MEDICARE

## 2020-01-14 VITALS
DIASTOLIC BLOOD PRESSURE: 66 MMHG | HEART RATE: 64 BPM | BODY MASS INDEX: 33.49 KG/M2 | WEIGHT: 201 LBS | HEIGHT: 65 IN | SYSTOLIC BLOOD PRESSURE: 140 MMHG

## 2020-01-14 DIAGNOSIS — M17.11 OSTEOARTHRITIS OF RIGHT KNEE: ICD-10-CM

## 2020-01-14 DIAGNOSIS — M25.561 CHRONIC PAIN OF RIGHT KNEE: Primary | ICD-10-CM

## 2020-01-14 DIAGNOSIS — G89.29 CHRONIC PAIN OF RIGHT KNEE: Primary | ICD-10-CM

## 2020-01-14 PROCEDURE — 1101F PR PT FALLS ASSESS DOC 0-1 FALLS W/OUT INJ PAST YR: ICD-10-PCS | Mod: HCNC,CPTII,S$GLB, | Performed by: ORTHOPAEDIC SURGERY

## 2020-01-14 PROCEDURE — 99999 PR PBB SHADOW E&M-EST. PATIENT-LVL III: ICD-10-PCS | Mod: PBBFAC,HCNC,, | Performed by: ORTHOPAEDIC SURGERY

## 2020-01-14 PROCEDURE — 20610 LARGE JOINT ASPIRATION/INJECTION: R KNEE: ICD-10-PCS | Mod: HCNC,RT,S$GLB, | Performed by: ORTHOPAEDIC SURGERY

## 2020-01-14 PROCEDURE — 99214 OFFICE O/P EST MOD 30 MIN: CPT | Mod: 25,HCNC,S$GLB, | Performed by: ORTHOPAEDIC SURGERY

## 2020-01-14 PROCEDURE — 20610 DRAIN/INJ JOINT/BURSA W/O US: CPT | Mod: HCNC,RT,S$GLB, | Performed by: ORTHOPAEDIC SURGERY

## 2020-01-14 PROCEDURE — 99214 PR OFFICE/OUTPT VISIT, EST, LEVL IV, 30-39 MIN: ICD-10-PCS | Mod: 25,HCNC,S$GLB, | Performed by: ORTHOPAEDIC SURGERY

## 2020-01-14 PROCEDURE — 1125F AMNT PAIN NOTED PAIN PRSNT: CPT | Mod: HCNC,S$GLB,, | Performed by: ORTHOPAEDIC SURGERY

## 2020-01-14 PROCEDURE — 99999 PR PBB SHADOW E&M-EST. PATIENT-LVL III: CPT | Mod: PBBFAC,HCNC,, | Performed by: ORTHOPAEDIC SURGERY

## 2020-01-14 PROCEDURE — 1159F MED LIST DOCD IN RCRD: CPT | Mod: HCNC,S$GLB,, | Performed by: ORTHOPAEDIC SURGERY

## 2020-01-14 PROCEDURE — 1101F PT FALLS ASSESS-DOCD LE1/YR: CPT | Mod: HCNC,CPTII,S$GLB, | Performed by: ORTHOPAEDIC SURGERY

## 2020-01-14 PROCEDURE — 1159F PR MEDICATION LIST DOCUMENTED IN MEDICAL RECORD: ICD-10-PCS | Mod: HCNC,S$GLB,, | Performed by: ORTHOPAEDIC SURGERY

## 2020-01-14 PROCEDURE — 1125F PR PAIN SEVERITY QUANTIFIED, PAIN PRESENT: ICD-10-PCS | Mod: HCNC,S$GLB,, | Performed by: ORTHOPAEDIC SURGERY

## 2020-01-14 RX ORDER — TRIAMCINOLONE ACETONIDE 40 MG/ML
40 INJECTION, SUSPENSION INTRA-ARTICULAR; INTRAMUSCULAR
Status: DISCONTINUED | OUTPATIENT
Start: 2020-01-14 | End: 2020-01-14 | Stop reason: HOSPADM

## 2020-01-14 RX ADMIN — TRIAMCINOLONE ACETONIDE 40 MG: 40 INJECTION, SUSPENSION INTRA-ARTICULAR; INTRAMUSCULAR at 01:01

## 2020-01-14 NOTE — PROGRESS NOTES
"CC: Right knee pain    69 y.o. Female returns to clinic for follow up evaluation of right knee.  She received a steroid injection on 11/12/19; reports it helped improve her pain.     history of right knee pain without new WAGNER worsening over the last year.  History of right knee scope in 2010 and left knee scope in 2007.    She reports that the pain and weakness. It also bothers her at night.    + mechanical symptoms, - instability    Is affecting ADLs.  Pain is 5/10 at it's worst.    REVIEW OF SYSTEMS:   Constitution: Negative. Negative for chills, fever and night sweats.   HENT: Negative for congestion and headaches.    Eyes: Negative for blurred vision, left vision loss and right vision loss.   Cardiovascular: Negative for chest pain and syncope.   Respiratory: Negative for cough and shortness of breath.    Endocrine: Negative for polydipsia, polyphagia and polyuria.   Hematologic/Lymphatic: Negative for bleeding problem. Does not bruise/bleed easily.   Skin: Negative for dry skin, itching and rash.   Musculoskeletal: Negative for falls. Positive for right knee pain and  muscle weakness.   Gastrointestinal: Negative for abdominal pain and bowel incontinence.   Genitourinary: Negative for bladder incontinence and nocturia.   Neurological: Negative for disturbances in coordination, loss of balance and seizures.   Psychiatric/Behavioral: Negative for depression. The patient does not have insomnia.    Allergic/Immunologic: Negative for hives and persistent infections.     PAST MEDICAL HISTORY:   Past Medical History:   Diagnosis Date    Arthritis     Draining cutaneous sinus tract     Headache(784.0)     Incisional hernia     Insomnia     Nocturnal leg cramps     Nonhealing surgical wound     PONV (postoperative nausea and vomiting)     with last surgery per patient, "Severe" PONV 2-27-14    Postmenopausal status     Rectosigmoid cancer        PAST SURGICAL HISTORY:   Past Surgical History:   Procedure " Laterality Date    BREAST BIOPSY      CHOLECYSTECTOMY  02/24/2014    with incisional hernia repair with mesh    COLONOSCOPY N/A 8/23/2016    Procedure: COLONOSCOPY;  Surgeon: Cuco Meraz MD;  Location: [facility] ENDO (4TH FLR);  Service: Endoscopy;  Laterality: N/A;    COLONOSCOPY N/A 12/9/2019    Procedure: COLONOSCOPY;  Surgeon: KISHORE Curry MD;  Location: [facility] ENDO (4TH FLR);  Service: Endoscopy;  Laterality: N/A;  ADHESIVE Allergy    HYSTERECTOMY      KNEE ARTHROSCOPY      KNEE SURGERY      right and left knee repair    OOPHORECTOMY      REPAIR OF RECURRENT INCISIONAL HERNIA  7/10/2018    Procedure: REPAIR, HERNIA, INCISIONAL, RECURRENT;  Surgeon: Cuco Meraz MD;  [facility];  Service: Colon and Rectal;;    SIGMOIDECTOMY  03/20/2006    LAR       FAMILY HISTORY:   Family History   Problem Relation Age of Onset    Breast cancer Mother     Cancer Mother         BREAST    Heart disease Father     Heart disease Maternal Grandmother     Diabetes Brother     Colon polyps Neg Hx     Ovarian cancer Neg Hx     Anesthesia problems Neg Hx        SOCIAL HISTORY:   Social History     Socioeconomic History    Marital status:      Spouse name: francis    Number of children: 2    Years of education: Not on file    Highest education level: Not on file   Occupational History    Occupation: Retired     Employer: Self   Social Needs    Financial resource strain: Not on file    Food insecurity:     Worry: Not on file     Inability: Not on file    Transportation needs:     Medical: No     Non-medical: No   Tobacco Use    Smoking status: Never Smoker    Smokeless tobacco: Never Used   Substance and Sexual Activity    Alcohol use: Yes     Alcohol/week: 1.0 standard drinks     Types: 1 Glasses of wine per week     Frequency: Monthly or less     Drinks per session: 1 or 2     Binge frequency: Never     Comment: socially    Drug use: No    Sexual activity: Yes     Partners: Male      "Birth control/protection: Post-menopausal   Lifestyle    Physical activity:     Days per week: 4 days     Minutes per session: 60 min    Stress: Only a little   Relationships    Social connections:     Talks on phone: More than three times a week     Gets together: Three times a week     Attends Voodoo service: Not on file     Active member of club or organization: No     Attends meetings of clubs or organizations: Never     Relationship status:    Other Topics Concern    Not on file   Social History Narrative    Not on file       MEDICATIONS:     Current Outpatient Medications:     cyanocobalamin 500 MCG tablet, Take 500 mcg by mouth every morning. , Disp: , Rfl:     ERGOCALCIFEROL, VITAMIN D2, (VITAMIN D ORAL), Take 1 capsule by mouth every morning. , Disp: , Rfl:     estrogens,conjugated,-methyltestosterone 1.25-2.5mg (ESTRATEST) 1.25-2.5 mg per tablet, Take 1 tablet by mouth once daily., Disp: 30 tablet, Rfl: 5    L GASSERI/B BIFIDUM/B LONGUM ([employer] HEALTH ORAL), Take 1 tablet by mouth 2 (two) times daily. , Disp: , Rfl:     linaclotide (LINZESS) 72 mcg Cap, Take 1 capsule (72 mcg total) by mouth once daily., Disp: 30 capsule, Rfl: 5    magnesium oxide-pyridoxine (BEELITH) 362-20 mg Tab, Take 1 tablet by mouth once daily. (Patient taking differently: Take 1 tablet by mouth every morning. ), Disp: 90 tablet, Rfl: 1    meloxicam (MOBIC) 7.5 MG tablet, TAKE ONE TABLET BY MOUTH EVERY DAY, Disp: 90 tablet, Rfl: 1    VITAMIN E ACETATE (VITAMIN E ORAL), Take 1 capsule by mouth every morning. , Disp: , Rfl:     hyoscyamine (LEVBID) 0.375 mg Tb12, Take 1 tablet (0.375 mg total) by mouth every 12 (twelve) hours., Disp: 60 tablet, Rfl: 2    ALLERGIES:   Review of patient's allergies indicates:   Allergen Reactions    Adhesive Rash     Asking that we use Paper Tape,  Uses "Sensitive Skin" band aids    Amoxil [amoxicillin] Hives    Penicillins Hives and Swelling     Swelling of ears and " "neck    Sulfa (sulfonamide antibiotics) Hives and Itching       VITAL SIGNS:   BP (!) 140/66   Pulse 64   Ht 5' 5" (1.651 m)   Wt 91.2 kg (201 lb)   BMI 33.45 kg/m²      PHYSICAL EXAMINATION:  General:  The patient is alert and oriented x 3.  Mood is pleasant.  Observation of ears, eyes and nose reveal no gross abnormalities.  No labored breathing observed.    RIGHT KNEE EXAMINATION     OBSERVATION / INSPECTION   Gait:   Antalgic   Alignment:  Neutral   Scars:   None   Muscle atrophy: Mild  Effusion:  Trace  Warmth:  None   Discoloration:   none     TENDERNESS / CREPITUS (T / C):          T / C      T / C   Patella   - / -   Lateral joint line   - / -   Peripatellar medial  -  Medial joint line    + / -   Peripatellar lateral -  Medial plica   - / -   Patellar tendon -   Popliteal fossa   - / -   Quad tendon   -   Gastrocnemius   -   Prepatellar Bursa - / -   Quadricep   -   Tibial tubercle  -  Thigh/hamstring  -   Pes anserine/HS -  Fibula    -   ITB   - / -  Tibia     -   Tib/fib joint  - / -  LCL    -     MFC   - / -   MCL: Proximal  -    LFC   - / -    Distal   -          ROM: (* = pain)  PASSIVE   ACTIVE    Left :   5 / 0 / 135   5 / 0 / 135     Right :    5 / 0 / 130   5 / 0 / 130    Patellofemoral examination:  See above noted areas of tenderness.   Patella position    Subluxation / dislocation: Centered           Sup. / Inf;   Normal   Crepitus (PF):    Absent   Patellar Mobility:       Medial-lateral:   Normal    Superior-inferior:  Normal    Inferior tilt   Normal    Patellar tendon:  Normal   Lateral tilt:    Normal   J-sign:     None   Patellofemoral grind:   No pain       MENISCAL SIGNS:     Pain on terminal extension:  -  Pain on terminal flexion:  +  Marcias maneuver:  + (for pain)  Squat     + (for pain)    LIGAMENT EXAMINATION:  ACL / Lachman:  normal (-1 to 2mm)    PCL-Post.  drawer: normal 0 to 2mm  MCL- Valgus:  normal 0 to 2mm  LCL- Varus:  normal 0 to 2mm  Pivot shift: normal (Equal) "   Dial Test: difference c/w other side   At 30° flexion: normal (< 5°)    At 90° flexion: normal (< 5°)   Reverse Pivot Shift:   normal (Equal)     STRENGTH: (* = with pain) PAINFUL SIDE   Quadricep   5/5   Hamstrin/5    EXTREMITY NEURO-VASCULAR EXAMINATION:   Sensation:  Grossly intact to light touch all dermatomal regions.   Motor Function:  Fully intact motor function at hip, knee, foot and ankle    DTRs;  quadriceps and  achilles 2+.  No clonus and downgoing Babinski.    Vascular status:  DP and PT pulses 2+, brisk capillary refill, symmetric.     XRAY (19):   Right: There is mild-moderate DJD and a varus deformity.  No fracture dislocation bone destruction or OCD seen.    Left: There is mild-moderate DJD and a varus deformity.  No fracture dislocation bone destruction or OCD seen.      ASSESSMENT:    Right knee pain, DJD    PLAN:   1. Cortisone injection today  2. Physical therapy at [place]  3. Follow up 3 months

## 2020-01-14 NOTE — PROCEDURES
Large Joint Aspiration/Injection: R knee  Date/Time: 1/14/2020 1:30 PM  Performed by: Devon Rivera MD  Authorized by: Devon Rivera MD     Consent Done?:  Yes (Verbal)  Indications:  Pain  Procedure site marked: Yes    Timeout: Prior to procedure the correct patient, procedure, and site was verified      Location:  Knee  Site:  R knee  Prep: Patient was prepped and draped in usual sterile fashion    Needle size:  22 G  Ultrasonic Guidance for needle placement: No  Approach:  Superior  Medications:  40 mg triamcinolone acetonide 40 mg/mL  Patient tolerance:  Patient tolerated the procedure well with no immediate complications

## 2020-01-21 ENCOUNTER — PES CALL (OUTPATIENT)
Dept: ADMINISTRATIVE | Facility: CLINIC | Age: 70
End: 2020-01-21

## 2020-01-30 ENCOUNTER — LAB VISIT (OUTPATIENT)
Dept: LAB | Facility: OTHER | Age: 70
End: 2020-01-30
Attending: INTERNAL MEDICINE
Payer: MEDICARE

## 2020-01-30 ENCOUNTER — OFFICE VISIT (OUTPATIENT)
Dept: INTERNAL MEDICINE | Facility: CLINIC | Age: 70
End: 2020-01-30
Payer: MEDICARE

## 2020-01-30 VITALS
WEIGHT: 200.38 LBS | HEART RATE: 66 BPM | SYSTOLIC BLOOD PRESSURE: 132 MMHG | HEIGHT: 65 IN | OXYGEN SATURATION: 98 % | DIASTOLIC BLOOD PRESSURE: 60 MMHG | BODY MASS INDEX: 33.38 KG/M2

## 2020-01-30 DIAGNOSIS — G25.81 RLS (RESTLESS LEGS SYNDROME): Primary | ICD-10-CM

## 2020-01-30 DIAGNOSIS — E61.1 IRON DEFICIENCY ASSOCIATED WITH NONFAMILIAL RESTLESS LEGS SYNDROME: ICD-10-CM

## 2020-01-30 DIAGNOSIS — G25.81 IRON DEFICIENCY ASSOCIATED WITH NONFAMILIAL RESTLESS LEGS SYNDROME: ICD-10-CM

## 2020-01-30 DIAGNOSIS — G25.81 RLS (RESTLESS LEGS SYNDROME): ICD-10-CM

## 2020-01-30 DIAGNOSIS — G60.9 NEUROPATHY, IDIOPATHIC: ICD-10-CM

## 2020-01-30 DIAGNOSIS — Z78.0 POSTMENOPAUSAL STATUS: ICD-10-CM

## 2020-01-30 LAB
FERRITIN SERPL-MCNC: 22 NG/ML (ref 20–300)
IRON SERPL-MCNC: 60 UG/DL (ref 30–160)
SATURATED IRON: 12 % (ref 20–50)
TOTAL IRON BINDING CAPACITY: 482 UG/DL (ref 250–450)
TRANSFERRIN SERPL-MCNC: 326 MG/DL (ref 200–375)
VIT B12 SERPL-MCNC: 434 PG/ML (ref 210–950)

## 2020-01-30 PROCEDURE — 1126F AMNT PAIN NOTED NONE PRSNT: CPT | Mod: HCNC,S$GLB,, | Performed by: INTERNAL MEDICINE

## 2020-01-30 PROCEDURE — 1101F PT FALLS ASSESS-DOCD LE1/YR: CPT | Mod: HCNC,CPTII,S$GLB, | Performed by: INTERNAL MEDICINE

## 2020-01-30 PROCEDURE — 99999 PR PBB SHADOW E&M-EST. PATIENT-LVL IV: CPT | Mod: PBBFAC,HCNC,, | Performed by: INTERNAL MEDICINE

## 2020-01-30 PROCEDURE — 83540 ASSAY OF IRON: CPT | Mod: HCNC

## 2020-01-30 PROCEDURE — 99215 PR OFFICE/OUTPT VISIT, EST, LEVL V, 40-54 MIN: ICD-10-PCS | Mod: HCNC,S$GLB,, | Performed by: INTERNAL MEDICINE

## 2020-01-30 PROCEDURE — 1159F MED LIST DOCD IN RCRD: CPT | Mod: HCNC,S$GLB,, | Performed by: INTERNAL MEDICINE

## 2020-01-30 PROCEDURE — 1101F PR PT FALLS ASSESS DOC 0-1 FALLS W/OUT INJ PAST YR: ICD-10-PCS | Mod: HCNC,CPTII,S$GLB, | Performed by: INTERNAL MEDICINE

## 2020-01-30 PROCEDURE — 99999 PR PBB SHADOW E&M-EST. PATIENT-LVL IV: ICD-10-PCS | Mod: PBBFAC,HCNC,, | Performed by: INTERNAL MEDICINE

## 2020-01-30 PROCEDURE — 82728 ASSAY OF FERRITIN: CPT | Mod: HCNC

## 2020-01-30 PROCEDURE — 99215 OFFICE O/P EST HI 40 MIN: CPT | Mod: HCNC,S$GLB,, | Performed by: INTERNAL MEDICINE

## 2020-01-30 PROCEDURE — 36415 COLL VENOUS BLD VENIPUNCTURE: CPT | Mod: HCNC

## 2020-01-30 PROCEDURE — 82607 VITAMIN B-12: CPT | Mod: HCNC

## 2020-01-30 PROCEDURE — 1159F PR MEDICATION LIST DOCUMENTED IN MEDICAL RECORD: ICD-10-PCS | Mod: HCNC,S$GLB,, | Performed by: INTERNAL MEDICINE

## 2020-01-30 PROCEDURE — 1126F PR PAIN SEVERITY QUANTIFIED, NO PAIN PRESENT: ICD-10-PCS | Mod: HCNC,S$GLB,, | Performed by: INTERNAL MEDICINE

## 2020-01-30 RX ORDER — GABAPENTIN 100 MG/1
100 CAPSULE ORAL NIGHTLY
Qty: 30 CAPSULE | Refills: 11 | Status: SHIPPED | OUTPATIENT
Start: 2020-01-30 | End: 2020-04-29 | Stop reason: SDUPTHER

## 2020-01-30 RX ORDER — FERROUS SULFATE 325(65) MG
325 TABLET, DELAYED RELEASE (ENTERIC COATED) ORAL DAILY
Qty: 90 TABLET | Refills: 3 | Status: SHIPPED | OUTPATIENT
Start: 2020-01-30 | End: 2021-04-12

## 2020-01-30 RX ORDER — ESTRADIOL 1 MG/1
1.5 TABLET ORAL DAILY
COMMUNITY
End: 2020-09-16 | Stop reason: SDUPTHER

## 2020-01-30 NOTE — PROGRESS NOTES
Subjective:       Patient ID: Tanesha Davis is a 69 y.o. female who  has a past medical history of Arthritis, Draining cutaneous sinus tract, Headache(784.0), Incisional hernia, Insomnia, Nocturnal leg cramps, Nonhealing surgical wound, PONV (postoperative nausea and vomiting), Postmenopausal status, and Rectosigmoid cancer.    Chief Complaint: Spasms     History was obtained from the patient and supplemented through chart review  She is a patient of Dr. Hernandez.  Was last seen  for night sweats. SPEP, HIV, HCV, QuantiFERON gold WNL.    HPI    BLE Cramps:  Nightly x 1 week.  Sometimes starts with B/l upper abd spasms/tightness at 8 PM.  Self-resolves in 5 min.  Not associated with diet.  Ate a salad prior.  H/o constipation/abdominal distention and is on Linzess.  Then awakes with nighttime cramps involving the medial aspect of R thigh, b/l calves, BLE.  R thigh is the worst.  Hard to stand on leg d/t foot cramps, plantar numbness R>L.   massages it and she stretches/walks.  Resolves with walking, but returns when she returns to sleep. Feet, calf soresness the next day.      No tobacco, claudication.  No paresthesias, weakness.  Has chronic LBP w/o radiation with prolonged sitting or laying down whhen she gets up.  No bowel, bladder incontinence.    Did report nocturnal leg cramps L>R, numbness during a clinic visit in 2015.  Improves with activity.  Stopped B complex.  Trial of gabapentin 300 q.h.s., but caused excess daytime sedation.  Then resolved.  Currently was worsened sx.  Recently took an old pill of gabapentin 300 without relief.  Is not taking B12 supplement.  Is not taking excess caffeine.    Lab Results   Component Value Date    HGBA1C 5.5 07/31/2019     Lab Results   Component Value Date    TSH 3.384 07/31/2019     Lab Results   Component Value Date    CRORLLUB23YY 38 07/31/2019    FYKZFOTH33DT 27 (L) 09/16/2015    ZQEFPDAF55BT 23 (L) 06/30/2015     Lab Results   Component Value Date  "   FERRITIN 53 11/07/2016     Lab Results   Component Value Date    WRRVRXZJ49 344 06/30/2015     Lab Results   Component Value Date    FOLATE 11.9 06/30/2015     Hot flashes:  Improved with dose adjustment on HRT, which is helping.  SPEP, HIV, HCV, QuantiFERON gold WNL.    Review of Systems   Constitutional: Negative for activity change and unexpected weight change.   HENT: Negative for hearing loss, rhinorrhea and trouble swallowing.    Eyes: Negative for discharge and visual disturbance.   Respiratory: Positive for chest tightness. Negative for wheezing.    Cardiovascular: Negative for chest pain and palpitations.   Gastrointestinal: Positive for abdominal distention and constipation. Negative for blood in stool, diarrhea and vomiting.   Endocrine: Negative for polydipsia and polyuria.   Genitourinary: Negative for difficulty urinating, dysuria, hematuria and menstrual problem.   Musculoskeletal: Positive for arthralgias and joint swelling. Negative for neck pain.   Skin: Negative for rash and wound.   Neurological: Positive for weakness and headaches.   Hematological: Negative for adenopathy.   Psychiatric/Behavioral: Positive for sleep disturbance. Negative for confusion and dysphoric mood.         Past Medical History:   Diagnosis Date    Arthritis     Draining cutaneous sinus tract     Headache(784.0)     Incisional hernia     Insomnia     Nocturnal leg cramps     Nonhealing surgical wound     PONV (postoperative nausea and vomiting)     with last surgery per patient, "Severe" PONV 2-27-14    Postmenopausal status     Rectosigmoid cancer      Past Surgical History:   Procedure Laterality Date    BREAST BIOPSY      CHOLECYSTECTOMY  02/24/2014    with incisional hernia repair with mesh    COLONOSCOPY N/A 8/23/2016    Procedure: COLONOSCOPY;  Surgeon: Cuco Meraz MD;  Location: [address];  Service: Endoscopy;  Laterality: N/A;    COLONOSCOPY N/A 12/9/2019    Procedure: COLONOSCOPY;  " Surgeon: KISHORE Curry MD;  Location: [facility] ENDO (4TH FLR);  Service: Endoscopy;  Laterality: N/A;  ADHESIVE Allergy    HYSTERECTOMY      KNEE ARTHROSCOPY      KNEE SURGERY      right and left knee repair    OOPHORECTOMY      REPAIR OF RECURRENT INCISIONAL HERNIA  7/10/2018    Procedure: REPAIR, HERNIA, INCISIONAL, RECURRENT;  Surgeon: Cuco Meraz MD;  Location: [facility] OR 2ND FLR;  Service: Colon and Rectal;;    SIGMOIDECTOMY  03/20/2006    LAR     Family History   Problem Relation Age of Onset    Breast cancer Mother     Cancer Mother         BREAST    Heart disease Father     Heart disease Maternal Grandmother     Diabetes Brother     Colon polyps Neg Hx     Ovarian cancer Neg Hx     Anesthesia problems Neg Hx      Social History     Socioeconomic History    Marital status:      Spouse name: francis    Number of children: 2    Years of education: Not on file    Highest education level: Not on file   Occupational History    Occupation: Retired     Employer: Self   Social Needs    Financial resource strain: Not on file    Food insecurity:     Worry: Not on file     Inability: Not on file    Transportation needs:     Medical: No     Non-medical: No   Tobacco Use    Smoking status: Never Smoker    Smokeless tobacco: Never Used   Substance and Sexual Activity    Alcohol use: Yes     Alcohol/week: 1.0 standard drinks     Types: 1 Glasses of wine per week     Frequency: Monthly or less     Drinks per session: 1 or 2     Binge frequency: Never     Comment: socially    Drug use: No    Sexual activity: Yes     Partners: Male     Birth control/protection: Post-menopausal   Lifestyle    Physical activity:     Days per week: 4 days     Minutes per session: 60 min    Stress: Only a little   Relationships    Social connections:     Talks on phone: More than three times a week     Gets together: Three times a week     Attends Yarsanism service: Not on file     Active member of club or  "organization: No     Attends meetings of clubs or organizations: Never     Relationship status:    Other Topics Concern    Not on file   Social History Narrative    Not on file     Objective:      Vitals:    01/30/20 1144   BP: 132/60   Pulse: 66   SpO2: 98%   Weight: 90.9 kg (200 lb 6.4 oz)   Height: 5' 5" (1.651 m)      Physical Exam   Constitutional: She appears well-developed and well-nourished. No distress.   HENT:   Head: Normocephalic and atraumatic.   Nose: Nose normal.   Mouth/Throat: Oropharynx is clear and moist. No oropharyngeal exudate.   Eyes: Pupils are equal, round, and reactive to light. EOM are normal. Right eye exhibits no discharge. Left eye exhibits no discharge. No scleral icterus.   Neck: Neck supple. No tracheal deviation present. No thyromegaly present.   Cardiovascular: Normal rate, regular rhythm, normal heart sounds and intact distal pulses.   No murmur heard.  Pulses:       Posterior tibial pulses are 2+ on the right side, and 2+ on the left side.   Pulmonary/Chest: Effort normal and breath sounds normal. No respiratory distress. She has no wheezes.   Abdominal: Soft. Bowel sounds are normal. She exhibits no distension. There is no tenderness.   Musculoskeletal: She exhibits no edema, tenderness or deformity.        Right hip: She exhibits normal range of motion.        Left hip: She exhibits normal range of motion.        Right knee: She exhibits normal range of motion.        Left knee: She exhibits normal range of motion.        Right ankle: She exhibits normal range of motion. No tenderness.        Left ankle: She exhibits normal range of motion. No tenderness.        Cervical back: She exhibits no tenderness.        Thoracic back: She exhibits no tenderness.        Lumbar back: She exhibits no tenderness.   Lymphadenopathy:     She has no cervical adenopathy.   Neurological: She is alert. No cranial nerve deficit. Gait normal.   Skin: Skin is warm and dry. She is not " diaphoretic. No erythema.   Psychiatric: She has a normal mood and affect. Her behavior is normal.         Lab Results   Component Value Date    WBC 6.77 08/28/2019    HGB 12.9 08/28/2019    HCT 42.0 08/28/2019     08/28/2019    CHOL 200 (H) 07/31/2019    TRIG 61 07/31/2019    HDL 49 07/31/2019    ALT 18 07/31/2019    AST 20 07/31/2019     07/31/2019    K 4.2 07/31/2019     07/31/2019    CREATININE 0.7 11/13/2019    BUN 13 07/31/2019    CO2 27 07/31/2019    TSH 3.384 07/31/2019    INR 1.0 02/27/2014    HGBA1C 5.5 07/31/2019       The 10-year ASCVD risk score (Rocael BURROWS Jr., et al., 2013) is: 7.6%    Values used to calculate the score:      Age: 69 years      Sex: Female      Is Non- : No      Diabetic: No      Tobacco smoker: No      Systolic Blood Pressure: 132 mmHg      Is BP treated: No      HDL Cholesterol: 49 mg/dL      Total Cholesterol: 200 mg/dL    (Imaging have been independently reviewed)  Knee x-ray with bilateral moderate DJD.    Assessment:       1. RLS (restless legs syndrome)    2. Postmenopausal status          Plan:       Tanesha was seen today for spasms.    Diagnoses and all orders for this visit:    RLS (restless legs syndrome)  Comments:  Check iron, B12. Rec iron daily. Discussed constipation, hydration. Cont Linzess. Low dose Gabapentin; discussed sedation, titrating. Stretching. Consider PT  Orders:  -     Ferritin; Future  -     Iron and TIBC; Future  -     gabapentin (NEURONTIN) 100 MG capsule; Take 1 capsule (100 mg total) by mouth every evening. 2 hours before bedtime.  -     Vitamin B12; Future  -     ferrous sulfate 325 (65 FE) MG EC tablet; Take 1 tablet (325 mg total) by mouth once daily.    Postmenopausal status  Comments:  Start low-dose gabapentin 100 as above.  Discussed potential sedation. On HRT.  Orders:  -     gabapentin (NEURONTIN) 100 MG capsule; Take 1 capsule (100 mg total) by mouth every evening. 2 hours before bedtime.    Other  orders  -     Cancel: Ambulatory Referral to Physical/Occupational Therapy         Side effects of medication(s) were discussed in detail and patient voiced understanding.  Patient will call back for any issues or complications.     RTC PRN.

## 2020-02-04 ENCOUNTER — CLINICAL SUPPORT (OUTPATIENT)
Dept: REHABILITATION | Facility: HOSPITAL | Age: 70
End: 2020-02-04
Attending: ORTHOPAEDIC SURGERY
Payer: MEDICARE

## 2020-02-04 DIAGNOSIS — R29.898 DECREASED STRENGTH OF LOWER EXTREMITY: ICD-10-CM

## 2020-02-04 DIAGNOSIS — M25.561 CHRONIC PAIN OF RIGHT KNEE: ICD-10-CM

## 2020-02-04 DIAGNOSIS — R26.89 DECREASED FUNCTIONAL MOBILITY: ICD-10-CM

## 2020-02-04 DIAGNOSIS — M25.661 DECREASED ROM OF RIGHT KNEE: ICD-10-CM

## 2020-02-04 DIAGNOSIS — G89.29 CHRONIC PAIN OF RIGHT KNEE: ICD-10-CM

## 2020-02-04 PROCEDURE — 97161 PT EVAL LOW COMPLEX 20 MIN: CPT | Mod: HCNC,PN

## 2020-02-04 PROCEDURE — 97110 THERAPEUTIC EXERCISES: CPT | Mod: HCNC,PN

## 2020-02-04 NOTE — PLAN OF CARE
"  [facility]  Physical Therapy Initial Evaluation    Name: Tanesha Davis  Clinic Number: [id number]    Therapy Diagnosis:   Encounter Diagnoses   Name Primary?    Decreased ROM of right knee     Decreased strength of lower extremity     Decreased functional mobility     Chronic pain of right knee      Physician: Devon Rivera MD    Physician Orders: PT Eval and Treat    Medical Diagnosis from Referral:   M25.561,G89.29 (ICD-10-CM) - Chronic pain of right knee   M17.11 (ICD-10-CM) - Osteoarthritis of right knee     Evaluation Date: 2/4/2020  Authorization Period Expiration: 12/31/2020  Plan of Care Expiration: 5/4/2020  Visit # / Visits authorized: 1/ 20    Time In: 1040  Time Out: 1125  Total Billable Time: 45 minutes    Precautions: Standard    Subjective   Date of onset: Chronic, increased over last year  History of current condition - Alice reports: history of pain in her R knee, multiple years of pain but increasing over the past year. States it hurts to walk or move on her R knee. Reports pain increases as her activity increases throughout the day, worst pain at the end of the night . "The longer I'm on it the worse it gets." Pt also reports increased pain with taking the stairs or trying to hop, run on LE. States she had an injection about 3 weeks ago, some relief but diminished over time. Pt reports that her L knee also bothers her, but not nearly as bad as R LE.      Medical History:   Past Medical History:   Diagnosis Date    Arthritis     Draining cutaneous sinus tract     Headache(784.0)     Incisional hernia     Insomnia     Nocturnal leg cramps     Nonhealing surgical wound     PONV (postoperative nausea and vomiting)     with last surgery per patient, "Severe" PONV 2-27-14    Postmenopausal status     Rectosigmoid cancer        Surgical History:   Tanesha Davis  has a past surgical history that includes Hysterectomy; Knee surgery; Oophorectomy; " "Breast biopsy; Cholecystectomy (02/24/2014); Knee arthroscopy; Colonoscopy (N/A, 8/23/2016); Repair of recurrent incisional hernia (7/10/2018); Sigmoidectomy (03/20/2006); and Colonoscopy (N/A, 12/9/2019).    Medications:   Tanesha has a current medication list which includes the following prescription(s): ergocalciferol (vitamin d2), estradiol, estrogens(conjugated)-methyltestosterone 1.25-2.5mg, ferrous sulfate, gabapentin, hyoscyamine, l gasseri/b bifidum/b longum, linaclotide, magnesium oxide-pyridoxine, meloxicam, and vitamin e acetate.    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     Objective     GAIT DEVIATIONS: Tanesha displays some notable antalgic gait on R LE    Range of Motion:   Knee Left active Left Passive   Flexion 118 NT   Extension 0 NT     Knee Right active Right Passive   Flexion 104 105   Extension -10 -8       Lower Extremity Strength   Right LE  Left LE    Knee extension: 4-/5 Knee extension: 5/5   Knee flexion: 3/5 Knee " flexion: 4-/5   Hip flexion: 4/5 Hip flexion: 4/5   Hip extension:  3+/5 Hip extension: 3+/5   Hip abduction: 3/5 Hip abduction: 3/5   Hip adduction: 4/5 Hip adduction 4/5   Ankle dorsiflexion: 4/5 Ankle dorsiflexion: 4/5   Ankle plantarflexion: 4/5 Ankle plantarflexion: 4/5       Special Tests:   Right Left   Valgus Stress Test Negative Negative   Varus Stress test Negative Negative   Marcia's Test Positive, medially Negative   Rush's compression test Positive Negative   Patellar Grind Test Positive Negative       Joint Mobility: Decreased mobility to R patella in all planes     Palpation: Increased TTP and tone to quadriceps, hip adductors, glut med, ITB and medial hamstring     Flexibility: Decreased flexibility noted to B hamstrings and R hip abductor   Popliteal Angle: R = 25 degrees ; L = 25 degrees     5 time sit to stand: 20 seconds    CMS Impairment/Limitation/Restriction for FOTO Knee Survey    Therapist reviewed FOTO scores for Tanesha Davis on 2/4/2020.   FOTO documents entered into [employer] - see Media section.    Limitation Score: 40%  Category: Mobility    Current : CK = at least 40% but < 60% impaired, limited or restricted  Goal: CJ = at least 20% but < 40% impaired, limited or restricted       TREATMENT   Treatment Time In: 1110  Treatment Time Out: 1125  Total Treatment time separate from Evaluation: 15 minutes    Alice received therapeutic exercises to develop strength and endurance for 15 minutes including:    +SLR 2x10  +Sidelying hip abduction 2x10  +Prone Hip Extension 2x10  +Prone Hamstring curl 2x10    Home Exercises and Patient Education Provided    Education provided:   - role of PT, goals, plan of care, scheduling/cancellation policies, insurance limitations    Written Home Exercises Provided: yes.  Exercises were reviewed and Alice was able to demonstrate them prior to the end of the session.  Alice demonstrated good  understanding of the education provided.     See EMR under  Patient Instructions for exercises provided 2/4/2020.    Assessment   Tanesha is a 69 y.o. female referred to outpatient Physical Therapy with a medical diagnosis of chronic pain and OA of R knee. Pt presents with signs and symptoms consistent with medical diagnoses. Pt with significant weakness of B hip girdle and posterior chain musculature. Pt also with poor quad activation and decreased quadriceps strength. Pt with limited functional strength as demonstrated by 5 time sit to stand score. Pt with limited R patellar mobility in all planes and positive patellar grind test suggesting possible patellar OA. Pt with significant tone and TTP to quadriceps, hamstring, hip abductor and adductor. Pt presenting with poor LE strength, increased R knee pain, limited tolerance to activities, lack of HEP, and limited functional mobility. Pt would benefit from skilled PT services in order to address listed deficits and improve functional mobility.     Pt prognosis is Good.   Pt will benefit from skilled outpatient Physical Therapy to address the deficits stated above and in the chart below, provide pt/family education, and to maximize pt's level of independence.     Plan of care discussed with patient: Yes  Pt's spiritual, cultural and educational needs considered and patient is agreeable to the plan of care and goals as stated below:     Anticipated Barriers for therapy: none    Medical Necessity is demonstrated by the following  History  Co-morbidities and personal factors that may impact the plan of care Co-morbidities:   history of cancer    Personal Factors:   no deficits     low   Examination  Body Structures and Functions, activity limitations and participation restrictions that may impact the plan of care Body Regions:   lower extremities    Body Systems:    ROM  strength  balance  gait  transfers  transitions  motor control    Participation Restrictions:   Ability to stand at work without pain     Activity limitations:    Learning and applying knowledge  no deficits    General Tasks and Commands  no deficits    Communication  no deficits    Mobility  walking    Self care  no deficits    Domestic Life  shopping  cooking  doing house work (cleaning house, washing dishes, laundry)  assisting others    Interactions/Relationships  no deficits    Life Areas  no deficits    Community and Social Life  no deficits         Complexity: low   Clinical Presentation stable and uncomplicated low   Decision Making/ Complexity Score: low       GOALS: Short Term Goals:  4 weeks  1.Report decreased in pain at worse less than  <   / =  7 /10  to increase tolerance for functional mobility.On going  2. Pt to improve R Knee range of motion by 25% to allow for improved functional mobility to allow for improvement in IADLs. .On going  3. Increased B LE MMT 1/2 grade to increase tolerance for ADL and work activities.On going  4. Pt to demonstrate improvement in 5 time sit to stand score to < / = seconds in order to improve functional strength.   5. Pt to tolerate HEP to improve ROM and independence with ADL's.On going    Long Term Goals: 8 weeks  1.Report decreased in pain at worse less than  <   / =  3/10  to increase tolerance for functional mobility.  2.Pt to improve range of motion by 75% to allow for improved functional mobility to allow for improvement in IADLs.   3.Increased B LE MMT 1 grade to increase tolerance for ADL and work activities.  4. Pt will report at CJ level (20%-40% impaired) on LEFS  to demonstrate increase in LE function with every day tasks.   5. Pt to be Independent with HEP to improve ROM and independence with ADL's  4. Pt to demonstrate improvement in 5 time sit to stand score to < / = seconds in order to improve functional strength.     Plan   Plan of care Certification: 2/4/2020 to 5/4/2020.    Outpatient Physical Therapy 2 times weekly for 6 weeks to include the following interventions: Gait Training, Manual Therapy, Moist  Heat/ Ice, Neuromuscular Re-ed, Patient Education, Self Care, Therapeutic Activites and Therapeutic Exercise.       Tamara Alberto, PT, DPT  2/4/2020

## 2020-02-18 ENCOUNTER — CLINICAL SUPPORT (OUTPATIENT)
Dept: REHABILITATION | Facility: HOSPITAL | Age: 70
End: 2020-02-18
Attending: ORTHOPAEDIC SURGERY
Payer: MEDICARE

## 2020-02-18 DIAGNOSIS — G89.29 CHRONIC PAIN OF RIGHT KNEE: ICD-10-CM

## 2020-02-18 DIAGNOSIS — M25.561 CHRONIC PAIN OF RIGHT KNEE: ICD-10-CM

## 2020-02-18 DIAGNOSIS — R26.89 DECREASED FUNCTIONAL MOBILITY: ICD-10-CM

## 2020-02-18 DIAGNOSIS — M25.661 DECREASED ROM OF RIGHT KNEE: ICD-10-CM

## 2020-02-18 DIAGNOSIS — R29.898 DECREASED STRENGTH OF LOWER EXTREMITY: ICD-10-CM

## 2020-02-18 PROCEDURE — 97110 THERAPEUTIC EXERCISES: CPT | Mod: HCNC,PN,CQ

## 2020-02-18 NOTE — PROGRESS NOTES
"  Physical Therapy Daily Treatment Note     Name: Tanesha Davis  Clinic Number: [id number]    Therapy Diagnosis:   Encounter Diagnoses   Name Primary?    Decreased ROM of right knee     Decreased strength of lower extremity     Decreased functional mobility     Chronic pain of right knee      Physician: Devon Rivera MD    Visit Date: 2/18/2020    Physician Orders: PT Eval and Treat     Medical Diagnosis from Referral:   M25.561,G89.29 (ICD-10-CM) - Chronic pain of right knee   M17.11 (ICD-10-CM) - Osteoarthritis of right knee      Evaluation Date: 2/4/2020  Authorization Period Expiration: 12/31/2020  Plan of Care Expiration: 5/4/2020  Visit # / Visits authorized: 2/ 20     Time In: 1003  Time Out: 1100  Total Time: 57 minutes  Total Billable Time: 30 minutes     Precautions: Standard      Subjective     Pt reports: she has been compliant with her exercises and pain has decreased.  She was compliant with home exercise program.  Response to previous treatment: good  Functional change: ongoing    Pain: 5/10  Location: right knee      Objective     Knee ROM- 2/18/20  R knee Flexion AROM/PROM: 101*/110*  R knee extension AROM/PROM: -5*/-3*    Alice received therapeutic exercises to develop strength, endurance, ROM, flexibility, posture and core stabilization for 57 minutes including:    Nustep or Upright Bike next session    Standing:  Calf stretch 3x30"  Mini Squats 2x10  Sit<>stands from 18" plyo + blue foam 2x10    Supine:  AA Heel slides 10x10"  Bridges 2x10  SAQs 3" x20  Quad sets (towel under knee) 3" x20  4-way hip 2x10  HSS 3x30"  Passive knee extension 3'  Prone HSCs with 2lbs 2x10      Alice received the following manual therapy techniques: Joint mobilizations were applied to the: R knee for 00 minutes, including:      Alice participated in neuromuscular re-education activities to improve: Balance, Coordination, Kinesthetic, Sense, Proprioception and Posture for 00 minutes. The following " activities were included:      Alice participated in dynamic functional therapeutic activities to improve functional performance for 00  minutes, including:      Alice participated in gait training to improve functional mobility and safety for 00  minutes, including:      Alice received hot pack for 00 minutes to L knee.    Alice received cold pack for 00 minutes to L knee.      Home Exercises Provided and Patient Education Provided     Education provided:   - compliance with HEP  - Possibility of DOMs    Written Home Exercises Provided: Patient instructed to cont prior HEP.  Exercises were reviewed and Alice was able to demonstrate them prior to the end of the session.  Alice demonstrated good  understanding of the education provided.     See EMR under Patient Instructions for exercises provided prior visit on 2/4/20.    Assessment   Pt tolerated treatment good today. Entered clinic with decreased knee pain and compliance with HEP. Challenged with new therex post initial evaluation with proper muscle fatigue achieved at end of treatment session.  Towel under knee for quad sets due to popping with towel under ankle. Improved knee ROM since initial evaluation, however, continues to lack knee flexion and extension. Pt would continue to benefit from further strengthening as tolerated by pt.    Alice is progressing well towards her goals.   Pt prognosis is Good.     Pt will continue to benefit from skilled outpatient physical therapy to address the deficits listed in the problem list box on initial evaluation, provide pt/family education and to maximize pt's level of independence in the home and community environment.     Pt's spiritual, cultural and educational needs considered and pt agreeable to plan of care and goals.     Anticipated barriers to physical therapy: none    Goals:   GOALS: Short Term Goals:  4 weeks  1.Report decreased in pain at worse less than  <   / =  7 /10  to increase tolerance for  functional mobility.On going  2. Pt to improve R Knee range of motion by 25% to allow for improved functional mobility to allow for improvement in IADLs. .On going  3. Increased B LE MMT 1/2 grade to increase tolerance for ADL and work activities.On going  4. Pt to demonstrate improvement in 5 time sit to stand score to < / = seconds in order to improve functional strength.   5. Pt to tolerate HEP to improve ROM and independence with ADL's.On going     Long Term Goals: 8 weeks  1.Report decreased in pain at worse less than  <   / =  3/10  to increase tolerance for functional mobility.  2.Pt to improve range of motion by 75% to allow for improved functional mobility to allow for improvement in IADLs.   3.Increased B LE MMT 1 grade to increase tolerance for ADL and work activities.  4. Pt will report at CJ level (20%-40% impaired) on LEFS  to demonstrate increase in LE function with every day tasks.   5. Pt to be Independent with HEP to improve ROM and independence with ADL's  4. Pt to demonstrate improvement in 5 time sit to stand score to < / = seconds in order to improve functional strength.     Plan     Continue to progress strength and ROM as tolerated by pt.  Plan of care Certification: 2/4/2020 to 5/4/2020.     Outpatient Physical Therapy 2 times weekly for 6 weeks     Iza North PTA

## 2020-02-20 ENCOUNTER — CLINICAL SUPPORT (OUTPATIENT)
Dept: REHABILITATION | Facility: HOSPITAL | Age: 70
End: 2020-02-20
Attending: ORTHOPAEDIC SURGERY
Payer: MEDICARE

## 2020-02-20 DIAGNOSIS — R29.898 DECREASED STRENGTH OF LOWER EXTREMITY: ICD-10-CM

## 2020-02-20 DIAGNOSIS — M25.661 DECREASED ROM OF RIGHT KNEE: ICD-10-CM

## 2020-02-20 DIAGNOSIS — M25.561 CHRONIC PAIN OF RIGHT KNEE: ICD-10-CM

## 2020-02-20 DIAGNOSIS — G89.29 CHRONIC PAIN OF RIGHT KNEE: ICD-10-CM

## 2020-02-20 DIAGNOSIS — R26.89 DECREASED FUNCTIONAL MOBILITY: ICD-10-CM

## 2020-02-20 PROCEDURE — 97110 THERAPEUTIC EXERCISES: CPT | Mod: HCNC,PN,CQ

## 2020-02-20 NOTE — PROGRESS NOTES
"  Physical Therapy Daily Treatment Note     Name: Tanesha Davis  Clinic Number: [id number]    Therapy Diagnosis:   Encounter Diagnoses   Name Primary?    Decreased ROM of right knee     Decreased strength of lower extremity     Decreased functional mobility     Chronic pain of right knee      Physician: Devon Rivera MD    Visit Date: 2/20/2020    Physician Orders: PT Eval and Treat     Medical Diagnosis from Referral:   M25.561,G89.29 (ICD-10-CM) - Chronic pain of right knee   M17.11 (ICD-10-CM) - Osteoarthritis of right knee      Evaluation Date: 2/4/2020  Authorization Period Expiration: 12/31/2020  Plan of Care Expiration: 5/4/2020  Visit # / Visits authorized: 3/ 20     Time In: 1000  Time Out: 1057  Total Time: 57 minutes  Total Billable Time: 45 minutes     Precautions: Standard      Subjective     Pt reports: Feeling under the weather. Have a cold going on.  She was compliant with home exercise program.  Response to previous treatment: good  Functional change: ongoing    Pain: 5/10  Location: right knee      Objective     Knee ROM- 2/18/20  R knee Flexion AROM/PROM: 101*/110*  R knee extension AROM/PROM: -5*/-3*    Alice received therapeutic exercises to develop strength, endurance, ROM, flexibility, posture and core stabilization for 57 minutes including:    Nustep 8 mins lvl 1    Standing:  Calf stretch 3x30"-  Mini Squats 2x10  Sit<>stands from 18" plyo + blue foam 2x10-  Standing hip abd 2x10 Kem   Step up on 4'' 2x10 RLE  Airex pad balance 2x30 seconds   Airex pad balance eyes closed 2x30 seconds     Supine:  AA Heel slides 10x10"  Bridges 2x10-  SAQs 3" x20  Quad sets (towel under knee) 3" x20-  4-way hip 2x10-  HSS 3x30"  Passive knee extension 3'  Prone HSCs with 2lbs 2x10      Alice received the following manual therapy techniques: Joint mobilizations were applied to the: R knee for 00 minutes, including:      Alice participated in neuromuscular re-education activities to improve: " Balance, Coordination, Kinesthetic, Sense, Proprioception and Posture for 00 minutes. The following activities were included:      Alice participated in dynamic functional therapeutic activities to improve functional performance for 00  minutes, including:      Alice participated in gait training to improve functional mobility and safety for 00  minutes, including:      Alice received hot pack for 00 minutes to L knee.    Alice received cold pack for 00 minutes to L knee.      Home Exercises Provided and Patient Education Provided     Education provided:   - compliance with HEP  - Possibility of DOMs    Written Home Exercises Provided: Patient instructed to cont prior HEP.  Exercises were reviewed and Alice was able to demonstrate them prior to the end of the session.  Alice demonstrated good  understanding of the education provided.     See EMR under Patient Instructions for exercises provided prior visit on 2/4/20.    Assessment   Pt tolerated treatment good today. Pt with noted fatigue and moving slowly secondary to cold. Pt tolerated addition of weight with exercises well no adverse affects. Pt did well with added standing exercises, not complaints of pain with step ups     Alice is progressing well towards her goals.   Pt prognosis is Good.     Pt will continue to benefit from skilled outpatient physical therapy to address the deficits listed in the problem list box on initial evaluation, provide pt/family education and to maximize pt's level of independence in the home and community environment.     Pt's spiritual, cultural and educational needs considered and pt agreeable to plan of care and goals.     Anticipated barriers to physical therapy: none    Goals:   GOALS: Short Term Goals:  4 weeks  1.Report decreased in pain at worse less than  <   / =  7 /10  to increase tolerance for functional mobility.On going  2. Pt to improve R Knee range of motion by 25% to allow for improved functional mobility to  allow for improvement in IADLs. .On going  3. Increased B LE MMT 1/2 grade to increase tolerance for ADL and work activities.On going  4. Pt to demonstrate improvement in 5 time sit to stand score to < / = seconds in order to improve functional strength.   5. Pt to tolerate HEP to improve ROM and independence with ADL's.On going     Long Term Goals: 8 weeks  1.Report decreased in pain at worse less than  <   / =  3/10  to increase tolerance for functional mobility.  2.Pt to improve range of motion by 75% to allow for improved functional mobility to allow for improvement in IADLs.   3.Increased B LE MMT 1 grade to increase tolerance for ADL and work activities.  4. Pt will report at CJ level (20%-40% impaired) on LEFS  to demonstrate increase in LE function with every day tasks.   5. Pt to be Independent with HEP to improve ROM and independence with ADL's  4. Pt to demonstrate improvement in 5 time sit to stand score to < / = seconds in order to improve functional strength.     Plan     Continue to progress strength and ROM as tolerated by pt.  Plan of care Certification: 2/4/2020 to 5/4/2020.     Outpatient Physical Therapy 2 times weekly for 6 weeks     Michael Cid, PTA

## 2020-03-03 ENCOUNTER — CLINICAL SUPPORT (OUTPATIENT)
Dept: REHABILITATION | Facility: HOSPITAL | Age: 70
End: 2020-03-03
Attending: ORTHOPAEDIC SURGERY
Payer: MEDICARE

## 2020-03-03 DIAGNOSIS — M25.561 CHRONIC PAIN OF RIGHT KNEE: ICD-10-CM

## 2020-03-03 DIAGNOSIS — R29.898 DECREASED STRENGTH OF LOWER EXTREMITY: ICD-10-CM

## 2020-03-03 DIAGNOSIS — G89.29 CHRONIC PAIN OF RIGHT KNEE: ICD-10-CM

## 2020-03-03 DIAGNOSIS — M25.661 DECREASED ROM OF RIGHT KNEE: ICD-10-CM

## 2020-03-03 DIAGNOSIS — R26.89 DECREASED FUNCTIONAL MOBILITY: ICD-10-CM

## 2020-03-03 PROCEDURE — 97110 THERAPEUTIC EXERCISES: CPT | Mod: HCNC,PN,CQ

## 2020-03-03 NOTE — PROGRESS NOTES
"  Physical Therapy Daily Treatment Note     Name: Tanesha Davis  Clinic Number: [id number]    Therapy Diagnosis:   Encounter Diagnoses   Name Primary?    Decreased ROM of right knee     Decreased strength of lower extremity     Decreased functional mobility     Chronic pain of right knee      Physician: Devon Rivera MD    Visit Date: 3/3/2020    Physician Orders: PT Eval and Treat     Medical Diagnosis from Referral:   M25.561,G89.29 (ICD-10-CM) - Chronic pain of right knee   M17.11 (ICD-10-CM) - Osteoarthritis of right knee      Evaluation Date: 2/4/2020  Authorization Period Expiration: 12/31/2020  Plan of Care Expiration: 5/4/2020  Visit # / Visits authorized: 4/ 20     Time In: 1003  Time Out: 1100  Total Time: 57 minutes  Total Billable Time: 30 minutes     Precautions: Standard      Subjective     Pt reports: she is doing much better today, not sick and no pain in knee  She was compliant with home exercise program.  Response to previous treatment: good  Functional change: ongoing    Pain: 5/10  Location: right knee      Objective     Knee ROM- 2/18/20  R knee Flexion AROM/PROM: 101*/110*  R knee extension AROM/PROM: -5*/-3*    Alice received therapeutic exercises to develop strength, endurance, ROM, flexibility, posture and core stabilization for 57 minutes including:    Nustep 8 mins lvl 1    Standing:  Calf stretch 3x30"-  Mini Squats 2x10  Sit<>stands from 18" plyo + blue foam 2x10-NP  Standing hip abd 2x10 Kem   Step up on 4'' 2x10 RLE no UE support  Airex pad balance 2x30 seconds   Airex pad balance eyes closed 2x30 seconds   airex pad with head turns vertical and horizontal [id number] each      Supine:  AA Heel slides 10x10"-NP  Bridges 2x10 RTB around knees  SAQs 3" x20 #1  Quad sets (towel under knee) 3" x20-NP  4-way hip 2x10-  HSS 3x30"  Passive knee extension 3'-NP  Prone HSCs with 2lbs 2x10-NP  Supine hip flexion stretch x2 mins RLE     Alice received the following manual therapy " techniques: Joint mobilizations were applied to the: R knee for 00 minutes, including:      Alice participated in neuromuscular re-education activities to improve: Balance, Coordination, Kinesthetic, Sense, Proprioception and Posture for 00 minutes. The following activities were included:      Alice participated in dynamic functional therapeutic activities to improve functional performance for 00  minutes, including:      Alice participated in gait training to improve functional mobility and safety for 00  minutes, including:      Alice received hot pack for 00 minutes to L knee.    Alice received cold pack for 00 minutes to L knee.      Home Exercises Provided and Patient Education Provided     Education provided:   - compliance with HEP  - Possibility of DOMs    Written Home Exercises Provided: Patient instructed to cont prior HEP.  Exercises were reviewed and Alice was able to demonstrate them prior to the end of the session.  Alice demonstrated good  understanding of the education provided.     See EMR under Patient Instructions for exercises provided prior visit on 2/4/20.    Assessment   Pt tolerated treatment good today. Pt with improved endurance to standing exercises. Pt did not require any UE support today when performing step ups, c/o very minimal pain. Pt also performed balance exercises with significantly less sway      Alice is progressing well towards her goals.   Pt prognosis is Good.     Pt will continue to benefit from skilled outpatient physical therapy to address the deficits listed in the problem list box on initial evaluation, provide pt/family education and to maximize pt's level of independence in the home and community environment.     Pt's spiritual, cultural and educational needs considered and pt agreeable to plan of care and goals.     Anticipated barriers to physical therapy: none    Goals:   GOALS: Short Term Goals:  4 weeks  1.Report decreased in pain at worse less than  <   /  =  7 /10  to increase tolerance for functional mobility.On going  2. Pt to improve R Knee range of motion by 25% to allow for improved functional mobility to allow for improvement in IADLs. .On going  3. Increased B LE MMT 1/2 grade to increase tolerance for ADL and work activities.On going  4. Pt to demonstrate improvement in 5 time sit to stand score to < / = seconds in order to improve functional strength.   5. Pt to tolerate HEP to improve ROM and independence with ADL's.On going     Long Term Goals: 8 weeks  1.Report decreased in pain at worse less than  <   / =  3/10  to increase tolerance for functional mobility.  2.Pt to improve range of motion by 75% to allow for improved functional mobility to allow for improvement in IADLs.   3.Increased B LE MMT 1 grade to increase tolerance for ADL and work activities.  4. Pt will report at CJ level (20%-40% impaired) on LEFS  to demonstrate increase in LE function with every day tasks.   5. Pt to be Independent with HEP to improve ROM and independence with ADL's  4. Pt to demonstrate improvement in 5 time sit to stand score to < / = seconds in order to improve functional strength.     Plan     Continue to progress strength and ROM as tolerated by pt.  Plan of care Certification: 2/4/2020 to 5/4/2020.     Outpatient Physical Therapy 2 times weekly for 6 weeks     Michael Cid PTA

## 2020-03-05 ENCOUNTER — CLINICAL SUPPORT (OUTPATIENT)
Dept: REHABILITATION | Facility: HOSPITAL | Age: 70
End: 2020-03-05
Attending: ORTHOPAEDIC SURGERY
Payer: MEDICARE

## 2020-03-05 DIAGNOSIS — M25.561 CHRONIC PAIN OF RIGHT KNEE: ICD-10-CM

## 2020-03-05 DIAGNOSIS — R26.89 DECREASED FUNCTIONAL MOBILITY: ICD-10-CM

## 2020-03-05 DIAGNOSIS — R29.898 DECREASED STRENGTH OF LOWER EXTREMITY: ICD-10-CM

## 2020-03-05 DIAGNOSIS — G89.29 CHRONIC PAIN OF RIGHT KNEE: ICD-10-CM

## 2020-03-05 DIAGNOSIS — M25.661 DECREASED ROM OF RIGHT KNEE: ICD-10-CM

## 2020-03-05 PROCEDURE — 97110 THERAPEUTIC EXERCISES: CPT | Mod: HCNC,PN

## 2020-03-05 NOTE — PROGRESS NOTES
Physical Therapy Daily Treatment Note     Name: Tanesha Davis  Clinic Number: [id number]    Therapy Diagnosis:   Encounter Diagnoses   Name Primary?    Decreased ROM of right knee     Decreased strength of lower extremity     Decreased functional mobility     Chronic pain of right knee      Physician: Devon Rivera MD    Visit Date: 3/5/2020    Physician Orders: PT Eval and Treat     Medical Diagnosis from Referral:   M25.561,G89.29 (ICD-10-CM) - Chronic pain of right knee   M17.11 (ICD-10-CM) - Osteoarthritis of right knee      Evaluation Date: 2/4/2020  Authorization Period Expiration: 12/31/2020  Plan of Care Expiration: 5/4/2020  Visit # / Visits authorized: 5/ 20     Time In: 1000  Time Out: 1058  Total Time: 58 minutes  Total Billable Time: 30 minutes     Precautions: Standard      Subjective     Pt reports: Her highest level of pain this week was 8/10 nightly. She feels she has been getting better overall with therapy. Reports an improvement in pain/stability when stepping out of the car after prolonged sitting. However, states she still has the most pain at night time. Reports some cramping in her calves and feet at nighttime.     She was compliant with home exercise program.  Response to previous treatment: good  Functional change: ongoing    Pain: 4/10  Location: right knee      Objective     GAIT DEVIATIONS: Tanesha displays some notable antalgic gait on R LE     Range of Motion:   Knee Left active Left Passive   Flexion 118 NT   Extension 0 NT      Knee Right active Right Passive   Flexion 105 105   Extension -5 -3         Lower Extremity Strength   Right LE   Left LE     Knee extension: 4-/5 Knee extension: 5/5   Knee flexion: 3/5 Knee flexion: 4-/5   Hip flexion: 4/5 Hip flexion: 4/5   Hip extension:  3+/5 Hip extension: 3+/5   Hip abduction: 4-/5 Hip abduction: 3/5   Hip adduction: 4/5 Hip adduction 4/5   Ankle dorsiflexion: 4/5 Ankle dorsiflexion: 4/5   Ankle plantarflexion: 4/5 Ankle  "plantarflexion: 4/5      Palpation: Increased TTP and tone to quadriceps, hip adductors, glut med, ITB and medial hamstring                Flexibility: Decreased flexibility noted to B hamstrings and R hip abductor              Popliteal Angle: R = 25 degrees ; L = 25 degrees                  Alice received therapeutic exercises to develop strength, endurance, ROM, flexibility, posture and core stabilization for 58 minutes including:    Nustep 8 mins lvl 1    Standing:  Calf stretch 3x30"-  Mini Squats 2x10  Sit<>stands from 18" plyo + blue foam 2x10-NP  Standing hip abd 2x10 Kem   Step up on 4'' 2x10 RLE no UE support  Airex pad balance 2x30 seconds   Airex pad balance eyes closed 2x30 seconds   airex pad with head turns vertical and horizontal [id number] each      Supine:  AA Heel slides 10x10"-NP  Bridges 2x10 RTB around knees  SAQs 3" x20 #1  Quad sets (towel under knee) 3" x20-NP  4-way hip 2x10-  HSS 3x30"  Passive knee extension 3'-NP  Prone HSCs 2x10  Supine hip flexion stretch x2 mins RLE     Alice received the following manual therapy techniques: Joint mobilizations were applied to the: R knee for 00 minutes, including:      Alice participated in neuromuscular re-education activities to improve: Balance, Coordination, Kinesthetic, Sense, Proprioception and Posture for 00 minutes. The following activities were included:      Alice participated in dynamic functional therapeutic activities to improve functional performance for 00  minutes, including:      Alice participated in gait training to improve functional mobility and safety for 00  minutes, including:      Alice received hot pack for 00 minutes to L knee.    Alice received cold pack for 00 minutes to L knee.      Home Exercises Provided and Patient Education Provided     Education provided:   - compliance with HEP  - Possibility of DOMs    Written Home Exercises Provided: Patient instructed to cont prior HEP.  Exercises were reviewed and " Alice was able to demonstrate them prior to the end of the session.  Alice demonstrated good  understanding of the education provided.     See EMR under Patient Instructions for exercises provided prior visit on 2/4/20.    Assessment   Pt tolerated treatment good today with no adverse response noted. Pt progress note performed this session. Pt with improvements noted in R LE strength, less pain reported with MMT. Pt also with noted improvement in R knee ROM. Pt reporting improvement in symptoms overall, however, continues to report pain 8/10 at worst at the end of the day. Pt continues with deficits in B LE strength, R LE flexibility, decreased functional mobility, and increased pain. Pt would continue to benefit from skilled PT services in order to address listed deficits and improve tolerance to activities. Goals updated and remain appropriate at this time. Plan to administer FOTO next session.     Alice is progressing well towards her goals.   Pt prognosis is Good.     Pt will continue to benefit from skilled outpatient physical therapy to address the deficits listed in the problem list box on initial evaluation, provide pt/family education and to maximize pt's level of independence in the home and community environment.     Pt's spiritual, cultural and educational needs considered and pt agreeable to plan of care and goals.     Anticipated barriers to physical therapy: none    Goals:   GOALS: Short Term Goals:  4 weeks  1.Report decreased in pain at worse less than  <   / =  7 /10  to increase tolerance for functional mobility.On going  2. Pt to improve R Knee range of motion by 25% to allow for improved functional mobility to allow for improvement in IADLs. .On going  3. Increased B LE MMT 1/2 grade to increase tolerance for ADL and work activities.-ongoing  4. Pt to demonstrate improvement in 5 time sit to stand score to < / = 17 seconds in order to improve functional strength.   5. Pt to tolerate HEP to  improve ROM and independence with ADL's. - MET (3/5/2020)     Long Term Goals: 8 weeks  1.Report decreased in pain at worse less than  <   / =  3/10  to increase tolerance for functional mobility.  2.Pt to improve range of motion by 75% to allow for improved functional mobility to allow for improvement in IADLs.   3.Increased B LE MMT 1 grade to increase tolerance for ADL and work activities.  4. Pt will report at CJ level (20%-40% impaired) on LEFS  to demonstrate increase in LE function with every day tasks.   5. Pt to be Independent with HEP to improve ROM and independence with ADL's  4. Pt to demonstrate improvement in 5 time sit to stand score to < / = 14 seconds in order to improve functional strength.     Plan     Continue to progress strength and ROM as tolerated by pt.  Plan of care Certification: 2/4/2020 to 5/4/2020.     Outpatient Physical Therapy 2 times weekly for 6 weeks     Tamara Alberto, PT

## 2020-03-10 ENCOUNTER — CLINICAL SUPPORT (OUTPATIENT)
Dept: REHABILITATION | Facility: HOSPITAL | Age: 70
End: 2020-03-10
Attending: ORTHOPAEDIC SURGERY
Payer: MEDICARE

## 2020-03-10 DIAGNOSIS — G89.29 CHRONIC PAIN OF RIGHT KNEE: ICD-10-CM

## 2020-03-10 DIAGNOSIS — R26.89 DECREASED FUNCTIONAL MOBILITY: ICD-10-CM

## 2020-03-10 DIAGNOSIS — M25.561 CHRONIC PAIN OF RIGHT KNEE: ICD-10-CM

## 2020-03-10 DIAGNOSIS — R29.898 DECREASED STRENGTH OF LOWER EXTREMITY: ICD-10-CM

## 2020-03-10 DIAGNOSIS — M25.661 DECREASED ROM OF RIGHT KNEE: ICD-10-CM

## 2020-03-10 PROCEDURE — 97110 THERAPEUTIC EXERCISES: CPT | Mod: HCNC,PN,CQ

## 2020-03-10 NOTE — PROGRESS NOTES
"  Physical Therapy Daily Treatment Note     Name: Tanesha Davis  Clinic Number: [id number]    Therapy Diagnosis:   Encounter Diagnoses   Name Primary?    Decreased ROM of right knee     Decreased strength of lower extremity     Decreased functional mobility     Chronic pain of right knee      Physician: Devon Rivera MD    Visit Date: 3/10/2020    Physician Orders: PT Eval and Treat     Medical Diagnosis from Referral:   M25.561,G89.29 (ICD-10-CM) - Chronic pain of right knee   M17.11 (ICD-10-CM) - Osteoarthritis of right knee      Evaluation Date: 2/4/2020  Authorization Period Expiration: 12/31/2020  Plan of Care Expiration: 5/4/2020  Visit # / Visits authorized: 6/ 20     Time In: 1006  Time Out: 1103  Total Time: 57 minutes  Total Billable Time: 30 minutes     Precautions: Standard      Subjective     Pt reports: her knees are doing better, less pain     She was compliant with home exercise program.  Response to previous treatment: good  Functional change: ongoing    Pain: 4/10  Location: right knee      Objective     Bolded    Alice received therapeutic exercises to develop strength, endurance, ROM, flexibility, posture and core stabilization for 58 minutes including:    Nustep 8 mins lvl 1    Standing:  Calf stretch 3x30"-  Mini Squats 2x10  Sit<>stands from 18" plyo + blue foam 2x10-NP  Standing hip abd 2x10 Kem   Step up on 4'' 2x10 RLE no UE support  Airex pad balance 2x30 seconds   Airex pad balance eyes closed 2x30 seconds   airex pad with head turns vertical and horizontal [id number] each    Foam tandem standing [id number] each way    Supine:  AA Heel slides 10x10"-NP  Bridges 3x10 GTB around knees  SAQs 3" x20 #1  Quad sets (towel under knee) 3" x20-NP  4-way hip 2x10-  HSS 3x30"  SL hip abduction 2x10 R foot numbness when performed   Prone HSCs 2x10-NP  Supine hip flexion stretch x2 mins RLE     Alice received the following manual therapy techniques: Joint mobilizations were applied to " the: R knee for 00 minutes, including:      Alice participated in neuromuscular re-education activities to improve: Balance, Coordination, Kinesthetic, Sense, Proprioception and Posture for 00 minutes. The following activities were included:      Alice participated in dynamic functional therapeutic activities to improve functional performance for 00  minutes, including:      Alice participated in gait training to improve functional mobility and safety for 00  minutes, including:      Alice received hot pack for 00 minutes to L knee.    Alice received cold pack for 00 minutes to L knee.      Home Exercises Provided and Patient Education Provided     Education provided:   - compliance with HEP  - Possibility of DOMs    Written Home Exercises Provided: Patient instructed to cont prior HEP.  Exercises were reviewed and Alice was able to demonstrate them prior to the end of the session.  Alice demonstrated good  understanding of the education provided.     See EMR under Patient Instructions for exercises provided prior visit on 2/4/20.    Assessment   Pt tolerated treatment good today with no adverse response noted. Pt experienced numbness in R foot when performing hip abduction. Pt improving with standing balance, able to perform tandem standing on foam with fair + balance      Alice is progressing well towards her goals.   Pt prognosis is Good.     Pt will continue to benefit from skilled outpatient physical therapy to address the deficits listed in the problem list box on initial evaluation, provide pt/family education and to maximize pt's level of independence in the home and community environment.     Pt's spiritual, cultural and educational needs considered and pt agreeable to plan of care and goals.     Anticipated barriers to physical therapy: none    Goals:   GOALS: Short Term Goals:  4 weeks  1.Report decreased in pain at worse less than  <   / =  7 /10  to increase tolerance for functional mobility.On  going  2. Pt to improve R Knee range of motion by 25% to allow for improved functional mobility to allow for improvement in IADLs. .On going  3. Increased B LE MMT 1/2 grade to increase tolerance for ADL and work activities.-ongoing  4. Pt to demonstrate improvement in 5 time sit to stand score to < / = 17 seconds in order to improve functional strength.   5. Pt to tolerate HEP to improve ROM and independence with ADL's. - MET (3/5/2020)     Long Term Goals: 8 weeks  1.Report decreased in pain at worse less than  <   / =  3/10  to increase tolerance for functional mobility.  2.Pt to improve range of motion by 75% to allow for improved functional mobility to allow for improvement in IADLs.   3.Increased B LE MMT 1 grade to increase tolerance for ADL and work activities.  4. Pt will report at CJ level (20%-40% impaired) on LEFS  to demonstrate increase in LE function with every day tasks.   5. Pt to be Independent with HEP to improve ROM and independence with ADL's  4. Pt to demonstrate improvement in 5 time sit to stand score to < / = 14 seconds in order to improve functional strength.     Plan     Continue to progress strength and ROM as tolerated by pt.  Plan of care Certification: 2/4/2020 to 5/4/2020.     Outpatient Physical Therapy 2 times weekly for 6 weeks     Michael Cid PTA

## 2020-03-20 ENCOUNTER — DOCUMENTATION ONLY (OUTPATIENT)
Dept: REHABILITATION | Facility: HOSPITAL | Age: 70
End: 2020-03-20

## 2020-03-20 DIAGNOSIS — M25.561 CHRONIC PAIN OF RIGHT KNEE: ICD-10-CM

## 2020-03-20 DIAGNOSIS — R29.898 DECREASED STRENGTH OF LOWER EXTREMITY: ICD-10-CM

## 2020-03-20 DIAGNOSIS — R26.89 DECREASED FUNCTIONAL MOBILITY: ICD-10-CM

## 2020-03-20 DIAGNOSIS — G89.29 CHRONIC PAIN OF RIGHT KNEE: ICD-10-CM

## 2020-03-20 DIAGNOSIS — M25.661 DECREASED ROM OF RIGHT KNEE: ICD-10-CM

## 2020-03-20 NOTE — PROGRESS NOTES
Patient: Tanesha Davis  Date: 3/20/2020  Diagnosis:   1. Decreased ROM of right knee     2. Decreased strength of lower extremity     3. Decreased functional mobility     4. Chronic pain of right knee       MRN: [id number]    Spoke with patient due to therapy following updates regarding COVID-19 closely and taking every precaution to ensure the safety of our patients, staff and community.  In an abundance of caution and in an effort to help reduce risk and limit community spread, we have decided to temporarily postpone appointments for patients who may be at increased risk to attend in-person therapy or where therapy is not critically needed at this time. Plan of care and home exercise program were reviewed and patient has what they need to continue therapy at home. All patient questions were answered. Also stated to patient that we are exploring virtual methods of providing care and will be in touch over the next few weeks. Patient verbalized understanding to all.    Michael Cid, PTA  3/20/2020

## 2020-03-25 ENCOUNTER — TELEPHONE (OUTPATIENT)
Dept: SPORTS MEDICINE | Facility: CLINIC | Age: 70
End: 2020-03-25

## 2020-03-25 NOTE — TELEPHONE ENCOUNTER
LVM informing patient her upcoming appointment with Dr. Rivera has been cancelled. Instructed her to call back in June to reschedule.

## 2020-03-30 RX ORDER — MELOXICAM 7.5 MG/1
TABLET ORAL
Qty: 90 TABLET | Refills: 0 | Status: SHIPPED | OUTPATIENT
Start: 2020-03-30 | End: 2020-06-23

## 2020-04-09 ENCOUNTER — TELEPHONE (OUTPATIENT)
Dept: REHABILITATION | Facility: HOSPITAL | Age: 70
End: 2020-04-09

## 2020-04-27 ENCOUNTER — DOCUMENTATION ONLY (OUTPATIENT)
Dept: REHABILITATION | Facility: HOSPITAL | Age: 70
End: 2020-04-27

## 2020-04-27 NOTE — PROGRESS NOTES
Spoke with Pt via telephone in regards to coming into clinic for reassessment and to continue therapy. Pt stated that she would like to wait two more weeks until work starts back up, but would like to continue therapy. Pt asked to follow up with her in two weeks in regards to starting back with physical therapy. Pt did not have any questions/concerns regarding HEP.    Michael Cid, PTA

## 2020-08-05 ENCOUNTER — TELEPHONE (OUTPATIENT)
Dept: INTERNAL MEDICINE | Facility: CLINIC | Age: 70
End: 2020-08-05

## 2020-08-05 NOTE — TELEPHONE ENCOUNTER
lvm for pt to call office back in regards of     This is Parker from [facility] calling to schedule an Enhance Annual Wellness Visit with our NP here a [facility]. This is an appointment that your insurance would like for you to have yearly as well a long with your annual with your pcp. Please give the office and Call back at [phone] ask to send a message to Parker to get this appointment schedule.      Thank you  NELI Canales

## 2020-08-19 NOTE — PLAN OF CARE
Pt AAOx4. Complaints of mild pain to abdominal incision. Dressing remains CDI; no further bleeding noted. VSS. Safety maintained.    Quality 110: Preventive Care And Screening: Influenza Immunization: Influenza Immunization Administered during Influenza season Quality 111:Pneumonia Vaccination Status For Older Adults: Pneumococcal Vaccination Previously Received Detail Level: Detailed Quality 226: Preventive Care And Screening: Tobacco Use: Screening And Cessation Intervention: Patient screened for tobacco use and is an ex/non-smoker

## 2020-09-16 RX ORDER — ESTRADIOL 1 MG/1
1 TABLET ORAL DAILY
Qty: 30 TABLET | Refills: 1 | Status: SHIPPED | OUTPATIENT
Start: 2020-09-16 | End: 2020-10-14 | Stop reason: SDUPTHER

## 2020-10-13 ENCOUNTER — PATIENT OUTREACH (OUTPATIENT)
Dept: ADMINISTRATIVE | Facility: OTHER | Age: 70
End: 2020-10-13

## 2020-10-14 ENCOUNTER — OFFICE VISIT (OUTPATIENT)
Dept: OBSTETRICS AND GYNECOLOGY | Facility: CLINIC | Age: 70
End: 2020-10-14
Payer: MEDICARE

## 2020-10-14 VITALS
HEIGHT: 65 IN | SYSTOLIC BLOOD PRESSURE: 116 MMHG | DIASTOLIC BLOOD PRESSURE: 74 MMHG | WEIGHT: 191.81 LBS | BODY MASS INDEX: 31.96 KG/M2

## 2020-10-14 DIAGNOSIS — Z12.39 ENCOUNTER FOR SCREENING FOR MALIGNANT NEOPLASM OF BREAST, UNSPECIFIED SCREENING MODALITY: ICD-10-CM

## 2020-10-14 DIAGNOSIS — Z12.31 ENCOUNTER FOR SCREENING MAMMOGRAM FOR MALIGNANT NEOPLASM OF BREAST: ICD-10-CM

## 2020-10-14 DIAGNOSIS — Z13.220 SCREENING, LIPID: ICD-10-CM

## 2020-10-14 DIAGNOSIS — R61 NIGHT SWEATS: ICD-10-CM

## 2020-10-14 DIAGNOSIS — R79.89 OTHER SPECIFIED ABNORMAL FINDINGS OF BLOOD CHEMISTRY: ICD-10-CM

## 2020-10-14 DIAGNOSIS — E55.9 VITAMIN D DEFICIENCY: ICD-10-CM

## 2020-10-14 DIAGNOSIS — Z13.220 SCREENING FOR HYPERLIPIDEMIA: ICD-10-CM

## 2020-10-14 DIAGNOSIS — N95.1 MENOPAUSAL SYMPTOMS: Primary | ICD-10-CM

## 2020-10-14 DIAGNOSIS — Z13.1 SCREENING FOR DIABETES MELLITUS (DM): ICD-10-CM

## 2020-10-14 DIAGNOSIS — Z78.0 MENOPAUSE: ICD-10-CM

## 2020-10-14 PROCEDURE — 3008F BODY MASS INDEX DOCD: CPT | Mod: CPTII,S$GLB,, | Performed by: PHYSICIAN ASSISTANT

## 2020-10-14 PROCEDURE — 1100F PTFALLS ASSESS-DOCD GE2>/YR: CPT | Mod: CPTII,S$GLB,, | Performed by: PHYSICIAN ASSISTANT

## 2020-10-14 PROCEDURE — 1159F PR MEDICATION LIST DOCUMENTED IN MEDICAL RECORD: ICD-10-PCS | Mod: S$GLB,,, | Performed by: PHYSICIAN ASSISTANT

## 2020-10-14 PROCEDURE — 1100F PR PT FALLS ASSESS DOC 2+ FALLS/FALL W/INJURY/YR: ICD-10-PCS | Mod: CPTII,S$GLB,, | Performed by: PHYSICIAN ASSISTANT

## 2020-10-14 PROCEDURE — 99214 PR OFFICE/OUTPT VISIT, EST, LEVL IV, 30-39 MIN: ICD-10-PCS | Mod: S$GLB,,, | Performed by: PHYSICIAN ASSISTANT

## 2020-10-14 PROCEDURE — 1159F MED LIST DOCD IN RCRD: CPT | Mod: S$GLB,,, | Performed by: PHYSICIAN ASSISTANT

## 2020-10-14 PROCEDURE — 99214 OFFICE O/P EST MOD 30 MIN: CPT | Mod: S$GLB,,, | Performed by: PHYSICIAN ASSISTANT

## 2020-10-14 PROCEDURE — 1126F PR PAIN SEVERITY QUANTIFIED, NO PAIN PRESENT: ICD-10-PCS | Mod: S$GLB,,, | Performed by: PHYSICIAN ASSISTANT

## 2020-10-14 PROCEDURE — 3288F FALL RISK ASSESSMENT DOCD: CPT | Mod: CPTII,S$GLB,, | Performed by: PHYSICIAN ASSISTANT

## 2020-10-14 PROCEDURE — 1126F AMNT PAIN NOTED NONE PRSNT: CPT | Mod: S$GLB,,, | Performed by: PHYSICIAN ASSISTANT

## 2020-10-14 PROCEDURE — 3288F PR FALLS RISK ASSESSMENT DOCUMENTED: ICD-10-PCS | Mod: CPTII,S$GLB,, | Performed by: PHYSICIAN ASSISTANT

## 2020-10-14 PROCEDURE — 3008F PR BODY MASS INDEX (BMI) DOCUMENTED: ICD-10-PCS | Mod: CPTII,S$GLB,, | Performed by: PHYSICIAN ASSISTANT

## 2020-10-14 RX ORDER — ESTRADIOL 1 MG/1
1 TABLET ORAL DAILY
Qty: 30 TABLET | Refills: 1 | Status: SHIPPED | OUTPATIENT
Start: 2020-10-14 | End: 2020-11-10 | Stop reason: SDUPTHER

## 2020-10-14 NOTE — PROGRESS NOTES
"Subjective:      Tanesha Davis is a 69 y.o. female who presents for follow up of HRT and transitioning of care from Dr. Siegel. She is currently taking estrace 1mg QD. She has had a hysterectomy. Was working well but started having some night sweats in the last month or so. Same thing happened last year around this time. Changed to estratest, but that was causing nausea, so switched to back to Estrace. Had been working well until recently. No other changes. Denies unexplained weight loss or gain. Does have history of colon cancer. Last colonoscopy last year normal. Gets horrible hot flashes when she discontinues estrace so would like to continue. Has not tried anything other than the oral HRT.  No prior cardiac history, family history of MI prior to age 50, or personal history of gestational DM/pre-eclampsia. She denies the following contraindications to HRT:  Vaginal bleeding, history of VTE/PE, thrombophilia,  breast cancer, or active liver disease.       PCP: Dr. Caleb Hernandez  Routine labs: 7/31/2019  WWE: 7/25/2018  Pap smear: 7/27/2018  Mammogram: 8/15/2019 TC 14.29 %  Colonoscopy: 2019    No visits with results within 3 Month(s) from this visit.   Latest known visit with results is:   Lab Visit on 01/30/2020   Component Date Value Ref Range Status    Ferritin 01/30/2020 22  20.0 - 300.0 ng/mL Final    Iron 01/30/2020 60  30 - 160 ug/dL Final    Transferrin 01/30/2020 326  200 - 375 mg/dL Final    TIBC 01/30/2020 482* 250 - 450 ug/dL Final    Saturated Iron 01/30/2020 12* 20 - 50 % Final    Vitamin B-12 01/30/2020 434  210 - 950 pg/mL Final       Past Medical History:   Diagnosis Date    Arthritis     Draining cutaneous sinus tract     Headache(784.0)     Incisional hernia     Insomnia     Nocturnal leg cramps     Nonhealing surgical wound     PONV (postoperative nausea and vomiting)     with last surgery per patient, "Severe" PONV 2-27-14    Postmenopausal status     Rectosigmoid cancer  "     Past Surgical History:   Procedure Laterality Date    BREAST BIOPSY      CHOLECYSTECTOMY  2014    with incisional hernia repair with mesh    COLONOSCOPY N/A 2016    Procedure: COLONOSCOPY;  Surgeon: Cuco Meraz MD;  Location: [facility] ENDO (4TH FLR);  Service: Endoscopy;  Laterality: N/A;    COLONOSCOPY N/A 2019    Procedure: COLONOSCOPY;  Surgeon: KISHORE Curry MD;  Location: [facility] ENDO (4TH FLR);  Service: Endoscopy;  Laterality: N/A;  ADHESIVE Allergy    HYSTERECTOMY      KNEE ARTHROSCOPY      KNEE SURGERY      right and left knee repair    OOPHORECTOMY      REPAIR OF RECURRENT INCISIONAL HERNIA  7/10/2018    Procedure: REPAIR, HERNIA, INCISIONAL, RECURRENT;  Surgeon: Cuco Meraz MD;  [facility];  Service: Colon and Rectal;;    SIGMOIDECTOMY  2006    LAR     Social History     Tobacco Use    Smoking status: Never Smoker    Smokeless tobacco: Never Used   Substance Use Topics    Alcohol use: Yes     Alcohol/week: 1.0 standard drinks     Types: 1 Glasses of wine per week     Frequency: Monthly or less     Drinks per session: 1 or 2     Binge frequency: Never     Comment: socially    Drug use: No     Family History   Problem Relation Age of Onset    Breast cancer Mother     Cancer Mother         BREAST    Heart disease Father     Heart disease Maternal Grandmother     Diabetes Brother     Colon polyps Neg Hx     Ovarian cancer Neg Hx     Anesthesia problems Neg Hx      OB History    Para Term  AB Living   4 4 4         SAB TAB Ectopic Multiple Live Births                  # Outcome Date GA Lbr Ray/2nd Weight Sex Delivery Anes PTL Lv   4 Term            3 Term            2 Term            1 Term                Current Outpatient Medications:     ERGOCALCIFEROL, VITAMIN D2, (VITAMIN D ORAL), Take 1 capsule by mouth every morning. , Disp: , Rfl:     estradioL (ESTRACE) 1 MG tablet, Take 1 tablet (1 mg total) by mouth once daily., Disp:  "30 tablet, Rfl: 1    ferrous sulfate 325 (65 FE) MG EC tablet, Take 1 tablet (325 mg total) by mouth once daily., Disp: 90 tablet, Rfl: 3    gabapentin (NEURONTIN) 100 MG capsule, Take 1 capsule (100 mg total) by mouth every evening. 2 hours before bedtime., Disp: 270 capsule, Rfl: 3    hyoscyamine (LEVBID) 0.375 mg Tb12, Take 1 tablet (0.375 mg total) by mouth every 12 (twelve) hours., Disp: 60 tablet, Rfl: 2    L GASSERI/B BIFIDUM/B LONGUM (MENDENHALL [employer] [facility] ORAL), Take 1 tablet by mouth 2 (two) times daily. , Disp: , Rfl:     linaCLOtide (LINZESS) 72 mcg Cap capsule, Take 1 capsule (72 mcg total) by mouth once daily., Disp: 30 capsule, Rfl: 5    magnesium oxide-pyridoxine (BEELITH) 362-20 mg Tab, Take 1 tablet by mouth once daily., Disp: 90 tablet, Rfl: 1    meloxicam (MOBIC) 7.5 MG tablet, TAKE 1 TABLET BY MOUTH EVERY DAY, Disp: 90 tablet, Rfl: 1    VITAMIN E ACETATE (VITAMIN E ORAL), Take 1 capsule by mouth every morning. , Disp: , Rfl:     The 10-year ASCVD risk score (Rocael MARKOS Jr., et al., 2013) is: 5.9%    Values used to calculate the score:      Age: 69 years      Sex: Female      Is Non- : No      Diabetic: No      Tobacco smoker: No      Systolic Blood Pressure: 116 mmHg      Is BP treated: No      HDL Cholesterol: 49 mg/dL      Total Cholesterol: 200 mg/dL    Review of Systems:  General: No fever, chills, or weight loss.  Chest: No chest pain, shortness of breath, or palpitations.  Breast: No pain, masses, or nipple discharge.  Vulva: No pain, lesions, or itching.  Vagina: No relaxation, itching, discharge, or lesions.  Abdomen: No pain, nausea, vomiting, diarrhea, or constipation.  Urinary: No incontinence, nocturia, frequency, or dysuria.  Extremities:  No leg cramps, edema, or calf pain.  Neurologic: No headaches, dizziness, or visual changes.    Objective:     Vitals:    10/14/20 1022   BP: 116/74   Weight: 87 kg (191 lb 12.8 oz)   Height: 5' 5" (1.651 m)   PainSc: " 0-No pain     Body mass index is 31.92 kg/m².      Physical Exam: Deferred      Assessment:      Menopausal symptoms: Night sweats in the last month. No other changes. Is due for mammogram. Will obtain labs and consider increasing estrace to 2mg pending vs switching to possibly a patch. No contraindication to HRT.  -     Estradiol; Future; Expected date: 10/14/2020  -     Lipid panel; Future; Expected date: 10/14/2020  -     Hemoglobin A1C; Future; Expected date: 10/14/2020  -     TSH; Future; Expected date: 10/14/2020  -     Testosterone, free; Future; Expected date: 10/14/2020  -     estradioL (ESTRACE) 1 MG tablet; Take 1 tablet (1 mg total) by mouth once daily.  Dispense: 30 tablet; Refill: 1        -     Vitamin D; Future; Expected date: 10/14/2020  -     CBC auto differential; Future; Expected date: 10/14/2020  -     Comprehensive Metabolic Panel; Future; Expected date: 10/14/2020    Encounter for screening for malignant neoplasm of breast, unspecified screening modality  -     Mammo Digital Screening Bilat w/ Julius; Future; Expected date: 10/14/2020        Plan:   Risks and benefits of hormone replacement therapy were discussed.  Schedule for mammogram. Continue yearly.  Obtain routine labs and estradiol level. She will follow up with PCP if screening labs are abnormal.  Pending estradiol level, consider increasing dose vs changing to patch.  Counseled that needs to be seen yearly for WWE and check estradiol levels.  I spent 20 minutes with this patient today, >50% counseling.    A full discussion of the benefit-risk ratio of hormonal replacement therapy was carried out. Improvement in vasomotor and other climacteric symptoms is discussed, including possible improvements in sleep and mood. Reduction of risk for osteoporosis was explained. We discussed the study data showing increased risk of thrombo-embolic events such as myocardial infarction, stroke and also possibly breast cancer with estrogen replacement,  and how this might affect her. The range of side effects such as breast tenderness, weight gain and including possible increases in lifetime risk of breast cancer and possible thrombotic complications was discussed. We also discussed ACOG's recommendation to use hormone replacement therapy for the relief of hot flashes alone and to be on the lowest dose possible for the shortest amount of time.  Alternative such as herbal and soy-based products were reviewed. All of her questions about this therapy were answered.

## 2020-10-21 ENCOUNTER — HOSPITAL ENCOUNTER (OUTPATIENT)
Dept: RADIOLOGY | Facility: OTHER | Age: 70
Discharge: HOME | End: 2020-10-21
Attending: PHYSICIAN ASSISTANT
Payer: MEDICARE

## 2020-10-21 DIAGNOSIS — Z12.39 ENCOUNTER FOR SCREENING FOR MALIGNANT NEOPLASM OF BREAST, UNSPECIFIED SCREENING MODALITY: ICD-10-CM

## 2020-10-21 DIAGNOSIS — Z12.31 ENCOUNTER FOR SCREENING MAMMOGRAM FOR MALIGNANT NEOPLASM OF BREAST: ICD-10-CM

## 2020-10-21 PROCEDURE — 77063 BREAST TOMOSYNTHESIS BI: CPT | Mod: 26,HCNC,, | Performed by: RADIOLOGY

## 2020-10-21 PROCEDURE — 77063 MAMMO DIGITAL SCREENING BILAT WITH TOMO: ICD-10-PCS | Mod: 26,HCNC,, | Performed by: RADIOLOGY

## 2020-10-21 PROCEDURE — 77067 SCR MAMMO BI INCL CAD: CPT | Mod: 26,HCNC,, | Performed by: RADIOLOGY

## 2020-10-21 PROCEDURE — 77067 MAMMO DIGITAL SCREENING BILAT WITH TOMO: ICD-10-PCS | Mod: 26,HCNC,, | Performed by: RADIOLOGY

## 2020-10-21 PROCEDURE — 77067 SCR MAMMO BI INCL CAD: CPT | Mod: TC,HCNC

## 2021-02-02 ENCOUNTER — PATIENT OUTREACH (OUTPATIENT)
Dept: ADMINISTRATIVE | Facility: OTHER | Age: 71
End: 2021-02-02

## 2021-02-02 DIAGNOSIS — R52 PAIN: Primary | ICD-10-CM

## 2021-02-03 ENCOUNTER — HOSPITAL ENCOUNTER (OUTPATIENT)
Dept: RADIOLOGY | Facility: HOSPITAL | Age: 71
Discharge: HOME | End: 2021-02-03
Attending: ORTHOPAEDIC SURGERY
Payer: MEDICARE

## 2021-02-03 ENCOUNTER — OFFICE VISIT (OUTPATIENT)
Dept: ORTHOPEDICS | Facility: CLINIC | Age: 71
End: 2021-02-03
Payer: MEDICARE

## 2021-02-03 VITALS — BODY MASS INDEX: 31.82 KG/M2 | HEIGHT: 65 IN | WEIGHT: 191 LBS

## 2021-02-03 DIAGNOSIS — R52 PAIN: ICD-10-CM

## 2021-02-03 DIAGNOSIS — M20.11 ACQUIRED HALLUX VALGUS OF RIGHT FOOT: ICD-10-CM

## 2021-02-03 DIAGNOSIS — M20.41 HAMMER TOE OF RIGHT FOOT: ICD-10-CM

## 2021-02-03 DIAGNOSIS — M20.5X1 CROSSOVER TOE, RIGHT: ICD-10-CM

## 2021-02-03 DIAGNOSIS — M72.2 PLANTAR FASCIITIS OF LEFT FOOT: Primary | ICD-10-CM

## 2021-02-03 PROCEDURE — 3008F BODY MASS INDEX DOCD: CPT | Mod: CPTII,S$GLB,, | Performed by: ORTHOPAEDIC SURGERY

## 2021-02-03 PROCEDURE — 99204 PR OFFICE/OUTPT VISIT, NEW, LEVL IV, 45-59 MIN: ICD-10-PCS | Mod: S$GLB,,, | Performed by: ORTHOPAEDIC SURGERY

## 2021-02-03 PROCEDURE — 1125F PR PAIN SEVERITY QUANTIFIED, PAIN PRESENT: ICD-10-PCS | Mod: S$GLB,,, | Performed by: ORTHOPAEDIC SURGERY

## 2021-02-03 PROCEDURE — 73630 XR FOOT COMPLETE 3 VIEW BILATERAL: ICD-10-PCS | Mod: 26,50,, | Performed by: RADIOLOGY

## 2021-02-03 PROCEDURE — 1159F MED LIST DOCD IN RCRD: CPT | Mod: S$GLB,,, | Performed by: ORTHOPAEDIC SURGERY

## 2021-02-03 PROCEDURE — 99204 OFFICE O/P NEW MOD 45 MIN: CPT | Mod: S$GLB,,, | Performed by: ORTHOPAEDIC SURGERY

## 2021-02-03 PROCEDURE — 99999 PR PBB SHADOW E&M-EST. PATIENT-LVL III: CPT | Mod: PBBFAC,,, | Performed by: ORTHOPAEDIC SURGERY

## 2021-02-03 PROCEDURE — 99999 PR PBB SHADOW E&M-EST. PATIENT-LVL III: ICD-10-PCS | Mod: PBBFAC,,, | Performed by: ORTHOPAEDIC SURGERY

## 2021-02-03 PROCEDURE — 1125F AMNT PAIN NOTED PAIN PRSNT: CPT | Mod: S$GLB,,, | Performed by: ORTHOPAEDIC SURGERY

## 2021-02-03 PROCEDURE — 1159F PR MEDICATION LIST DOCUMENTED IN MEDICAL RECORD: ICD-10-PCS | Mod: S$GLB,,, | Performed by: ORTHOPAEDIC SURGERY

## 2021-02-03 PROCEDURE — 3008F PR BODY MASS INDEX (BMI) DOCUMENTED: ICD-10-PCS | Mod: CPTII,S$GLB,, | Performed by: ORTHOPAEDIC SURGERY

## 2021-02-03 PROCEDURE — 73630 X-RAY EXAM OF FOOT: CPT | Mod: 26,50,, | Performed by: RADIOLOGY

## 2021-02-03 PROCEDURE — 73630 X-RAY EXAM OF FOOT: CPT | Mod: TC,50

## 2021-02-12 ENCOUNTER — LAB VISIT (OUTPATIENT)
Dept: LAB | Facility: OTHER | Age: 71
End: 2021-02-12
Attending: INTERNAL MEDICINE
Payer: MEDICARE

## 2021-02-12 ENCOUNTER — OFFICE VISIT (OUTPATIENT)
Dept: INTERNAL MEDICINE | Facility: CLINIC | Age: 71
End: 2021-02-12
Attending: INTERNAL MEDICINE
Payer: MEDICARE

## 2021-02-12 VITALS
HEIGHT: 65 IN | BODY MASS INDEX: 33.09 KG/M2 | SYSTOLIC BLOOD PRESSURE: 134 MMHG | HEART RATE: 68 BPM | WEIGHT: 198.63 LBS | OXYGEN SATURATION: 97 % | DIASTOLIC BLOOD PRESSURE: 78 MMHG

## 2021-02-12 DIAGNOSIS — M79.642 PAIN IN BOTH HANDS: ICD-10-CM

## 2021-02-12 DIAGNOSIS — M79.641 PAIN IN BOTH HANDS: ICD-10-CM

## 2021-02-12 DIAGNOSIS — M54.50 RECURRENT LOW BACK PAIN: ICD-10-CM

## 2021-02-12 DIAGNOSIS — D50.8 OTHER IRON DEFICIENCY ANEMIA: ICD-10-CM

## 2021-02-12 DIAGNOSIS — M79.604 PAIN IN BOTH LOWER EXTREMITIES: ICD-10-CM

## 2021-02-12 DIAGNOSIS — M79.605 PAIN IN BOTH LOWER EXTREMITIES: ICD-10-CM

## 2021-02-12 DIAGNOSIS — D50.8 OTHER IRON DEFICIENCY ANEMIA: Primary | ICD-10-CM

## 2021-02-12 LAB
BASOPHILS # BLD AUTO: 0.07 K/UL (ref 0–0.2)
BASOPHILS NFR BLD: 1.1 % (ref 0–1.9)
DIFFERENTIAL METHOD: NORMAL
EOSINOPHIL # BLD AUTO: 0.2 K/UL (ref 0–0.5)
EOSINOPHIL NFR BLD: 3.2 % (ref 0–8)
ERYTHROCYTE [DISTWIDTH] IN BLOOD BY AUTOMATED COUNT: 12.7 % (ref 11.5–14.5)
FERRITIN SERPL-MCNC: 39 NG/ML (ref 20–300)
HCT VFR BLD AUTO: 40.9 % (ref 37–48.5)
HGB BLD-MCNC: 13.2 G/DL (ref 12–16)
IMM GRANULOCYTES # BLD AUTO: 0.02 K/UL (ref 0–0.04)
IMM GRANULOCYTES NFR BLD AUTO: 0.3 % (ref 0–0.5)
IRON SERPL-MCNC: 81 UG/DL (ref 30–160)
LYMPHOCYTES # BLD AUTO: 1.5 K/UL (ref 1–4.8)
LYMPHOCYTES NFR BLD: 23.2 % (ref 18–48)
MCH RBC QN AUTO: 29.5 PG (ref 27–31)
MCHC RBC AUTO-ENTMCNC: 32.3 G/DL (ref 32–36)
MCV RBC AUTO: 92 FL (ref 82–98)
MONOCYTES # BLD AUTO: 0.5 K/UL (ref 0.3–1)
MONOCYTES NFR BLD: 7.2 % (ref 4–15)
NEUTROPHILS # BLD AUTO: 4.1 K/UL (ref 1.8–7.7)
NEUTROPHILS NFR BLD: 65 % (ref 38–73)
NRBC BLD-RTO: 0 /100 WBC
PLATELET # BLD AUTO: 209 K/UL (ref 150–350)
PMV BLD AUTO: 10.1 FL (ref 9.2–12.9)
RBC # BLD AUTO: 4.47 M/UL (ref 4–5.4)
RHEUMATOID FACT SERPL-ACNC: <10 IU/ML (ref 0–15)
SATURATED IRON: 19 % (ref 20–50)
TOTAL IRON BINDING CAPACITY: 423 UG/DL (ref 250–450)
TRANSFERRIN SERPL-MCNC: 286 MG/DL (ref 200–375)
WBC # BLD AUTO: 6.24 K/UL (ref 3.9–12.7)

## 2021-02-12 PROCEDURE — 86431 RHEUMATOID FACTOR QUANT: CPT

## 2021-02-12 PROCEDURE — 99397 PR PREVENTIVE VISIT,EST,65 & OVER: ICD-10-PCS | Mod: S$GLB,,, | Performed by: INTERNAL MEDICINE

## 2021-02-12 PROCEDURE — 99999 PR PBB SHADOW E&M-EST. PATIENT-LVL V: ICD-10-PCS | Mod: PBBFAC,,, | Performed by: INTERNAL MEDICINE

## 2021-02-12 PROCEDURE — 1125F AMNT PAIN NOTED PAIN PRSNT: CPT | Mod: S$GLB,,, | Performed by: INTERNAL MEDICINE

## 2021-02-12 PROCEDURE — 83540 ASSAY OF IRON: CPT

## 2021-02-12 PROCEDURE — 1125F PR PAIN SEVERITY QUANTIFIED, PAIN PRESENT: ICD-10-PCS | Mod: S$GLB,,, | Performed by: INTERNAL MEDICINE

## 2021-02-12 PROCEDURE — 82728 ASSAY OF FERRITIN: CPT

## 2021-02-12 PROCEDURE — 3008F BODY MASS INDEX DOCD: CPT | Mod: CPTII,S$GLB,, | Performed by: INTERNAL MEDICINE

## 2021-02-12 PROCEDURE — 36415 COLL VENOUS BLD VENIPUNCTURE: CPT

## 2021-02-12 PROCEDURE — 99999 PR PBB SHADOW E&M-EST. PATIENT-LVL V: CPT | Mod: PBBFAC,,, | Performed by: INTERNAL MEDICINE

## 2021-02-12 PROCEDURE — 3008F PR BODY MASS INDEX (BMI) DOCUMENTED: ICD-10-PCS | Mod: CPTII,S$GLB,, | Performed by: INTERNAL MEDICINE

## 2021-02-12 PROCEDURE — 85025 COMPLETE CBC W/AUTO DIFF WBC: CPT

## 2021-02-12 PROCEDURE — 86038 ANTINUCLEAR ANTIBODIES: CPT

## 2021-02-12 PROCEDURE — 99397 PER PM REEVAL EST PAT 65+ YR: CPT | Mod: S$GLB,,, | Performed by: INTERNAL MEDICINE

## 2021-02-15 LAB — ANA SER QL IF: NORMAL

## 2021-03-25 ENCOUNTER — OFFICE VISIT (OUTPATIENT)
Dept: GASTROENTEROLOGY | Facility: CLINIC | Age: 71
End: 2021-03-25
Payer: MEDICARE

## 2021-03-25 VITALS
WEIGHT: 196.63 LBS | SYSTOLIC BLOOD PRESSURE: 140 MMHG | BODY MASS INDEX: 33.57 KG/M2 | HEART RATE: 66 BPM | HEIGHT: 64 IN | DIASTOLIC BLOOD PRESSURE: 73 MMHG

## 2021-03-25 DIAGNOSIS — D50.8 OTHER IRON DEFICIENCY ANEMIA: ICD-10-CM

## 2021-03-25 DIAGNOSIS — Z12.11 SPECIAL SCREENING FOR MALIGNANT NEOPLASMS, COLON: Primary | ICD-10-CM

## 2021-03-25 DIAGNOSIS — R11.0 NAUSEA: Primary | ICD-10-CM

## 2021-03-25 PROCEDURE — 99204 PR OFFICE/OUTPT VISIT, NEW, LEVL IV, 45-59 MIN: ICD-10-PCS | Mod: S$GLB,,, | Performed by: INTERNAL MEDICINE

## 2021-03-25 PROCEDURE — 1159F PR MEDICATION LIST DOCUMENTED IN MEDICAL RECORD: ICD-10-PCS | Mod: S$GLB,,, | Performed by: INTERNAL MEDICINE

## 2021-03-25 PROCEDURE — 3288F FALL RISK ASSESSMENT DOCD: CPT | Mod: CPTII,S$GLB,, | Performed by: INTERNAL MEDICINE

## 2021-03-25 PROCEDURE — 3288F PR FALLS RISK ASSESSMENT DOCUMENTED: ICD-10-PCS | Mod: CPTII,S$GLB,, | Performed by: INTERNAL MEDICINE

## 2021-03-25 PROCEDURE — 99999 PR PBB SHADOW E&M-EST. PATIENT-LVL IV: ICD-10-PCS | Mod: PBBFAC,,, | Performed by: INTERNAL MEDICINE

## 2021-03-25 PROCEDURE — 3008F BODY MASS INDEX DOCD: CPT | Mod: CPTII,S$GLB,, | Performed by: INTERNAL MEDICINE

## 2021-03-25 PROCEDURE — 1101F PT FALLS ASSESS-DOCD LE1/YR: CPT | Mod: CPTII,S$GLB,, | Performed by: INTERNAL MEDICINE

## 2021-03-25 PROCEDURE — 99204 OFFICE O/P NEW MOD 45 MIN: CPT | Mod: S$GLB,,, | Performed by: INTERNAL MEDICINE

## 2021-03-25 PROCEDURE — 99999 PR PBB SHADOW E&M-EST. PATIENT-LVL IV: CPT | Mod: PBBFAC,,, | Performed by: INTERNAL MEDICINE

## 2021-03-25 PROCEDURE — 1159F MED LIST DOCD IN RCRD: CPT | Mod: S$GLB,,, | Performed by: INTERNAL MEDICINE

## 2021-03-25 PROCEDURE — 3008F PR BODY MASS INDEX (BMI) DOCUMENTED: ICD-10-PCS | Mod: CPTII,S$GLB,, | Performed by: INTERNAL MEDICINE

## 2021-03-25 PROCEDURE — 1101F PR PT FALLS ASSESS DOC 0-1 FALLS W/OUT INJ PAST YR: ICD-10-PCS | Mod: CPTII,S$GLB,, | Performed by: INTERNAL MEDICINE

## 2021-03-25 PROCEDURE — 1126F PR PAIN SEVERITY QUANTIFIED, NO PAIN PRESENT: ICD-10-PCS | Mod: S$GLB,,, | Performed by: INTERNAL MEDICINE

## 2021-03-25 PROCEDURE — 1126F AMNT PAIN NOTED NONE PRSNT: CPT | Mod: S$GLB,,, | Performed by: INTERNAL MEDICINE

## 2021-03-25 RX ORDER — SODIUM, POTASSIUM,MAG SULFATES 17.5-3.13G
1 SOLUTION, RECONSTITUTED, ORAL ORAL DAILY
Qty: 1 KIT | Refills: 0 | Status: SHIPPED | OUTPATIENT
Start: 2021-03-25 | End: 2021-03-27

## 2021-04-09 ENCOUNTER — PATIENT OUTREACH (OUTPATIENT)
Dept: ADMINISTRATIVE | Facility: OTHER | Age: 71
End: 2021-04-09

## 2021-04-12 ENCOUNTER — OFFICE VISIT (OUTPATIENT)
Dept: ORTHOPEDICS | Facility: CLINIC | Age: 71
End: 2021-04-12
Payer: MEDICARE

## 2021-04-12 DIAGNOSIS — M20.11 ACQUIRED HALLUX VALGUS OF RIGHT FOOT: Primary | ICD-10-CM

## 2021-04-12 DIAGNOSIS — M20.5X1 CROSSOVER TOE, RIGHT: ICD-10-CM

## 2021-04-12 DIAGNOSIS — M20.41 HAMMER TOE OF RIGHT FOOT: ICD-10-CM

## 2021-04-12 PROCEDURE — 1159F PR MEDICATION LIST DOCUMENTED IN MEDICAL RECORD: ICD-10-PCS | Mod: S$GLB,,, | Performed by: ORTHOPAEDIC SURGERY

## 2021-04-12 PROCEDURE — 99999 PR PBB SHADOW E&M-EST. PATIENT-LVL III: CPT | Mod: PBBFAC,,, | Performed by: ORTHOPAEDIC SURGERY

## 2021-04-12 PROCEDURE — 1125F PR PAIN SEVERITY QUANTIFIED, PAIN PRESENT: ICD-10-PCS | Mod: S$GLB,,, | Performed by: ORTHOPAEDIC SURGERY

## 2021-04-12 PROCEDURE — 99214 PR OFFICE/OUTPT VISIT, EST, LEVL IV, 30-39 MIN: ICD-10-PCS | Mod: S$GLB,,, | Performed by: ORTHOPAEDIC SURGERY

## 2021-04-12 PROCEDURE — 1159F MED LIST DOCD IN RCRD: CPT | Mod: S$GLB,,, | Performed by: ORTHOPAEDIC SURGERY

## 2021-04-12 PROCEDURE — 99999 PR PBB SHADOW E&M-EST. PATIENT-LVL III: ICD-10-PCS | Mod: PBBFAC,,, | Performed by: ORTHOPAEDIC SURGERY

## 2021-04-12 PROCEDURE — 1125F AMNT PAIN NOTED PAIN PRSNT: CPT | Mod: S$GLB,,, | Performed by: ORTHOPAEDIC SURGERY

## 2021-04-12 PROCEDURE — 99214 OFFICE O/P EST MOD 30 MIN: CPT | Mod: S$GLB,,, | Performed by: ORTHOPAEDIC SURGERY

## 2021-04-14 ENCOUNTER — OFFICE VISIT (OUTPATIENT)
Dept: SPINE | Facility: CLINIC | Age: 71
End: 2021-04-14
Attending: INTERNAL MEDICINE
Payer: MEDICARE

## 2021-04-14 VITALS
WEIGHT: 196.63 LBS | SYSTOLIC BLOOD PRESSURE: 144 MMHG | DIASTOLIC BLOOD PRESSURE: 68 MMHG | HEART RATE: 77 BPM | HEIGHT: 64 IN | BODY MASS INDEX: 33.57 KG/M2

## 2021-04-14 DIAGNOSIS — M47.817 LUMBOSACRAL SPONDYLOSIS WITHOUT MYELOPATHY: ICD-10-CM

## 2021-04-14 DIAGNOSIS — M54.9 DORSALGIA, UNSPECIFIED: ICD-10-CM

## 2021-04-14 DIAGNOSIS — G89.29 CHRONIC BILATERAL LOW BACK PAIN WITH SCIATICA, SCIATICA LATERALITY UNSPECIFIED: Primary | ICD-10-CM

## 2021-04-14 DIAGNOSIS — M54.50 RECURRENT LOW BACK PAIN: ICD-10-CM

## 2021-04-14 DIAGNOSIS — M54.40 CHRONIC BILATERAL LOW BACK PAIN WITH SCIATICA, SCIATICA LATERALITY UNSPECIFIED: Primary | ICD-10-CM

## 2021-04-14 PROCEDURE — 3008F BODY MASS INDEX DOCD: CPT | Mod: CPTII,S$GLB,, | Performed by: NURSE PRACTITIONER

## 2021-04-14 PROCEDURE — 3008F PR BODY MASS INDEX (BMI) DOCUMENTED: ICD-10-PCS | Mod: CPTII,S$GLB,, | Performed by: NURSE PRACTITIONER

## 2021-04-14 PROCEDURE — 99999 PR PBB SHADOW E&M-EST. PATIENT-LVL IV: CPT | Mod: PBBFAC,,, | Performed by: NURSE PRACTITIONER

## 2021-04-14 PROCEDURE — 1101F PR PT FALLS ASSESS DOC 0-1 FALLS W/OUT INJ PAST YR: ICD-10-PCS | Mod: CPTII,S$GLB,, | Performed by: NURSE PRACTITIONER

## 2021-04-14 PROCEDURE — 1125F AMNT PAIN NOTED PAIN PRSNT: CPT | Mod: S$GLB,,, | Performed by: NURSE PRACTITIONER

## 2021-04-14 PROCEDURE — 1159F PR MEDICATION LIST DOCUMENTED IN MEDICAL RECORD: ICD-10-PCS | Mod: S$GLB,,, | Performed by: NURSE PRACTITIONER

## 2021-04-14 PROCEDURE — 1159F MED LIST DOCD IN RCRD: CPT | Mod: S$GLB,,, | Performed by: NURSE PRACTITIONER

## 2021-04-14 PROCEDURE — 1101F PT FALLS ASSESS-DOCD LE1/YR: CPT | Mod: CPTII,S$GLB,, | Performed by: NURSE PRACTITIONER

## 2021-04-14 PROCEDURE — 99204 OFFICE O/P NEW MOD 45 MIN: CPT | Mod: S$GLB,,, | Performed by: NURSE PRACTITIONER

## 2021-04-14 PROCEDURE — 99204 PR OFFICE/OUTPT VISIT, NEW, LEVL IV, 45-59 MIN: ICD-10-PCS | Mod: S$GLB,,, | Performed by: NURSE PRACTITIONER

## 2021-04-14 PROCEDURE — 3288F FALL RISK ASSESSMENT DOCD: CPT | Mod: CPTII,S$GLB,, | Performed by: NURSE PRACTITIONER

## 2021-04-14 PROCEDURE — 99999 PR PBB SHADOW E&M-EST. PATIENT-LVL IV: ICD-10-PCS | Mod: PBBFAC,,, | Performed by: NURSE PRACTITIONER

## 2021-04-14 PROCEDURE — 3288F PR FALLS RISK ASSESSMENT DOCUMENTED: ICD-10-PCS | Mod: CPTII,S$GLB,, | Performed by: NURSE PRACTITIONER

## 2021-04-14 PROCEDURE — 1125F PR PAIN SEVERITY QUANTIFIED, PAIN PRESENT: ICD-10-PCS | Mod: S$GLB,,, | Performed by: NURSE PRACTITIONER

## 2021-04-14 RX ORDER — BACLOFEN 10 MG/1
10 TABLET ORAL 3 TIMES DAILY
Qty: 30 TABLET | Refills: 2 | Status: SHIPPED | OUTPATIENT
Start: 2021-04-14 | End: 2021-08-10

## 2021-04-19 ENCOUNTER — TELEPHONE (OUTPATIENT)
Dept: GASTROENTEROLOGY | Facility: CLINIC | Age: 71
End: 2021-04-19

## 2021-04-22 ENCOUNTER — ANESTHESIA EVENT (OUTPATIENT)
Dept: ENDOSCOPY | Facility: HOSPITAL | Age: 71
End: 2021-04-22
Payer: MEDICARE

## 2021-04-22 ENCOUNTER — HOSPITAL ENCOUNTER (OUTPATIENT)
Facility: HOSPITAL | Age: 71
Discharge: HOME | End: 2021-04-22
Attending: INTERNAL MEDICINE | Admitting: INTERNAL MEDICINE
Payer: MEDICARE

## 2021-04-22 ENCOUNTER — ANESTHESIA (OUTPATIENT)
Dept: ENDOSCOPY | Facility: HOSPITAL | Age: 71
End: 2021-04-22
Payer: MEDICARE

## 2021-04-22 VITALS
SYSTOLIC BLOOD PRESSURE: 113 MMHG | DIASTOLIC BLOOD PRESSURE: 53 MMHG | TEMPERATURE: 98 F | HEIGHT: 65 IN | OXYGEN SATURATION: 97 % | BODY MASS INDEX: 33.15 KG/M2 | HEART RATE: 70 BPM | WEIGHT: 199 LBS | RESPIRATION RATE: 16 BRPM

## 2021-04-22 DIAGNOSIS — R14.0 BLOATING: Primary | ICD-10-CM

## 2021-04-22 PROCEDURE — 88305 TISSUE EXAM BY PATHOLOGIST: CPT | Mod: 59 | Performed by: PATHOLOGY

## 2021-04-22 PROCEDURE — 25000003 PHARM REV CODE 250: Performed by: NURSE ANESTHETIST, CERTIFIED REGISTERED

## 2021-04-22 PROCEDURE — 43239 EGD BIOPSY SINGLE/MULTIPLE: CPT | Performed by: INTERNAL MEDICINE

## 2021-04-22 PROCEDURE — E9220 PRA ENDO ANESTHESIA: HCPCS | Mod: ,,, | Performed by: NURSE ANESTHETIST, CERTIFIED REGISTERED

## 2021-04-22 PROCEDURE — 37000008 HC ANESTHESIA 1ST 15 MINUTES: Performed by: INTERNAL MEDICINE

## 2021-04-22 PROCEDURE — 43239 PR EGD, FLEX, W/BIOPSY, SGL/MULTI: ICD-10-PCS | Mod: ,,, | Performed by: INTERNAL MEDICINE

## 2021-04-22 PROCEDURE — 88305 TISSUE EXAM BY PATHOLOGIST: CPT | Mod: 26,,, | Performed by: PATHOLOGY

## 2021-04-22 PROCEDURE — 63600175 PHARM REV CODE 636 W HCPCS: Performed by: NURSE ANESTHETIST, CERTIFIED REGISTERED

## 2021-04-22 PROCEDURE — E9220 PRA ENDO ANESTHESIA: ICD-10-PCS | Mod: ,,, | Performed by: NURSE ANESTHETIST, CERTIFIED REGISTERED

## 2021-04-22 PROCEDURE — 43239 EGD BIOPSY SINGLE/MULTIPLE: CPT | Mod: ,,, | Performed by: INTERNAL MEDICINE

## 2021-04-22 PROCEDURE — 88342 CHG IMMUNOCYTOCHEMISTRY: ICD-10-PCS | Mod: 26,,, | Performed by: PATHOLOGY

## 2021-04-22 PROCEDURE — 88305 TISSUE EXAM BY PATHOLOGIST: ICD-10-PCS | Mod: 26,,, | Performed by: PATHOLOGY

## 2021-04-22 PROCEDURE — 88342 IMHCHEM/IMCYTCHM 1ST ANTB: CPT | Performed by: PATHOLOGY

## 2021-04-22 PROCEDURE — 88342 IMHCHEM/IMCYTCHM 1ST ANTB: CPT | Mod: 26,,, | Performed by: PATHOLOGY

## 2021-04-22 PROCEDURE — 27201012 HC FORCEPS, HOT/COLD, DISP: Performed by: INTERNAL MEDICINE

## 2021-04-22 PROCEDURE — 37000009 HC ANESTHESIA EA ADD 15 MINS: Performed by: INTERNAL MEDICINE

## 2021-04-22 PROCEDURE — 82657 ENZYME CELL ACTIVITY: CPT | Performed by: PATHOLOGY

## 2021-04-22 RX ORDER — LIDOCAINE HYDROCHLORIDE 20 MG/ML
INJECTION INTRAVENOUS
Status: DISCONTINUED | OUTPATIENT
Start: 2021-04-22 | End: 2021-04-22

## 2021-04-22 RX ORDER — SODIUM CHLORIDE 9 MG/ML
INJECTION, SOLUTION INTRAVENOUS CONTINUOUS
Status: DISCONTINUED | OUTPATIENT
Start: 2021-04-22 | End: 2021-04-22 | Stop reason: HOSPADM

## 2021-04-22 RX ORDER — PROPOFOL 10 MG/ML
VIAL (ML) INTRAVENOUS
Status: DISCONTINUED | OUTPATIENT
Start: 2021-04-22 | End: 2021-04-22

## 2021-04-22 RX ORDER — PROPOFOL 10 MG/ML
VIAL (ML) INTRAVENOUS CONTINUOUS PRN
Status: DISCONTINUED | OUTPATIENT
Start: 2021-04-22 | End: 2021-04-22

## 2021-04-22 RX ADMIN — PROPOFOL 200 MCG/KG/MIN: 10 INJECTION, EMULSION INTRAVENOUS at 09:04

## 2021-04-22 RX ADMIN — LIDOCAINE HYDROCHLORIDE 100 MG: 20 INJECTION, SOLUTION INTRAVENOUS at 09:04

## 2021-04-22 RX ADMIN — SODIUM CHLORIDE: 0.9 INJECTION, SOLUTION INTRAVENOUS at 09:04

## 2021-04-22 RX ADMIN — GLYCOPYRROLATE 0.2 MCG: 0.2 INJECTION, SOLUTION INTRAMUSCULAR; INTRAVITREAL at 09:04

## 2021-04-22 RX ADMIN — PROPOFOL 100 MG: 10 INJECTION, EMULSION INTRAVENOUS at 09:04

## 2021-04-26 ENCOUNTER — HOSPITAL ENCOUNTER (OUTPATIENT)
Dept: RADIOLOGY | Facility: OTHER | Age: 71
Discharge: HOME | End: 2021-04-26
Attending: NURSE PRACTITIONER
Payer: MEDICARE

## 2021-04-26 DIAGNOSIS — M54.9 DORSALGIA, UNSPECIFIED: ICD-10-CM

## 2021-04-26 PROCEDURE — 72148 MRI LUMBAR SPINE WITHOUT CONTRAST: ICD-10-PCS | Mod: 26,,, | Performed by: INTERNAL MEDICINE

## 2021-04-26 PROCEDURE — 72110 XR LUMBAR SPINE 5 VIEW WITH FLEX AND EXT: ICD-10-PCS | Mod: 26,,, | Performed by: INTERNAL MEDICINE

## 2021-04-26 PROCEDURE — 72110 X-RAY EXAM L-2 SPINE 4/>VWS: CPT | Mod: TC,FY

## 2021-04-26 PROCEDURE — 72148 MRI LUMBAR SPINE W/O DYE: CPT | Mod: TC

## 2021-04-26 PROCEDURE — 72110 X-RAY EXAM L-2 SPINE 4/>VWS: CPT | Mod: 26,,, | Performed by: INTERNAL MEDICINE

## 2021-04-26 PROCEDURE — 72148 MRI LUMBAR SPINE W/O DYE: CPT | Mod: 26,,, | Performed by: INTERNAL MEDICINE

## 2021-04-27 ENCOUNTER — PES CALL (OUTPATIENT)
Dept: ADMINISTRATIVE | Facility: CLINIC | Age: 71
End: 2021-04-27

## 2021-04-28 ENCOUNTER — OFFICE VISIT (OUTPATIENT)
Dept: SPINE | Facility: CLINIC | Age: 71
End: 2021-04-28
Payer: MEDICARE

## 2021-04-28 VITALS
SYSTOLIC BLOOD PRESSURE: 136 MMHG | HEIGHT: 65 IN | WEIGHT: 199.06 LBS | DIASTOLIC BLOOD PRESSURE: 70 MMHG | HEART RATE: 61 BPM | BODY MASS INDEX: 33.16 KG/M2

## 2021-04-28 DIAGNOSIS — M47.817 LUMBOSACRAL SPONDYLOSIS WITHOUT MYELOPATHY: ICD-10-CM

## 2021-04-28 DIAGNOSIS — M54.40 CHRONIC BILATERAL LOW BACK PAIN WITH SCIATICA, SCIATICA LATERALITY UNSPECIFIED: ICD-10-CM

## 2021-04-28 DIAGNOSIS — R25.2 LEG CRAMPING: ICD-10-CM

## 2021-04-28 DIAGNOSIS — G59 MONONEUROPATHY IN DISEASES CLASSIFIED ELSEWHERE: ICD-10-CM

## 2021-04-28 DIAGNOSIS — G89.29 CHRONIC BILATERAL LOW BACK PAIN WITH SCIATICA, SCIATICA LATERALITY UNSPECIFIED: ICD-10-CM

## 2021-04-28 DIAGNOSIS — G62.9 NEUROPATHY: Primary | ICD-10-CM

## 2021-04-28 PROCEDURE — 1125F AMNT PAIN NOTED PAIN PRSNT: CPT | Mod: S$GLB,,, | Performed by: NURSE PRACTITIONER

## 2021-04-28 PROCEDURE — 3008F PR BODY MASS INDEX (BMI) DOCUMENTED: ICD-10-PCS | Mod: CPTII,S$GLB,, | Performed by: NURSE PRACTITIONER

## 2021-04-28 PROCEDURE — 1101F PR PT FALLS ASSESS DOC 0-1 FALLS W/OUT INJ PAST YR: ICD-10-PCS | Mod: CPTII,S$GLB,, | Performed by: NURSE PRACTITIONER

## 2021-04-28 PROCEDURE — 1101F PT FALLS ASSESS-DOCD LE1/YR: CPT | Mod: CPTII,S$GLB,, | Performed by: NURSE PRACTITIONER

## 2021-04-28 PROCEDURE — 1159F PR MEDICATION LIST DOCUMENTED IN MEDICAL RECORD: ICD-10-PCS | Mod: S$GLB,,, | Performed by: NURSE PRACTITIONER

## 2021-04-28 PROCEDURE — 3288F PR FALLS RISK ASSESSMENT DOCUMENTED: ICD-10-PCS | Mod: CPTII,S$GLB,, | Performed by: NURSE PRACTITIONER

## 2021-04-28 PROCEDURE — 99214 OFFICE O/P EST MOD 30 MIN: CPT | Mod: S$GLB,,, | Performed by: NURSE PRACTITIONER

## 2021-04-28 PROCEDURE — 1125F PR PAIN SEVERITY QUANTIFIED, PAIN PRESENT: ICD-10-PCS | Mod: S$GLB,,, | Performed by: NURSE PRACTITIONER

## 2021-04-28 PROCEDURE — 99999 PR PBB SHADOW E&M-EST. PATIENT-LVL III: CPT | Mod: PBBFAC,,, | Performed by: NURSE PRACTITIONER

## 2021-04-28 PROCEDURE — 3288F FALL RISK ASSESSMENT DOCD: CPT | Mod: CPTII,S$GLB,, | Performed by: NURSE PRACTITIONER

## 2021-04-28 PROCEDURE — 1159F MED LIST DOCD IN RCRD: CPT | Mod: S$GLB,,, | Performed by: NURSE PRACTITIONER

## 2021-04-28 PROCEDURE — 99214 PR OFFICE/OUTPT VISIT, EST, LEVL IV, 30-39 MIN: ICD-10-PCS | Mod: S$GLB,,, | Performed by: NURSE PRACTITIONER

## 2021-04-28 PROCEDURE — 99999 PR PBB SHADOW E&M-EST. PATIENT-LVL III: ICD-10-PCS | Mod: PBBFAC,,, | Performed by: NURSE PRACTITIONER

## 2021-04-28 PROCEDURE — 3008F BODY MASS INDEX DOCD: CPT | Mod: CPTII,S$GLB,, | Performed by: NURSE PRACTITIONER

## 2021-04-30 LAB
FINAL PATHOLOGIC DIAGNOSIS: NORMAL
GROSS: NORMAL
Lab: NORMAL

## 2021-05-05 LAB
FINAL PATHOLOGIC DIAGNOSIS: NORMAL
GROSS: NORMAL
Lab: NORMAL

## 2021-05-11 ENCOUNTER — TELEPHONE (OUTPATIENT)
Dept: GASTROENTEROLOGY | Facility: CLINIC | Age: 71
End: 2021-05-11

## 2021-05-13 ENCOUNTER — CLINICAL SUPPORT (OUTPATIENT)
Dept: REHABILITATION | Facility: HOSPITAL | Age: 71
End: 2021-05-13
Payer: MEDICARE

## 2021-05-13 DIAGNOSIS — M25.69 DECREASED ROM OF TRUNK AND BACK: ICD-10-CM

## 2021-05-13 DIAGNOSIS — M54.50 RECURRENT LOW BACK PAIN: ICD-10-CM

## 2021-05-13 DIAGNOSIS — R29.898 DECREASED STRENGTH OF TRUNK AND BACK: ICD-10-CM

## 2021-05-13 DIAGNOSIS — M54.9 DORSALGIA, UNSPECIFIED: ICD-10-CM

## 2021-05-13 PROCEDURE — 97162 PT EVAL MOD COMPLEX 30 MIN: CPT | Mod: PN

## 2021-05-13 PROCEDURE — 97110 THERAPEUTIC EXERCISES: CPT | Mod: PN

## 2021-06-07 ENCOUNTER — PROCEDURE VISIT (OUTPATIENT)
Dept: PHYSICAL MEDICINE AND REHAB | Facility: CLINIC | Age: 71
End: 2021-06-07
Payer: MEDICARE

## 2021-06-07 DIAGNOSIS — G62.9 NEUROPATHY: ICD-10-CM

## 2021-06-07 DIAGNOSIS — G59 MONONEUROPATHY IN DISEASES CLASSIFIED ELSEWHERE: ICD-10-CM

## 2021-06-07 PROCEDURE — 95909 PR NERVE CONDUCTION STUDY; 5-6 STUDIES: ICD-10-PCS | Mod: S$GLB,,, | Performed by: PHYSICAL MEDICINE & REHABILITATION

## 2021-06-07 PROCEDURE — 95886 PR EMG COMPLETE, W/ NERVE CONDUCTION STUDIES, 5+ MUSCLES: ICD-10-PCS | Mod: S$GLB,,, | Performed by: PHYSICAL MEDICINE & REHABILITATION

## 2021-06-07 PROCEDURE — 95909 NRV CNDJ TST 5-6 STUDIES: CPT | Mod: S$GLB,,, | Performed by: PHYSICAL MEDICINE & REHABILITATION

## 2021-06-07 PROCEDURE — 95886 MUSC TEST DONE W/N TEST COMP: CPT | Mod: S$GLB,,, | Performed by: PHYSICAL MEDICINE & REHABILITATION

## 2021-06-11 ENCOUNTER — CLINICAL SUPPORT (OUTPATIENT)
Dept: REHABILITATION | Facility: HOSPITAL | Age: 71
End: 2021-06-11
Payer: MEDICARE

## 2021-06-11 DIAGNOSIS — M25.69 DECREASED ROM OF TRUNK AND BACK: ICD-10-CM

## 2021-06-11 DIAGNOSIS — R29.898 DECREASED STRENGTH OF TRUNK AND BACK: ICD-10-CM

## 2021-06-11 PROCEDURE — 97110 THERAPEUTIC EXERCISES: CPT | Mod: PN

## 2021-06-15 ENCOUNTER — CLINICAL SUPPORT (OUTPATIENT)
Dept: REHABILITATION | Facility: HOSPITAL | Age: 71
End: 2021-06-15
Payer: MEDICARE

## 2021-06-15 DIAGNOSIS — R29.898 DECREASED STRENGTH OF TRUNK AND BACK: ICD-10-CM

## 2021-06-15 DIAGNOSIS — M25.69 DECREASED ROM OF TRUNK AND BACK: ICD-10-CM

## 2021-06-15 PROCEDURE — 97110 THERAPEUTIC EXERCISES: CPT | Mod: PN

## 2021-06-22 ENCOUNTER — CLINICAL SUPPORT (OUTPATIENT)
Dept: REHABILITATION | Facility: HOSPITAL | Age: 71
End: 2021-06-22
Payer: MEDICARE

## 2021-06-22 DIAGNOSIS — R29.898 DECREASED STRENGTH OF TRUNK AND BACK: ICD-10-CM

## 2021-06-22 DIAGNOSIS — M25.69 DECREASED ROM OF TRUNK AND BACK: Primary | ICD-10-CM

## 2021-06-22 PROCEDURE — 97110 THERAPEUTIC EXERCISES: CPT | Mod: PN | Performed by: PHYSICAL MEDICINE & REHABILITATION

## 2021-06-24 ENCOUNTER — CLINICAL SUPPORT (OUTPATIENT)
Dept: REHABILITATION | Facility: HOSPITAL | Age: 71
End: 2021-06-24
Payer: MEDICARE

## 2021-06-24 DIAGNOSIS — R29.898 DECREASED STRENGTH OF TRUNK AND BACK: ICD-10-CM

## 2021-06-24 DIAGNOSIS — M25.69 DECREASED ROM OF TRUNK AND BACK: ICD-10-CM

## 2021-06-24 PROCEDURE — 97110 THERAPEUTIC EXERCISES: CPT | Mod: PN

## 2021-06-29 ENCOUNTER — CLINICAL SUPPORT (OUTPATIENT)
Dept: REHABILITATION | Facility: HOSPITAL | Age: 71
End: 2021-06-29
Payer: MEDICARE

## 2021-06-29 DIAGNOSIS — M25.69 DECREASED ROM OF TRUNK AND BACK: Primary | ICD-10-CM

## 2021-06-29 DIAGNOSIS — R29.898 DECREASED STRENGTH OF TRUNK AND BACK: ICD-10-CM

## 2021-06-29 PROCEDURE — 97110 THERAPEUTIC EXERCISES: CPT | Mod: PN | Performed by: PHYSICAL MEDICINE & REHABILITATION

## 2021-07-01 ENCOUNTER — CLINICAL SUPPORT (OUTPATIENT)
Dept: REHABILITATION | Facility: HOSPITAL | Age: 71
End: 2021-07-01
Payer: MEDICARE

## 2021-07-01 DIAGNOSIS — M25.69 DECREASED ROM OF TRUNK AND BACK: ICD-10-CM

## 2021-07-01 DIAGNOSIS — R29.898 DECREASED STRENGTH OF TRUNK AND BACK: ICD-10-CM

## 2021-07-01 PROCEDURE — 97110 THERAPEUTIC EXERCISES: CPT | Mod: PN

## 2021-07-06 ENCOUNTER — CLINICAL SUPPORT (OUTPATIENT)
Dept: REHABILITATION | Facility: HOSPITAL | Age: 71
End: 2021-07-06
Payer: MEDICARE

## 2021-07-06 DIAGNOSIS — M25.69 DECREASED ROM OF TRUNK AND BACK: ICD-10-CM

## 2021-07-06 DIAGNOSIS — R29.898 DECREASED STRENGTH OF TRUNK AND BACK: ICD-10-CM

## 2021-07-06 PROCEDURE — 97110 THERAPEUTIC EXERCISES: CPT | Mod: PN

## 2021-07-08 ENCOUNTER — CLINICAL SUPPORT (OUTPATIENT)
Dept: REHABILITATION | Facility: HOSPITAL | Age: 71
End: 2021-07-08
Payer: MEDICARE

## 2021-07-08 DIAGNOSIS — R29.898 DECREASED STRENGTH OF TRUNK AND BACK: ICD-10-CM

## 2021-07-08 DIAGNOSIS — M25.69 DECREASED ROM OF TRUNK AND BACK: ICD-10-CM

## 2021-07-08 PROCEDURE — 97110 THERAPEUTIC EXERCISES: CPT | Mod: PN

## 2021-07-20 ENCOUNTER — PES CALL (OUTPATIENT)
Dept: ADMINISTRATIVE | Facility: CLINIC | Age: 71
End: 2021-07-20

## 2021-08-03 ENCOUNTER — CLINICAL SUPPORT (OUTPATIENT)
Dept: REHABILITATION | Facility: HOSPITAL | Age: 71
End: 2021-08-03
Payer: MEDICARE

## 2021-08-03 DIAGNOSIS — R29.898 DECREASED STRENGTH OF TRUNK AND BACK: ICD-10-CM

## 2021-08-03 DIAGNOSIS — M25.69 DECREASED ROM OF TRUNK AND BACK: Primary | ICD-10-CM

## 2021-08-03 PROCEDURE — 97110 THERAPEUTIC EXERCISES: CPT | Mod: PN | Performed by: PHYSICAL MEDICINE & REHABILITATION

## 2021-08-03 PROCEDURE — 97750 PHYSICAL PERFORMANCE TEST: CPT | Mod: PN | Performed by: PHYSICAL MEDICINE & REHABILITATION

## 2021-08-06 ENCOUNTER — CLINICAL SUPPORT (OUTPATIENT)
Dept: REHABILITATION | Facility: HOSPITAL | Age: 71
End: 2021-08-06
Payer: MEDICARE

## 2021-08-06 DIAGNOSIS — M25.69 DECREASED ROM OF TRUNK AND BACK: ICD-10-CM

## 2021-08-06 DIAGNOSIS — R29.898 DECREASED STRENGTH OF TRUNK AND BACK: ICD-10-CM

## 2021-08-06 PROCEDURE — 97110 THERAPEUTIC EXERCISES: CPT | Mod: PN

## 2021-08-12 ENCOUNTER — OFFICE VISIT (OUTPATIENT)
Dept: INTERNAL MEDICINE | Facility: CLINIC | Age: 71
End: 2021-08-12
Attending: INTERNAL MEDICINE
Payer: MEDICARE

## 2021-08-12 ENCOUNTER — LAB VISIT (OUTPATIENT)
Dept: LAB | Facility: OTHER | Age: 71
End: 2021-08-12
Attending: INTERNAL MEDICINE
Payer: MEDICARE

## 2021-08-12 VITALS
OXYGEN SATURATION: 98 % | DIASTOLIC BLOOD PRESSURE: 58 MMHG | HEART RATE: 80 BPM | HEIGHT: 65 IN | BODY MASS INDEX: 32.87 KG/M2 | WEIGHT: 197.31 LBS | SYSTOLIC BLOOD PRESSURE: 126 MMHG

## 2021-08-12 DIAGNOSIS — Z13.6 SCREENING FOR CARDIOVASCULAR CONDITION: ICD-10-CM

## 2021-08-12 DIAGNOSIS — K43.2 INCISIONAL HERNIA, WITHOUT OBSTRUCTION OR GANGRENE: ICD-10-CM

## 2021-08-12 DIAGNOSIS — R20.1 HYPOESTHESIA OF SKIN: ICD-10-CM

## 2021-08-12 DIAGNOSIS — R79.9 ABNORMAL BLOOD CHEMISTRY: Primary | ICD-10-CM

## 2021-08-12 DIAGNOSIS — R79.9 ABNORMAL BLOOD CHEMISTRY: ICD-10-CM

## 2021-08-12 LAB
ALBUMIN SERPL BCP-MCNC: 3.8 G/DL (ref 3.5–5.2)
ALP SERPL-CCNC: 76 U/L (ref 55–135)
ALT SERPL W/O P-5'-P-CCNC: 16 U/L (ref 10–44)
ANION GAP SERPL CALC-SCNC: 9 MMOL/L (ref 8–16)
AST SERPL-CCNC: 18 U/L (ref 10–40)
BASOPHILS # BLD AUTO: 0.07 K/UL (ref 0–0.2)
BASOPHILS NFR BLD: 1.1 % (ref 0–1.9)
BILIRUB SERPL-MCNC: 0.5 MG/DL (ref 0.1–1)
BUN SERPL-MCNC: 13 MG/DL (ref 8–23)
CALCIUM SERPL-MCNC: 9.3 MG/DL (ref 8.7–10.5)
CHLORIDE SERPL-SCNC: 105 MMOL/L (ref 95–110)
CHOLEST SERPL-MCNC: 205 MG/DL (ref 120–199)
CHOLEST/HDLC SERPL: 4.3 {RATIO} (ref 2–5)
CO2 SERPL-SCNC: 24 MMOL/L (ref 23–29)
CREAT SERPL-MCNC: 0.8 MG/DL (ref 0.5–1.4)
DIFFERENTIAL METHOD: NORMAL
EOSINOPHIL # BLD AUTO: 0.2 K/UL (ref 0–0.5)
EOSINOPHIL NFR BLD: 2.5 % (ref 0–8)
ERYTHROCYTE [DISTWIDTH] IN BLOOD BY AUTOMATED COUNT: 12.7 % (ref 11.5–14.5)
EST. GFR  (AFRICAN AMERICAN): >60 ML/MIN/1.73 M^2
EST. GFR  (NON AFRICAN AMERICAN): >60 ML/MIN/1.73 M^2
GLUCOSE SERPL-MCNC: 169 MG/DL (ref 70–110)
HCT VFR BLD AUTO: 41.8 % (ref 37–48.5)
HDLC SERPL-MCNC: 48 MG/DL (ref 40–75)
HDLC SERPL: 23.4 % (ref 20–50)
HGB BLD-MCNC: 13.4 G/DL (ref 12–16)
IMM GRANULOCYTES # BLD AUTO: 0.02 K/UL (ref 0–0.04)
IMM GRANULOCYTES NFR BLD AUTO: 0.3 % (ref 0–0.5)
LDLC SERPL CALC-MCNC: 124.4 MG/DL (ref 63–159)
LYMPHOCYTES # BLD AUTO: 1.5 K/UL (ref 1–4.8)
LYMPHOCYTES NFR BLD: 23.2 % (ref 18–48)
MCH RBC QN AUTO: 29.8 PG (ref 27–31)
MCHC RBC AUTO-ENTMCNC: 32.1 G/DL (ref 32–36)
MCV RBC AUTO: 93 FL (ref 82–98)
MONOCYTES # BLD AUTO: 0.4 K/UL (ref 0.3–1)
MONOCYTES NFR BLD: 5.4 % (ref 4–15)
NEUTROPHILS # BLD AUTO: 4.4 K/UL (ref 1.8–7.7)
NEUTROPHILS NFR BLD: 67.5 % (ref 38–73)
NONHDLC SERPL-MCNC: 157 MG/DL
NRBC BLD-RTO: 0 /100 WBC
PLATELET # BLD AUTO: 233 K/UL (ref 150–450)
PMV BLD AUTO: 10.9 FL (ref 9.2–12.9)
POTASSIUM SERPL-SCNC: 3.8 MMOL/L (ref 3.5–5.1)
PROT SERPL-MCNC: 6.8 G/DL (ref 6–8.4)
RBC # BLD AUTO: 4.49 M/UL (ref 4–5.4)
SODIUM SERPL-SCNC: 138 MMOL/L (ref 136–145)
TRIGL SERPL-MCNC: 163 MG/DL (ref 30–150)
TSH SERPL DL<=0.005 MIU/L-ACNC: 2.36 UIU/ML (ref 0.4–4)
WBC # BLD AUTO: 6.5 K/UL (ref 3.9–12.7)

## 2021-08-12 PROCEDURE — 3074F PR MOST RECENT SYSTOLIC BLOOD PRESSURE < 130 MM HG: ICD-10-PCS | Mod: CPTII,S$GLB,, | Performed by: INTERNAL MEDICINE

## 2021-08-12 PROCEDURE — 1126F PR PAIN SEVERITY QUANTIFIED, NO PAIN PRESENT: ICD-10-PCS | Mod: CPTII,S$GLB,, | Performed by: INTERNAL MEDICINE

## 2021-08-12 PROCEDURE — 99999 PR PBB SHADOW E&M-EST. PATIENT-LVL III: CPT | Mod: PBBFAC,,, | Performed by: INTERNAL MEDICINE

## 2021-08-12 PROCEDURE — 3078F PR MOST RECENT DIASTOLIC BLOOD PRESSURE < 80 MM HG: ICD-10-PCS | Mod: CPTII,S$GLB,, | Performed by: INTERNAL MEDICINE

## 2021-08-12 PROCEDURE — 36415 COLL VENOUS BLD VENIPUNCTURE: CPT | Performed by: INTERNAL MEDICINE

## 2021-08-12 PROCEDURE — 84443 ASSAY THYROID STIM HORMONE: CPT | Performed by: INTERNAL MEDICINE

## 2021-08-12 PROCEDURE — 1126F AMNT PAIN NOTED NONE PRSNT: CPT | Mod: CPTII,S$GLB,, | Performed by: INTERNAL MEDICINE

## 2021-08-12 PROCEDURE — 99999 PR PBB SHADOW E&M-EST. PATIENT-LVL III: ICD-10-PCS | Mod: PBBFAC,,, | Performed by: INTERNAL MEDICINE

## 2021-08-12 PROCEDURE — 3008F BODY MASS INDEX DOCD: CPT | Mod: CPTII,S$GLB,, | Performed by: INTERNAL MEDICINE

## 2021-08-12 PROCEDURE — 99214 PR OFFICE/OUTPT VISIT, EST, LEVL IV, 30-39 MIN: ICD-10-PCS | Mod: S$GLB,,, | Performed by: INTERNAL MEDICINE

## 2021-08-12 PROCEDURE — 99214 OFFICE O/P EST MOD 30 MIN: CPT | Mod: S$GLB,,, | Performed by: INTERNAL MEDICINE

## 2021-08-12 PROCEDURE — 3074F SYST BP LT 130 MM HG: CPT | Mod: CPTII,S$GLB,, | Performed by: INTERNAL MEDICINE

## 2021-08-12 PROCEDURE — 80053 COMPREHEN METABOLIC PANEL: CPT | Performed by: INTERNAL MEDICINE

## 2021-08-12 PROCEDURE — 80061 LIPID PANEL: CPT | Performed by: INTERNAL MEDICINE

## 2021-08-12 PROCEDURE — 3078F DIAST BP <80 MM HG: CPT | Mod: CPTII,S$GLB,, | Performed by: INTERNAL MEDICINE

## 2021-08-12 PROCEDURE — 85025 COMPLETE CBC W/AUTO DIFF WBC: CPT | Performed by: INTERNAL MEDICINE

## 2021-08-12 PROCEDURE — 83036 HEMOGLOBIN GLYCOSYLATED A1C: CPT | Performed by: INTERNAL MEDICINE

## 2021-08-12 PROCEDURE — 3008F PR BODY MASS INDEX (BMI) DOCUMENTED: ICD-10-PCS | Mod: CPTII,S$GLB,, | Performed by: INTERNAL MEDICINE

## 2021-08-12 RX ORDER — MELOXICAM 7.5 MG/1
7.5 TABLET ORAL DAILY
Qty: 90 TABLET | Refills: 1 | Status: SHIPPED | OUTPATIENT
Start: 2021-08-12 | End: 2022-02-03

## 2021-08-13 LAB
ESTIMATED AVG GLUCOSE: 100 MG/DL (ref 68–131)
HBA1C MFR BLD: 5.1 % (ref 4–5.6)

## 2021-08-18 ENCOUNTER — CLINICAL SUPPORT (OUTPATIENT)
Dept: REHABILITATION | Facility: HOSPITAL | Age: 71
End: 2021-08-18
Payer: MEDICARE

## 2021-08-18 DIAGNOSIS — M25.69 DECREASED ROM OF TRUNK AND BACK: ICD-10-CM

## 2021-08-18 DIAGNOSIS — R29.898 DECREASED STRENGTH OF TRUNK AND BACK: ICD-10-CM

## 2021-08-18 PROCEDURE — 97110 THERAPEUTIC EXERCISES: CPT | Mod: PN

## 2021-08-20 ENCOUNTER — CLINICAL SUPPORT (OUTPATIENT)
Dept: REHABILITATION | Facility: HOSPITAL | Age: 71
End: 2021-08-20
Attending: INTERNAL MEDICINE
Payer: MEDICARE

## 2021-08-20 DIAGNOSIS — R29.898 DECREASED STRENGTH OF TRUNK AND BACK: ICD-10-CM

## 2021-08-20 DIAGNOSIS — M25.69 DECREASED ROM OF TRUNK AND BACK: ICD-10-CM

## 2021-08-20 PROCEDURE — 97110 THERAPEUTIC EXERCISES: CPT | Mod: PN

## 2021-08-24 ENCOUNTER — CLINICAL SUPPORT (OUTPATIENT)
Dept: REHABILITATION | Facility: HOSPITAL | Age: 71
End: 2021-08-24
Payer: MEDICARE

## 2021-08-24 DIAGNOSIS — M25.69 DECREASED ROM OF TRUNK AND BACK: ICD-10-CM

## 2021-08-24 DIAGNOSIS — R29.898 DECREASED STRENGTH OF TRUNK AND BACK: ICD-10-CM

## 2021-08-24 PROCEDURE — 97110 THERAPEUTIC EXERCISES: CPT | Mod: PN

## 2021-08-26 ENCOUNTER — CLINICAL SUPPORT (OUTPATIENT)
Dept: REHABILITATION | Facility: HOSPITAL | Age: 71
End: 2021-08-26
Payer: MEDICARE

## 2021-08-26 DIAGNOSIS — R29.898 DECREASED STRENGTH OF TRUNK AND BACK: ICD-10-CM

## 2021-08-26 DIAGNOSIS — M25.69 DECREASED ROM OF TRUNK AND BACK: ICD-10-CM

## 2021-08-26 PROCEDURE — 97110 THERAPEUTIC EXERCISES: CPT | Mod: PN

## 2021-09-17 ENCOUNTER — CLINICAL SUPPORT (OUTPATIENT)
Dept: REHABILITATION | Facility: HOSPITAL | Age: 71
End: 2021-09-17
Payer: MEDICARE

## 2021-09-17 DIAGNOSIS — R29.898 DECREASED STRENGTH OF TRUNK AND BACK: ICD-10-CM

## 2021-09-17 DIAGNOSIS — M25.69 DECREASED ROM OF TRUNK AND BACK: ICD-10-CM

## 2021-09-17 PROCEDURE — 97110 THERAPEUTIC EXERCISES: CPT | Mod: PN

## 2021-09-21 ENCOUNTER — CLINICAL SUPPORT (OUTPATIENT)
Dept: REHABILITATION | Facility: HOSPITAL | Age: 71
End: 2021-09-21
Payer: MEDICARE

## 2021-09-21 DIAGNOSIS — R29.898 DECREASED STRENGTH OF TRUNK AND BACK: ICD-10-CM

## 2021-09-21 DIAGNOSIS — M25.69 DECREASED ROM OF TRUNK AND BACK: ICD-10-CM

## 2021-09-21 PROCEDURE — 97110 THERAPEUTIC EXERCISES: CPT | Mod: PN

## 2021-09-23 ENCOUNTER — CLINICAL SUPPORT (OUTPATIENT)
Dept: REHABILITATION | Facility: HOSPITAL | Age: 71
End: 2021-09-23
Payer: MEDICARE

## 2021-09-23 DIAGNOSIS — M25.69 DECREASED ROM OF TRUNK AND BACK: ICD-10-CM

## 2021-09-23 DIAGNOSIS — R29.898 DECREASED STRENGTH OF TRUNK AND BACK: ICD-10-CM

## 2021-09-23 PROCEDURE — 97110 THERAPEUTIC EXERCISES: CPT | Mod: PN

## 2021-10-13 ENCOUNTER — CLINICAL SUPPORT (OUTPATIENT)
Dept: REHABILITATION | Facility: HOSPITAL | Age: 71
End: 2021-10-13
Payer: MEDICARE

## 2021-10-13 DIAGNOSIS — M25.69 DECREASED ROM OF TRUNK AND BACK: ICD-10-CM

## 2021-10-13 DIAGNOSIS — R29.898 DECREASED STRENGTH OF TRUNK AND BACK: ICD-10-CM

## 2021-10-13 PROCEDURE — 97110 THERAPEUTIC EXERCISES: CPT | Mod: PN

## 2021-10-19 ENCOUNTER — CLINICAL SUPPORT (OUTPATIENT)
Dept: REHABILITATION | Facility: HOSPITAL | Age: 71
End: 2021-10-19
Payer: MEDICARE

## 2021-10-19 DIAGNOSIS — R29.898 DECREASED STRENGTH OF TRUNK AND BACK: ICD-10-CM

## 2021-10-19 DIAGNOSIS — M25.69 DECREASED ROM OF TRUNK AND BACK: ICD-10-CM

## 2021-10-19 PROCEDURE — 97110 THERAPEUTIC EXERCISES: CPT | Mod: HCNC,PN

## 2021-10-25 ENCOUNTER — DOCUMENTATION ONLY (OUTPATIENT)
Dept: REHABILITATION | Facility: HOSPITAL | Age: 71
End: 2021-10-25
Payer: MEDICARE

## 2021-12-20 ENCOUNTER — TELEPHONE (OUTPATIENT)
Dept: PAIN MEDICINE | Facility: CLINIC | Age: 71
End: 2021-12-20
Payer: MEDICARE

## 2021-12-20 ENCOUNTER — PATIENT OUTREACH (OUTPATIENT)
Dept: ADMINISTRATIVE | Facility: OTHER | Age: 71
End: 2021-12-20
Payer: MEDICARE

## 2021-12-20 ENCOUNTER — PATIENT MESSAGE (OUTPATIENT)
Dept: ADMINISTRATIVE | Facility: OTHER | Age: 71
End: 2021-12-20
Payer: MEDICARE

## 2021-12-20 DIAGNOSIS — Z12.31 ENCOUNTER FOR SCREENING MAMMOGRAM FOR MALIGNANT NEOPLASM OF BREAST: Primary | ICD-10-CM

## 2021-12-21 ENCOUNTER — OFFICE VISIT (OUTPATIENT)
Dept: PAIN MEDICINE | Facility: CLINIC | Age: 71
End: 2021-12-21
Payer: MEDICARE

## 2021-12-21 VITALS
BODY MASS INDEX: 31.29 KG/M2 | RESPIRATION RATE: 17 BRPM | SYSTOLIC BLOOD PRESSURE: 123 MMHG | DIASTOLIC BLOOD PRESSURE: 67 MMHG | TEMPERATURE: 97 F | WEIGHT: 187.81 LBS | HEART RATE: 65 BPM | HEIGHT: 65 IN | OXYGEN SATURATION: 98 %

## 2021-12-21 DIAGNOSIS — R10.9 FLANK PAIN: Primary | ICD-10-CM

## 2021-12-21 DIAGNOSIS — M47.816 LUMBAR SPONDYLOSIS: ICD-10-CM

## 2021-12-21 PROCEDURE — 99999 PR PBB SHADOW E&M-EST. PATIENT-LVL IV: CPT | Mod: PBBFAC,,, | Performed by: NURSE PRACTITIONER

## 2021-12-21 PROCEDURE — 99213 PR OFFICE/OUTPT VISIT, EST, LEVL III, 20-29 MIN: ICD-10-PCS | Mod: S$GLB,,, | Performed by: NURSE PRACTITIONER

## 2021-12-21 PROCEDURE — 99213 OFFICE O/P EST LOW 20 MIN: CPT | Mod: S$GLB,,, | Performed by: NURSE PRACTITIONER

## 2021-12-21 PROCEDURE — 99999 PR PBB SHADOW E&M-EST. PATIENT-LVL IV: ICD-10-PCS | Mod: PBBFAC,,, | Performed by: NURSE PRACTITIONER

## 2021-12-21 RX ORDER — TIZANIDINE 2 MG/1
4 TABLET ORAL EVERY 8 HOURS PRN
Qty: 90 TABLET | Refills: 0 | Status: SHIPPED | OUTPATIENT
Start: 2021-12-21 | End: 2021-12-28

## 2022-01-06 ENCOUNTER — TELEPHONE (OUTPATIENT)
Dept: PAIN MEDICINE | Facility: CLINIC | Age: 72
End: 2022-01-06
Payer: MEDICARE

## 2022-01-07 ENCOUNTER — OFFICE VISIT (OUTPATIENT)
Dept: PAIN MEDICINE | Facility: CLINIC | Age: 72
End: 2022-01-07
Payer: MEDICARE

## 2022-01-07 VITALS
DIASTOLIC BLOOD PRESSURE: 74 MMHG | SYSTOLIC BLOOD PRESSURE: 133 MMHG | TEMPERATURE: 98 F | HEIGHT: 65 IN | WEIGHT: 189.63 LBS | HEART RATE: 68 BPM | BODY MASS INDEX: 31.59 KG/M2

## 2022-01-07 DIAGNOSIS — M54.9 MUSCULOSKELETAL BACK PAIN: Primary | ICD-10-CM

## 2022-01-07 PROCEDURE — 99213 PR OFFICE/OUTPT VISIT, EST, LEVL III, 20-29 MIN: ICD-10-PCS | Mod: S$GLB,,, | Performed by: ANESTHESIOLOGY

## 2022-01-07 PROCEDURE — 99999 PR PBB SHADOW E&M-EST. PATIENT-LVL IV: ICD-10-PCS | Mod: PBBFAC,,, | Performed by: ANESTHESIOLOGY

## 2022-01-07 PROCEDURE — 3075F SYST BP GE 130 - 139MM HG: CPT | Mod: CPTII,S$GLB,, | Performed by: ANESTHESIOLOGY

## 2022-01-07 PROCEDURE — 1101F PR PT FALLS ASSESS DOC 0-1 FALLS W/OUT INJ PAST YR: ICD-10-PCS | Mod: CPTII,S$GLB,, | Performed by: ANESTHESIOLOGY

## 2022-01-07 PROCEDURE — 99213 OFFICE O/P EST LOW 20 MIN: CPT | Mod: S$GLB,,, | Performed by: ANESTHESIOLOGY

## 2022-01-07 PROCEDURE — 1125F PR PAIN SEVERITY QUANTIFIED, PAIN PRESENT: ICD-10-PCS | Mod: CPTII,S$GLB,, | Performed by: ANESTHESIOLOGY

## 2022-01-07 PROCEDURE — 1160F PR REVIEW ALL MEDS BY PRESCRIBER/CLIN PHARMACIST DOCUMENTED: ICD-10-PCS | Mod: CPTII,S$GLB,, | Performed by: ANESTHESIOLOGY

## 2022-01-07 PROCEDURE — 3075F PR MOST RECENT SYSTOLIC BLOOD PRESS GE 130-139MM HG: ICD-10-PCS | Mod: CPTII,S$GLB,, | Performed by: ANESTHESIOLOGY

## 2022-01-07 PROCEDURE — 1125F AMNT PAIN NOTED PAIN PRSNT: CPT | Mod: CPTII,S$GLB,, | Performed by: ANESTHESIOLOGY

## 2022-01-07 PROCEDURE — 3008F PR BODY MASS INDEX (BMI) DOCUMENTED: ICD-10-PCS | Mod: CPTII,S$GLB,, | Performed by: ANESTHESIOLOGY

## 2022-01-07 PROCEDURE — 1159F PR MEDICATION LIST DOCUMENTED IN MEDICAL RECORD: ICD-10-PCS | Mod: CPTII,S$GLB,, | Performed by: ANESTHESIOLOGY

## 2022-01-07 PROCEDURE — 99999 PR PBB SHADOW E&M-EST. PATIENT-LVL IV: CPT | Mod: PBBFAC,,, | Performed by: ANESTHESIOLOGY

## 2022-01-07 PROCEDURE — 1101F PT FALLS ASSESS-DOCD LE1/YR: CPT | Mod: CPTII,S$GLB,, | Performed by: ANESTHESIOLOGY

## 2022-01-07 PROCEDURE — 3288F FALL RISK ASSESSMENT DOCD: CPT | Mod: CPTII,S$GLB,, | Performed by: ANESTHESIOLOGY

## 2022-01-07 PROCEDURE — 3288F PR FALLS RISK ASSESSMENT DOCUMENTED: ICD-10-PCS | Mod: CPTII,S$GLB,, | Performed by: ANESTHESIOLOGY

## 2022-01-07 PROCEDURE — 3078F DIAST BP <80 MM HG: CPT | Mod: CPTII,S$GLB,, | Performed by: ANESTHESIOLOGY

## 2022-01-07 PROCEDURE — 3078F PR MOST RECENT DIASTOLIC BLOOD PRESSURE < 80 MM HG: ICD-10-PCS | Mod: CPTII,S$GLB,, | Performed by: ANESTHESIOLOGY

## 2022-01-07 PROCEDURE — 1160F RVW MEDS BY RX/DR IN RCRD: CPT | Mod: CPTII,S$GLB,, | Performed by: ANESTHESIOLOGY

## 2022-01-07 PROCEDURE — 1159F MED LIST DOCD IN RCRD: CPT | Mod: CPTII,S$GLB,, | Performed by: ANESTHESIOLOGY

## 2022-01-07 PROCEDURE — 3008F BODY MASS INDEX DOCD: CPT | Mod: CPTII,S$GLB,, | Performed by: ANESTHESIOLOGY

## 2022-01-07 NOTE — PROGRESS NOTES
"PCP: Caleb Hernandez MD    REFERRING PHYSICIAN: No ref. provider found    CHIEF COMPLAINT: low back pain    SUBJECTIVE:  Interval History 1/7/2022:  The pain is located at midline low back and radiates anteriorly to lateral right side. The pain has been there for about 1 month. The pain is described as stabbing. Has had two episodes of nighttime vomiting in December with "yellow" emesis due to pain. Sometimes wakes up at night due to pain. Exacerbating factors: lying on back, right lateral flexion, cold weather. Mitigating factors: none identified. Symptoms interfere with bending to the right. The patient feels like symptoms have been worsening. Patient denies saddle anesthesia, bowel and bladder incontinence, acute limb weakness, ataxia, and falls. No abdominal pain.      PAIN SCORES:  Best: Pain is 7  Worst: Pain is 10  Usually: Pain is 8  Current: Pain is 9    Interval History 12/21/2021:  Pt presents for evaluation of Right sided flank and lower back pain. Pt states exercise doing abdominal rotation at gym started this pain and  moving from house to house and initially thought it was muscular. She states vomiting associated with 1st time pain starting and this Sunday night having 2nd episode of this pain and  bilious emesis approx 4 times. States no pain with eating. Denies dysuria or hematuria. Denies any constipation or change in BM.  Neurontin 100mg being taken qhs. She is not currently on mobic.       Interval History 4/28/2021:  The patient presents for follow-up of lower back pain and leg radiculopathy/cramping.  She states lower back pain states and there thigh and posterior calf cramping intermittently.  She states is associated paresthesias to both feet that correlate when the legs are cramping in her back it is exacerbated.  She also states other times the paresthesias to her feet are just at night and not correlated with back pain or thigh cramping she is taking baclofen with some minimal benefit " "and also states that gabapentin 100 mg q.h.s. is beneficial but has had decreased benefit over time.     HPI:  Mrs Davis is a 71y/o female presenting with complaint of over a year of LBP and Leg pain/cramping and feet paresthesias. Pt states LBP is bilateral, cramping is now bilateral to thighs but more to left anterior thigh, groin, and calf. She does not focally state leg heaviness or weakness when standing. She has pain and paresthesias to dorsum of feet. She voices morning stiffness, some pain with valsalva and pain from sitting to standing. She denies any loss of b/b or saddle paresthesias. She is currently taking neurontin and mobic with mild benefit. She has had PT For her legs but not specifically for her back per Pt it was beneficial. She has no recent imaging.     6 weeks of Conservative therapy (PT/Chiro/Home Exercises with Dates)  PT 10 weeks, ended 12/2021 - helped with left sided back pain    Treatments / Medications: (Ice/Heat/NSAIDS/APAP/etc):  Baclofen - no benefit  Neurontin - helps  Meloxicam - no benefit  Tizanidine - minimal benefit     Report: Reviewed    Interventional Pain Procedures: (Previous injections)  None    Past Medical History:   Diagnosis Date    Arthritis     Draining cutaneous sinus tract     Headache(784.0)     Incisional hernia     Insomnia     Nocturnal leg cramps     Nonhealing surgical wound     PONV (postoperative nausea and vomiting)     with last surgery per patient, "Severe" PONV 2-27-14    Postmenopausal status     Rectosigmoid cancer      Past Surgical History:   Procedure Laterality Date    BREAST BIOPSY Left     CHOLECYSTECTOMY  02/24/2014    with incisional hernia repair with mesh    COLONOSCOPY N/A 8/23/2016    Procedure: COLONOSCOPY;  Surgeon: Cuco Meraz MD;  Location: [facility] (4TH FLR);  Service: Endoscopy;  Laterality: N/A;    COLONOSCOPY N/A 12/9/2019    Procedure: COLONOSCOPY;  Surgeon: KISHORE Curry MD;  Location: [facility] (4TH " FLR);  Service: Endoscopy;  Laterality: N/A;  ADHESIVE Allergy    ESOPHAGOGASTRODUODENOSCOPY N/A 4/22/2021    Procedure: EGD (ESOPHAGOGASTRODUODENOSCOPY);  Surgeon: Lukasz Lindquist MD;  Location: [facility] (4TH FLR);  Service: Endoscopy;  Laterality: N/A;  [place] [facility] [place] to see if correct form of test-MS    HYSTERECTOMY      KNEE ARTHROSCOPY      KNEE SURGERY      right and left knee repair    OOPHORECTOMY      REPAIR OF RECURRENT INCISIONAL HERNIA  7/10/2018    Procedure: REPAIR, HERNIA, INCISIONAL, RECURRENT;  Surgeon: Cuco Meraz MD;  Location: [facility] OR 2ND FLR;  Service: Colon and Rectal;;    SIGMOIDECTOMY  03/20/2006    LAR     Social History     Socioeconomic History    Marital status:      Spouse name: francis    Number of children: 2   Occupational History    Occupation: Retired     Employer: Self   Tobacco Use    Smoking status: Never Smoker    Smokeless tobacco: Never Used   Substance and Sexual Activity    Alcohol use: Yes     Alcohol/week: 1.0 standard drink     Types: 1 Glasses of wine per week     Comment: socially    Drug use: No    Sexual activity: Yes     Partners: Male     Birth control/protection: Post-menopausal     Social Determinants of Health     Financial Resource Strain: Low Risk     Difficulty of Paying Living Expenses: Not hard at all   Food Insecurity: No Food Insecurity    Worried About Running Out of Food in the Last Year: Never true    Ran Out of Food in the Last Year: Never true   Transportation Needs: No Transportation Needs    Lack of Transportation (Medical): No    Lack of Transportation (Non-Medical): No   Physical Activity: Sufficiently Active    Days of Exercise per Week: 4 days    Minutes of Exercise per Session: 60 min   Stress: No Stress Concern Present    Feeling of Stress : Only a little   Social Connections: Unknown    Frequency of Communication with Friends and Family: Twice a week    Frequency of  "Social Gatherings with Friends and Family: Once a week    Active Member of Clubs or Organizations: Yes    Attends Club or Organization Meetings: 1 to 4 times per year    Marital Status:      Family History   Problem Relation Age of Onset    Breast cancer Mother 56    Cancer Mother         BREAST    Heart disease Father     Heart disease Maternal Grandmother     Diabetes Brother     Colon polyps Neg Hx     Ovarian cancer Neg Hx     Anesthesia problems Neg Hx        Review of patient's allergies indicates:   Allergen Reactions    Adhesive Rash     Asking that we use Paper Tape,  Uses "Sensitive Skin" band aids    Amoxil [amoxicillin] Hives    Penicillins Hives and Swelling     Swelling of ears and neck    Sulfa (sulfonamide antibiotics) Hives and Itching       Current Outpatient Medications   Medication Sig    ERGOCALCIFEROL, VITAMIN D2, (VITAMIN D ORAL) Take 1 capsule by mouth every morning.     estradioL (ESTRACE) 1 MG tablet TAKE 1 TABLET BY MOUTH EVERY DAY    ferrous sulfate 325 (65 FE) MG EC tablet TAKE 1 TABLET BY MOUTH EVERY DAY    gabapentin (NEURONTIN) 100 MG capsule TAKE 1 CAPSULE BY MOUTH EVERY EVENING 2 HOURS BEFORE BEDTIME.    L GASSERI/B BIFIDUM/B LONGUM ([facility] ORAL) Take 1 tablet by mouth 2 (two) times daily.    linaCLOtide (LINZESS) 72 mcg Cap capsule Take 1 capsule (72 mcg total) by mouth once daily.    magnesium oxide-pyridoxine (BEELITH) 362-20 mg Tab Take 1 tablet by mouth once daily.    meloxicam (MOBIC) 7.5 MG tablet Take 1 tablet (7.5 mg total) by mouth once daily.    tiZANidine (ZANAFLEX) 2 MG tablet TAKE 2 TABLETS BY MOUTH EVERY 8 (EIGHT) HOURS AS NEEDED FOR MUSCLE SPASM,CAN MAKE YOU SLEEPY    VITAMIN E ACETATE (VITAMIN E ORAL) Take 1 capsule by mouth every morning.     baclofen (LIORESAL) 10 MG tablet TAKE 1 TABLET (10 MG TOTAL) BY MOUTH 3 (THREE) TIMES DAILY. TAKE 1/2 PILL TO ONE FULL PILL AS NEEDED FOR MUSCLE SPASMS     No current " "facility-administered medications for this visit.       ROS:  GENERAL: No fever. No chills. No fatigue. Denies weight loss. Denies weight gain.  HEENT: Denies headaches. Denies vision change. Denies eye pain. Denies double vision. Denies ear pain.   CV: Denies chest pain.   PULM: Denies of shortness of breath.  GI: Denies constipation. No diarrhea. No abdominal pain. Denies nausea. Denies vomiting. No blood in stool.  HEME: Denies bleeding problems.  : Denies urgency. No painful urination. No blood in urine.  MS: Denies joint stiffness. Denies joint swelling.  Positive for back pain.  SKIN: Denies rash.   NEURO: Denies seizures. No weakness.  PSYCH:  Positive for difficulty sleeping. No anxiety. Denies depression. No suicidal thoughts.       VITALS:   Vitals:    01/07/22 1305   BP: 133/74   Pulse: 68   Temp: 97.9 °F (36.6 °C)   Weight: 86 kg (189 lb 9.5 oz)   Height: 5' 5" (1.651 m)   PainSc:   9   PainLoc: Back         PHYSICAL EXAM:   GENERAL: Well appearing, in no acute distress, alert and oriented x3.  PSYCH:  Mood and affect appropriate.  SKIN: Skin color, texture, turgor normal, no rashes or lesions.  HEENT:  Normocephalic, atraumatic. Cranial nerves grossly intact.  NECK: No pain to palpation over the cervical paraspinous muscles. No pain to palpation over facets. No pain with neck flexion, extension, or lateral flexion.   PULM: No evidence of respiratory difficulty, symmetric chest rise.  GI:  Non-distended  BACK: Limited range of motion, most significant with right flexion. Slight pain to palpation over the spinous processes. No pain to palpation over facet joints. Exquisite tenderness over lateral right side. No CVA tenderness. There is no pain with palpation over the sacroiliac joints bilaterally. Straight leg raising in the supine position is negative to radicular pain.   EXTREMITIES: No deformities, edema, or skin discoloration.   MUSCULOSKELETAL: +R RADHA and FADIR. Negative left RADHA and FADIR. " Bilateral upper and lower extremity strength is normal and symmetric. No atrophy is noted.  NEURO: Sensation is equal and appropriate bilaterally. Bilateral upper and lower extremity coordination and muscle stretch reflexes are physiologic and symmetric. Plantar response are downgoing.   GAIT: normal.      LABS: CBC, CMP, lipase, amylase from 12/21/21 WNL    IMAGING:    XR Lumbar Spine 4/14/21  Scoliosis and degenerative change    MRI Lumbar Spine 4/14/21  There are multilevel degenerative changes within the spinal canal which are further detailed above.  At the L3-4 and L4-5 levels there is a mild degree of spinal canal narrowing and at L5-S1 there is a moderate degree of bilateral neural foraminal narrowing    OTHER STUDIES  6/7/2021 NCS/EMG:  This is a normal electrophysiologic study of the bilateral lower extremities.      ASSESSMENT: 71 y.o. year old female with low back pain radiating to right flank, consistent with musculoskeletal pain.     DISCUSSION: Ms. Davis developed right lateral abdominal wall pain while doing PT exercise with twisting motion. On exam her pain is reproducible over the right lateral oblique musculature. We discussed that this is most likely a pulled muscle which will take time to heal.     PLAN:  1. PT referral placed  2. Start taking NSAID regularly  3. Continue applying heating pad/hot water  4. Can continue gabapentin and tizanidine if helpful  5. RTC 2 months  6. Next consider TPI      The above plan and management options were discussed at length with patient. Patient is in agreement with the above and verbalized understanding.     Kaylah Stokes MD  Neurology PGY1  01/07/2022

## 2022-01-11 ENCOUNTER — OFFICE VISIT (OUTPATIENT)
Dept: INTERNAL MEDICINE | Facility: CLINIC | Age: 72
End: 2022-01-11
Attending: INTERNAL MEDICINE
Payer: MEDICARE

## 2022-01-11 VITALS
OXYGEN SATURATION: 99 % | WEIGHT: 191.38 LBS | DIASTOLIC BLOOD PRESSURE: 78 MMHG | HEIGHT: 65 IN | HEART RATE: 63 BPM | SYSTOLIC BLOOD PRESSURE: 122 MMHG | BODY MASS INDEX: 31.89 KG/M2

## 2022-01-11 DIAGNOSIS — R10.9 RIGHT FLANK PAIN: Primary | ICD-10-CM

## 2022-01-11 DIAGNOSIS — K59.04 FUNCTIONAL CONSTIPATION: ICD-10-CM

## 2022-01-11 DIAGNOSIS — Z12.31 ENCOUNTER FOR SCREENING MAMMOGRAM FOR MALIGNANT NEOPLASM OF BREAST: ICD-10-CM

## 2022-01-11 DIAGNOSIS — Z85.038 HISTORY OF COLON CANCER: ICD-10-CM

## 2022-01-11 PROCEDURE — 99214 OFFICE O/P EST MOD 30 MIN: CPT | Mod: S$GLB,,, | Performed by: INTERNAL MEDICINE

## 2022-01-11 PROCEDURE — 1101F PR PT FALLS ASSESS DOC 0-1 FALLS W/OUT INJ PAST YR: ICD-10-PCS | Mod: CPTII,S$GLB,, | Performed by: INTERNAL MEDICINE

## 2022-01-11 PROCEDURE — 3288F PR FALLS RISK ASSESSMENT DOCUMENTED: ICD-10-PCS | Mod: CPTII,S$GLB,, | Performed by: INTERNAL MEDICINE

## 2022-01-11 PROCEDURE — 3078F DIAST BP <80 MM HG: CPT | Mod: CPTII,S$GLB,, | Performed by: INTERNAL MEDICINE

## 2022-01-11 PROCEDURE — 1101F PT FALLS ASSESS-DOCD LE1/YR: CPT | Mod: CPTII,S$GLB,, | Performed by: INTERNAL MEDICINE

## 2022-01-11 PROCEDURE — 1125F AMNT PAIN NOTED PAIN PRSNT: CPT | Mod: CPTII,S$GLB,, | Performed by: INTERNAL MEDICINE

## 2022-01-11 PROCEDURE — 1125F PR PAIN SEVERITY QUANTIFIED, PAIN PRESENT: ICD-10-PCS | Mod: CPTII,S$GLB,, | Performed by: INTERNAL MEDICINE

## 2022-01-11 PROCEDURE — 3074F PR MOST RECENT SYSTOLIC BLOOD PRESSURE < 130 MM HG: ICD-10-PCS | Mod: CPTII,S$GLB,, | Performed by: INTERNAL MEDICINE

## 2022-01-11 PROCEDURE — 99214 PR OFFICE/OUTPT VISIT, EST, LEVL IV, 30-39 MIN: ICD-10-PCS | Mod: S$GLB,,, | Performed by: INTERNAL MEDICINE

## 2022-01-11 PROCEDURE — 1159F PR MEDICATION LIST DOCUMENTED IN MEDICAL RECORD: ICD-10-PCS | Mod: CPTII,S$GLB,, | Performed by: INTERNAL MEDICINE

## 2022-01-11 PROCEDURE — 99999 PR PBB SHADOW E&M-EST. PATIENT-LVL III: CPT | Mod: PBBFAC,,, | Performed by: INTERNAL MEDICINE

## 2022-01-11 PROCEDURE — 1159F MED LIST DOCD IN RCRD: CPT | Mod: CPTII,S$GLB,, | Performed by: INTERNAL MEDICINE

## 2022-01-11 PROCEDURE — 3008F BODY MASS INDEX DOCD: CPT | Mod: CPTII,S$GLB,, | Performed by: INTERNAL MEDICINE

## 2022-01-11 PROCEDURE — 3078F PR MOST RECENT DIASTOLIC BLOOD PRESSURE < 80 MM HG: ICD-10-PCS | Mod: CPTII,S$GLB,, | Performed by: INTERNAL MEDICINE

## 2022-01-11 PROCEDURE — 99999 PR PBB SHADOW E&M-EST. PATIENT-LVL III: ICD-10-PCS | Mod: PBBFAC,,, | Performed by: INTERNAL MEDICINE

## 2022-01-11 PROCEDURE — 3288F FALL RISK ASSESSMENT DOCD: CPT | Mod: CPTII,S$GLB,, | Performed by: INTERNAL MEDICINE

## 2022-01-11 PROCEDURE — 3074F SYST BP LT 130 MM HG: CPT | Mod: CPTII,S$GLB,, | Performed by: INTERNAL MEDICINE

## 2022-01-11 PROCEDURE — 3008F PR BODY MASS INDEX (BMI) DOCUMENTED: ICD-10-PCS | Mod: CPTII,S$GLB,, | Performed by: INTERNAL MEDICINE

## 2022-01-11 NOTE — PROGRESS NOTES
Subjective:       Patient ID: Tanesha Davis is a 71 y.o. female.    Chief Complaint: Annual Exam and Flank Pain    Here for urgent appt    Right flank pain 1 month prior while finishing up therapy with back and spine PT. Was causing pain so severe caused emesis, twice. Pain is worsens by bending to right, lying down, right lateral twisting of torso. Saw Suki in back and spine. Baclofen she but could not tolerate, lethargy and switched to tizanidine. Taking 2mg QHS. Occasional left abdominal pain is unchanged. No change in /GI/GYN. Taking ibuprofen 400mg maybe once a day. Not taking meloxicam but has some at home.    Left abdominal pain related to recurrent ventral hernia persist and is unchanged. Has not pursued surgical correction on account of pandemic.     ### ventral hernia-recurrent ###   12/04/2019 Ct abd/pelvis: Recurrent large ventral abdominal hernia containing mesenteric fat and loops of small bowel without associated complication.     ### Constipation ###  -taking colace, metamucil, probiotics, and consumes 8-12 12oz water/day. She has a BM once every 2 days, improved from once a week.  -Linzess prn currently. No acute issues.     ### Hx colon Ca ###  -s/p T3N0 with rectosigmoid mod diff adenocarcionma that was treated with anterior resection 3/30/06.  currently getting colo every 3 years. last done 12/19/2019. Followed by Dr. Meraz.  Aside from constipation she has no GI symptoms.  Due for next colonoscopy 12/19/2022     ### REMY ###  First seen 4/2013 01/2020  TIBC 482, ferritin -22 for possible RLS workup. Hx of colon CA, last colonoscopy 12/9/2019. Abd pain as above. No new upper GI symptoms. No BRBRP, melena, SOB at rest or with exertion, dizziness,   Nexium every other day for several years      Back Pain  This is a recurrent problem. The current episode started more than 1 month ago. The problem occurs constantly. The problem has been waxing and waning since onset. The pain is  "present in the costovertebral angle. The quality of the pain is described as stabbing. The pain is at a severity of 9/10. The pain is moderate. The pain is the same all the time. The symptoms are aggravated by bending, sitting and standing. Stiffness is present in the morning and at night. Associated symptoms include abdominal pain, leg pain, numbness, paresthesias and weakness. Pertinent negatives include no bladder incontinence, bowel incontinence, chest pain, dysuria, fever, headaches, paresis, pelvic pain, perianal numbness, tingling or weight loss. She has tried NSAIDs and muscle relaxant for the symptoms. The treatment provided moderate relief.       Review of Systems   Constitutional: Negative for fever and weight loss.   Cardiovascular: Negative for chest pain.   Gastrointestinal: Positive for abdominal pain. Negative for bowel incontinence.   Genitourinary: Negative for bladder incontinence, dysuria, hematuria and pelvic pain.   Musculoskeletal: Positive for back pain.   Neurological: Positive for weakness, numbness and paresthesias. Negative for tingling and headaches.       Objective:      Vitals:    01/11/22 0917   BP: 122/78   BP Location: Left arm   Patient Position: Sitting   BP Method: Large (Manual)   Pulse: 63   SpO2: 99%   Weight: 86.8 kg (191 lb 5.8 oz)   Height: 5' 5" (1.651 m)      Physical Exam  Constitutional:       General: She is not in acute distress.     Appearance: She is well-developed and well-nourished.   HENT:      Head: Normocephalic and atraumatic.      Mouth/Throat:      Mouth: Oropharynx is clear and moist.      Pharynx: No oropharyngeal exudate.   Eyes:      General: No scleral icterus.     Extraocular Movements: EOM normal.      Conjunctiva/sclera: Conjunctivae normal.      Pupils: Pupils are equal, round, and reactive to light.   Neck:      Thyroid: No thyromegaly.   Cardiovascular:      Rate and Rhythm: Normal rate and regular rhythm.      Heart sounds: Normal heart sounds. " No murmur heard.      Pulmonary:      Effort: Pulmonary effort is normal.      Breath sounds: Normal breath sounds. No wheezing or rales.   Abdominal:      General: There is no distension.      Palpations: Abdomen is soft.      Tenderness: There is no abdominal tenderness. There is no right CVA tenderness or left CVA tenderness.       Musculoskeletal:         General: No tenderness or edema.   Lymphadenopathy:      Cervical: No cervical adenopathy.   Skin:     General: Skin is warm and dry.   Neurological:      Mental Status: She is alert and oriented to person, place, and time.   Psychiatric:         Mood and Affect: Mood and affect normal.         Behavior: Behavior normal.         Assessment:       1. Right flank pain    2. Encounter for screening mammogram for malignant neoplasm of breast    3. History of colon cancer    4. Functional constipation        Plan:       Tanesha was seen today for annual exam and flank pain.    Diagnoses and all orders for this visit:    Right flank pain   In an abundance of caution we will get a urinalysis but her history and exam is very much indicative of a musculoskeletal etiology as her tenderness is restricted to right abdominal wall musculature stopping at midline right inguinal canal inferior border of chest wall. Office and Emergency Department prompts discussed.        -     Urinalysis, Reflex to Urine Culture Urine, Clean Catch; Future    Encounter for screening mammogram for malignant neoplasm of breast  -     Mammo Digital Screening Bilat w/ Julius; Future    History of colon cancer    Functional constipation           Caleb Ceballos MD  Internal Medicine-[facility]        Side effects of medication(s) were discussed in detail and patient voiced understanding.  Patient will call back for any issues or complications.     Answers for HPI/ROS submitted by the patient on 1/7/2022  genital pain: No

## 2022-01-19 ENCOUNTER — HOSPITAL ENCOUNTER (OUTPATIENT)
Dept: RADIOLOGY | Facility: HOSPITAL | Age: 72
Discharge: HOME | End: 2022-01-19
Attending: INTERNAL MEDICINE
Payer: MEDICARE

## 2022-01-19 VITALS — HEIGHT: 65 IN | BODY MASS INDEX: 31.49 KG/M2 | WEIGHT: 189 LBS

## 2022-01-19 DIAGNOSIS — Z12.31 ENCOUNTER FOR SCREENING MAMMOGRAM FOR MALIGNANT NEOPLASM OF BREAST: ICD-10-CM

## 2022-01-19 PROCEDURE — 77067 MAMMO DIGITAL SCREENING BILAT WITH TOMO: ICD-10-PCS | Mod: 26,,, | Performed by: RADIOLOGY

## 2022-01-19 PROCEDURE — 77063 BREAST TOMOSYNTHESIS BI: CPT | Mod: 26,,, | Performed by: RADIOLOGY

## 2022-01-19 PROCEDURE — 77063 MAMMO DIGITAL SCREENING BILAT WITH TOMO: ICD-10-PCS | Mod: 26,,, | Performed by: RADIOLOGY

## 2022-01-19 PROCEDURE — 77067 SCR MAMMO BI INCL CAD: CPT | Mod: TC

## 2022-01-19 PROCEDURE — 77067 SCR MAMMO BI INCL CAD: CPT | Mod: 26,,, | Performed by: RADIOLOGY

## 2022-01-19 PROCEDURE — 77063 BREAST TOMOSYNTHESIS BI: CPT | Mod: TC

## 2022-02-03 RX ORDER — MELOXICAM 7.5 MG/1
TABLET ORAL
Qty: 90 TABLET | Refills: 1 | Status: SHIPPED | OUTPATIENT
Start: 2022-02-03 | End: 2022-10-26

## 2022-02-03 NOTE — TELEPHONE ENCOUNTER
Requested medication has been pended and routed to Dr. Hernandez for approval. LOV 1/11/202. Unable to associate dx with requesting medications due to not being listed under previous or problem list.

## 2022-02-07 ENCOUNTER — CLINICAL SUPPORT (OUTPATIENT)
Dept: REHABILITATION | Facility: HOSPITAL | Age: 72
End: 2022-02-07
Payer: MEDICARE

## 2022-02-07 DIAGNOSIS — M54.9 MUSCULOSKELETAL BACK PAIN: ICD-10-CM

## 2022-02-07 DIAGNOSIS — M54.50 ACUTE RIGHT-SIDED LOW BACK PAIN WITHOUT SCIATICA: ICD-10-CM

## 2022-02-07 PROBLEM — G89.29 CHRONIC PAIN OF RIGHT KNEE: Status: RESOLVED | Noted: 2020-02-04 | Resolved: 2022-02-07

## 2022-02-07 PROBLEM — R26.89 DECREASED FUNCTIONAL MOBILITY: Status: RESOLVED | Noted: 2020-02-04 | Resolved: 2022-02-07

## 2022-02-07 PROBLEM — R29.898 DECREASED STRENGTH OF LOWER EXTREMITY: Status: RESOLVED | Noted: 2020-02-04 | Resolved: 2022-02-07

## 2022-02-07 PROBLEM — M25.69 DECREASED ROM OF TRUNK AND BACK: Status: RESOLVED | Noted: 2021-05-13 | Resolved: 2022-02-07

## 2022-02-07 PROBLEM — M25.561 CHRONIC PAIN OF RIGHT KNEE: Status: RESOLVED | Noted: 2020-02-04 | Resolved: 2022-02-07

## 2022-02-07 PROBLEM — R29.898 DECREASED STRENGTH OF TRUNK AND BACK: Status: RESOLVED | Noted: 2021-05-13 | Resolved: 2022-02-07

## 2022-02-07 PROBLEM — M25.661 DECREASED ROM OF RIGHT KNEE: Status: RESOLVED | Noted: 2020-02-04 | Resolved: 2022-02-07

## 2022-02-07 PROCEDURE — 97161 PT EVAL LOW COMPLEX 20 MIN: CPT | Mod: HCNC,PN

## 2022-02-07 PROCEDURE — 97110 THERAPEUTIC EXERCISES: CPT | Mod: HCNC,PN

## 2022-02-07 NOTE — PLAN OF CARE
[facility]  Physical Therapy Initial Evaluation  Date: 2/7/2022   Name: Tanesha Davis  Clinic Number: [id number]    Therapy Diagnosis:   Encounter Diagnoses   Name Primary?    Musculoskeletal back pain     Acute right-sided low back pain without sciatica      Physician: Kaylah Stokes MD    Physician Orders: PT Eval and Treat   Medical Diagnosis from Referral: Musculoskeletal back pain  Evaluation Date: 2/7/2022  Authorization Period Expiration: 01/07/2023  Plan of Care Expiration: 5-7-22  Visit # / Visits authorized: 1/ 1   Progress Note Due: 3-7-22  FOTO: 1/ 1    Precautions: Standard    Time In: 11:15 pm  Time Out: 12:00 pm   Total Appointment Time (timed & untimed codes): 45 minutes    Subjective   Date of onset: 6 to 8 weeks.   History of current condition - Alice reports: that she has R lower back and side pain. Pt states she complete ran back program in October 2021. Pt states about 6 to 8 weeks ago she felt R lower back and radiated towards to R side pain. Pt states she tried stretches. Pt states she cries and sometimes she throw up because of pain. Pt denies pain radiating down towards LE. Pt states she has some muscles cramp on R or L at night.     WAGNER: Pt denies any fall or traumatic event.   Any Bowel and Bladder movement issues: No   Any falls: No  Any dizziness: No  Any Headache: No  Any injection in lower back: steroid or epidural: no  Pain radiates: R lower back towards lateral side   Pain constant or intermitting: intermittent     Pain:  Current 8/10, worst 10/10, best 3/10   Location: right lower back pain and radiated to lateral side   Description: Sharp  Aggravating Factors: Sitting, Standing, Flexing and Lifting  Easing Factors: hot bath    Prior Therapy: yes. Health back   Social History:  lives with their family  Occupation: Owns dance studio  Prior Level of Function: independent   Current Level of Function: independent     Pts goals: decrease lower back  "pain     Imaging, MRI studies:   Impression:     There are multilevel degenerative changes within the spinal canal which are further detailed above.  At the L3-4 and L4-5 levels there is a mild degree of spinal canal narrowing and at L5-S1 there is a moderate degree of bilateral neural foraminal narrowing.       Medical History:   Past Medical History:   Diagnosis Date    Arthritis     Draining cutaneous sinus tract     Headache(784.0)     Incisional hernia     Insomnia     Nocturnal leg cramps     Nonhealing surgical wound     PONV (postoperative nausea and vomiting)     with last surgery per patient, "Severe" PONV 2-27-14    Postmenopausal status     Rectosigmoid cancer        Surgical History:   Tanesha Davis  has a past surgical history that includes Hysterectomy; Knee surgery; Oophorectomy; Cholecystectomy (02/24/2014); Knee arthroscopy; Colonoscopy (N/A, 8/23/2016); Repair of recurrent incisional hernia (7/10/2018); Sigmoidectomy (03/20/2006); Colonoscopy (N/A, 12/9/2019); Breast biopsy (Left); and Esophagogastroduodenoscopy (N/A, 4/22/2021).    Medications:   Tanesha has a current medication list which includes the following prescription(s): ergocalciferol (vitamin d2), estradiol, ferrous sulfate, gabapentin, l gasseri/b bifidum/b longum, linaclotide, magnesium oxide-pyridoxine, meloxicam, tizanidine, and vitamin e acetate.    Allergies:   Review of patient's allergies indicates:   Allergen Reactions    Adhesive Rash     Asking that we use Paper Tape,  Uses "Sensitive Skin" band aids    Amoxil [amoxicillin] Hives    Penicillins Hives and Swelling     Swelling of ears and neck    Sulfa (sulfonamide antibiotics) Hives and Itching          Objective       Observation:     Posture Alignment: slouched posture;forward head;    Sensation: intact to light touch    DTR:: intact to light touch    GAIT DEVIATIONS: Tanesha displays no major deviation    Lumbar Range of Motion:    %   Flexion 65 deg   Extension 10 deg " with R lower back pain     Left Side Bending 8 deg with stabbing pain   Right Side Bending 2 deg with pain   Left rotation   Mod loss with pain    Right Rotation   Unable to perform due to pain    *= pain    mity Strength    Right LE  Left LE    Hip flexion: 4-/5 Hip flexion: 4+/5   Knee extension: 4/5 Knee extension: 4+/5   Knee flexion: 4/5 Knee flexion: 4+/5   Hip IR: 4-/5 Hip IR: 4+/5   Hip ER: 4-/5 Hip ER: 4+/5   Hip extension:  4-/5 Hip extension: 4+/5   Hip abduction: 4-/5 Hip abduction: 4+/5   Hip adduction: 4-/5 Hip adduction 4+/5   Ankle dorsiflexion: 4+/5 Ankle dorsiflexion: 4+/5   Ankle plantarflexion: 4+/5 Ankle plantarflexion: 4+/5     Special Tests:          Neuro Dynamic Testing:    Sciatic nerve:      SLR: negative       Neural Tension:     Slump test: negative    Palpation: Severe pain at R QL region.     Flexibility:    Ely's test: R =  Negative  ; L = negative    Popliteal Angle: R = negative degrees ; L = negative degrees      PT Evaluation Completed? Yes  Discussed Plan of Care with patient: Yes        CMS Impairment/Limitation/Restriction for FOTO Lumbar Spine Survey  Status Limitation G-Code CMS Severity Modifier  Intake 56% 44% Current Status CK - At least 40 percent but less than 60 percent  Predicted 66% 34% Goal Status+ CJ - At least 20 percent but less than 40 percent  D/C Status CK **only report if this is a one time visit  +Based on FOTO predicted change score    TREATMENT   Treatment Time In: 11:50 am  Treatment Time Out:12:00 pm  Total Treatment time separate from Evaluation: 10 minutes    Alice received therapeutic exercises to develop ROM and flexibility for 10 minutes including:  Supine QL stretch   Pelvic tilt  Hamstring stretch       Home Exercises and Patient Education Provided    Education provided:   - Perform HEP 2 times per day     Written Home Exercises Provided: Patient instructed to cont prior HEP.  Exercises were reviewed and Alice was able to demonstrate them prior  to the end of the session.  Alice demonstrated good  understanding of the education provided.     See EMR under Patient Instructions for exercises provided 2/7/2022.    Assessment   Tanesha is a 71 y.o. female referred to outpatient Physical Therapy with a medical diagnosis of Musculoskeletal back pain. Pt presents to therapy with acute R lower back pain Pain radiates lateral to R side. Pain is localized around R QL region. During palpation, severe R QL pain. Pt has decreased lumbar AROM and R LE muscles strength. Pt was negative for sciatica nerve pathology. I believe pt has R QL strain. Pt will benefit from skilled PT services to decrease pain and return to work.     Pt prognosis is Good.   Pt will benefit from skilled outpatient Physical Therapy to address the deficits stated above and in the chart below, provide pt/family education, and to maximize pt's level of independence.     Plan of care discussed with patient: Yes  Pt's spiritual, cultural and educational needs considered and patient is agreeable to the plan of care and goals as stated below:     Anticipated Barriers for therapy: None     Medical Necessity is demonstrated by the following  History  Co-morbidities and personal factors that may impact the plan of care Co-morbidities:   not identified    Personal Factors:   no deficits     low   Examination  Body Structures and Functions, activity limitations and participation restrictions that may impact the plan of care Body Regions:   back    Body Systems:    ROM  strength  flexibility    Participation Restrictions:  Work    Activity limitations:   Learning and applying knowledge  no deficits    General Tasks and Commands  no deficits    Communication  no deficits    Mobility  lifting and carrying objects  fine hand use (grasping/picking up)  walking  moving around using equipment (WC)  using transportation (bus, train, plane, car)  driving (bike, car, motorcycle)    Self care  no deficits    Domestic  Life  shopping  cooking  doing house work (cleaning house, washing dishes, laundry)    Interactions/Relationships  no deficits    Life Areas  no deficits    Community and Social Life  community life         Complexity: low  See FOTO outcome assessment   Clinical Presentation stable and uncomplicated low   Decision Making/ Complexity Score: low     GOALS: Short Term Goals:  4 weeks  1. Report decreased in pain at worse less than  <   / =  5  /10  to increase tolerance for functional activities.On going  2. Increased R LE MMT 1/2  to increase tolerance for ADL and work activities.On going  3. Pt to tolerate HEP to improve ROM and independence with ADL's.On going  4. Pt to improve range of motion by 25% to allow for improved functional mobility to allow for improvement in IADLs. On going    Long Term Goals: 8 weeks  1. Report decreased in pain at worse less than  <   / =  2 /10  to increase tolerance for functional mobility.  On going  2. Increased R LEMMT 1 grade to increase tolerance for ADL and work activities.On going  3. Pt will report at 34% or less limitation on FOTO Lumbar spine survey  to demonstrate decrease in disability and improvement in back pain.On going  4. Pt to be Independent with HEP to improve ROM and independence with ADL's. On going  5. Pt to demonstrate negative Bridge Test in order to show improved core strength for lumbar stabilization. On going  6. Pt to improve range of motion by 75% to allow for improved functional mobility to allow for improvement in IADLs. On going      Plan   Plan of care Certification: 2/7/2022 to 5-7-22    Outpatient Physical Therapy 2 times weekly for 12 weeks to include the following interventions: Cervical/Lumbar Traction, Manual Therapy, Moist Heat/ Ice, Neuromuscular Re-ed, Patient Education, Therapeutic Activities and Therapeutic Exercise. Emmy Prieto, PT      I CERTIFY THE NEED FOR THESE SERVICES FURNISHED UNDER THIS PLAN OF TREATMENT AND  WHILE UNDER MY CARE   Physician's comments:     Physician's Signature: ___Kaylah Stokes MD_

## 2022-03-08 ENCOUNTER — CLINICAL SUPPORT (OUTPATIENT)
Dept: REHABILITATION | Facility: HOSPITAL | Age: 72
End: 2022-03-08
Payer: MEDICARE

## 2022-03-08 DIAGNOSIS — M54.50 ACUTE BILATERAL LOW BACK PAIN WITHOUT SCIATICA: Primary | ICD-10-CM

## 2022-03-08 PROCEDURE — 97140 MANUAL THERAPY 1/> REGIONS: CPT | Mod: HCNC,PN

## 2022-03-08 PROCEDURE — 97110 THERAPEUTIC EXERCISES: CPT | Mod: HCNC,PN

## 2022-03-08 NOTE — PROGRESS NOTES
[facility]   Physical Therapy Treatment Note     Name: Tanesha Davis  Clinic Number: [id number]    Therapy Diagnosis:   Encounter Diagnosis   Name Primary?    Acute bilateral low back pain without sciatica Yes     Physician: Kaylah Stokes MD    Visit Date: 3/8/2022    Physician: Kaylah Stokes MD     Physician Orders: PT Eval and Treat   Medical Diagnosis from Referral: Musculoskeletal back pain  Evaluation Date: 2/7/2022  Authorization Period Expiration: 05/07/2022  Plan of Care Expiration: 5-7-22  Visit # / Visits authorized: 1/ 20  Progress Note Due: 4-8-22  FOTO: 1/ 1     Precautions: Standard    Time In: 10:00 am  Time Out: 10:55 am  Total Billable Time: 45 minutes      SUBJECTIVE     Pt reports: that she still have pain at R lower back. Pain is localized lateral side. Pt still have pain at night. She has pain when she reach out for objects.   She was compliant with home exercise program.  Response to previous treatment: None  Functional change: No change    Pain: 5/10  Location:right lower back pain and radiated to lateral side     OBJECTIVE     Objective Measures updated at progress report unless specified.       Lumbar Range of Motion:     %   Flexion 65 deg   Extension 10 deg with R lower back pain      Left Side Bending 8 deg with stabbing pain   Right Side Bending 2 deg with pain   Left rotation    Mod loss with pain    Right Rotation    Unable to perform due to pain    *= pain     mity Strength     Right LE   Left LE     Hip flexion: 4-/5 Hip flexion: 4+/5   Knee extension: 4/5 Knee extension: 4+/5   Knee flexion: 4/5 Knee flexion: 4+/5   Hip IR: 4-/5 Hip IR: 4+/5   Hip ER: 4-/5 Hip ER: 4+/5   Hip extension:  4-/5 Hip extension: 4+/5   Hip abduction: 4-/5 Hip abduction: 4+/5   Hip adduction: 4-/5 Hip adduction 4+/5   Ankle dorsiflexion: 4+/5 Ankle dorsiflexion: 4+/5   Ankle plantarflexion: 4+/5 Ankle plantarflexion: 4+/5      Special Tests:              Neuro Dynamic  Testing:               Sciatic nerve:                                       SLR: negative                                        Neural Tension:                           Slump test: negative     Palpation: Severe pain at R QL region.      Flexibility:               Ely's test: R =  Negative  ; L = negative               Popliteal Angle: R = negative degrees ; L = negative degrees        TREATMENT     Alice received the treatments listed below:      received therapeutic exercises to develop strength and ROM for 35  minutes including:    Nustep 6 min  Seated  QL stretch with bosu ball to R QL only 5x30 sec   Open book R side only 20x   LTR with yoga ball 2 min   Supine QL stretch 5x30 sec  Pelvic tilt 3x10 hold 2 sec   Hamstring stretch 3x30 sec       received the following manual therapy techniques: Soft tissue Mobilization were applied to the: R QL for 10 minutes, including:  SMT of R QL    PATIENT EDUCATION AND HOME EXERCISES     Home Exercises and Patient Education Provided     Education provided:   - Perform HEP 2 times per day      Written Home Exercises Provided: Patient instructed to cont prior HEP.  Exercises were reviewed and Alice was able to demonstrate them prior to the end of the session.  Alice demonstrated good  understanding of the education provided.      See EMR under Patient Instructions for exercises provided 2/7/2022.    ASSESSMENT     Pt presented to today for the first f/u. Pt was re-assessed. Pt demonstrated the same scores and pain as initial eval. Pt has me t0/4 STGs. PT session today was focusing in improve QL flexibility to decrease R lower back pain. Pt required mod v/c's to perform exercises with proper form and stretches. Pt demonstrated minor provocation of R  QL during therex today. During palpation, PT noticed soft tissue restriction of with pain during palpation. Pt will cont benefiting from skilled PT services to decrease R QL pain and return to PLOF.     Alice Is progressing  well towards her goals.   Pt prognosis is Good.     Pt will continue to benefit from skilled outpatient physical therapy to address the deficits listed in the problem list box on initial evaluation, provide pt/family education and to maximize pt's level of independence in the home and community environment.     Pt's spiritual, cultural and educational needs considered and pt agreeable to plan of care and goals.     Anticipated barriers to physical therapy: none      GOALS: Short Term Goals:  4 weeks  1. Report decreased in pain at worse less than  <   / =  5  /10  to increase tolerance for functional activities.Goal not met 3-8-22  2. Increased R LE MMT 1/2  to increase tolerance for ADL and work activities. Goal not met 3-8-22  3. Pt to tolerate HEP to improve ROM and independence with ADL's. Goal not met 3-8-22  4. Pt to improve range of motion by 25% to allow for improved functional mobility to allow for improvement in IADLs. Goal not met 3-8-22     Long Term Goals: 8 weeks  1. Report decreased in pain at worse less than  <   / =  2 /10  to increase tolerance for functional mobility.  On going  2. Increased R LEMMT 1 grade to increase tolerance for ADL and work activities.On going  3. Pt will report at 34% or less limitation on FOTO Lumbar spine survey  to demonstrate decrease in disability and improvement in back pain.On going  4. Pt to be Independent with HEP to improve ROM and independence with ADL's. On going  5. Pt to demonstrate negative Bridge Test in order to show improved core strength for lumbar stabilization. On going  6. Pt to improve range of motion by 75% to allow for improved functional mobility to allow for improvement in IADLs. On going    PLAN     Plan of care Certification: 2/7/2022 to 5-7-22    Alvaro Prieto PT

## 2022-03-11 ENCOUNTER — CLINICAL SUPPORT (OUTPATIENT)
Dept: REHABILITATION | Facility: HOSPITAL | Age: 72
End: 2022-03-11
Payer: MEDICARE

## 2022-03-11 DIAGNOSIS — M54.50 ACUTE RIGHT-SIDED LOW BACK PAIN WITHOUT SCIATICA: Primary | ICD-10-CM

## 2022-03-11 PROCEDURE — 97110 THERAPEUTIC EXERCISES: CPT | Mod: HCNC,PN

## 2022-03-11 PROCEDURE — 97140 MANUAL THERAPY 1/> REGIONS: CPT | Mod: HCNC,PN

## 2022-03-11 NOTE — PROGRESS NOTES
[facility]   Physical Therapy Treatment Note     Name: Tanesha Davis  Clinic Number: [id number]    Therapy Diagnosis:   Encounter Diagnosis   Name Primary?    Acute right-sided low back pain without sciatica Yes     Physician: Kaylah Stokes MD    Visit Date: 3/11/2022    Physician: Kaylah Stokes MD     Physician Orders: PT Eval and Treat   Medical Diagnosis from Referral: Musculoskeletal back pain  Evaluation Date: 2/7/2022  Authorization Period Expiration: 05/07/2022  Plan of Care Expiration: 5-7-22  Visit # / Visits authorized: 3/ 20  Progress Note Due: 4-8-22  FOTO: 1/ 1     Precautions: Standard    Time In: 10:00 am  Time Out: 10:55 am  Total Billable Time: 45 minutes      SUBJECTIVE     Pt reports: that she cont with R lower back pain. Pt states stretches and exercises is not helping.   She was compliant with home exercise program.  Response to previous treatment: None  Functional change: No change    Pain: 5/10  Location:right lower back pain and radiated to lateral side     OBJECTIVE     Objective Measures updated at progress report unless specified.       TREATMENT     Alice received the treatments listed below:      received therapeutic exercises to develop strength and ROM for 30 minutes including:    Nustep 6 min  Seated  QL stretch with bosu ball to R QL only 5x30 sec   Open book R side only 20x   LTR with yoga ball 2 min   Supine QL stretch 5x30 sec  Pelvic tilt 3x10 hold 2 sec   Hamstring stretch 3x30 sec       received the following manual therapy techniques: Soft tissue Mobilization were applied to the: R QL for 15 minutes, including:    Vacuum/cupping STM with manual therapy techniques was performed to R QL to decrease muscle tightness, increase circulation and promote healing process. The pt's skin was monitored for redness adjusting pressure as needed. The pt was instructed in possible side effects of bruising and/or soreness.     Pt cleared of contraindications  and verbal and written consent acquired. Pt given option of copy of consent form. Pt educated on benefits and potential side effects of DN. DN for 5minutes with pt in L sidelying position with involved side R QL. Lumbosacral spine protocol with  winding.  Patient provided written and verbal consent to receive functional dry needling at today's visit (see consent form scanned into chart). FDN performed to R QL mm. FDN performed to reduce pain and muscle tension, promote blood flow, and improve ROM and function. Pt tolerated tx well without adverse effects. She was educated on what to expect following the procedure and she verbalized understanding.      Pt received 10 min of heat pack in side lying position to decrease pain and promote healing.       PATIENT EDUCATION AND HOME EXERCISES     Home Exercises and Patient Education Provided     Education provided:   - Perform HEP 2 times per day      Written Home Exercises Provided: Patient instructed to cont prior HEP.  Exercises were reviewed and Alice was able to demonstrate them prior to the end of the session.  Alice demonstrated good  understanding of the education provided.      See EMR under Patient Instructions for exercises provided 2/7/2022.    ASSESSMENT     Pt presented to today with same pain as initial eval. Pt cont with lateral lower back pain at R QL region. Pt tolerated fair stretches techniques with somewhat provocation of R QL pain. Pt required mod v/c's to perform exercises with proper form. Dry needling was performed R QL. Only 1 needles was inserted. Needles was inserted shallow today. Pt did not have any side effects after dry needling. Cupping technique was also performed. Pt also did not have any side effects. Pt was instructed to assessed effect of dry needling and cupping in the next 24 or 48 hours. Dry needling, cupping and stretches should help decrease R QL pain and improve quality of life. Pt will cont benefiting from skilled PT services  to decrease R QL pain and return to PLOF.     Alice Is progressing well towards her goals.   Pt prognosis is Good.     Pt will continue to benefit from skilled outpatient physical therapy to address the deficits listed in the problem list box on initial evaluation, provide pt/family education and to maximize pt's level of independence in the home and community environment.     Pt's spiritual, cultural and educational needs considered and pt agreeable to plan of care and goals.     Anticipated barriers to physical therapy: none      GOALS: Short Term Goals:  4 weeks  1. Report decreased in pain at worse less than  <   / =  5  /10  to increase tolerance for functional activities.Goal not met 3-8-22  2. Increased R LE MMT 1/2  to increase tolerance for ADL and work activities. Goal not met 3-8-22  3. Pt to tolerate HEP to improve ROM and independence with ADL's. Goal not met 3-8-22  4. Pt to improve range of motion by 25% to allow for improved functional mobility to allow for improvement in IADLs. Goal not met 3-8-22     Long Term Goals: 8 weeks  1. Report decreased in pain at worse less than  <   / =  2 /10  to increase tolerance for functional mobility.  On going  2. Increased R LEMMT 1 grade to increase tolerance for ADL and work activities.On going  3. Pt will report at 34% or less limitation on FOTO Lumbar spine survey  to demonstrate decrease in disability and improvement in back pain.On going  4. Pt to be Independent with HEP to improve ROM and independence with ADL's. On going  5. Pt to demonstrate negative Bridge Test in order to show improved core strength for lumbar stabilization. On going  6. Pt to improve range of motion by 75% to allow for improved functional mobility to allow for improvement in IADLs. On going    PLAN     Plan of care Certification: 2/7/2022 to 5-7-22    Alvaro Prieto, PT

## 2022-03-15 ENCOUNTER — CLINICAL SUPPORT (OUTPATIENT)
Dept: REHABILITATION | Facility: HOSPITAL | Age: 72
End: 2022-03-15
Payer: MEDICARE

## 2022-03-15 DIAGNOSIS — M54.50 ACUTE RIGHT-SIDED LOW BACK PAIN WITHOUT SCIATICA: Primary | ICD-10-CM

## 2022-03-15 PROCEDURE — 97110 THERAPEUTIC EXERCISES: CPT | Mod: HCNC,PN

## 2022-03-15 PROCEDURE — 97140 MANUAL THERAPY 1/> REGIONS: CPT | Mod: HCNC,PN

## 2022-03-15 NOTE — PROGRESS NOTES
[facility]   Physical Therapy Treatment Note     Name: Tanesha Davis  Clinic Number: [id number]    Therapy Diagnosis:   Encounter Diagnosis   Name Primary?    Acute right-sided low back pain without sciatica Yes     Physician: Kaylah Stokes MD    Visit Date: 3/15/2022    Physician: Kaylah Stokes MD     Physician Orders: PT Eval and Treat   Medical Diagnosis from Referral: Musculoskeletal back pain  Evaluation Date: 2/7/2022  Authorization Period Expiration: 05/07/2022  Plan of Care Expiration: 5-7-22  Visit # / Visits authorized: 4/ 20  Progress Note Due: 4-8-22  FOTO: 1/ 1     Precautions: Standard    Time In: 9:15 am  Time Out: 10:00 am  Total Billable Time: 45 minutes      SUBJECTIVE     Pt reports: that she cont with R lower back pain. Pt states dry needling and cupping did not make any difference last visit.    She was compliant with home exercise program.  Response to previous treatment: None  Functional change: No change    Pain: 5/10  Location:right lower back pain and radiated to lateral side     OBJECTIVE     Objective Measures updated at progress report unless specified.       TREATMENT     Alice received the treatments listed below:      received therapeutic exercises to develop strength and ROM for 35 minutes including:    Nustep 6 min  Seated  QL stretch with bosu ball to R QL only 5x30 sec   Open book R side only 30x   LTR with yoga ball 2 min   Supine QL stretch 5x30 sec  Pelvic tilt 3x10 hold 2 sec   Hamstring stretch 3x30 sec       received the following manual therapy techniques: Soft tissue Mobilization were applied to the: R QL for 10 minutes, including:    Pt cleared of contraindications and verbal and written consent acquired. Pt given option of copy of consent form. Pt educated on benefits and potential side effects of DN. DN for 10 minutes with pt in L sidelying position with involved side R QL. Lumbosacral spine protocol with  Winding. Pt received 10 min  E-stim at parameters of 2 hz and 250 us.   Patient provided written and verbal consent to receive functional dry needling at today's visit (see consent form scanned into chart). FDN performed to R QL mm. FDN performed to reduce pain and muscle tension, promote blood flow, and improve ROM and function. Pt tolerated tx well without adverse effects. She was educated on what to expect following the procedure and she verbalized understanding.      Pt received 10 min of heat pack in side lying position to decrease pain and promote healing.       PATIENT EDUCATION AND HOME EXERCISES     Home Exercises and Patient Education Provided     Education provided:   - Perform HEP 2 times per day      Written Home Exercises Provided: Patient instructed to cont prior HEP.  Exercises were reviewed and Alice was able to demonstrate them prior to the end of the session.  Alice demonstrated good  understanding of the education provided.      See EMR under Patient Instructions for exercises provided 2/7/2022.    ASSESSMENT     Pt presented to today to therapy with R QL pain. Same stretches were performed today. Pt required minor v/c's. She did experience some provocation of R QL pain. Dry needling was performed R QL. Only 2 needles was inserted. Needles was inserted shallow today. Pt did not have any side effects after dry needling. E-stim was performed to help decrease pain. Pt was instructed to assessed effect of dry needling and cupping in the next 24 or 48 hours. Dry needling, cupping and stretches should help decrease R QL pain and improve quality of life. Pt will cont benefiting from skilled PT services to decrease R QL pain and return to PLOF.     Alice Is progressing well towards her goals.   Pt prognosis is Good.     Pt will continue to benefit from skilled outpatient physical therapy to address the deficits listed in the problem list box on initial evaluation, provide pt/family education and to maximize pt's level of  independence in the home and community environment.     Pt's spiritual, cultural and educational needs considered and pt agreeable to plan of care and goals.     Anticipated barriers to physical therapy: none      GOALS: Short Term Goals:  4 weeks  1. Report decreased in pain at worse less than  <   / =  5  /10  to increase tolerance for functional activities.Goal not met 3-8-22  2. Increased R LE MMT 1/2  to increase tolerance for ADL and work activities. Goal not met 3-8-22  3. Pt to tolerate HEP to improve ROM and independence with ADL's. Goal not met 3-8-22  4. Pt to improve range of motion by 25% to allow for improved functional mobility to allow for improvement in IADLs. Goal not met 3-8-22     Long Term Goals: 8 weeks  1. Report decreased in pain at worse less than  <   / =  2 /10  to increase tolerance for functional mobility.  On going  2. Increased R LEMMT 1 grade to increase tolerance for ADL and work activities.On going  3. Pt will report at 34% or less limitation on FOTO Lumbar spine survey  to demonstrate decrease in disability and improvement in back pain.On going  4. Pt to be Independent with HEP to improve ROM and independence with ADL's. On going  5. Pt to demonstrate negative Bridge Test in order to show improved core strength for lumbar stabilization. On going  6. Pt to improve range of motion by 75% to allow for improved functional mobility to allow for improvement in IADLs. On going    PLAN     Plan of care Certification: 2/7/2022 to 5-7-22    Alvaro Prieto, PT

## 2022-03-21 ENCOUNTER — CLINICAL SUPPORT (OUTPATIENT)
Dept: REHABILITATION | Facility: HOSPITAL | Age: 72
End: 2022-03-21
Payer: MEDICARE

## 2022-03-21 DIAGNOSIS — M54.50 ACUTE RIGHT-SIDED LOW BACK PAIN WITHOUT SCIATICA: Primary | ICD-10-CM

## 2022-03-21 PROCEDURE — 97140 MANUAL THERAPY 1/> REGIONS: CPT | Mod: PN,CQ

## 2022-03-21 PROCEDURE — 97110 THERAPEUTIC EXERCISES: CPT | Mod: PN,CQ

## 2022-03-22 NOTE — PROGRESS NOTES
[facility]   Physical Therapy Treatment Note     Name: Tanesha Davis  Clinic Number: [id number]    Therapy Diagnosis:   Encounter Diagnosis   Name Primary?    Right low back pain, unspecified chronicity, unspecified whether sciatica present Yes     Physician: Kaylah Stoeks MD    Visit Date: 3/23/2022    Physician: Kaylah Stokes MD     Physician Orders: PT Eval and Treat   Medical Diagnosis from Referral: Musculoskeletal back pain  Evaluation Date: 2/7/2022  Authorization Period Expiration: 05/07/2022  Plan of Care Expiration: 5-7-22  Visit # / Visits authorized: 5/ 20  Progress Note Due: 4-8-22  FOTO: 1/ 1     Precautions: Standard    Time In: 1035  Time Out: 1125  Total Billable Time: 50 minutes      SUBJECTIVE     Pt reports: Pt states she feels a little better but was hoping to be feeling better by now.      She was compliant with home exercise program.  Response to previous treatment: soreness  Functional change: No change    Pain: 5/10  Location: right lower back pain and radiated to lateral side     OBJECTIVE     Objective Measures updated at progress report unless specified.       TREATMENT     Alice received the treatments listed below:      received therapeutic exercises to develop strength and ROM for 35 minutes including:    Nustep 6 min  Seated  QL stretch with bosu ball to R QL only 5x30 sec   Open book R side only 30x   LTR with yoga ball 2 min   Supine QL stretch 5x30 sec-NP  Pelvic tilt 3x10 hold 2 sec   Hamstring stretch 3x30 sec   R Sidebend w/ 7.5lb KB 15x (weight in right hand)  Paloff Press w/ RTB 20x/ea  +QL stretch in doorway 3x30s    Alice received the following manual therapy techniques: Soft tissue Mobilization were applied to the: R QL for 15 minutes, including:  STM to right QL, R QL stretching in prone, R QL release  R lumbar caudal stretching in L sidelying  R lumbar gapping in L sidelying          Pt received 10 min of heat pack in side lying  position to decrease pain and promote healing.       PATIENT EDUCATION AND HOME EXERCISES     Home Exercises and Patient Education Provided     Education provided:   -       Written Home Exercises Provided: Patient instructed to cont prior HEP.  Exercises were reviewed and Alice was able to demonstrate them prior to the end of the session.  Alice demonstrated good  understanding of the education provided.      See EMR under Patient Instructions for exercises provided 2/7/2022.    ASSESSMENT     Continued with previously prescribed exercises today. Introduced standing QL stretch for additional stretching and instructed pt to perform at home to help decrease mm tightness- pt verbalized understanding. Spent more time on manual intervention today to address QL tightness. She had good tolerance to all techniques performed with general tenderness reported in QL however denies any increased pain symptoms. Utilized heat at end of session to help decrease tightness and pain. Pt will benefit from additional core strengthening and lumbopelvic stabilization exercises in upcoming sessions.     Alice Is progressing well towards her goals.   Pt prognosis is Good.     Pt will continue to benefit from skilled outpatient physical therapy to address the deficits listed in the problem list box on initial evaluation, provide pt/family education and to maximize pt's level of independence in the home and community environment.     Pt's spiritual, cultural and educational needs considered and pt agreeable to plan of care and goals.     Anticipated barriers to physical therapy: none      GOALS: Short Term Goals:  4 weeks  1. Report decreased in pain at worse less than  <   / =  5  /10  to increase tolerance for functional activities.Goal not met 3-8-22  2. Increased R LE MMT 1/2  to increase tolerance for ADL and work activities. Goal not met 3-8-22  3. Pt to tolerate HEP to improve ROM and independence with ADL's. Goal not met  3-8-22  4. Pt to improve range of motion by 25% to allow for improved functional mobility to allow for improvement in IADLs. Goal not met 3-8-22     Long Term Goals: 8 weeks  1. Report decreased in pain at worse less than  <   / =  2 /10  to increase tolerance for functional mobility.  On going  2. Increased R LEMMT 1 grade to increase tolerance for ADL and work activities.On going  3. Pt will report at 34% or less limitation on FOTO Lumbar spine survey  to demonstrate decrease in disability and improvement in back pain.On going  4. Pt to be Independent with HEP to improve ROM and independence with ADL's. On going  5. Pt to demonstrate negative Bridge Test in order to show improved core strength for lumbar stabilization. On going  6. Pt to improve range of motion by 75% to allow for improved functional mobility to allow for improvement in IADLs. On going    PLAN     Plan of care Certification: 2/7/2022 to 5-7-22    Cole Mcintosh, PTA   03/23/2022

## 2022-03-23 ENCOUNTER — CLINICAL SUPPORT (OUTPATIENT)
Dept: REHABILITATION | Facility: HOSPITAL | Age: 72
End: 2022-03-23
Payer: MEDICARE

## 2022-03-23 DIAGNOSIS — M54.50 RIGHT LOW BACK PAIN, UNSPECIFIED CHRONICITY, UNSPECIFIED WHETHER SCIATICA PRESENT: Primary | ICD-10-CM

## 2022-03-23 PROCEDURE — 97140 MANUAL THERAPY 1/> REGIONS: CPT | Mod: PN,CQ

## 2022-03-23 PROCEDURE — 97110 THERAPEUTIC EXERCISES: CPT | Mod: PN,CQ

## 2022-03-25 NOTE — PROGRESS NOTES
[facility]   Physical Therapy Treatment Note     Name: Tanesha Davis  Clinic Number: [id number]    Therapy Diagnosis:   No diagnosis found.  Physician: Kaylah Stokes MD    Visit Date: 3/28/2022    Physician: Kaylah Stokes MD     Physician Orders: PT Eval and Treat   Medical Diagnosis from Referral: Musculoskeletal back pain  Evaluation Date: 2/7/2022  Authorization Period Expiration: 05/07/2022  Plan of Care Expiration: 5-7-22  Visit # / Visits authorized: 7/ 20  Progress Note Due: 4-8-22  FOTO: 1/ 1     Precautions: Standard    Time In: 1052AM  Time Out: 1140AM  Total Billable Time: 37 minutes      SUBJECTIVE     Pt reports: her back is about the same.     She was compliant with home exercise program.  Response to previous treatment: soreness  Functional change: No change    Pain: 5/10  Location: right lower back pain and radiated to lateral side     OBJECTIVE     Objective Measures updated at progress report unless specified.       TREATMENT     Alice received the treatments listed below:      received therapeutic exercises to develop strength and ROM for 32 minutes including:    Nustep    6 min  Paloff Press w/ GTB  20x/ea  Supine QL stretch   5 x 30 sec  R Sidebend w/ 7.5lb KB  15x (weight in right hand)  LTR with yoga ball   2 min   +SLR    2 x 10  +Bridges   2 x 10  +Supine Clamshells w/ RTB 2 x 10    NP:Seated QL stretch with bosu ball to R QL only 5x30 sec -NP  Open book R side only  30x   Pelvic tilt    3x10 hold 2 sec   Hamstring stretch   3x30 sec  +QL stretch in doorway  3x30s     Alice received the following manual therapy techniques: Soft tissue Mobilization were applied to the: R QL for 05 minutes, including:  STM to right QL, R QL stretching in prone, R QL release  R lumbar caudal stretching in L sidelying  R lumbar gapping in L sidelying      Pt received 10 min of heat pack in side lying position to decrease pain and promote healing.       PATIENT EDUCATION AND  HOME EXERCISES     Home Exercises and Patient Education Provided     Education provided:   -  Continue HEP  - Schedule follow up with doctor     Written Home Exercises Provided: Patient instructed to cont prior HEP.  Exercises were reviewed and Alice was able to demonstrate them prior to the end of the session.  Alice demonstrated good  understanding of the education provided.      See EMR under Patient Instructions for exercises provided 2/7/2022.    ASSESSMENT     Alice tolerated the above tx session well with minimal c/o pain. Continued with previously prescribed exercises today with the addition of BLE strengthening. Pt continues to report right sided low back pain that is essentially unchanged since Dry needling recommended to administer again NV for relief, but also instructed to pt to schedule follow-up with MD if said pain persists.      Alice Is progressing well towards her goals.   Pt prognosis is Good.     Pt will continue to benefit from skilled outpatient physical therapy to address the deficits listed in the problem list box on initial evaluation, provide pt/family education and to maximize pt's level of independence in the home and community environment.     Pt's spiritual, cultural and educational needs considered and pt agreeable to plan of care and goals.     Anticipated barriers to physical therapy: none      GOALS: Short Term Goals:  4 weeks  1. Report decreased in pain at worse less than  <   / =  5  /10  to increase tolerance for functional activities.Goal not met 3-8-22  2. Increased R LE MMT 1/2  to increase tolerance for ADL and work activities. Goal not met 3-8-22  3. Pt to tolerate HEP to improve ROM and independence with ADL's. Goal not met 3-8-22  4. Pt to improve range of motion by 25% to allow for improved functional mobility to allow for improvement in IADLs. Goal not met 3-8-22     Long Term Goals: 8 weeks  1. Report decreased in pain at worse less than  <   / =  2 /10  to  increase tolerance for functional mobility.  On going  2. Increased R LEMMT 1 grade to increase tolerance for ADL and work activities.On going  3. Pt will report at 34% or less limitation on FOTO Lumbar spine survey  to demonstrate decrease in disability and improvement in back pain.On going  4. Pt to be Independent with HEP to improve ROM and independence with ADL's. On going  5. Pt to demonstrate negative Bridge Test in order to show improved core strength for lumbar stabilization. On going  6. Pt to improve range of motion by 75% to allow for improved functional mobility to allow for improvement in IADLs. On going    PLAN     Plan of care Certification: 2/7/2022 to 5-7-22    Alisa Billings, PTA   03/28/2022

## 2022-03-28 ENCOUNTER — CLINICAL SUPPORT (OUTPATIENT)
Dept: REHABILITATION | Facility: HOSPITAL | Age: 72
End: 2022-03-28
Payer: MEDICARE

## 2022-03-28 DIAGNOSIS — M54.50 RIGHT LOW BACK PAIN, UNSPECIFIED CHRONICITY, UNSPECIFIED WHETHER SCIATICA PRESENT: Primary | ICD-10-CM

## 2022-03-28 PROCEDURE — 97110 THERAPEUTIC EXERCISES: CPT | Mod: PN,CQ

## 2022-03-30 ENCOUNTER — CLINICAL SUPPORT (OUTPATIENT)
Dept: REHABILITATION | Facility: HOSPITAL | Age: 72
End: 2022-03-30
Payer: MEDICARE

## 2022-03-30 DIAGNOSIS — M54.50 CHRONIC RIGHT-SIDED LOW BACK PAIN WITHOUT SCIATICA: Primary | ICD-10-CM

## 2022-03-30 DIAGNOSIS — G89.29 CHRONIC RIGHT-SIDED LOW BACK PAIN WITHOUT SCIATICA: Primary | ICD-10-CM

## 2022-03-30 PROCEDURE — 97110 THERAPEUTIC EXERCISES: CPT | Mod: PN

## 2022-03-30 PROCEDURE — 97140 MANUAL THERAPY 1/> REGIONS: CPT | Mod: PN

## 2022-03-30 NOTE — PROGRESS NOTES
[facility]   Physical Therapy Treatment Note     Name: Tanesha Davis  Clinic Number: [id number]    Therapy Diagnosis:   Encounter Diagnosis   Name Primary?    Chronic right-sided low back pain without sciatica Yes     Physician: Kaylah Stokes MD    Visit Date: 3/30/2022    Physician: Kaylah Stokes MD     Physician Orders: PT Eval and Treat   Medical Diagnosis from Referral: Musculoskeletal back pain  Evaluation Date: 2/7/2022  Authorization Period Expiration: 05/07/2022  Plan of Care Expiration: 5-7-22  Visit # / Visits authorized: 8/ 20  Progress Note Due: 4-30-22  FOTO: 1/ 1     Precautions: Standard    Time In: 10:50 am  Time Out: 11:40 am  Total Billable Time: 45 minutes      SUBJECTIVE     Pt reports: that she does not have constant pain anymore. Pt states the frequency of R lower back pain has decreased. She states she can sleep better at night.     She was compliant with home exercise program.  Response to previous treatment: soreness  Functional change: No change    Pain: 5/10  Location: right lower back pain and radiated to lateral side     OBJECTIVE     Objective Measures updated at progress report unless specified.     CMS Impairment/Limitation/Restriction for FOTO Lumbar Spine Survey  Status Limitation G-Code CMS Severity Modifier  Intake 56% 44%  Predicted 66% 34% Goal Status+ CJ - At least 20 percent but less than 40 percent  3/30/2022 57% 43% Current Status CK - At least 40 percent but less than 60 percent  D/C Status CK **only report if this is discharge survey  +Based on FOTO predicted change score    Lumbar Range of Motion:     %   Flexion 65 deg   Extension 10 deg with R lower back pain      Left Side Bending 14deg with stabbing pain   Right Side Bending 10 deg with pain   Left rotation    Mod loss with pain    Right Rotation    Unable to perform due to pain    *= pain     mity Strength     Right LE   Left LE     Hip flexion: 4-/5 Hip flexion: 4+/5   Knee  extension: 4/5 Knee extension: 4+/5   Knee flexion: 4/5 Knee flexion: 4+/5   Hip IR: 4-/5 Hip IR: 4+/5   Hip ER: 4-/5 Hip ER: 4+/5   Hip extension:  4-/5 Hip extension: 4+/5   Hip abduction: 4-/5 Hip abduction: 4+/5   Hip adduction: 4-/5 Hip adduction 4+/5   Ankle dorsiflexion: 4+/5 Ankle dorsiflexion: 4+/5   Ankle plantarflexion: 4+/5 Ankle plantarflexion: 4+/5               Palpation: Mod pain at R QL region.         TREATMENT     Alice received the treatments listed below:      received therapeutic exercises to develop strength and ROM for 35 minutes including:    Nustep    6 min  Paloff Press w/ GTB  20x/ea  Supine QL stretch   5 x 30 sec  R Sidebend w/ 7.5lb KB  15x (weight in right hand)  LTR with yoga ball   2 min   +SLR    2 x 10  +Bridges   2 x 10  +Supine Clamshells w/ RTB 2 x 10    NP:Seated QL stretch with bosu ball to R QL only 5x30 sec -NP  Open book R side only  30x   Pelvic tilt    3x10 hold 2 sec   Hamstring stretch   3x30 sec  +QL stretch in doorway  3x30s     Alice received the following manual therapy techniques: Soft tissue Mobilization were applied to the: R QL for 10 minutes, including:  STM to right QL, R QL stretching in prone, R QL release  R lumbar caudal stretching in L sidelying  R lumbar gapping in L sidelying    Pt cleared of contraindications and verbal and written consent acquired. Pt given option of copy of consent form. Pt educated on benefits and potential side effects of DN. DN for 10 minutes with pt in L sidelying position with involved side R QL. Lumbosacral spine protocol with  Winding. Pt received 10 min E-stim at parameters of 2 hz and 250 us.   Patient provided written and verbal consent to receive functional dry needling at today's visit (see consent form scanned into chart). FDN performed to R QL mm. FDN performed to reduce pain and muscle tension, promote blood flow, and improve ROM and function. Pt tolerated tx well without adverse effects. She was educated on what to  expect following the procedure and she verbalized understanding.    Pt received 10 min of heat pack in side lying position to decrease pain and promote healing.       PATIENT EDUCATION AND HOME EXERCISES     Home Exercises and Patient Education Provided     Education provided:   -  Continue HEP  - Schedule follow up with doctor     Written Home Exercises Provided: Patient instructed to cont prior HEP.  Exercises were reviewed and Alice was able to demonstrate them prior to the end of the session.  Alice demonstrated good  understanding of the education provided.      See EMR under Patient Instructions for exercises provided 2/7/2022.    ASSESSMENT     Pt was re-assessed today. Pt has been 8 visits in therapy so far. Pt demonstrated minor improvement of lumbar range of motion and B LE muscles strength, but pt has improved functional mobility and sleep habits. PT has less R QL pain at night. She also has less pain during static standing. Pain has been subsiding during functional activities. Even thought, lumbar range of motion and B LE muscles strength has not improved, frequency of R QL pain has decrease. I believe we are approaching the right muscles dysfunction. Pt will cont benefit from skilled PT services to decrease pain and return to work pain free.     Alice tolerated the above tx session well with minimal c/o pain. CDry needling was performed R QL. Only 2 needles was inserted. Needles was inserted shallow today. Pt did not have any side effects after dry needling. E-stim was performed to help decrease pain. Pt was instructed to assessed effect of dry needling and cupping in the next 24 or 48 hours     Alice Is progressing well towards her goals.   Pt prognosis is Good.     Pt will continue to benefit from skilled outpatient physical therapy to address the deficits listed in the problem list box on initial evaluation, provide pt/family education and to maximize pt's level of independence in the home and  community environment.     Pt's spiritual, cultural and educational needs considered and pt agreeable to plan of care and goals.     Anticipated barriers to physical therapy: none      GOALS: Short Term Goals:  4 weeks  1. Report decreased in pain at worse less than  <   / =  5  /10  to increase tolerance for functional activities.Goal not met 3-8-22  2. Increased R LE MMT 1/2  to increase tolerance for ADL and work activities. Goal not met 3-8-22  3. Pt to tolerate HEP to improve ROM and independence with ADL's. Goal not met 3-8-22  4. Pt to improve range of motion by 25% to allow for improved functional mobility to allow for improvement in IADLs. Goal not met 3-8-22     Long Term Goals: 8 weeks  1. Report decreased in pain at worse less than  <   / =  2 /10  to increase tolerance for functional mobility.  Goal not met 3-30-22  2. Increased R LEMMT 1 grade to increase tolerance for ADL and work activities. Goal not met 3-30-22  3. Pt will report at 34% or less limitation on FOTO Lumbar spine survey  to demonstrate decrease in disability and improvement in back pain Goal not met 3-30-22  4. Pt to be Independent with HEP to improve ROM and independence with ADL's. Goal  met 3-30-22  5. Pt to demonstrate negative Bridge Test in order to show improved core strength for lumbar stabilization. Goal not met 3-30-22  6. Pt to improve range of motion by 75% to allow for improved functional mobility to allow for improvement in IADLs. Goal not met 3-30-22    PLAN     Plan of care Certification: 2/7/2022 to 5-7-22    Alvaro Prieto, PT   03/30/2022

## 2022-04-08 ENCOUNTER — CLINICAL SUPPORT (OUTPATIENT)
Dept: REHABILITATION | Facility: HOSPITAL | Age: 72
End: 2022-04-08
Payer: MEDICARE

## 2022-04-08 DIAGNOSIS — M54.50 ACUTE RIGHT-SIDED LOW BACK PAIN WITHOUT SCIATICA: Primary | ICD-10-CM

## 2022-04-08 PROCEDURE — 97110 THERAPEUTIC EXERCISES: CPT | Mod: PN

## 2022-04-08 PROCEDURE — 97140 MANUAL THERAPY 1/> REGIONS: CPT | Mod: PN

## 2022-04-08 NOTE — PROGRESS NOTES
[facility]   Physical Therapy Treatment Note     Name: Tanesha Davis  Clinic Number: [id number]    Therapy Diagnosis:   Encounter Diagnosis   Name Primary?    Acute right-sided low back pain without sciatica Yes     Physician: Kaylah Stokes MD    Visit Date: 4/8/2022    Physician: Kaylah Stokes MD     Physician Orders: PT Eval and Treat   Medical Diagnosis from Referral: Musculoskeletal back pain  Evaluation Date: 2/7/2022  Authorization Period Expiration: 05/07/2022  Plan of Care Expiration: 5-7-22  Visit # / Visits authorized: 9/ 20  Progress Note Due: 4-30-22  FOTO: 1/ 1     Precautions: Standard    Time In: 10:45 am  Time Out: 11:40 am  Total Billable Time: 45 minutes      SUBJECTIVE     Pt reports: that she has increase of R lateral lower back pain today. Pt states she was awake the entire night due to pain. Pt states pain is about 8/10. Pt states she has been walking with limp.    She was compliant with home exercise program.  Response to previous treatment: soreness  Functional change: No change    Pain: 8/10  Location: right lower back pain and radiated to lateral side     OBJECTIVE     Objective Measures updated at progress report unless specified.       TREATMENT     Alice received the treatments listed below:      received therapeutic exercises to develop strength and ROM for 30 minutes including:    Nustep    6 min  Paloff Press w/ GTB  20x/ea  Supine QL stretch   5 x 30 sec  R Sidebend w/ 7.5lb KB  15x (weight in right hand)  LTR with yoga ball   2 min   +SLR    2 x 10  +Bridges   2 x 10  +Supine Clamshells w/ RTB 2 x 10    NP:Seated QL stretch with bosu ball to R QL only 5x30 sec -NP  Open book R side only  30x   Pelvic tilt    3x10 hold 2 sec   Hamstring stretch   3x30 sec  +QL stretch in doorway  3x30s     Alice received the following manual therapy techniques: Soft tissue Mobilization were applied to the: R QL for 15 minutes, including:  STM to right QL, R QL  stretching in prone, R QL release  R lumbar caudal stretching in L sidelying  R lumbar gapping in L sidelying    Pt cleared of contraindications and verbal and written consent acquired. Pt given option of copy of consent form. Pt educated on benefits and potential side effects of DN. DN for 10 minutes with pt in L sidelying position with involved side R QL. Lumbosacral spine protocol with  Winding. Pt received 10 min E-stim at parameters of 2 hz and 250 us.   Patient provided written and verbal consent to receive functional dry needling at today's visit (see consent form scanned into chart). FDN performed to R QL mm. FDN performed to reduce pain and muscle tension, promote blood flow, and improve ROM and function. Pt tolerated tx well without adverse effects. She was educated on what to expect following the procedure and she verbalized understanding. NP     Pt received 10 min of heat pack in side lying position to decrease pain and promote healing.       PATIENT EDUCATION AND HOME EXERCISES     Home Exercises and Patient Education Provided     Education provided:   -  Continue HEP  - Schedule follow up with doctor     Written Home Exercises Provided: Patient instructed to cont prior HEP.  Exercises were reviewed and Alice was able to demonstrate them prior to the end of the session.  Alice demonstrated good  understanding of the education provided.      See EMR under Patient Instructions for exercises provided 2/7/2022.    ASSESSMENT     Alice tolerated the above tx session well with mod c/o pain. Pt presented to therapy with provocation of R lateral  lower back pain. Stretches technique was performed to R QL, but pt experience discomfort today. Bridge exercises was not performed due to increase pain. Cupping and STM was performed R QL. Pain subsided after therapy, but she still feels pain. Plan to cont focusing R QL pain. Manual lumbar traction will be performed to decrease R QL pain.      Alice Is progressing  well towards her goals.   Pt prognosis is Good.     Pt will continue to benefit from skilled outpatient physical therapy to address the deficits listed in the problem list box on initial evaluation, provide pt/family education and to maximize pt's level of independence in the home and community environment.     Pt's spiritual, cultural and educational needs considered and pt agreeable to plan of care and goals.     Anticipated barriers to physical therapy: none      GOALS: Short Term Goals:  4 weeks  1. Report decreased in pain at worse less than  <   / =  5  /10  to increase tolerance for functional activities.Goal not met 3-8-22  2. Increased R LE MMT 1/2  to increase tolerance for ADL and work activities. Goal not met 3-8-22  3. Pt to tolerate HEP to improve ROM and independence with ADL's. Goal not met 3-8-22  4. Pt to improve range of motion by 25% to allow for improved functional mobility to allow for improvement in IADLs. Goal not met 3-8-22     Long Term Goals: 8 weeks  1. Report decreased in pain at worse less than  <   / =  2 /10  to increase tolerance for functional mobility.  Goal not met 3-30-22  2. Increased R LEMMT 1 grade to increase tolerance for ADL and work activities. Goal not met 3-30-22  3. Pt will report at 34% or less limitation on FOTO Lumbar spine survey  to demonstrate decrease in disability and improvement in back pain Goal not met 3-30-22  4. Pt to be Independent with HEP to improve ROM and independence with ADL's. Goal  met 3-30-22  5. Pt to demonstrate negative Bridge Test in order to show improved core strength for lumbar stabilization. Goal not met 3-30-22  6. Pt to improve range of motion by 75% to allow for improved functional mobility to allow for improvement in IADLs. Goal not met 3-30-22    PLAN     Plan of care Certification: 2/7/2022 to 5-7-22    Alvaro Prieto PT   04/08/2022

## 2022-04-20 ENCOUNTER — CLINICAL SUPPORT (OUTPATIENT)
Dept: REHABILITATION | Facility: HOSPITAL | Age: 72
End: 2022-04-20
Payer: MEDICARE

## 2022-04-20 DIAGNOSIS — G89.29 CHRONIC BILATERAL LOW BACK PAIN WITHOUT SCIATICA: Primary | ICD-10-CM

## 2022-04-20 DIAGNOSIS — M54.50 CHRONIC BILATERAL LOW BACK PAIN WITHOUT SCIATICA: Primary | ICD-10-CM

## 2022-04-20 PROCEDURE — 97140 MANUAL THERAPY 1/> REGIONS: CPT | Mod: PN

## 2022-04-20 PROCEDURE — 97110 THERAPEUTIC EXERCISES: CPT | Mod: PN

## 2022-04-20 NOTE — PROGRESS NOTES
[facility]   Physical Therapy Treatment Note     Name: Tanesha Davis  Clinic Number: [id number]    Therapy Diagnosis:   Encounter Diagnosis   Name Primary?    Chronic bilateral low back pain without sciatica Yes     Physician: Kaylah Stokes MD    Visit Date: 4/20/2022    Physician: Kaylah Stokes MD     Physician Orders: PT Eval and Treat   Medical Diagnosis from Referral: Musculoskeletal back pain  Evaluation Date: 2/7/2022  Authorization Period Expiration: 05/07/2022  Plan of Care Expiration: 5-7-22  Visit # / Visits authorized: 10/ 20  Progress Note Due: 4-30-22  FOTO: 1/ 1     Precautions: Standard    Time In: 11:31 am  Time Out: 12:15 pm  Total Billable Time: 44 minutes      SUBJECTIVE     Pt reports: that she still have R lower back pain. Pt states she was feeling better and then she felt increase of R lower back pain over the weekend.  Pt states that on Saturday she was siting down and then she got up. Her L leg when numb and she fall down on the floor.     She was compliant with home exercise program.  Response to previous treatment: soreness  Functional change: No change    Pain: 6/10  Location: right lower back pain and radiated to lateral side     OBJECTIVE     Objective Measures updated at progress report unless specified.       TREATMENT     Alice received the treatments listed below:      received therapeutic exercises to develop strength and ROM for 35 minutes including:    Nustep    6 min  LTR with yoga ball   2 min    B hamstring stretch 3x30 sec ea   +SLR    2 x 10  Open book R side only  30x   Seated QL stretch with bosu ball to R QL only 3x30 sec   Seated lats stretch with stools 5x30 sec       NP  Paloff Press w/ GTB  20x/ea  Supine QL stretch   5 x 30 sec  R Sidebend w/ 7.5lb KB  15x (weight in right hand)  +Bridges   2 x 10  +Supine Clamshells w/ RTB 2 x 10  Pelvic tilt    3x10 hold 2 sec   Hamstring stretch   3x30 sec  +QL stretch in doorway  3x30s     Alice  received the following manual therapy techniques: Soft tissue Mobilization were applied to the: R QL for 10 minutes, including:  STM to right QL, R QL stretching in prone, R QL release  R lumbar caudal stretching in L sidelying  R lumbar gapping in L sidelying    Manual lumbar distraction     Pt cleared of contraindications and verbal and written consent acquired. Pt given option of copy of consent form. Pt educated on benefits and potential side effects of DN. DN for 00 minutes with pt in L sidelying position with involved side R QL. Lumbosacral spine protocol with  Winding. Pt received 10 min E-stim at parameters of 2 hz and 250 us.   Patient provided written and verbal consent to receive functional dry needling at today's visit (see consent form scanned into chart). FDN performed to R QL mm. FDN performed to reduce pain and muscle tension, promote blood flow, and improve ROM and function. Pt tolerated tx well without adverse effects. She was educated on what to expect following the procedure and she verbalized understanding. NP     Pt received 10 min of heat pack in side lying position to decrease pain and promote healing.       PATIENT EDUCATION AND HOME EXERCISES     Home Exercises and Patient Education Provided     Education provided:   -  Continue HEP  - Schedule follow up with doctor     Written Home Exercises Provided: Patient instructed to cont prior HEP.  Exercises were reviewed and Alice was able to demonstrate them prior to the end of the session.  Alice demonstrated good  understanding of the education provided.      See EMR under Patient Instructions for exercises provided 2/7/2022.    ASSESSMENT     Alice tolerated the above tx session well with mod/ min c/o pain. Pt presented to therapy with provocation of R lateral  lower back pain after experience fall over the weekend. Stretches technique was performed to R QL, but pt experience discomfort today. Addition of B trunk extensors stretches today.  PT required mod v/c's to perform exercises with proper form. Lumbar distraction was performed to decrease R lower back pain. Pt responded fair today. Pt was instructed to return to [facility] for further evaluation of problem.      Alice Is progressing well towards her goals.   Pt prognosis is Good.     Pt will continue to benefit from skilled outpatient physical therapy to address the deficits listed in the problem list box on initial evaluation, provide pt/family education and to maximize pt's level of independence in the home and community environment.     Pt's spiritual, cultural and educational needs considered and pt agreeable to plan of care and goals.     Anticipated barriers to physical therapy: none      GOALS: Short Term Goals:  4 weeks  1. Report decreased in pain at worse less than  <   / =  5  /10  to increase tolerance for functional activities.Goal not met 3-8-22  2. Increased R LE MMT 1/2  to increase tolerance for ADL and work activities. Goal not met 3-8-22  3. Pt to tolerate HEP to improve ROM and independence with ADL's. Goal not met 3-8-22  4. Pt to improve range of motion by 25% to allow for improved functional mobility to allow for improvement in IADLs. Goal not met 3-8-22     Long Term Goals: 8 weeks  1. Report decreased in pain at worse less than  <   / =  2 /10  to increase tolerance for functional mobility.  Goal not met 3-30-22  2. Increased R LEMMT 1 grade to increase tolerance for ADL and work activities. Goal not met 3-30-22  3. Pt will report at 34% or less limitation on FOTO Lumbar spine survey  to demonstrate decrease in disability and improvement in back pain Goal not met 3-30-22  4. Pt to be Independent with HEP to improve ROM and independence with ADL's. Goal  met 3-30-22  5. Pt to demonstrate negative Bridge Test in order to show improved core strength for lumbar stabilization. Goal not met 3-30-22  6. Pt to improve range of motion by 75% to allow for improved functional mobility to  allow for improvement in IADLs. Goal not met 3-30-22    PLAN     Plan of care Certification: 2/7/2022 to 5-7-22    Alvaro Prieto, PT   04/20/2022

## 2022-04-21 ENCOUNTER — PATIENT OUTREACH (OUTPATIENT)
Dept: ADMINISTRATIVE | Facility: OTHER | Age: 72
End: 2022-04-21
Payer: MEDICARE

## 2022-04-22 ENCOUNTER — CLINICAL SUPPORT (OUTPATIENT)
Dept: REHABILITATION | Facility: HOSPITAL | Age: 72
End: 2022-04-22
Payer: MEDICARE

## 2022-04-22 DIAGNOSIS — G89.29 CHRONIC RIGHT-SIDED LOW BACK PAIN WITHOUT SCIATICA: Primary | ICD-10-CM

## 2022-04-22 DIAGNOSIS — M54.50 CHRONIC RIGHT-SIDED LOW BACK PAIN WITHOUT SCIATICA: Primary | ICD-10-CM

## 2022-04-22 PROCEDURE — 97110 THERAPEUTIC EXERCISES: CPT | Mod: PN,CQ

## 2022-04-22 NOTE — PROGRESS NOTES
"[facility]   Physical Therapy Treatment Note     Name: Tanesha Davis  Clinic Number: [id number]    Therapy Diagnosis:     Physician: Kaylah Stokes MD    Visit Date: 4/22/2022    Physician: Kaylah Stokes MD     Physician Orders: PT Eval and Treat   Medical Diagnosis from Referral: Musculoskeletal back pain  Evaluation Date: 2/7/2022  Authorization Period Expiration: 05/07/2022  Plan of Care Expiration: 5-7-22  Visit # / Visits authorized: 10/ 20  Progress Note Due: 4-30-22  FOTO: 1/ 1     Precautions: Standard    Time In: 1045AM  Time Out: 1140AM  Total Billable Time: 45 minutes      SUBJECTIVE     Pt reports: that her pain is about the same. Her pain stays on that right side, and she continues to have abdominal pain as well.     She was compliant with home exercise program.  Response to previous treatment: soreness  Functional change: No change    Pain: 6/10  Location: right lower back pain and radiated to lateral side     OBJECTIVE     Objective Measures updated at progress report unless specified.       TREATMENT     Alice received the treatments listed below:      received therapeutic exercises to develop strength and ROM for 35 minutes including:    Nustep    6 min  Pelvic tilt    3x10 hold 2 sec   LTR with yoga ball   2 min    +Supine Abdominal Bracing w/ ball 20x2-3" hold  SLR    2 x 10  Open book Lying on L only 30x   Neuro Re-ED with ESTIM 10min    NP:  B hamstring stretch 3x30 sec ea   Seated QL stretch with bosu ball to R QL only 3x30 sec   Seated lats stretch with stools 5x30 sec   Paloff Press w/ GTB  20x/ea  Supine QL stretch   5 x 30 sec  R Sidebend w/ 7.5lb KB  15x (weight in right hand)  +Bridges   2 x 10  +Supine Clamshells w/ RTB 2 x 10  Hamstring stretch   3x30 sec  +QL stretch in doorway  3x30s     Alice received the following manual therapy techniques: Soft tissue Mobilization were applied to the: R QL for 00 minutes, including:  STM to right QL, R QL " stretching in prone, R QL release  R lumbar caudal stretching in L sidelying  R lumbar gapping in L sidelying    Manual lumbar distraction     Pt cleared of contraindications and verbal and written consent acquired. Pt given option of copy of consent form. Pt educated on benefits and potential side effects of DN. DN for 00 minutes with pt in L sidelying position with involved side R QL. Lumbosacral spine protocol with  Winding. Pt received 10 min E-stim at parameters of 2 hz and 250 us.   Patient provided written and verbal consent to receive functional dry needling at today's visit (see consent form scanned into chart). FDN performed to R QL mm. FDN performed to reduce pain and muscle tension, promote blood flow, and improve ROM and function. Pt tolerated tx well without adverse effects. She was educated on what to expect following the procedure and she verbalized understanding. NP     Pt received 10 min of heat pack in side lying position to decrease pain and promote healing.       PATIENT EDUCATION AND HOME EXERCISES     Home Exercises and Patient Education Provided     Education provided:   -  Continue HEP  - Schedule follow up with doctor     Written Home Exercises Provided: Patient instructed to cont prior HEP.  Exercises were reviewed and Alice was able to demonstrate them prior to the end of the session.  Alice demonstrated good  understanding of the education provided.      See EMR under Patient Instructions for exercises provided 2/7/2022.    ASSESSMENT     Alice tolerated the above tx session well with minimal c/o pain. Pt presents with symptoms that are unchanged since previous session. Worked on core strengthening this date, as well as administered e-stim with no adverse effects reported.      Alice Is progressing well towards her goals.   Pt prognosis is Good.     Pt will continue to benefit from skilled outpatient physical therapy to address the deficits listed in the problem list box on initial  evaluation, provide pt/family education and to maximize pt's level of independence in the home and community environment.     Pt's spiritual, cultural and educational needs considered and pt agreeable to plan of care and goals.     Anticipated barriers to physical therapy: none      GOALS: Short Term Goals:  4 weeks  1. Report decreased in pain at worse less than  <   / =  5  /10  to increase tolerance for functional activities.Goal not met 3-8-22  2. Increased R LE MMT 1/2  to increase tolerance for ADL and work activities. Goal not met 3-8-22  3. Pt to tolerate HEP to improve ROM and independence with ADL's. Goal not met 3-8-22  4. Pt to improve range of motion by 25% to allow for improved functional mobility to allow for improvement in IADLs. Goal not met 3-8-22     Long Term Goals: 8 weeks  1. Report decreased in pain at worse less than  <   / =  2 /10  to increase tolerance for functional mobility.  Goal not met 3-30-22  2. Increased R LEMMT 1 grade to increase tolerance for ADL and work activities. Goal not met 3-30-22  3. Pt will report at 34% or less limitation on FOTO Lumbar spine survey  to demonstrate decrease in disability and improvement in back pain Goal not met 3-30-22  4. Pt to be Independent with HEP to improve ROM and independence with ADL's. Goal  met 3-30-22  5. Pt to demonstrate negative Bridge Test in order to show improved core strength for lumbar stabilization. Goal not met 3-30-22  6. Pt to improve range of motion by 75% to allow for improved functional mobility to allow for improvement in IADLs. Goal not met 3-30-22    PLAN     Plan of care Certification: 2/7/2022 to 5-7-22    Alisa Billings, MICHA   04/22/2022

## 2022-04-25 ENCOUNTER — OFFICE VISIT (OUTPATIENT)
Dept: PAIN MEDICINE | Facility: CLINIC | Age: 72
End: 2022-04-25
Payer: MEDICARE

## 2022-04-25 ENCOUNTER — HOSPITAL ENCOUNTER (OUTPATIENT)
Dept: RADIOLOGY | Facility: OTHER | Age: 72
Discharge: HOME | End: 2022-04-25
Attending: NURSE PRACTITIONER
Payer: MEDICARE

## 2022-04-25 VITALS
BODY MASS INDEX: 31.82 KG/M2 | HEART RATE: 69 BPM | TEMPERATURE: 98 F | RESPIRATION RATE: 18 BRPM | SYSTOLIC BLOOD PRESSURE: 129 MMHG | HEIGHT: 65 IN | DIASTOLIC BLOOD PRESSURE: 65 MMHG | WEIGHT: 191 LBS

## 2022-04-25 DIAGNOSIS — M54.16 LUMBAR RADICULOPATHY: Primary | ICD-10-CM

## 2022-04-25 DIAGNOSIS — M47.816 LUMBAR SPONDYLOSIS: ICD-10-CM

## 2022-04-25 DIAGNOSIS — R10.9 FLANK PAIN: ICD-10-CM

## 2022-04-25 DIAGNOSIS — M54.9 DORSALGIA, UNSPECIFIED: ICD-10-CM

## 2022-04-25 DIAGNOSIS — M79.18 MYOFASCIAL PAIN: ICD-10-CM

## 2022-04-25 DIAGNOSIS — M54.9 MUSCULOSKELETAL BACK PAIN: ICD-10-CM

## 2022-04-25 PROCEDURE — 20553 NJX 1/MLT TRIGGER POINTS 3/>: CPT | Mod: S$GLB,,, | Performed by: NURSE PRACTITIONER

## 2022-04-25 PROCEDURE — 1160F PR REVIEW ALL MEDS BY PRESCRIBER/CLIN PHARMACIST DOCUMENTED: ICD-10-PCS | Mod: CPTII,S$GLB,, | Performed by: NURSE PRACTITIONER

## 2022-04-25 PROCEDURE — 72114 X-RAY EXAM L-S SPINE BENDING: CPT | Mod: TC,FY

## 2022-04-25 PROCEDURE — 72114 X-RAY EXAM L-S SPINE BENDING: CPT | Mod: 26,,, | Performed by: RADIOLOGY

## 2022-04-25 PROCEDURE — 3078F DIAST BP <80 MM HG: CPT | Mod: CPTII,S$GLB,, | Performed by: NURSE PRACTITIONER

## 2022-04-25 PROCEDURE — 3078F PR MOST RECENT DIASTOLIC BLOOD PRESSURE < 80 MM HG: ICD-10-PCS | Mod: CPTII,S$GLB,, | Performed by: NURSE PRACTITIONER

## 2022-04-25 PROCEDURE — 3008F PR BODY MASS INDEX (BMI) DOCUMENTED: ICD-10-PCS | Mod: CPTII,S$GLB,, | Performed by: NURSE PRACTITIONER

## 2022-04-25 PROCEDURE — 72114 XR LUMBAR SPINE 5 VIEW WITH FLEX AND EXT: ICD-10-PCS | Mod: 26,,, | Performed by: RADIOLOGY

## 2022-04-25 PROCEDURE — 99999 PR PBB SHADOW E&M-EST. PATIENT-LVL V: CPT | Mod: PBBFAC,,, | Performed by: NURSE PRACTITIONER

## 2022-04-25 PROCEDURE — 99213 PR OFFICE/OUTPT VISIT, EST, LEVL III, 20-29 MIN: ICD-10-PCS | Mod: 25,S$GLB,, | Performed by: NURSE PRACTITIONER

## 2022-04-25 PROCEDURE — 3288F FALL RISK ASSESSMENT DOCD: CPT | Mod: CPTII,S$GLB,, | Performed by: NURSE PRACTITIONER

## 2022-04-25 PROCEDURE — 3008F BODY MASS INDEX DOCD: CPT | Mod: CPTII,S$GLB,, | Performed by: NURSE PRACTITIONER

## 2022-04-25 PROCEDURE — 3074F PR MOST RECENT SYSTOLIC BLOOD PRESSURE < 130 MM HG: ICD-10-PCS | Mod: CPTII,S$GLB,, | Performed by: NURSE PRACTITIONER

## 2022-04-25 PROCEDURE — 1159F PR MEDICATION LIST DOCUMENTED IN MEDICAL RECORD: ICD-10-PCS | Mod: CPTII,S$GLB,, | Performed by: NURSE PRACTITIONER

## 2022-04-25 PROCEDURE — 1101F PR PT FALLS ASSESS DOC 0-1 FALLS W/OUT INJ PAST YR: ICD-10-PCS | Mod: CPTII,S$GLB,, | Performed by: NURSE PRACTITIONER

## 2022-04-25 PROCEDURE — 1125F PR PAIN SEVERITY QUANTIFIED, PAIN PRESENT: ICD-10-PCS | Mod: CPTII,S$GLB,, | Performed by: NURSE PRACTITIONER

## 2022-04-25 PROCEDURE — 1160F RVW MEDS BY RX/DR IN RCRD: CPT | Mod: CPTII,S$GLB,, | Performed by: NURSE PRACTITIONER

## 2022-04-25 PROCEDURE — 20553 PR INJECT TRIGGER POINTS, > 3: ICD-10-PCS | Mod: S$GLB,,, | Performed by: NURSE PRACTITIONER

## 2022-04-25 PROCEDURE — 3074F SYST BP LT 130 MM HG: CPT | Mod: CPTII,S$GLB,, | Performed by: NURSE PRACTITIONER

## 2022-04-25 PROCEDURE — 1159F MED LIST DOCD IN RCRD: CPT | Mod: CPTII,S$GLB,, | Performed by: NURSE PRACTITIONER

## 2022-04-25 PROCEDURE — 1101F PT FALLS ASSESS-DOCD LE1/YR: CPT | Mod: CPTII,S$GLB,, | Performed by: NURSE PRACTITIONER

## 2022-04-25 PROCEDURE — 99999 PR PBB SHADOW E&M-EST. PATIENT-LVL V: ICD-10-PCS | Mod: PBBFAC,,, | Performed by: NURSE PRACTITIONER

## 2022-04-25 PROCEDURE — 3288F PR FALLS RISK ASSESSMENT DOCUMENTED: ICD-10-PCS | Mod: CPTII,S$GLB,, | Performed by: NURSE PRACTITIONER

## 2022-04-25 PROCEDURE — 1125F AMNT PAIN NOTED PAIN PRSNT: CPT | Mod: CPTII,S$GLB,, | Performed by: NURSE PRACTITIONER

## 2022-04-25 PROCEDURE — 99213 OFFICE O/P EST LOW 20 MIN: CPT | Mod: 25,S$GLB,, | Performed by: NURSE PRACTITIONER

## 2022-04-25 NOTE — PROGRESS NOTES
"  PCP: Caleb Hernandez MD    REFERRING PHYSICIAN: No ref. provider found    CHIEF COMPLAINT: low back pain    SUBJECTIVE:    Interval History 4/25/2022:  Mrs Davis presents for continued pain to right flank and now radiating and associated with right sided lower lumbar pain. Pt has continued PT with dry needling and TENS and gets some relief but next day pain returns. She has no radicular pain at this time. Emesis associated continues intermittently. She is currently prescribed neurontin, mobic, and zanaflex all with benefit without SE.      Interval History 1/7/2022:  The pain is located at midline low back and radiates anteriorly to lateral right side. The pain has been there for about 1 month. The pain is described as stabbing. Has had two episodes of nighttime vomiting in December with "yellow" emesis due to pain. Sometimes wakes up at night due to pain. Exacerbating factors: lying on back, right lateral flexion, cold weather. Mitigating factors: none identified. Symptoms interfere with bending to the right. The patient feels like symptoms have been worsening. Patient denies saddle anesthesia, bowel and bladder incontinence, acute limb weakness, ataxia, and falls. No abdominal pain.      PAIN SCORES:  Best: Pain is 7  Worst: Pain is 10  Usually: Pain is 8  Current: Pain is 9    Interval History 12/21/2021:  Pt presents for evaluation of Right sided flank and lower back pain. Pt states exercise doing abdominal rotation at gym started this pain and  moving from house to house and initially thought it was muscular. She states vomiting associated with 1st time pain starting and this Sunday night having 2nd episode of this pain and  bilious emesis approx 4 times. States no pain with eating. Denies dysuria or hematuria. Denies any constipation or change in BM.  Neurontin 100mg being taken qhs. She is not currently on mobic.       Interval History 4/28/2021:  The patient presents for follow-up of lower back pain " "and leg radiculopathy/cramping.  She states lower back pain states and there thigh and posterior calf cramping intermittently.  She states is associated paresthesias to both feet that correlate when the legs are cramping in her back it is exacerbated.  She also states other times the paresthesias to her feet are just at night and not correlated with back pain or thigh cramping she is taking baclofen with some minimal benefit and also states that gabapentin 100 mg q.h.s. is beneficial but has had decreased benefit over time.     HPI:  Mrs Davis is a 71y/o female presenting with complaint of over a year of LBP and Leg pain/cramping and feet paresthesias. Pt states LBP is bilateral, cramping is now bilateral to thighs but more to left anterior thigh, groin, and calf. She does not focally state leg heaviness or weakness when standing. She has pain and paresthesias to dorsum of feet. She voices morning stiffness, some pain with valsalva and pain from sitting to standing. She denies any loss of b/b or saddle paresthesias. She is currently taking neurontin and mobic with mild benefit. She has had PT For her legs but not specifically for her back per Pt it was beneficial. She has no recent imaging.     6 weeks of Conservative therapy (PT/Chiro/Home Exercises with Dates)  PT 10 weeks, ended 12/2021 - helped with left sided back pain    Treatments / Medications: (Ice/Heat/NSAIDS/APAP/etc):  Baclofen - no benefit  Neurontin - helps  Meloxicam - no benefit  Tizanidine - minimal benefit     Report: Reviewed    Interventional Pain Procedures: (Previous injections)  None    Past Medical History:   Diagnosis Date    Arthritis     Draining cutaneous sinus tract     Headache(784.0)     Incisional hernia     Insomnia     Nocturnal leg cramps     Nonhealing surgical wound     PONV (postoperative nausea and vomiting)     with last surgery per patient, "Severe" PONV 2-27-14    Postmenopausal status     Rectosigmoid cancer  "     Past Surgical History:   Procedure Laterality Date    BREAST BIOPSY Left     CHOLECYSTECTOMY  02/24/2014    with incisional hernia repair with mesh    COLONOSCOPY N/A 8/23/2016    Procedure: COLONOSCOPY;  Surgeon: Cuco Meraz MD;  Location: [facility] ENDO (4TH FLR);  Service: Endoscopy;  Laterality: N/A;    COLONOSCOPY N/A 12/9/2019    Procedure: COLONOSCOPY;  Surgeon: KISHORE Curry MD;  Location: [facility] ENDO (4TH FLR);  Service: Endoscopy;  Laterality: N/A;  ADHESIVE Allergy    ESOPHAGOGASTRODUODENOSCOPY N/A 4/22/2021    Procedure: EGD (ESOPHAGOGASTRODUODENOSCOPY);  Surgeon: Lukasz Lindquist MD;  Location: [facility] ENDO (4TH FLR);  Service: Endoscopy;  Laterality: N/A;  3/25-covid + 7/2020 [place] [facility] [place] getting results to us to see if correct form of test-MS    HYSTERECTOMY      KNEE ARTHROSCOPY      KNEE SURGERY      right and left knee repair    OOPHORECTOMY      REPAIR OF RECURRENT INCISIONAL HERNIA  7/10/2018    Procedure: REPAIR, HERNIA, INCISIONAL, RECURRENT;  Surgeon: Cuco Meraz MD;  Location: [facility] OR 2ND FLR;  Service: Colon and Rectal;;    SIGMOIDECTOMY  03/20/2006    LAR     Social History     Socioeconomic History    Marital status:      Spouse name: francis    Number of children: 2   Occupational History    Occupation: Retired     Employer: Self   Tobacco Use    Smoking status: Never Smoker    Smokeless tobacco: Never Used   Substance and Sexual Activity    Alcohol use: Yes     Alcohol/week: 1.0 standard drink     Types: 1 Glasses of wine per week     Comment: socially    Drug use: No    Sexual activity: Yes     Partners: Male     Birth control/protection: Post-menopausal     Social Determinants of Health     Financial Resource Strain: Low Risk     Difficulty of Paying Living Expenses: Not hard at all   Food Insecurity: No Food Insecurity    Worried About Running Out of Food in the Last Year: Never true    Ran Out of Food in the Last Year: Never true   Transportation Needs:  "No Transportation Needs    Lack of Transportation (Medical): No    Lack of Transportation (Non-Medical): No   Physical Activity: Insufficiently Active    Days of Exercise per Week: 5 days    Minutes of Exercise per Session: 20 min   Stress: No Stress Concern Present    Feeling of Stress : Not at all   Social Connections: Unknown    Frequency of Communication with Friends and Family: Three times a week    Frequency of Social Gatherings with Friends and Family: Once a week    Active Member of Clubs or Organizations: No    Attends Club or Organization Meetings: Never    Marital Status:    Housing Stability: Low Risk     Unable to Pay for Housing in the Last Year: No    Number of Places Lived in the Last Year: 2    Unstable Housing in the Last Year: No     Family History   Problem Relation Age of Onset    Breast cancer Mother 56    Cancer Mother         BREAST    Heart disease Father     Heart disease Maternal Grandmother     Diabetes Brother     Colon polyps Neg Hx     Ovarian cancer Neg Hx     Anesthesia problems Neg Hx        Review of patient's allergies indicates:   Allergen Reactions    Adhesive Rash     Asking that we use Paper Tape,  Uses "Sensitive Skin" band aids    Amoxil [amoxicillin] Hives    Penicillins Hives and Swelling     Swelling of ears and neck    Sulfa (sulfonamide antibiotics) Hives and Itching       Current Outpatient Medications   Medication Sig    ERGOCALCIFEROL, VITAMIN D2, (VITAMIN D ORAL) Take 1 capsule by mouth every morning.     ferrous sulfate 325 (65 FE) MG EC tablet TAKE 1 TABLET BY MOUTH EVERY DAY    gabapentin (NEURONTIN) 100 MG capsule TAKE 1 CAPSULE BY MOUTH EVERY EVENING 2 HOURS BEFORE BEDTIME.    linaCLOtide (LINZESS) 72 mcg Cap capsule Take 1 capsule (72 mcg total) by mouth once daily.    magnesium oxide-pyridoxine (BEELITH) 362-20 mg Tab Take 1 tablet by mouth once daily.    tiZANidine (ZANAFLEX) 2 MG tablet TAKE 2 TABLETS BY MOUTH EVERY 8 " "(EIGHT) HOURS AS NEEDED FOR MUSCLE SPASM,CAN MAKE YOU SLEEPY    VITAMIN E ACETATE (VITAMIN E ORAL) Take 1 capsule by mouth every morning.     estradioL (ESTRACE) 1 MG tablet TAKE 1 TABLET BY MOUTH EVERY DAY    L GASSERI/B BIFIDUM/B LONGUM ([employer]TRAP[employer] ORAL) Take 1 tablet by mouth 2 (two) times daily.    meloxicam (MOBIC) 7.5 MG tablet TAKE 1 TABLET BY MOUTH EVERY DAY (Patient not taking: Reported on 4/25/2022)     No current facility-administered medications for this visit.       ROS:  GENERAL: No fever. No chills. No fatigue. Denies weight loss. Denies weight gain.  HEENT: Denies headaches. Denies vision change. Denies eye pain. Denies double vision. Denies ear pain.   CV: Denies chest pain.   PULM: Denies of shortness of breath.  GI: Denies constipation. No diarrhea. No abdominal pain. Denies nausea. Denies vomiting. No blood in stool.  HEME: Denies bleeding problems.  : Denies urgency. No painful urination. No blood in urine.  MS: Denies joint stiffness. Denies joint swelling.  Positive for back pain.  SKIN: Denies rash.   NEURO: Denies seizures. No weakness.  PSYCH:  Positive for difficulty sleeping. No anxiety. Denies depression. No suicidal thoughts.       VITALS:   Vitals:    04/25/22 1018   BP: 129/65   Pulse: 69   Resp: 18   Temp: 98.2 °F (36.8 °C)   TempSrc: Temporal   Weight: 86.6 kg (191 lb)   Height: 5' 5" (1.651 m)   PainSc:   6   PainLoc: Back         PHYSICAL EXAM:   GENERAL: Well appearing, in no acute distress, alert and oriented x3.  PSYCH:  Mood and affect appropriate.  SKIN: Skin color, texture, turgor normal, no rashes or lesions.  HEENT:  Normocephalic, atraumatic. Cranial nerves grossly intact.  NECK: No pain to palpation over the cervical paraspinous muscles. No pain to palpation over facets. No pain with neck flexion, extension, or lateral flexion.   PULM: No evidence of respiratory difficulty, symmetric chest rise.  GI:  Non-distended, no gross bulge   BACK: Limited range " of motion, most significant with right flexion. Slight pain to palpation over the spinous processes. No pain to palpation over facet joints. Exquisite tenderness over lateral right side. No CVA tenderness. There is no pain with palpation over the sacroiliac joints bilaterally. Straight leg raising in the supine position is negative to radicular pain.   EXTREMITIES: No deformities, edema, or skin discoloration.   MUSCULOSKELETAL: +R RADHA and FADIR. Negative left RADHA and FADIR. Bilateral upper and lower extremity strength is normal and symmetric. No atrophy is noted.  NEURO: Sensation is equal and appropriate bilaterally. Bilateral upper and lower extremity coordination and muscle stretch reflexes are physiologic and symmetric. Plantar response are downgoing.   GAIT: normal.      LABS: CBC, CMP, lipase, amylase from 12/21/21 WNL    IMAGING:    XR Lumbar Spine 4/14/21  Scoliosis and degenerative change    MRI Lumbar Spine 4/14/21  There are multilevel degenerative changes within the spinal canal which are further detailed above.  At the L3-4 and L4-5 levels there is a mild degree of spinal canal narrowing and at L5-S1 there is a moderate degree of bilateral neural foraminal narrowing    OTHER STUDIES  6/7/2021 NCS/EMG:  This is a normal electrophysiologic study of the bilateral lower extremities.      ASSESSMENT: 71 y.o. year old female with low back pain radiating to right flank, consistent with musculoskeletal pain.     DISCUSSION: Ms. Davis developed right lateral abdominal wall pain while doing PT exercise with twisting motion. On exam her pain is reproducible over the right lateral oblique musculature. We discussed that this is most likely a pulled muscle which will take time to heal.     PLAN:    - Prior records reviewed  - She has had no long standing benefit from PT  - She continues to take Mobic, Neurontin and zanaflex.  - Will continue with Dr Skaggs prior plan and give TPI today  - Pt having more focal  lower back pain associated and will evaluate for any upper lumbar radicular etiology  \  - RTC after imaging for review  The above plan and management options were discussed at length with patient. Patient is in agreement with the above and verbalized understanding.     Sergio Mireles NP  04/27/2022    Trigger Point Injection:   The procedure was discussed with the patient including complications of nerve damage,  bleeding, infection, and failure of pain relief.   Trigger points were identified by palpation and marked. Alcohol prep of sites done. A mixture of 9mL 0.25% bupivacaine +4mg Dexamethasone was prepared (10 mL total).   A 27-gauge needle was advanced to the point of maximal tenderness, and medication was injected after negative aspiration. All sites done in the same manner. Patient tolerated the procedure well and without complications. (6 sites total). The patient was observed after the procedure.  enies any adverse effects including dizziness or shortness or breath.

## 2022-04-27 ENCOUNTER — PATIENT MESSAGE (OUTPATIENT)
Dept: PAIN MEDICINE | Facility: CLINIC | Age: 72
End: 2022-04-27
Payer: MEDICARE

## 2022-04-27 ENCOUNTER — CLINICAL SUPPORT (OUTPATIENT)
Dept: REHABILITATION | Facility: HOSPITAL | Age: 72
End: 2022-04-27
Payer: MEDICARE

## 2022-04-27 DIAGNOSIS — M54.50 CHRONIC RIGHT-SIDED LOW BACK PAIN WITHOUT SCIATICA: Primary | ICD-10-CM

## 2022-04-27 DIAGNOSIS — G89.29 CHRONIC RIGHT-SIDED LOW BACK PAIN WITHOUT SCIATICA: Primary | ICD-10-CM

## 2022-04-27 PROCEDURE — 97110 THERAPEUTIC EXERCISES: CPT | Mod: PN,CQ

## 2022-04-27 NOTE — PROGRESS NOTES
"[facility]   Physical Therapy Treatment Note     Name: Tanesha Davis  Clinic Number: [id number]    Therapy Diagnosis:     Physician: Kaylah Stokes MD    Visit Date: 4/27/2022    Physician: Kaylah Stokes MD     Physician Orders: PT Eval and Treat   Medical Diagnosis from Referral: Musculoskeletal back pain  Evaluation Date: 2/7/2022  Authorization Period Expiration: 05/07/2022  Plan of Care Expiration: 5-7-22  Visit # / Visits authorized: 10/ 20  Progress Note Due: 4-30-22  FOTO: 1/ 1     Precautions: Standard    Time In: 1132AM   Time Out: 1215PM  Total Billable Time: 43 minutes      SUBJECTIVE     Pt reports: She feels that therapy has been helping because when she doesn't come it gets worse. She got a steroid injection the other day and it felt good but then today it feels sore. The E-stim helped and she was pain free for about three days. The pain always comes back. She is getting and MRI on May 6th at 1pm.      She was compliant with home exercise program.  Response to previous treatment: Okay for 3 days   Functional change: No change    Pain: 5-6/10  Location: right lower back pain and radiated to lateral side     OBJECTIVE     Objective Measures updated at progress report unless specified.       TREATMENT     Alice received the treatments listed below:      received therapeutic exercises to develop strength and ROM for 43 minutes including:    Nustep    6 min    Treadmill x 6'  +Seated ball rollout x 10 ea (Fwd/Left)  Pelvic tilt    3x10 hold 2 sec   LTR    2 min    Supine Abdominal Bracing w/ ball 20x2-3" hold  SLR    2 x 10  Open book Lying on L only 30x   Bridges    x 10  Supine Clamshells w/ GTB 3 x 10  +Standing hip abd/ext      NP:  Neuro Re-ED with ESTIM 10min  B hamstring stretch 3x30 sec ea   Seated QL stretch with bosu ball to R QL only 3x30 sec   Seated lats stretch with stools 5x30 sec   Paloff Press w/ GTB  20x/ea  Supine QL stretch   5 x 30 sec  R Sidebend w/ " 7.5lb KB  15x (weight in right hand)  Hamstring stretch   3x30 sec  +QL stretch in doorway  3x30s     Alice received the following manual therapy techniques: Soft tissue Mobilization were applied to the: R QL for 08 minutes, including:  STM to right QL, R QL stretching in prone, R QL release  R lumbar caudal stretching in L sidelying  R lumbar gapping in L sidelying    Manual lumbar distraction     Pt cleared of contraindications and verbal and written consent acquired. Pt given option of copy of consent form. Pt educated on benefits and potential side effects of DN. DN for 00 minutes with pt in L sidelying position with involved side R QL. Lumbosacral spine protocol with  Winding. Pt received 10 min E-stim at parameters of 2 hz and 250 us.   Patient provided written and verbal consent to receive functional dry needling at today's visit (see consent form scanned into chart). FDN performed to R QL mm. FDN performed to reduce pain and muscle tension, promote blood flow, and improve ROM and function. Pt tolerated tx well without adverse effects. She was educated on what to expect following the procedure and she verbalized understanding. NP     Pt received 10 min of heat pack in side lying position to decrease pain and promote healing.       PATIENT EDUCATION AND HOME EXERCISES     Home Exercises and Patient Education Provided     Education provided:   -  Continue HEP  - Schedule follow up with doctor     Written Home Exercises Provided: Patient instructed to cont prior HEP.  Exercises were reviewed and Alice was able to demonstrate them prior to the end of the session.  Alice demonstrated good  understanding of the education provided.      See EMR under Patient Instructions for exercises provided 2/7/2022.    ASSESSMENT     Alice tolerated the above tx session well with minimal c/o pain. Pt presents with symptoms that are unchanged since previous session. Performed STM to R QL with pt reporting some relief though  aching is still there. Added seated ball rollout after STM to increase stretch. Post therapy pain rating remains unchanged.     Alice Is progressing well towards her goals.   Pt prognosis is Good.     Pt will continue to benefit from skilled outpatient physical therapy to address the deficits listed in the problem list box on initial evaluation, provide pt/family education and to maximize pt's level of independence in the home and community environment.     Pt's spiritual, cultural and educational needs considered and pt agreeable to plan of care and goals.     Anticipated barriers to physical therapy: none      GOALS: Short Term Goals:  4 weeks  1. Report decreased in pain at worse less than  <   / =  5  /10  to increase tolerance for functional activities.Goal not met 3-8-22  2. Increased R LE MMT 1/2  to increase tolerance for ADL and work activities. Goal not met 3-8-22  3. Pt to tolerate HEP to improve ROM and independence with ADL's. Goal not met 3-8-22  4. Pt to improve range of motion by 25% to allow for improved functional mobility to allow for improvement in IADLs. Goal not met 3-8-22     Long Term Goals: 8 weeks  1. Report decreased in pain at worse less than  <   / =  2 /10  to increase tolerance for functional mobility.  Goal not met 3-30-22  2. Increased R LEMMT 1 grade to increase tolerance for ADL and work activities. Goal not met 3-30-22  3. Pt will report at 34% or less limitation on FOTO Lumbar spine survey  to demonstrate decrease in disability and improvement in back pain Goal not met 3-30-22  4. Pt to be Independent with HEP to improve ROM and independence with ADL's. Goal  met 3-30-22  5. Pt to demonstrate negative Bridge Test in order to show improved core strength for lumbar stabilization. Goal not met 3-30-22  6. Pt to improve range of motion by 75% to allow for improved functional mobility to allow for improvement in IADLs. Goal not met 3-30-22    PLAN     Plan of care Certification:  2/7/2022 to 5-7-22    Richy Loredo, PTA   04/27/2022

## 2022-04-28 NOTE — PROGRESS NOTES
[facility]   Physical Therapy Treatment Note     Name: Tanesha Davis  Clinic Number: [id number]    Therapy Diagnosis:     Physician: Kaylah Stokes MD    Visit Date: 4/29/2022    Physician: Kaylah Stokes MD     Physician Orders: PT Eval and Treat   Medical Diagnosis from Referral: Musculoskeletal back pain  Evaluation Date: 2/7/2022  Authorization Period Expiration: 05/07/2022  Plan of Care Expiration: 5-7-22  Visit # / Visits authorized: 11/ 20  Progress Note Due: 5-29 -22  FOTO: 1/ 1     Precautions: Standard    Time In: 10:45 am  Time Out: 11:40 aM  Total Billable Time: 45  minutes      SUBJECTIVE     Pt reports: She feels that therapy has been helping because when she doesn't come it gets worse. She got a steroid injection the other day and it felt good but then today it feels sore. The E-stim helped and she was pain free for about three days. The pain always comes back. She is getting and MRI on May 6th at 1pm.      She was compliant with home exercise program.  Response to previous treatment: Okay for 3 days   Functional change: No change    Pain: 5-6/10  Location: right lower back pain and radiated to lateral side     OBJECTIVE     Objective Measures updated at progress report unless specified.     Lumbar Range of Motion:     %   Flexion 65 deg with pain    Extension 10 deg with R lower back pain      Left Side Bending 14 deg with stabbing pain   Right Side Bending 10 deg with pain   Left rotation    Mod loss with pain    Right Rotation    Unable to perform due to pain    *= pain     mity Strength     Right LE   Left LE     Hip flexion: 4-/5 Hip flexion: 4+/5   Knee extension: 4/5 Knee extension: 4+/5   Knee flexion: 4/5 Knee flexion: 4+/5   Hip IR: 4-/5 Hip IR: 4+/5   Hip ER: 4-/5 Hip ER: 4+/5   Hip extension:  4-/5 Hip extension: 4+/5   Hip abduction: 4-/5 Hip abduction: 4+/5   Hip adduction: 4-/5 Hip adduction 4+/5   Ankle dorsiflexion: 4+/5 Ankle dorsiflexion: 4+/5   Ankle  "plantarflexion: 4+/5 Ankle plantarflexion: 4+/5         Palpation: Mod pain at R QL region.       TREATMENT     Alice received the treatments listed below:      received therapeutic exercises to develop strength and ROM for 45 minutes including:      Treadmill x 6'  +Seated ball rollout x 10 ea (Fwd/Left)  Pelvic tilt    3x10 hold 2 sec   LTR    2 min    Supine Abdominal Bracing w/ ball 20x2-3" hold  SLR    2 x 10  Open book Lying on L only 30x   Bridges    x 10  Supine Clamshells w/ GTB 3 x 10  +Standing hip abd/ext      NP:  Neuro Re-ED with ESTIM 10min  B hamstring stretch 3x30 sec ea   Seated QL stretch with bosu ball to R QL only 3x30 sec   Seated lats stretch with stools 5x30 sec   Paloff Press w/ GTB  20x/ea  Supine QL stretch   5 x 30 sec  R Sidebend w/ 7.5lb KB  15x (weight in right hand)  Hamstring stretch   3x30 sec  +QL stretch in doorway  3x30s     Alice received the following manual therapy techniques: Soft tissue Mobilization were applied to the: R QL for 00 minutes, including:  STM to right QL, R QL stretching in prone, R QL release  R lumbar caudal stretching in L sidelying  R lumbar gapping in L sidelying    Manual lumbar distraction     Pt cleared of contraindications and verbal and written consent acquired. Pt given option of copy of consent form. Pt educated on benefits and potential side effects of DN. DN for 00 minutes with pt in L sidelying position with involved side R QL. Lumbosacral spine protocol with  Winding. Pt received 10 min E-stim at parameters of 2 hz and 250 us.   Patient provided written and verbal consent to receive functional dry needling at today's visit (see consent form scanned into chart). FDN performed to R QL mm. FDN performed to reduce pain and muscle tension, promote blood flow, and improve ROM and function. Pt tolerated tx well without adverse effects. She was educated on what to expect following the procedure and she verbalized understanding. NP     Pt received 10 " min of heat pack in side lying position to decrease pain and promote healing.       PATIENT EDUCATION AND HOME EXERCISES     Home Exercises and Patient Education Provided     Education provided:   -  Continue HEP  - Schedule follow up with doctor     Written Home Exercises Provided: Patient instructed to cont prior HEP.  Exercises were reviewed and Alice was able to demonstrate them prior to the end of the session.  Alice demonstrated good  understanding of the education provided.      See EMR under Patient Instructions for exercises provided 2/7/2022.    ASSESSMENT       Pt was re-assessed today.  Pt demonstrated minor improvement of lumbar range of motion and B LE muscles strength, but pt has improved functional mobility and sleep habits. Pt cont with R QL pain at night. She also cont with pain during static standing. Pain has been the same as last re-assessment. during functional activities.. Pt has met 1/6 LTGs. Overall, pt is progressing very slowly in therapy. Pt will benefit return to M.D for further evaluation of the problem. Pt will cont benefit from skilled PT services to decrease pain and return to work pain free.    Alice Is progressing well towards her goals.   Pt prognosis is Good.     Pt will continue to benefit from skilled outpatient physical therapy to address the deficits listed in the problem list box on initial evaluation, provide pt/family education and to maximize pt's level of independence in the home and community environment.     Pt's spiritual, cultural and educational needs considered and pt agreeable to plan of care and goals.     Anticipated barriers to physical therapy: none      GOALS: Short Term Goals:  4 weeks  1. Report decreased in pain at worse less than  <   / =  5  /10  to increase tolerance for functional activities.Goal not met 3-8-22  2. Increased R LE MMT 1/2  to increase tolerance for ADL and work activities. Goal not met 3-8-22  3. Pt to tolerate HEP to improve ROM and  independence with ADL's. Goal not met 3-8-22  4. Pt to improve range of motion by 25% to allow for improved functional mobility to allow for improvement in IADLs. Goal not met 3-8-22     Long Term Goals: 8 weeks  1. Report decreased in pain at worse less than  <   / =  2 /10  to increase tolerance for functional mobility.  Goal not met 4-29-22  2. Increased R LEMMT 1 grade to increase tolerance for ADL and work activities. Goal not met 4-29-22  3. Pt will report at 34% or less limitation on FOTO Lumbar spine survey  to demonstrate decrease in disability and improvement in back pain Goal not met 4-29-22  4. Pt to be Independent with HEP to improve ROM and independence with ADL's. Goal  met 4-329-22  5. Pt to demonstrate negative Bridge Test in order to show improved core strength for lumbar stabilization. Goal not met 4-29-22  6. Pt to improve range of motion by 75% to allow for improved functional mobility to allow for improvement in IADLs. Goal not met 4-29-22    PLAN   Plan to return to M.D for further evaluation.     Plan of care Certification: 2/7/2022 to 5-7-22    Alvaro Prieto, PT   04/29/2022

## 2022-04-29 ENCOUNTER — CLINICAL SUPPORT (OUTPATIENT)
Dept: REHABILITATION | Facility: HOSPITAL | Age: 72
End: 2022-04-29
Payer: MEDICARE

## 2022-04-29 DIAGNOSIS — M25.561 CHRONIC PAIN OF BOTH KNEES: Primary | ICD-10-CM

## 2022-04-29 DIAGNOSIS — M54.50 ACUTE RIGHT-SIDED LOW BACK PAIN WITHOUT SCIATICA: ICD-10-CM

## 2022-04-29 DIAGNOSIS — G89.29 CHRONIC PAIN OF BOTH KNEES: Primary | ICD-10-CM

## 2022-04-29 DIAGNOSIS — M25.562 CHRONIC PAIN OF BOTH KNEES: Primary | ICD-10-CM

## 2022-04-29 PROCEDURE — 97110 THERAPEUTIC EXERCISES: CPT | Mod: PN

## 2022-05-06 ENCOUNTER — HOSPITAL ENCOUNTER (OUTPATIENT)
Dept: RADIOLOGY | Facility: OTHER | Age: 72
Discharge: HOME | End: 2022-05-06
Attending: NURSE PRACTITIONER
Payer: MEDICARE

## 2022-05-06 DIAGNOSIS — M54.9 DORSALGIA, UNSPECIFIED: ICD-10-CM

## 2022-05-06 DIAGNOSIS — M54.16 LUMBAR RADICULOPATHY: ICD-10-CM

## 2022-05-06 PROCEDURE — 72148 MRI LUMBAR SPINE WITHOUT CONTRAST: ICD-10-PCS | Mod: 26,,, | Performed by: RADIOLOGY

## 2022-05-06 PROCEDURE — 72148 MRI LUMBAR SPINE W/O DYE: CPT | Mod: 26,,, | Performed by: RADIOLOGY

## 2022-05-06 PROCEDURE — 72148 MRI LUMBAR SPINE W/O DYE: CPT | Mod: TC

## 2022-06-02 ENCOUNTER — TELEPHONE (OUTPATIENT)
Dept: PAIN MEDICINE | Facility: CLINIC | Age: 72
End: 2022-06-02
Payer: MEDICARE

## 2022-06-02 NOTE — TELEPHONE ENCOUNTER
"This message is for patient in regards to his/her appointment 06/03/22 at 11:45a       [facility] Policy: For the safety of all patients and staff members.     Patient Visitor policy: Due to social distancing and limited seating staff are requesting patient to arrive to their schedule appointments on time.     Upon arriving to facility; patients are required to wear a face mask, if patient do not have a face mask one will be provided. Upon arriving patient we ask patients to contact clinic at this number [phone] to notify staff that they have arrived or they may do so by utilizing the Mobile checked in [place](if patient have patient portal; clinical staff will send a message through there letting them know it's okay to proceed to their visit). Staff will give the patient the "okay" to come up or wait inside their vehicle until clinic is ready for patient to be seen by Dr. Shabnam Skaggs MD. If you have any questions or concerns please contact [phone]      Patient verbalized and confirmed appt  "

## 2022-06-03 ENCOUNTER — TELEPHONE (OUTPATIENT)
Dept: ADMINISTRATIVE | Facility: OTHER | Age: 72
End: 2022-06-03
Payer: MEDICARE

## 2022-06-03 ENCOUNTER — OFFICE VISIT (OUTPATIENT)
Dept: PAIN MEDICINE | Facility: CLINIC | Age: 72
End: 2022-06-03
Payer: MEDICARE

## 2022-06-03 VITALS
HEIGHT: 65 IN | RESPIRATION RATE: 18 BRPM | OXYGEN SATURATION: 100 % | TEMPERATURE: 98 F | HEART RATE: 65 BPM | SYSTOLIC BLOOD PRESSURE: 127 MMHG | BODY MASS INDEX: 31.96 KG/M2 | WEIGHT: 191.81 LBS | DIASTOLIC BLOOD PRESSURE: 55 MMHG

## 2022-06-03 DIAGNOSIS — M47.816 SPONDYLOSIS WITHOUT MYELOPATHY OR RADICULOPATHY, LUMBAR REGION: Primary | ICD-10-CM

## 2022-06-03 DIAGNOSIS — M54.9 MUSCULOSKELETAL BACK PAIN: ICD-10-CM

## 2022-06-03 PROCEDURE — 1160F PR REVIEW ALL MEDS BY PRESCRIBER/CLIN PHARMACIST DOCUMENTED: ICD-10-PCS | Mod: CPTII,S$GLB,, | Performed by: ANESTHESIOLOGY

## 2022-06-03 PROCEDURE — 3008F PR BODY MASS INDEX (BMI) DOCUMENTED: ICD-10-PCS | Mod: CPTII,S$GLB,, | Performed by: ANESTHESIOLOGY

## 2022-06-03 PROCEDURE — 3078F PR MOST RECENT DIASTOLIC BLOOD PRESSURE < 80 MM HG: ICD-10-PCS | Mod: CPTII,S$GLB,, | Performed by: ANESTHESIOLOGY

## 2022-06-03 PROCEDURE — 3074F PR MOST RECENT SYSTOLIC BLOOD PRESSURE < 130 MM HG: ICD-10-PCS | Mod: CPTII,S$GLB,, | Performed by: ANESTHESIOLOGY

## 2022-06-03 PROCEDURE — 99214 PR OFFICE/OUTPT VISIT, EST, LEVL IV, 30-39 MIN: ICD-10-PCS | Mod: S$GLB,,, | Performed by: ANESTHESIOLOGY

## 2022-06-03 PROCEDURE — 3074F SYST BP LT 130 MM HG: CPT | Mod: CPTII,S$GLB,, | Performed by: ANESTHESIOLOGY

## 2022-06-03 PROCEDURE — 3008F BODY MASS INDEX DOCD: CPT | Mod: CPTII,S$GLB,, | Performed by: ANESTHESIOLOGY

## 2022-06-03 PROCEDURE — 3288F PR FALLS RISK ASSESSMENT DOCUMENTED: ICD-10-PCS | Mod: CPTII,S$GLB,, | Performed by: ANESTHESIOLOGY

## 2022-06-03 PROCEDURE — 1101F PT FALLS ASSESS-DOCD LE1/YR: CPT | Mod: CPTII,S$GLB,, | Performed by: ANESTHESIOLOGY

## 2022-06-03 PROCEDURE — 99214 OFFICE O/P EST MOD 30 MIN: CPT | Mod: S$GLB,,, | Performed by: ANESTHESIOLOGY

## 2022-06-03 PROCEDURE — 1160F RVW MEDS BY RX/DR IN RCRD: CPT | Mod: CPTII,S$GLB,, | Performed by: ANESTHESIOLOGY

## 2022-06-03 PROCEDURE — 99999 PR PBB SHADOW E&M-EST. PATIENT-LVL IV: ICD-10-PCS | Mod: PBBFAC,,, | Performed by: ANESTHESIOLOGY

## 2022-06-03 PROCEDURE — 99999 PR PBB SHADOW E&M-EST. PATIENT-LVL IV: CPT | Mod: PBBFAC,,, | Performed by: ANESTHESIOLOGY

## 2022-06-03 PROCEDURE — 1159F PR MEDICATION LIST DOCUMENTED IN MEDICAL RECORD: ICD-10-PCS | Mod: CPTII,S$GLB,, | Performed by: ANESTHESIOLOGY

## 2022-06-03 PROCEDURE — 1125F PR PAIN SEVERITY QUANTIFIED, PAIN PRESENT: ICD-10-PCS | Mod: CPTII,S$GLB,, | Performed by: ANESTHESIOLOGY

## 2022-06-03 PROCEDURE — 1159F MED LIST DOCD IN RCRD: CPT | Mod: CPTII,S$GLB,, | Performed by: ANESTHESIOLOGY

## 2022-06-03 PROCEDURE — 3288F FALL RISK ASSESSMENT DOCD: CPT | Mod: CPTII,S$GLB,, | Performed by: ANESTHESIOLOGY

## 2022-06-03 PROCEDURE — 1125F AMNT PAIN NOTED PAIN PRSNT: CPT | Mod: CPTII,S$GLB,, | Performed by: ANESTHESIOLOGY

## 2022-06-03 PROCEDURE — 1101F PR PT FALLS ASSESS DOC 0-1 FALLS W/OUT INJ PAST YR: ICD-10-PCS | Mod: CPTII,S$GLB,, | Performed by: ANESTHESIOLOGY

## 2022-06-03 PROCEDURE — 3078F DIAST BP <80 MM HG: CPT | Mod: CPTII,S$GLB,, | Performed by: ANESTHESIOLOGY

## 2022-06-03 RX ORDER — CYCLOBENZAPRINE HCL 5 MG
5 TABLET ORAL NIGHTLY
Qty: 30 TABLET | Refills: 0 | Status: SHIPPED | OUTPATIENT
Start: 2022-06-03 | End: 2022-07-03

## 2022-06-03 NOTE — H&P (VIEW-ONLY)
"PCP: Caleb Hernandez MD     REFERRING PHYSICIAN: No ref. provider found     CHIEF COMPLAINT: low back pain     SUBJECTIVE:     Interval History 6/2/22:  Patient is here for follow up of chronic right sided abdominal/ flank pain. She reports no relief with TPI performed during her last visit. MRI was obtained as it seemed like her pain was more focused in the lumbar region. She feels that her pain is getting worse and now more consistently wraps around to her lower back on the right side. Pain is worse when going for a sitting to standing position. No relief with Tizanidine and feels like she gets a dry mouth when taking.     Interval History 4/25/2022:  Mrs Davis presents for continued pain to right flank and now radiating and associated with right sided lower lumbar pain. Pt has continued PT with dry needling and TENS and gets some relief but next day pain returns. She has no radicular pain at this time. Emesis associated continues intermittently. She is currently prescribed neurontin, mobic, and zanaflex all with benefit without SE.       Interval History 1/7/2022:  The pain is located at midline low back and radiates anteriorly to lateral right side. The pain has been there for about 1 month. The pain is described as stabbing. Has had two episodes of nighttime vomiting in December with "yellow" emesis due to pain. Sometimes wakes up at night due to pain. Exacerbating factors: lying on back, right lateral flexion, cold weather. Mitigating factors: none identified. Symptoms interfere with bending to the right. The patient feels like symptoms have been worsening. Patient denies saddle anesthesia, bowel and bladder incontinence, acute limb weakness, ataxia, and falls. No abdominal pain.      PAIN SCORES:  Best: Pain is 7  Worst: Pain is 10  Usually: Pain is 8  Current: Pain is 9     Interval History 12/21/2021:  Pt presents for evaluation of Right sided flank and lower back pain. Pt states exercise doing " abdominal rotation at gym started this pain and  moving from house to house and initially thought it was muscular. She states vomiting associated with 1st time pain starting and this Sunday night having 2nd episode of this pain and  bilious emesis approx 4 times. States no pain with eating. Denies dysuria or hematuria. Denies any constipation or change in BM.  Neurontin 100mg being taken qhs. She is not currently on mobic.       Interval History 4/28/2021:  The patient presents for follow-up of lower back pain and leg radiculopathy/cramping.  She states lower back pain states and there thigh and posterior calf cramping intermittently.  She states is associated paresthesias to both feet that correlate when the legs are cramping in her back it is exacerbated.  She also states other times the paresthesias to her feet are just at night and not correlated with back pain or thigh cramping she is taking baclofen with some minimal benefit and also states that gabapentin 100 mg q.h.s. is beneficial but has had decreased benefit over time.     HPI:  Mrs Davis is a 71y/o female presenting with complaint of over a year of LBP and Leg pain/cramping and feet paresthesias. Pt states LBP is bilateral, cramping is now bilateral to thighs but more to left anterior thigh, groin, and calf. She does not focally state leg heaviness or weakness when standing. She has pain and paresthesias to dorsum of feet. She voices morning stiffness, some pain with valsalva and pain from sitting to standing. She denies any loss of b/b or saddle paresthesias. She is currently taking neurontin and mobic with mild benefit. She has had PT For her legs but not specifically for her back per Pt it was beneficial. She has no recent imaging.      6 weeks of Conservative therapy (PT/Chiro/Home Exercises with Dates)  PT 10 weeks, ended 12/2021 - helped with left sided back pain  PT: Again 2/22-4/22 without relief of this right flank pain     Treatments /  "Medications: (Ice/Heat/NSAIDS/APAP/etc):  Baclofen - no benefit  Neurontin - helps  Meloxicam - no benefit  Tizanidine - minimal benefit      Report: Reviewed     Interventional Pain Procedures: (Previous injections)  TPI 4/27/22- 0% relief    Past Medical History:   Diagnosis Date    Arthritis      Draining cutaneous sinus tract      Headache(784.0)      Incisional hernia      Insomnia      Nocturnal leg cramps      Nonhealing surgical wound      PONV (postoperative nausea and vomiting)       with last surgery per patient, "Severe" PONV 2-27-14    Postmenopausal status      Rectosigmoid cancer        Past Surgical History:   Procedure Laterality Date    BREAST BIOPSY Left      CHOLECYSTECTOMY   02/24/2014     with incisional hernia repair with mesh    COLONOSCOPY N/A 8/23/2016     Procedure: COLONOSCOPY;  Surgeon: Cuco Meraz MD;  Location: [facility] (4TH FLR);  Service: Endoscopy;  Laterality: N/A;    COLONOSCOPY N/A 12/9/2019     Procedure: COLONOSCOPY;  Surgeon: KISHORE Curry MD;  Location: [facility] (4TH FLR);  Service: Endoscopy;  Laterality: N/A;  ADHESIVE Allergy    ESOPHAGOGASTRODUODENOSCOPY N/A 4/22/2021     Procedure: EGD (ESOPHAGOGASTRODUODENOSCOPY);  Surgeon: Lukasz Lindquist MD;  Location: [facility] (4TH FLR);  Service: Endoscopy;  Laterality: N/A;  3/25-covid + 7/2020 at [facility]-pt getting results to us to see if correct form of test-MS    HYSTERECTOMY        KNEE ARTHROSCOPY        KNEE SURGERY         right and left knee repair    OOPHORECTOMY        REPAIR OF RECURRENT INCISIONAL HERNIA   7/10/2018     Procedure: REPAIR, HERNIA, INCISIONAL, RECURRENT;  Surgeon: Cuco Meraz MD;  Location: [facility] OR 2ND FLR;  Service: Colon and Rectal;;    SIGMOIDECTOMY   03/20/2006     LAR      Social History               Socioeconomic History    Marital status:        Spouse name: francis    Number of children: 2   Occupational History    Occupation: Retired       Employer: " "[employer]   Tobacco Use    Smoking status: Never Smoker    Smokeless tobacco: Never Used   Substance and Sexual Activity    Alcohol use: Yes       Alcohol/week: 1.0 standard drink       Types: 1 Glasses of wine per week       Comment: socially    Drug use: No    Sexual activity: Yes       Partners: Male       Birth control/protection: Post-menopausal      Social Determinants of Health          Financial Resource Strain: Low Risk     Difficulty of Paying Living Expenses: Not hard at all   Food Insecurity: No Food Insecurity    Worried About Running Out of Food in the Last Year: Never true    Ran Out of Food in the Last Year: Never true   Transportation Needs: No Transportation Needs    Lack of Transportation (Medical): No    Lack of Transportation (Non-Medical): No   Physical Activity: Insufficiently Active    Days of Exercise per Week: 5 days    Minutes of Exercise per Session: 20 min   Stress: No Stress Concern Present    Feeling of Stress : Not at all   Social Connections: Unknown    Frequency of Communication with Friends and Family: Three times a week    Frequency of Social Gatherings with Friends and Family: Once a week    Active Member of Clubs or Organizations: No    Attends Club or Organization Meetings: Never    Marital Status:    Housing Stability: Low Risk     Unable to Pay for Housing in the Last Year: No    Number of Places Lived in the Last Year: 2    Unstable Housing in the Last Year: No               Family History   Problem Relation Age of Onset    Breast cancer Mother 56    Cancer Mother           BREAST    Heart disease Father      Heart disease Maternal Grandmother      Diabetes Brother      Colon polyps Neg Hx      Ovarian cancer Neg Hx      Anesthesia problems Neg Hx                 Review of patient's allergies indicates:   Allergen Reactions    Adhesive Rash       Asking that we use Paper Tape,  Uses "Sensitive Skin" band aids    Amoxil [amoxicillin] Hives    " Penicillins Hives and Swelling       Swelling of ears and neck    Sulfa (sulfonamide antibiotics) Hives and Itching              Current Outpatient Medications   Medication Sig    ERGOCALCIFEROL, VITAMIN D2, (VITAMIN D ORAL) Take 1 capsule by mouth every morning.     ferrous sulfate 325 (65 FE) MG EC tablet TAKE 1 TABLET BY MOUTH EVERY DAY    gabapentin (NEURONTIN) 100 MG capsule TAKE 1 CAPSULE BY MOUTH EVERY EVENING 2 HOURS BEFORE BEDTIME.    linaCLOtide (LINZESS) 72 mcg Cap capsule Take 1 capsule (72 mcg total) by mouth once daily.    magnesium oxide-pyridoxine (BEELITH) 362-20 mg Tab Take 1 tablet by mouth once daily.    tiZANidine (ZANAFLEX) 2 MG tablet TAKE 2 TABLETS BY MOUTH EVERY 8 (EIGHT) HOURS AS NEEDED FOR MUSCLE SPASM,CAN MAKE YOU SLEEPY    VITAMIN E ACETATE (VITAMIN E ORAL) Take 1 capsule by mouth every morning.     estradioL (ESTRACE) 1 MG tablet TAKE 1 TABLET BY MOUTH EVERY DAY    L GASSERI/B BIFIDUM/B LONGUM ([employer] [facility] ORAL) Take 1 tablet by mouth 2 (two) times daily.    meloxicam (MOBIC) 7.5 MG tablet TAKE 1 TABLET BY MOUTH EVERY DAY (Patient not taking: Reported on 4/25/2022)      No current facility-administered medications for this visit.         ROS:  GENERAL: No fever. No chills. No fatigue. Denies weight loss. Denies weight gain.  HEENT: Denies headaches. Denies vision change. Denies eye pain. Denies double vision. Denies ear pain.   CV: Denies chest pain.   PULM: Denies of shortness of breath.  GI: Denies constipation. No diarrhea. No abdominal pain. Denies nausea. Denies vomiting. No blood in stool.  HEME: Denies bleeding problems.  : Denies urgency. No painful urination. No blood in urine.  MS: Denies joint stiffness. Denies joint swelling.  Positive for back pain.  SKIN: Denies rash.   NEURO: Denies seizures. No weakness.  PSYCH:  Positive for difficulty sleeping. No anxiety. Denies depression. No suicidal thoughts.         VITALS:       Vitals:     04/25/22 1018  "  BP: 129/65   Pulse: 69   Resp: 18   Temp: 98.2 °F (36.8 °C)   TempSrc: Temporal   Weight: 86.6 kg (191 lb)   Height: 5' 5" (1.651 m)   PainSc:   6   PainLoc: Back            PHYSICAL EXAM:   GENERAL: Well appearing, in no acute distress, alert and oriented x3.  PSYCH:  Mood and affect appropriate.  SKIN: Skin color, texture, turgor normal, no rashes or lesions.  HEENT:  Normocephalic, atraumatic. Cranial nerves grossly intact.  NECK: No pain to palpation over the cervical paraspinous muscles. No pain to palpation over facets. No pain with neck flexion, extension, or lateral flexion.   PULM: No evidence of respiratory difficulty, symmetric chest rise.  GI:  Non-distended, no gross bulge. Pain to palpation of right flank.   BACK: Pain with rotation of right side. Positive pain with facet loading on right side. Tender to palpation to right lower back.There is no pain with palpation over the sacroiliac joints bilaterally. Straight leg raising in the supine position is negative to radicular pain.   EXTREMITIES: No deformities, edema, or skin discoloration.   MUSCULOSKELETAL: Bilateral upper and lower extremity strength is normal and symmetric. No atrophy is noted.  NEURO: Sensation is equal and appropriate bilaterally. Bilateral upper and lower extremity coordination and muscle stretch reflexes are physiologic and symmetric. Plantar response are downgoing.   GAIT: normal.        IMAGIN.) XR Lumbar Spine 21  Scoliosis and degenerative change     2.) MRI Lumbar Spine 21  There are multilevel degenerative changes within the spinal canal which are further detailed above.  At the L3-4 and L4-5 levels there is a mild degree of spinal canal narrowing and at L5-S1 there is a moderate degree of bilateral neural foraminal narrowing    3.) MRI LUMBAR SPINE WITHOUT CONTRAST 22:  Lumbar spine alignment demonstrates mild dextroscoliosis with grade 1 retrolisthesis of T12 on L1 and grade 1 anterolisthesis of L3 " on L4 and L4 on L5, similar when compared to the previous MRI.  No spondylolysis.  Vertebral body heights are well maintained without evidence for fracture.  No marrow signal abnormality to suggest an infiltrative process.     There is degenerative disc space narrowing and desiccation throughout the visualized thoracolumbar spine most pronounced at T11-T12, T12-L1 and L5-S1 noting chronic degenerative endplate changes.  No endplate edema.     Distal spinal cord demonstrates normal contour and signal intensity.  Cauda equina appears normal without findings to suggest arachnoiditis.  Conus medullaris terminates at L1.     Multiple T2 hyperintense bilateral cortical renal lesions, likely cysts.  Remaining visualized abdominal/pelvic structures demonstrate no significant abnormalities.  There is mild fatty degeneration of the posterior paraspinal musculature.  SI joints are symmetric.     T12-L1: Grade 1 retrolisthesis.  Circumferential disc bulge that encroaches into the right neural foramen.  Right facet arthropathy and bilateral ligamentum flavum buckling.  Findings contribute to mild right neural foraminal narrowing.  No spinal canal stenosis.     L1-L2: Circumferential disc bulge encroaches into the left neural foramen.  Bilateral facet arthropathy and bilateral ligamentum flavum buckling.  Findings contribute to mild left neural foraminal narrowing.  No spinal canal stenosis.     L2-L3: Circumferential disc bulge, bilateral facet arthropathy and bilateral ligamentum flavum buckling.  Findings contribute to mild left neural foraminal narrowing.  No spinal canal stenosis.     L3-L4: Grade 1 anterolisthesis with uncovering of the intervertebral discs.  Bilateral facet arthropathy and bilateral ligamentum flavum buckling.  Findings contribute to mild-to-moderate spinal canal stenosis.  No neural foraminal narrowing.     L4-L5: Grade 1 anterolisthesis with uncovering of the intervertebral disc.  Bilateral facet  arthropathy and bilateral ligamentum flavum buckling.  Findings contribute to moderate spinal canal stenosis and mild left neural foraminal narrowing.     L5-S1: Circumferential disc bulge encroaches into the bilateral foraminal zones and abuts the right descending S1 nerve root.  Bilateral facet arthropathy.  Findings contribute to mild bilateral neural foraminal narrowing.     Impression:     1. Lumbar degenerative changes, most pronounced at L3-L4 and L4-L5 and slightly progressed when compared to MRI dated 04/26/2021.          ASSESSMENT: 71 y.o. year old female with low back pain radiating to right flank consistent with musculoskeletal pain, however, she has had no relief with trigger point injections. She also has positive facet loading and MRI with degenerative changes concerning for lumbar spondylosis with possible referred to to the right flank/ abdomen.      DISCUSSION: Ms. Davis developed right lateral abdominal wall pain while doing PT exercise with twisting motion. On exam her pain is reproducible over the right lateral oblique musculature. However, she has failed muscle relaxors, PT, and TPI. Exam does also indicate pain in the SIJ and the right lumbar facets. MRI does show multilevel facet arthropathy and some moderate canal stenosis.      PLAN:  1.) Schedule Right L2, L3, L4, L5 MBB.  2.) D/c Tizanidine. Start Flexeril 5 mg QHS to aid in musculoskeletal pain at night preventing sleep.   3.) Continue Gabapentin and Mobic  4.) Will call to schedule follow up s/p MBB with Dr. Skaggs.     1. Spondylosis without myelopathy or radiculopathy, lumbar region  Procedure Order to Pain Management   2. Musculoskeletal back pain       Maggie Fletcher MD  PGY2, CA1 Anesthesiology  6/2/22

## 2022-06-03 NOTE — PROGRESS NOTES
"PCP: Caleb Hernandez MD     REFERRING PHYSICIAN: No ref. provider found     CHIEF COMPLAINT: low back pain     SUBJECTIVE:     Interval History 6/2/22:  Patient is here for follow up of chronic right sided abdominal/ flank pain. She reports no relief with TPI performed during her last visit. MRI was obtained as it seemed like her pain was more focused in the lumbar region. She feels that her pain is getting worse and now more consistently wraps around to her lower back on the right side. Pain is worse when going for a sitting to standing position. No relief with Tizanidine and feels like she gets a dry mouth when taking.     Interval History 4/25/2022:  Mrs Davis presents for continued pain to right flank and now radiating and associated with right sided lower lumbar pain. Pt has continued PT with dry needling and TENS and gets some relief but next day pain returns. She has no radicular pain at this time. Emesis associated continues intermittently. She is currently prescribed neurontin, mobic, and zanaflex all with benefit without SE.       Interval History 1/7/2022:  The pain is located at midline low back and radiates anteriorly to lateral right side. The pain has been there for about 1 month. The pain is described as stabbing. Has had two episodes of nighttime vomiting in December with "yellow" emesis due to pain. Sometimes wakes up at night due to pain. Exacerbating factors: lying on back, right lateral flexion, cold weather. Mitigating factors: none identified. Symptoms interfere with bending to the right. The patient feels like symptoms have been worsening. Patient denies saddle anesthesia, bowel and bladder incontinence, acute limb weakness, ataxia, and falls. No abdominal pain.      PAIN SCORES:  Best: Pain is 7  Worst: Pain is 10  Usually: Pain is 8  Current: Pain is 9     Interval History 12/21/2021:  Pt presents for evaluation of Right sided flank and lower back pain. Pt states exercise doing " abdominal rotation at gym started this pain and  moving from house to house and initially thought it was muscular. She states vomiting associated with 1st time pain starting and this Sunday night having 2nd episode of this pain and  bilious emesis approx 4 times. States no pain with eating. Denies dysuria or hematuria. Denies any constipation or change in BM.  Neurontin 100mg being taken qhs. She is not currently on mobic.       Interval History 4/28/2021:  The patient presents for follow-up of lower back pain and leg radiculopathy/cramping.  She states lower back pain states and there thigh and posterior calf cramping intermittently.  She states is associated paresthesias to both feet that correlate when the legs are cramping in her back it is exacerbated.  She also states other times the paresthesias to her feet are just at night and not correlated with back pain or thigh cramping she is taking baclofen with some minimal benefit and also states that gabapentin 100 mg q.h.s. is beneficial but has had decreased benefit over time.     HPI:  Mrs Davis is a 69y/o female presenting with complaint of over a year of LBP and Leg pain/cramping and feet paresthesias. Pt states LBP is bilateral, cramping is now bilateral to thighs but more to left anterior thigh, groin, and calf. She does not focally state leg heaviness or weakness when standing. She has pain and paresthesias to dorsum of feet. She voices morning stiffness, some pain with valsalva and pain from sitting to standing. She denies any loss of b/b or saddle paresthesias. She is currently taking neurontin and mobic with mild benefit. She has had PT For her legs but not specifically for her back per Pt it was beneficial. She has no recent imaging.      6 weeks of Conservative therapy (PT/Chiro/Home Exercises with Dates)  PT 10 weeks, ended 12/2021 - helped with left sided back pain  PT: Again 2/22-4/22 without relief of this right flank pain     Treatments /  "Medications: (Ice/Heat/NSAIDS/APAP/etc):  Baclofen - no benefit  Neurontin - helps  Meloxicam - no benefit  Tizanidine - minimal benefit      Report: Reviewed     Interventional Pain Procedures: (Previous injections)  TPI 4/27/22- 0% relief    Past Medical History:   Diagnosis Date    Arthritis      Draining cutaneous sinus tract      Headache(784.0)      Incisional hernia      Insomnia      Nocturnal leg cramps      Nonhealing surgical wound      PONV (postoperative nausea and vomiting)       with last surgery per patient, "Severe" PONV 2-27-14    Postmenopausal status      Rectosigmoid cancer        Past Surgical History:   Procedure Laterality Date    BREAST BIOPSY Left      CHOLECYSTECTOMY   02/24/2014     with incisional hernia repair with mesh    COLONOSCOPY N/A 8/23/2016     Procedure: COLONOSCOPY;  Surgeon: Cuco Meraz MD;  Location: [facility] (4TH FLR);  Service: Endoscopy;  Laterality: N/A;    COLONOSCOPY N/A 12/9/2019     Procedure: COLONOSCOPY;  Surgeon: KISHORE Curry MD;  Location: [facility] (4TH FLR);  Service: Endoscopy;  Laterality: N/A;  ADHESIVE Allergy    ESOPHAGOGASTRODUODENOSCOPY N/A 4/22/2021     Procedure: EGD (ESOPHAGOGASTRODUODENOSCOPY);  Surgeon: Lukasz Lindquist MD;  Location: [facility] (4TH FLR);  Service: Endoscopy;  Laterality: N/A;  3/25-covid + 7/2020 at [facility]-pt getting results to us to see if correct form of test-MS    HYSTERECTOMY        KNEE ARTHROSCOPY        KNEE SURGERY         right and left knee repair    OOPHORECTOMY        REPAIR OF RECURRENT INCISIONAL HERNIA   7/10/2018     Procedure: REPAIR, HERNIA, INCISIONAL, RECURRENT;  Surgeon: Cuco Meraz MD;  Location: [facility] OR 2ND FLR;  Service: Colon and Rectal;;    SIGMOIDECTOMY   03/20/2006     LAR      Social History               Socioeconomic History    Marital status:        Spouse name: francis    Number of children: 2   Occupational History    Occupation: Retired       Employer: " "[employer]   Tobacco Use    Smoking status: Never Smoker    Smokeless tobacco: Never Used   Substance and Sexual Activity    Alcohol use: Yes       Alcohol/week: 1.0 standard drink       Types: 1 Glasses of wine per week       Comment: socially    Drug use: No    Sexual activity: Yes       Partners: Male       Birth control/protection: Post-menopausal      Social Determinants of Health          Financial Resource Strain: Low Risk     Difficulty of Paying Living Expenses: Not hard at all   Food Insecurity: No Food Insecurity    Worried About Running Out of Food in the Last Year: Never true    Ran Out of Food in the Last Year: Never true   Transportation Needs: No Transportation Needs    Lack of Transportation (Medical): No    Lack of Transportation (Non-Medical): No   Physical Activity: Insufficiently Active    Days of Exercise per Week: 5 days    Minutes of Exercise per Session: 20 min   Stress: No Stress Concern Present    Feeling of Stress : Not at all   Social Connections: Unknown    Frequency of Communication with Friends and Family: Three times a week    Frequency of Social Gatherings with Friends and Family: Once a week    Active Member of Clubs or Organizations: No    Attends Club or Organization Meetings: Never    Marital Status:    Housing Stability: Low Risk     Unable to Pay for Housing in the Last Year: No    Number of Places Lived in the Last Year: 2    Unstable Housing in the Last Year: No               Family History   Problem Relation Age of Onset    Breast cancer Mother 56    Cancer Mother           BREAST    Heart disease Father      Heart disease Maternal Grandmother      Diabetes Brother      Colon polyps Neg Hx      Ovarian cancer Neg Hx      Anesthesia problems Neg Hx                 Review of patient's allergies indicates:   Allergen Reactions    Adhesive Rash       Asking that we use Paper Tape,  Uses "Sensitive Skin" band aids    Amoxil [amoxicillin] Hives    " Penicillins Hives and Swelling       Swelling of ears and neck    Sulfa (sulfonamide antibiotics) Hives and Itching              Current Outpatient Medications   Medication Sig    ERGOCALCIFEROL, VITAMIN D2, (VITAMIN D ORAL) Take 1 capsule by mouth every morning.     ferrous sulfate 325 (65 FE) MG EC tablet TAKE 1 TABLET BY MOUTH EVERY DAY    gabapentin (NEURONTIN) 100 MG capsule TAKE 1 CAPSULE BY MOUTH EVERY EVENING 2 HOURS BEFORE BEDTIME.    linaCLOtide (LINZESS) 72 mcg Cap capsule Take 1 capsule (72 mcg total) by mouth once daily.    magnesium oxide-pyridoxine (BEELITH) 362-20 mg Tab Take 1 tablet by mouth once daily.    tiZANidine (ZANAFLEX) 2 MG tablet TAKE 2 TABLETS BY MOUTH EVERY 8 (EIGHT) HOURS AS NEEDED FOR MUSCLE SPASM,CAN MAKE YOU SLEEPY    VITAMIN E ACETATE (VITAMIN E ORAL) Take 1 capsule by mouth every morning.     estradioL (ESTRACE) 1 MG tablet TAKE 1 TABLET BY MOUTH EVERY DAY    L GASSERI/B BIFIDUM/B LONGUM ([facility] ORAL) Take 1 tablet by mouth 2 (two) times daily.    meloxicam (MOBIC) 7.5 MG tablet TAKE 1 TABLET BY MOUTH EVERY DAY (Patient not taking: Reported on 4/25/2022)      No current facility-administered medications for this visit.         ROS:  GENERAL: No fever. No chills. No fatigue. Denies weight loss. Denies weight gain.  HEENT: Denies headaches. Denies vision change. Denies eye pain. Denies double vision. Denies ear pain.   CV: Denies chest pain.   PULM: Denies of shortness of breath.  GI: Denies constipation. No diarrhea. No abdominal pain. Denies nausea. Denies vomiting. No blood in stool.  HEME: Denies bleeding problems.  : Denies urgency. No painful urination. No blood in urine.  MS: Denies joint stiffness. Denies joint swelling.  Positive for back pain.  SKIN: Denies rash.   NEURO: Denies seizures. No weakness.  PSYCH:  Positive for difficulty sleeping. No anxiety. Denies depression. No suicidal thoughts.         VITALS:       Vitals:     04/25/22 1018  "  BP: 129/65   Pulse: 69   Resp: 18   Temp: 98.2 °F (36.8 °C)   TempSrc: Temporal   Weight: 86.6 kg (191 lb)   Height: 5' 5" (1.651 m)   PainSc:   6   PainLoc: Back            PHYSICAL EXAM:   GENERAL: Well appearing, in no acute distress, alert and oriented x3.  PSYCH:  Mood and affect appropriate.  SKIN: Skin color, texture, turgor normal, no rashes or lesions.  HEENT:  Normocephalic, atraumatic. Cranial nerves grossly intact.  NECK: No pain to palpation over the cervical paraspinous muscles. No pain to palpation over facets. No pain with neck flexion, extension, or lateral flexion.   PULM: No evidence of respiratory difficulty, symmetric chest rise.  GI:  Non-distended, no gross bulge. Pain to palpation of right flank.   BACK: Pain with rotation of right side. Positive pain with facet loading on right side. Tender to palpation to right lower back.There is no pain with palpation over the sacroiliac joints bilaterally. Straight leg raising in the supine position is negative to radicular pain.   EXTREMITIES: No deformities, edema, or skin discoloration.   MUSCULOSKELETAL: Bilateral upper and lower extremity strength is normal and symmetric. No atrophy is noted.  NEURO: Sensation is equal and appropriate bilaterally. Bilateral upper and lower extremity coordination and muscle stretch reflexes are physiologic and symmetric. Plantar response are downgoing.   GAIT: normal.        IMAGIN.) XR Lumbar Spine 21  Scoliosis and degenerative change     2.) MRI Lumbar Spine 21  There are multilevel degenerative changes within the spinal canal which are further detailed above.  At the L3-4 and L4-5 levels there is a mild degree of spinal canal narrowing and at L5-S1 there is a moderate degree of bilateral neural foraminal narrowing    3.) MRI LUMBAR SPINE WITHOUT CONTRAST 22:  Lumbar spine alignment demonstrates mild dextroscoliosis with grade 1 retrolisthesis of T12 on L1 and grade 1 anterolisthesis of L3 " on L4 and L4 on L5, similar when compared to the previous MRI.  No spondylolysis.  Vertebral body heights are well maintained without evidence for fracture.  No marrow signal abnormality to suggest an infiltrative process.     There is degenerative disc space narrowing and desiccation throughout the visualized thoracolumbar spine most pronounced at T11-T12, T12-L1 and L5-S1 noting chronic degenerative endplate changes.  No endplate edema.     Distal spinal cord demonstrates normal contour and signal intensity.  Cauda equina appears normal without findings to suggest arachnoiditis.  Conus medullaris terminates at L1.     Multiple T2 hyperintense bilateral cortical renal lesions, likely cysts.  Remaining visualized abdominal/pelvic structures demonstrate no significant abnormalities.  There is mild fatty degeneration of the posterior paraspinal musculature.  SI joints are symmetric.     T12-L1: Grade 1 retrolisthesis.  Circumferential disc bulge that encroaches into the right neural foramen.  Right facet arthropathy and bilateral ligamentum flavum buckling.  Findings contribute to mild right neural foraminal narrowing.  No spinal canal stenosis.     L1-L2: Circumferential disc bulge encroaches into the left neural foramen.  Bilateral facet arthropathy and bilateral ligamentum flavum buckling.  Findings contribute to mild left neural foraminal narrowing.  No spinal canal stenosis.     L2-L3: Circumferential disc bulge, bilateral facet arthropathy and bilateral ligamentum flavum buckling.  Findings contribute to mild left neural foraminal narrowing.  No spinal canal stenosis.     L3-L4: Grade 1 anterolisthesis with uncovering of the intervertebral discs.  Bilateral facet arthropathy and bilateral ligamentum flavum buckling.  Findings contribute to mild-to-moderate spinal canal stenosis.  No neural foraminal narrowing.     L4-L5: Grade 1 anterolisthesis with uncovering of the intervertebral disc.  Bilateral facet  arthropathy and bilateral ligamentum flavum buckling.  Findings contribute to moderate spinal canal stenosis and mild left neural foraminal narrowing.     L5-S1: Circumferential disc bulge encroaches into the bilateral foraminal zones and abuts the right descending S1 nerve root.  Bilateral facet arthropathy.  Findings contribute to mild bilateral neural foraminal narrowing.     Impression:     1. Lumbar degenerative changes, most pronounced at L3-L4 and L4-L5 and slightly progressed when compared to MRI dated 04/26/2021.          ASSESSMENT: 71 y.o. year old female with low back pain radiating to right flank consistent with musculoskeletal pain, however, she has had no relief with trigger point injections. She also has positive facet loading and MRI with degenerative changes concerning for lumbar spondylosis with possible referred to to the right flank/ abdomen.      DISCUSSION: Ms. Davis developed right lateral abdominal wall pain while doing PT exercise with twisting motion. On exam her pain is reproducible over the right lateral oblique musculature. However, she has failed muscle relaxors, PT, and TPI. Exam does also indicate pain in the SIJ and the right lumbar facets. MRI does show multilevel facet arthropathy and some moderate canal stenosis.      PLAN:  1.) Schedule Right L2, L3, L4, L5 MBB.  2.) D/c Tizanidine. Start Flexeril 5 mg QHS to aid in musculoskeletal pain at night preventing sleep.   3.) Continue Gabapentin and Mobic  4.) Will call to schedule follow up s/p MBB with Dr. Skaggs.     1. Spondylosis without myelopathy or radiculopathy, lumbar region  Procedure Order to Pain Management   2. Musculoskeletal back pain       Maggie Fletcher MD  PGY2, CA1 Anesthesiology  6/2/22

## 2022-06-06 ENCOUNTER — PATIENT MESSAGE (OUTPATIENT)
Dept: PAIN MEDICINE | Facility: OTHER | Age: 72
End: 2022-06-06
Payer: MEDICARE

## 2022-06-06 ENCOUNTER — TELEPHONE (OUTPATIENT)
Dept: ADMINISTRATIVE | Facility: OTHER | Age: 72
End: 2022-06-06
Payer: MEDICARE

## 2022-06-28 ENCOUNTER — HOSPITAL ENCOUNTER (OUTPATIENT)
Facility: OTHER | Age: 72
Discharge: HOME | End: 2022-06-28
Attending: ANESTHESIOLOGY | Admitting: ANESTHESIOLOGY
Payer: MEDICARE

## 2022-06-28 VITALS
TEMPERATURE: 98 F | HEART RATE: 68 BPM | RESPIRATION RATE: 14 BRPM | DIASTOLIC BLOOD PRESSURE: 67 MMHG | OXYGEN SATURATION: 97 % | SYSTOLIC BLOOD PRESSURE: 146 MMHG

## 2022-06-28 DIAGNOSIS — M47.816 SPONDYLOSIS WITHOUT MYELOPATHY OR RADICULOPATHY, LUMBAR REGION: Primary | ICD-10-CM

## 2022-06-28 DIAGNOSIS — G89.29 CHRONIC PAIN: ICD-10-CM

## 2022-06-28 PROCEDURE — 64495 INJ PARAVERT F JNT L/S 3 LEV: CPT | Mod: KX,RT | Performed by: ANESTHESIOLOGY

## 2022-06-28 PROCEDURE — 64495 INJ PARAVERT F JNT L/S 3 LEV: CPT | Mod: KX,RT,, | Performed by: ANESTHESIOLOGY

## 2022-06-28 PROCEDURE — 64493 INJ PARAVERT F JNT L/S 1 LEV: CPT | Mod: KX,RT,, | Performed by: ANESTHESIOLOGY

## 2022-06-28 PROCEDURE — 64495 PR INJ DX/THER AGNT PARAVERT FACET JOINT,IMG GUIDE,LUMBAR/SAC, ADD LEVEL: ICD-10-PCS | Mod: KX,RT,, | Performed by: ANESTHESIOLOGY

## 2022-06-28 PROCEDURE — 25000003 PHARM REV CODE 250: Performed by: ANESTHESIOLOGY

## 2022-06-28 PROCEDURE — 64494 PR INJ DX/THER AGNT PARAVERT FACET JOINT,IMG GUIDE,LUMBAR/SAC, 2ND LEVEL: ICD-10-PCS | Mod: KX,RT,, | Performed by: ANESTHESIOLOGY

## 2022-06-28 PROCEDURE — 64494 INJ PARAVERT F JNT L/S 2 LEV: CPT | Mod: KX,RT,, | Performed by: ANESTHESIOLOGY

## 2022-06-28 PROCEDURE — 64493 INJ PARAVERT F JNT L/S 1 LEV: CPT | Mod: KX,RT | Performed by: ANESTHESIOLOGY

## 2022-06-28 PROCEDURE — 64493 PR INJ DX/THER AGNT PARAVERT FACET JOINT,IMG GUIDE,LUMBAR/SAC,1ST LVL: ICD-10-PCS | Mod: KX,RT,, | Performed by: ANESTHESIOLOGY

## 2022-06-28 PROCEDURE — 64494 INJ PARAVERT F JNT L/S 2 LEV: CPT | Mod: KX,RT | Performed by: ANESTHESIOLOGY

## 2022-06-28 RX ORDER — SODIUM CHLORIDE 9 MG/ML
500 INJECTION, SOLUTION INTRAVENOUS CONTINUOUS
Status: DISCONTINUED | OUTPATIENT
Start: 2022-06-28 | End: 2023-08-28

## 2022-06-28 RX ORDER — LIDOCAINE HYDROCHLORIDE 20 MG/ML
INJECTION, SOLUTION INFILTRATION; PERINEURAL
Status: DISCONTINUED | OUTPATIENT
Start: 2022-06-28 | End: 2022-06-28 | Stop reason: HOSPADM

## 2022-06-28 RX ORDER — BUPIVACAINE HYDROCHLORIDE 2.5 MG/ML
INJECTION, SOLUTION EPIDURAL; INFILTRATION; INTRACAUDAL
Status: DISCONTINUED | OUTPATIENT
Start: 2022-06-28 | End: 2022-06-28 | Stop reason: HOSPADM

## 2022-06-28 NOTE — OP NOTE
Diagnostic Lumbar Medial Branch Block Under Fluoroscopy    The procedure, risks, benefits, and options were discussed with the patient. There are no contraindications to the procedure. The patent expressed understanding and agreed to the procedure. Informed written consent was obtained prior to the start of the procedure and can be found in the patient's chart.    PATIENT NAME: Tanesha Davis   MRN: [id number]     DATE OF PROCEDURE: 06/28/2022                                           PROCEDURE:  Diagnostic Right L2, L3, L4 and L5 Lumbar Medial Branch Block under Fluoroscopy    PRE-OP DIAGNOSIS: Spondylosis without myelopathy or radiculopathy, lumbar region [M47.816] Lumbar spondylosis [M47.816]    POST-OP DIAGNOSIS: Same    PHYSICIAN: Shabnam Skaggs MD    ASSISTANTS: Dr. Vieira    MEDICATIONS INJECTED:  Bupivicaine 0.25%    LOCAL ANESTHETIC INJECTED:   Xylocaine 2%    SEDATION: None    ESTIMATED BLOOD LOSS:  None    COMPLICATIONS:  None.    INTERVAL HISTORY: Patient has clinical and imaging findings suggestive of facet mediated pain.    TECHNIQUE: Time-out was performed to identify the patient and procedure to be performed. With the patient laying in a prone position, the surgical area was prepped and draped in the usual sterile fashion using ChloraPrep and fenestrated drape. The levels were determined under fluoroscopic guidance. Skin anesthesia was achieved by injecting Lidocaine 2% over the injection sites. A 25 gauge, 3.5 inch needle was introduced into the medial branch nerves at the junctions of the superior articular process and the transverse processes of the targeted sites using AP, lateral and/or contralateral oblique fluoroscopic imaging. After negative aspiration for blood or CSF was confirmed, 1 mL of the anesthetic listed above was then slowly injected at each site. The needles were removed and bleeding was nil. A sterile dressing was applied. No specimens collected. The patient tolerated the procedure  well.     The patient was monitored after the procedure in the recovery area. They were given post-procedure and discharge instructions to follow at home. The patient was discharged in a stable condition.    Shabnam Skaggs MD

## 2022-06-28 NOTE — DISCHARGE INSTRUCTIONS
Thank you for allowing us to care for you today. You may receive a survey about the care we provided. Your feedback is valuable and helps us provide excellent care throughout the community.     Home Care Instructions for Pain Management:    1. DIET:   You may resume your normal diet today.   2. BATHING:   You may shower with luke warm water. No tub baths or anything that will soak injection sites under water for the next 24 hours.  3. DRESSING:   You may remove your bandage today.   4. ACTIVITY LEVEL:   You may resume your normal activities 24 hrs after your procedure. Nothing strenuous today.  5. MEDICATIONS:   You may resume your normal medications today. To restart blood thinners, ask your doctor.  6. DRIVING    If you have received any sedatives by mouth today, you may not drive for 12 hours.    If you have received any sedation through your IV, you may not drive for 24 hrs.   7. SPECIAL INSTRUCTIONS:   No heat to the injection site for 24 hrs including, hot bath or shower, heating pad, moist heat, or hot tubs.    Use ice pack to injection site for any pain or discomfort.  Apply ice packs for 20 minute intervals as needed.    IF you have diabetes, be sure to monitor your blood sugar more closely. IF your injection contained steroids your blood sugar levels may become higher than normal.    If you are still having pain upon discharge:  Your pain may improve over the next 48 hours. The anesthetic (numbing medication) works immediately to 48 hours. IF your injection contained a steroid (anti-inflammatory medication), it takes approximately 3 days to start feeling relief and 7-10 days to see your greatest results from the medication. It is possible you may need subsequent injections. This would be discussed at your follow up appointment with pain management or your referring doctor.    Please call the PAIN MANAGEMENT office at [phone] or ON CALL pager at [phone] if you experienced any:   -Weakness or  loss of sensation  -Fever > 101.5  -Pain uncontrolled with oral medications   -Persistent nausea, vomiting, or diarrhea  -Redness or drainage from the injection sites, or any other worrisome concerns.   If physician on call was not reached or could not communicate with our office for any reason please go to the nearest emergency department.   Lumbar/Cervical/Joint Medial Branch Block Pain Diary    Patient Name:  :    Pre-procedure Pain Score: _____/10    Time of injection:     Please fill out the chart below to the best of your ability. We need to know how much percentage of relief in your pain that you have while the numbing medication is active. Please be mindful that this is a diagnostic test and may not last for a long time. If you are asleep during one of the times listed below, you do not have to record that time.     Time After the   Procedure % of pain relief   achieved Pain Score (0-10)   1 hour post-procedure     2 hours post-procedure     3 hours post-procedure     4 hours post-procedure     5 hours post-procedure     6 hours post-procedure     12 hours post-procedure     24 hours post-procedure       Please make sure to return this form or information to the clinic. This information is needed to proceed with your plan of care. There are several options that you can use to send this back to us.    Form can be faxed to us at [phone]  Call us to provide the information at [phone]  Send the information to us through your [facility] Chart    If you have any questions about completing this diary, please call us at [phone].

## 2022-06-28 NOTE — DISCHARGE SUMMARY
[facility] ([facility])  Discharge Note  Short Stay    Procedure(s) (LRB):  BLOCK, NERVE, RIGHT L2-L3-L4-L5 (Right)    OUTCOME: Patient tolerated treatment/procedure well without complication and is now ready for discharge.    DISPOSITION: Home or Self Care    FINAL DIAGNOSIS:  Lumbar spondylosis    FOLLOWUP: In clinic    DISCHARGE INSTRUCTIONS:  No discharge procedures on file.     TIME SPENT ON DISCHARGE: 2 minutes

## 2022-06-29 ENCOUNTER — PATIENT MESSAGE (OUTPATIENT)
Dept: PAIN MEDICINE | Facility: OTHER | Age: 72
End: 2022-06-29
Payer: MEDICARE

## 2022-06-30 ENCOUNTER — TELEPHONE (OUTPATIENT)
Dept: PAIN MEDICINE | Facility: OTHER | Age: 72
End: 2022-06-30
Payer: MEDICARE

## 2022-06-30 ENCOUNTER — TELEPHONE (OUTPATIENT)
Dept: PAIN MEDICINE | Facility: CLINIC | Age: 72
End: 2022-06-30
Payer: MEDICARE

## 2022-06-30 DIAGNOSIS — M47.816 LUMBAR SPONDYLOSIS: Primary | ICD-10-CM

## 2022-06-30 NOTE — TELEPHONE ENCOUNTER
----- Message from Marie Bertrand MA sent at 6/30/2022  4:00 PM CDT -----  Regarding: FW: Pain Dairy  Contact: JACK SANCHEZ [[id number]]    ----- Message -----  From: Bebeto Neely  Sent: 6/30/2022   1:19 PM CDT  To: [facility] Staff  Subject: Pain Dairy                                       Name of Who is Calling: JACK SANCHEZ [[id number]]      What is the request in detail: Would like to speak with staff in regards to reporting pain dairy as follow as:    Time         Score                             Time        Score  1HR        60%    4/10                      6HR         95%   numbness started going away         numbness in front right leg        2HR        70%  still numbness        12 hr        95%   no numbness  3HR        80%  still numbness        24 hr        95%   numbness gone  4HR        90%  still numbness  5HR        95%  still numbness    Can the clinic reply by [facility]: yes      What Number to Call Back if not in [facility]: [phone]

## 2022-06-30 NOTE — TELEPHONE ENCOUNTER
Patient was contact and informed that we have received her pain diary score. Staff informed patient that we will forward pain diary over to the Doctor Sharifa, once the provider reviews it. Someone will contact her in regards of the next steps.    Verbalized understanding

## 2022-06-30 NOTE — TELEPHONE ENCOUNTER
----- Message from Bebeto Neely sent at 6/30/2022  1:13 PM CDT -----  Regarding: Pain Dairy  Contact: JACK SANCHEZ [[id number]]  Name of Who is Calling: JACK SANCHEZ [[id number]]      What is the request in detail: Would like to speak with staff in regards to reporting pain dairy as follow as:    Time         Score                             Time        Score  1HR        60%    4/10                      6HR         95%   numbness started going away         numbness in front right leg        2HR        70%  still numbness        12 hr        95%   no numbness  3HR        80%  still numbness        24 hr        95%   numbness gone  4HR        90%  still numbness  5HR        95%  still numbness    Can the clinic reply by [facility]: yes      What Number to Call Back if not in [facility]: [phone]

## 2022-07-01 ENCOUNTER — TELEPHONE (OUTPATIENT)
Dept: SPINE | Facility: CLINIC | Age: 72
End: 2022-07-01
Payer: MEDICARE

## 2022-07-01 NOTE — TELEPHONE ENCOUNTER
Staff spoke with patient she stated she received a call  from schedulers in regards to setting up procedure and was offered dates. Staff informed the patient we don't see any procedure set up but will send a message to the schedulers to try and gain more information.

## 2022-07-01 NOTE — TELEPHONE ENCOUNTER
----- Message from Clara Hess sent at 7/1/2022 12:46 PM CDT -----  Who called?:PT      What is the request in detail:PT would like to speak with staff about scheduling her nerve block procedure. Please advise         Can the clinic reply by [facility]?:No        Best Call Back Number: [phone]

## 2022-07-05 ENCOUNTER — TELEPHONE (OUTPATIENT)
Dept: ADMINISTRATIVE | Facility: OTHER | Age: 72
End: 2022-07-05
Payer: MEDICARE

## 2022-07-18 ENCOUNTER — PATIENT MESSAGE (OUTPATIENT)
Dept: PAIN MEDICINE | Facility: OTHER | Age: 72
End: 2022-07-18
Payer: MEDICARE

## 2022-08-02 ENCOUNTER — HOSPITAL ENCOUNTER (OUTPATIENT)
Facility: OTHER | Age: 72
Discharge: HOME | End: 2022-08-02
Attending: ANESTHESIOLOGY | Admitting: ANESTHESIOLOGY
Payer: MEDICARE

## 2022-08-02 VITALS
SYSTOLIC BLOOD PRESSURE: 183 MMHG | RESPIRATION RATE: 16 BRPM | HEART RATE: 60 BPM | DIASTOLIC BLOOD PRESSURE: 72 MMHG | BODY MASS INDEX: 31.16 KG/M2 | TEMPERATURE: 98 F | OXYGEN SATURATION: 97 % | HEIGHT: 65 IN | WEIGHT: 187 LBS

## 2022-08-02 DIAGNOSIS — G89.29 CHRONIC PAIN: ICD-10-CM

## 2022-08-02 DIAGNOSIS — M47.816 SPONDYLOSIS WITHOUT MYELOPATHY OR RADICULOPATHY, LUMBAR REGION: Primary | ICD-10-CM

## 2022-08-02 PROCEDURE — 64494 INJ PARAVERT F JNT L/S 2 LEV: CPT | Mod: KX,RT | Performed by: ANESTHESIOLOGY

## 2022-08-02 PROCEDURE — 64495 PR INJ DX/THER AGNT PARAVERT FACET JOINT,IMG GUIDE,LUMBAR/SAC, ADD LEVEL: ICD-10-PCS | Mod: KX,RT,, | Performed by: ANESTHESIOLOGY

## 2022-08-02 PROCEDURE — 64494 PR INJ DX/THER AGNT PARAVERT FACET JOINT,IMG GUIDE,LUMBAR/SAC, 2ND LEVEL: ICD-10-PCS | Mod: KX,RT,, | Performed by: ANESTHESIOLOGY

## 2022-08-02 PROCEDURE — 64493 PR INJ DX/THER AGNT PARAVERT FACET JOINT,IMG GUIDE,LUMBAR/SAC,1ST LVL: ICD-10-PCS | Mod: KX,RT,, | Performed by: ANESTHESIOLOGY

## 2022-08-02 PROCEDURE — 64495 INJ PARAVERT F JNT L/S 3 LEV: CPT | Mod: KX,RT,, | Performed by: ANESTHESIOLOGY

## 2022-08-02 PROCEDURE — 64493 INJ PARAVERT F JNT L/S 1 LEV: CPT | Mod: KX,RT | Performed by: ANESTHESIOLOGY

## 2022-08-02 PROCEDURE — 64493 INJ PARAVERT F JNT L/S 1 LEV: CPT | Mod: KX,RT,, | Performed by: ANESTHESIOLOGY

## 2022-08-02 PROCEDURE — 64495 INJ PARAVERT F JNT L/S 3 LEV: CPT | Mod: KX,RT | Performed by: ANESTHESIOLOGY

## 2022-08-02 PROCEDURE — 25000003 PHARM REV CODE 250: Performed by: ANESTHESIOLOGY

## 2022-08-02 PROCEDURE — 64494 INJ PARAVERT F JNT L/S 2 LEV: CPT | Mod: KX,RT,, | Performed by: ANESTHESIOLOGY

## 2022-08-02 RX ORDER — SODIUM CHLORIDE 9 MG/ML
500 INJECTION, SOLUTION INTRAVENOUS CONTINUOUS
Status: DISCONTINUED | OUTPATIENT
Start: 2022-08-02 | End: 2022-08-02 | Stop reason: HOSPADM

## 2022-08-02 RX ORDER — LIDOCAINE HYDROCHLORIDE 20 MG/ML
INJECTION, SOLUTION INFILTRATION; PERINEURAL
Status: DISCONTINUED | OUTPATIENT
Start: 2022-08-02 | End: 2022-08-02 | Stop reason: HOSPADM

## 2022-08-02 RX ORDER — BUPIVACAINE HYDROCHLORIDE 2.5 MG/ML
INJECTION, SOLUTION EPIDURAL; INFILTRATION; INTRACAUDAL
Status: DISCONTINUED | OUTPATIENT
Start: 2022-08-02 | End: 2022-08-02 | Stop reason: HOSPADM

## 2022-08-02 NOTE — DISCHARGE INSTRUCTIONS
Lumbar/Cervical/Joint Medial Branch Block Pain Diary    Patient Name:  :    Pre-procedure Pain Score: _____/10    Time of injection:     Please fill out the chart below to the best of your ability. We need to know how much percentage of relief in your pain that you have while the numbing medication is active. Please be mindful that this is a diagnostic test and may not last for a long time. If you are asleep during one of the times listed below, you do not have to record that time.     Time After the   Procedure % of pain relief   achieved Pain Score (0-10)   1 hour post-procedure     2 hours post-procedure     3 hours post-procedure     4 hours post-procedure     5 hours post-procedure     6 hours post-procedure     12 hours post-procedure     24 hours post-procedure       Please make sure to return this form or information to the clinic. This information is needed to proceed with your plan of care. There are several options that you can use to send this back to us.    Form can be faxed to us at [phone]  Call us to provide the information at [phone]  Send the information to us through your [facility] Chart    If you have any questions about completing this diary, please call us at [phone].    Thank you for allowing us to care for you today. You may receive a survey about the care we provided. Your feedback is valuable and helps us provide excellent care throughout the community.     Home Care Instructions for Pain Management:    1. DIET:   You may resume your normal diet today.   2. BATHING:   You may shower with luke warm water. No tub baths or anything that will soak injection sites under water for the next 24 hours.  3. DRESSING:   You may remove your bandage today.   4. ACTIVITY LEVEL:   You may resume your normal activities 24 hrs after your procedure. Nothing strenuous today.  5. MEDICATIONS:   You may resume your normal medications today. To restart blood thinners, ask your doctor.  6.  DRIVING    If you have received any sedatives by mouth today, you may not drive for 12 hours.    If you have received any sedation through your IV, you may not drive for 24 hrs.   7. SPECIAL INSTRUCTIONS:   No heat to the injection site for 24 hrs including, hot bath or shower, heating pad, moist heat, or hot tubs.    Use ice pack to injection site for any pain or discomfort.  Apply ice packs for 20 minute intervals as needed.    IF you have diabetes, be sure to monitor your blood sugar more closely. IF your injection contained steroids your blood sugar levels may become higher than normal.    If you are still having pain upon discharge:  Your pain may improve over the next 48 hours. The anesthetic (numbing medication) works immediately to 48 hours. IF your injection contained a steroid (anti-inflammatory medication), it takes approximately 3 days to start feeling relief and 7-10 days to see your greatest results from the medication. It is possible you may need subsequent injections. This would be discussed at your follow up appointment with pain management or your referring doctor.    Please call the PAIN MANAGEMENT office at [phone] or ON CALL pager at [phone] if you experienced any:   -Weakness or loss of sensation  -Fever > 101.5  -Pain uncontrolled with oral medications   -Persistent nausea, vomiting, or diarrhea  -Redness or drainage from the injection sites, or any other worrisome concerns.   If physician on call was not reached or could not communicate with our office for any reason please go to the nearest emergency department.

## 2022-08-02 NOTE — H&P
"HPI  Patient presenting for Procedure(s) (LRB):  BLOCK, NERVE, RIGHT L2-L5 MEDIAL BRANCH (Right)     Patient on Anti-coagulation No    No health changes since previous encounter    Past Medical History:   Diagnosis Date    Arthritis     Draining cutaneous sinus tract     Headache(784.0)     Incisional hernia     Insomnia     Nocturnal leg cramps     Nonhealing surgical wound     PONV (postoperative nausea and vomiting)     with last surgery per patient, "Severe" PONV 2-27-14    Postmenopausal status     Rectosigmoid cancer      Past Surgical History:   Procedure Laterality Date    BREAST BIOPSY Left     CHOLECYSTECTOMY  02/24/2014    with incisional hernia repair with mesh    COLONOSCOPY N/A 8/23/2016    Procedure: COLONOSCOPY;  Surgeon: Cuco Meraz MD;  Location: [facility] ENDO (4TH FLR);  Service: Endoscopy;  Laterality: N/A;    COLONOSCOPY N/A 12/9/2019    Procedure: COLONOSCOPY;  Surgeon: KISHORE Curry MD;  Location: [facility] ENDO (4TH FLR);  Service: Endoscopy;  Laterality: N/A;  ADHESIVE Allergy    ESOPHAGOGASTRODUODENOSCOPY N/A 4/22/2021    Procedure: EGD (ESOPHAGOGASTRODUODENOSCOPY);  Surgeon: Lukasz Lindquist MD;  Location: [facility] (4TH FLR);  Service: Endoscopy;  Laterality: N/A;  3/25-covid + 7/2020 at [facility]-pt getting results to us to see if correct form of test-MS    HYSTERECTOMY      INJECTION OF ANESTHETIC AGENT AROUND NERVE Right 6/28/2022    Procedure: BLOCK, NERVE, RIGHT L2-L3-L4-L5;  Surgeon: Shabnam Skaggs MD;  Location: [facility] PAIN MGT;  Service: Pain Management;  Laterality: Right;    KNEE ARTHROSCOPY      KNEE SURGERY      right and left knee repair    OOPHORECTOMY      REPAIR OF RECURRENT INCISIONAL HERNIA  7/10/2018    Procedure: REPAIR, HERNIA, INCISIONAL, RECURRENT;  Surgeon: Cuco Meraz MD;  Location: [facility] OR [facility] FLR;  Service: Colon and Rectal;;    SIGMOIDECTOMY  03/20/2006    LAR     Review of patient's allergies indicates:   Allergen Reactions    Adhesive " "Rash     Asking that we use Paper Tape,  Uses "Sensitive Skin" band aids    Amoxil [amoxicillin] Hives    Penicillins Hives and Swelling     Swelling of ears and neck    Sulfa (sulfonamide antibiotics) Hives and Itching      No current facility-administered medications for this encounter.     Facility-Administered Medications Ordered in Other Encounters   Medication    0.9%  NaCl infusion       PMHx, PSHx, Allergies, Medications reviewed in epic    ROS negative except pain complaints in HPI    OBJECTIVE:    BP (!) 143/89 (BP Location: Right arm, Patient Position: Lying)   Pulse 65   Resp 16   Ht 5' 5" (1.651 m)   Wt 84.8 kg (187 lb)   SpO2 97%   BMI 31.12 kg/m²     PHYSICAL EXAMINATION:    GENERAL: Well appearing, in no acute distress, alert and oriented x3.  PSYCH:  Mood and affect appropriate.  SKIN: Skin color, texture, turgor normal, no rashes or lesions which will impact the procedure.  CV: RRR with palpation of the radial artery.  PULM: No evidence of respiratory difficulty, symmetric chest rise. Clear to auscultation.  NEURO: Cranial nerves grossly intact.    Plan:    Proceed with procedure as planned Procedure(s) (LRB):  BLOCK, NERVE, RIGHT L2-L5 MEDIAL BRANCH (Right)    Winston Duckworth  08/02/2022          "

## 2022-08-02 NOTE — DISCHARGE SUMMARY
[facility] - Pain Management ([place])  Discharge Note  Short Stay    Procedure(s) (LRB):  BLOCK, NERVE, RIGHT L2-L5 MEDIAL BRANCH (Right)    OUTCOME: Patient tolerated treatment/procedure well without complication and is now ready for discharge.    DISPOSITION: Home or Self Care    FINAL DIAGNOSIS:  Lumbar Spondylosis    FOLLOWUP: In clinic    DISCHARGE INSTRUCTIONS:  No discharge procedures on file.     TIME SPENT ON DISCHARGE: 2 minutes

## 2022-08-02 NOTE — OP NOTE
Diagnostic Lumbar Medial Branch Block Under Fluoroscopy    The procedure, risks, benefits, and options were discussed with the patient. There are no contraindications to the procedure. The patent expressed understanding and agreed to the procedure. Informed written consent was obtained prior to the start of the procedure and can be found in the patient's chart.    PATIENT NAME: Tanesha Davis   MRN: [id number]     DATE OF PROCEDURE: 08/02/2022                                           PROCEDURE:  Diagnostic Right L2, L3, L4 and L5 Lumbar Medial Branch Block under Fluoroscopy    PRE-OP DIAGNOSIS: Lumbar spondylosis [M47.816] Lumbar spondylosis [M47.816]    POST-OP DIAGNOSIS: Same    PHYSICIAN: Shabnam Skaggs MD    ASSISTANTS: Dr. Duff    MEDICATIONS INJECTED:  Bupivicaine 0.25%    LOCAL ANESTHETIC INJECTED:   Xylocaine 2%    SEDATION: None    ESTIMATED BLOOD LOSS:  None    COMPLICATIONS:  None.    INTERVAL HISTORY: Patient has clinical and imaging findings suggestive of facet mediated pain.    TECHNIQUE: Time-out was performed to identify the patient and procedure to be performed. With the patient laying in a prone position, the surgical area was prepped and draped in the usual sterile fashion using ChloraPrep and fenestrated drape. The levels were determined under fluoroscopic guidance. Skin anesthesia was achieved by injecting Lidocaine 2% over the injection sites. A 25 gauge, 3.5 inch needle was introduced into the medial branch nerves at the junctions of the superior articular process and the transverse processes of the targeted sites using AP, lateral and/or contralateral oblique fluoroscopic imaging. After negative aspiration for blood or CSF was confirmed, 0.5 mL of the anesthetic listed above was then slowly injected at each site. The needles were removed and bleeding was nil. A sterile dressing was applied. No specimens collected. The patient tolerated the procedure well.     The patient was monitored  after the procedure in the recovery area. They were given post-procedure and discharge instructions to follow at home. The patient was discharged in a stable condition.    Shabnam Skaggs MD

## 2022-08-03 ENCOUNTER — TELEPHONE (OUTPATIENT)
Dept: PAIN MEDICINE | Facility: CLINIC | Age: 72
End: 2022-08-03
Payer: MEDICARE

## 2022-08-03 NOTE — TELEPHONE ENCOUNTER
----- Message from Chun Wan sent at 8/3/2022  4:28 PM CDT -----   Name of Who is Calling:     What is the request in detail:  patient request call back in reference to pain diary information Please contact to further discuss and advise      Can the clinic reply by [facility]:     What Number to Call Back if not in [facility]:  [phone]

## 2022-08-03 NOTE — TELEPHONE ENCOUNTER
Patient provider pain diary score of 80% relief. Staff sent the message over to the Doctor for further assistance.    Verbalized understanding

## 2022-08-03 NOTE — TELEPHONE ENCOUNTER
----- Message from Chun Wan sent at 8/3/2022  4:28 PM CDT -----   Name of Who is Calling:     What is the request in detail:  patient request call back in reference to pain diary information Please contact to further discuss and advise      Can the clinic reply by [facility]:     What Number to Call Back if not in [facility]:  [phone]

## 2022-08-29 ENCOUNTER — TELEPHONE (OUTPATIENT)
Dept: PAIN MEDICINE | Facility: CLINIC | Age: 72
End: 2022-08-29
Payer: MEDICARE

## 2022-08-29 DIAGNOSIS — M47.816 LUMBAR SPONDYLOSIS: Primary | ICD-10-CM

## 2022-08-29 NOTE — TELEPHONE ENCOUNTER
Staff spoke with pt to make sure she had sent in her pian diary of which was receive on 08/03. Staff stated that we will forward this over to the provider for further review. Pt verbalized understanding and confirmed. SG        ----- Message from Addie Hawk sent at 8/29/2022 11:50 AM CDT -----  Regarding: RE: CAll BAck  Pt. Had 2nd Block 8/2/22, Dr. Skaggs, please advise if you'll move forward with RFA.   ----- Message -----  From: Vijay Mattson  Sent: 8/24/2022  10:35 AM CDT  To: [facility] Pain Management Procedures  Subject: CAll BAck                                        Name of Who is Calling:JACK SANCHEZ [[id number]]              What is the request in detail: Patient requesting to set up  a appointment for her 2nd nerve block Please assist              Can the clinic reply by [facility]: No              What Number to Call Back if not in [facility]:[phone]

## 2022-08-29 NOTE — TELEPHONE ENCOUNTER
----- Message from Kelin Rivera MA sent at 8/29/2022  3:46 PM CDT -----  Regarding: procedure  Dr. Skaggs,    BLOCK, NERVE, RIGHT L2-L5 MEDIAL BRANCH, patient states she receive 80% from following procedure, 08/02.    Kelin Rivera MA  [address]  [address]  [place], [place].[address]  [phone] office [phone]     ----- Message -----  From: Addie Hawk  Sent: 8/29/2022  11:51 AM CDT  To: Shar[facility] Zepeda Shabnam Staff, #  Subject: RE: CAll BAck                                    Pt. Had 2nd Block 8/2/22, Dr. Skaggs, please advise if you'll move forward with RFA.   ----- Message -----  From: Vijay Mattson  Sent: 8/24/2022  10:35 AM CDT  To: [facility] Pain Management Procedures  Subject: CAll BAck                                        Name of Who is Calling:JACK SANCHEZ [[id number]]              What is the request in detail: Patient requesting to set up  a appointment for her 2nd nerve block Please assist              Can the clinic reply by [facility]: No              What [facility] Back [facility]:[phone]

## 2022-08-31 ENCOUNTER — PATIENT MESSAGE (OUTPATIENT)
Dept: PAIN MEDICINE | Facility: OTHER | Age: 72
End: 2022-08-31
Payer: MEDICARE

## 2022-08-31 DIAGNOSIS — M47.816 LUMBAR SPONDYLOSIS: Primary | ICD-10-CM

## 2022-08-31 DIAGNOSIS — Z78.0 MENOPAUSE: ICD-10-CM

## 2022-08-31 DIAGNOSIS — N95.1 MENOPAUSAL SYMPTOMS: ICD-10-CM

## 2022-09-01 RX ORDER — ESTRADIOL 1 MG/1
1 TABLET ORAL DAILY
Qty: 30 TABLET | Refills: 0 | OUTPATIENT
Start: 2022-09-01

## 2022-09-07 DIAGNOSIS — Z78.0 ASYMPTOMATIC MENOPAUSAL STATE: ICD-10-CM

## 2022-09-27 ENCOUNTER — HOSPITAL ENCOUNTER (OUTPATIENT)
Facility: OTHER | Age: 72
Discharge: HOME | End: 2022-09-27
Attending: ANESTHESIOLOGY | Admitting: ANESTHESIOLOGY
Payer: MEDICARE

## 2022-09-27 VITALS
OXYGEN SATURATION: 98 % | HEIGHT: 63 IN | HEART RATE: 62 BPM | TEMPERATURE: 98 F | WEIGHT: 187 LBS | BODY MASS INDEX: 33.13 KG/M2 | RESPIRATION RATE: 16 BRPM | DIASTOLIC BLOOD PRESSURE: 72 MMHG | SYSTOLIC BLOOD PRESSURE: 128 MMHG

## 2022-09-27 DIAGNOSIS — M47.816 SPONDYLOSIS WITHOUT MYELOPATHY OR RADICULOPATHY, LUMBAR REGION: Primary | ICD-10-CM

## 2022-09-27 DIAGNOSIS — M47.9 OSTEOARTHRITIS OF SPINE, UNSPECIFIED SPINAL OSTEOARTHRITIS COMPLICATION STATUS, UNSPECIFIED SPINAL REGION: ICD-10-CM

## 2022-09-27 DIAGNOSIS — G89.29 CHRONIC PAIN: ICD-10-CM

## 2022-09-27 PROCEDURE — 64636 DESTROY L/S FACET JNT ADDL: CPT | Mod: RT | Performed by: ANESTHESIOLOGY

## 2022-09-27 PROCEDURE — 64636 PR DESTROY L/S FACET JNT ADDL: ICD-10-PCS | Mod: RT,,, | Performed by: ANESTHESIOLOGY

## 2022-09-27 PROCEDURE — 64635 DESTROY LUMB/SAC FACET JNT: CPT | Mod: RT | Performed by: ANESTHESIOLOGY

## 2022-09-27 PROCEDURE — 63600175 PHARM REV CODE 636 W HCPCS: Performed by: ANESTHESIOLOGY

## 2022-09-27 PROCEDURE — 25000003 PHARM REV CODE 250: Performed by: STUDENT IN AN ORGANIZED HEALTH CARE EDUCATION/TRAINING PROGRAM

## 2022-09-27 PROCEDURE — 64636 DESTROY L/S FACET JNT ADDL: CPT | Mod: RT,,, | Performed by: ANESTHESIOLOGY

## 2022-09-27 PROCEDURE — 64635 DESTROY LUMB/SAC FACET JNT: CPT | Mod: RT,,, | Performed by: ANESTHESIOLOGY

## 2022-09-27 PROCEDURE — 64635 PR DESTROY LUMB/SAC FACET JNT: ICD-10-PCS | Mod: RT,,, | Performed by: ANESTHESIOLOGY

## 2022-09-27 PROCEDURE — 25000003 PHARM REV CODE 250: Performed by: ANESTHESIOLOGY

## 2022-09-27 RX ORDER — LIDOCAINE HYDROCHLORIDE 20 MG/ML
INJECTION, SOLUTION INFILTRATION; PERINEURAL
Status: DISCONTINUED | OUTPATIENT
Start: 2022-09-27 | End: 2022-09-27 | Stop reason: HOSPADM

## 2022-09-27 RX ORDER — MIDAZOLAM HYDROCHLORIDE 1 MG/ML
INJECTION INTRAMUSCULAR; INTRAVENOUS
Status: DISCONTINUED | OUTPATIENT
Start: 2022-09-27 | End: 2022-09-27 | Stop reason: HOSPADM

## 2022-09-27 RX ORDER — SODIUM CHLORIDE 9 MG/ML
500 INJECTION, SOLUTION INTRAVENOUS CONTINUOUS
Status: DISCONTINUED | OUTPATIENT
Start: 2022-09-27 | End: 2022-09-27 | Stop reason: HOSPADM

## 2022-09-27 RX ORDER — DEXAMETHASONE SODIUM PHOSPHATE 10 MG/ML
INJECTION INTRAMUSCULAR; INTRAVENOUS
Status: DISCONTINUED | OUTPATIENT
Start: 2022-09-27 | End: 2022-09-27 | Stop reason: HOSPADM

## 2022-09-27 RX ORDER — FENTANYL CITRATE 50 UG/ML
INJECTION, SOLUTION INTRAMUSCULAR; INTRAVENOUS
Status: DISCONTINUED | OUTPATIENT
Start: 2022-09-27 | End: 2022-09-27 | Stop reason: HOSPADM

## 2022-09-27 RX ORDER — BUPIVACAINE HYDROCHLORIDE 2.5 MG/ML
INJECTION, SOLUTION EPIDURAL; INFILTRATION; INTRACAUDAL
Status: DISCONTINUED | OUTPATIENT
Start: 2022-09-27 | End: 2022-09-27 | Stop reason: HOSPADM

## 2022-09-27 NOTE — OP NOTE
Therapeutic Lumbar Medial Branch Radiofrequency Ablation under Fluoroscopy     The procedure, risks, benefits, and options were discussed with the patient. There are no contraindications to the procedure. The patent expressed understanding and agreed to the procedure. Informed written consent was obtained prior to the start of the procedure and can be found in the patient's chart.        PATIENT NAME: Tanesha Davis   MRN: [id number]     DATE OF PROCEDURE: 09/27/2022     PROCEDURE:  Right L2, L3, L4, and L5 Lumbar Radiofrequency Ablation under Fluoroscopy    PRE-OP DIAGNOSIS: Lumbar spondylosis [M47.816] Lumbar spondylosis [M47.816]    POST-OP DIAGNOSIS: Same    PHYSICIAN: Shabnam Skaggs MD    ASSISTANTS: Quan Benoit MD  Pain Fellow     MEDICATIONS INJECTED:  Preservative-free Decadron 10mg with 9cc of Bupivicaine 0.25%    LOCAL ANESTHETIC INJECTED:   Xylocaine 2%    SEDATION: Versed 1mg and Fentanyl 100mcg                                                                                                                                                                                     Conscious sedation ordered by M.D. Patient re-evaluation prior to administration of conscious sedation. No changes noted in patient's status from initial evaluation. The patient's vital signs were monitored by RN and patient remained hemodynamically stable throughout the procedure.    Event Time In   Sedation Start 1009       ESTIMATED BLOOD LOSS:  None    COMPLICATIONS:  None     INTERVAL HISTORY: Patient has clinical and imaging findings suggestive of facet mediated pain. Patients has completed 2 previous diagnostic medial branch blocks at specified levels with at least 80% relief for the expected duration of the local anesthetic utilized.    TECHNIQUE: Time-out was performed to identify the patient and procedure to be performed. With the patient laying in a prone position, the surgical area was prepped and draped in the usual  sterile fashion using ChloraPrep and fenestrated drape. The levels were determined under fluoroscopic guidance. Skin anesthesia was achieved by injecting Lidocaine 2% over the injection sites. A 18 gauge 10mm curved active tip needle was introduced to the anatomic local of the medial branch at each of the above levels using AP, lateral and/or contralateral oblique fluoroscopic imaging. Then sensory and motor testing was performed to confirm that the needle tips were in the correct location. After negative aspiration for blood or CSF was confirmed, 1 mL of the lidocaine 2% listed above was injected slowly at each site. This was followed by thermal lesioning at 80 degrees celsius for 90 seconds. Needles were then rotated, depth was checked with lateral fluoroscopy, and an additional ablation was performed without pain. That was followed by slowly injecting 1 mL of the medication mixture listed above at each site. The needles were removed and bleeding was nil. A sterile dressing was applied. No specimens collected. The patient tolerated the procedure well and did not have any procedure related motor deficit at the conclusion of the procedure.    The patient was monitored after the procedure in the recovery area. They were given post-procedure and discharge instructions to follow at home. The patient was discharged in a stable condition.    Shabnam Skaggs MD

## 2022-09-27 NOTE — DISCHARGE INSTRUCTIONS

## 2022-09-27 NOTE — DISCHARGE SUMMARY
Discharge Note  Short Stay      SUMMARY     Admit Date: 9/27/2022    Attending Physician: Shabnam Skaggs      Discharge Physician: Shabnam Skaggs      Discharge Date: 9/27/2022 10:25 AM    Procedure(s) (LRB):  Radiofrequency Ablation Right L2-L5 (Right)    Final Diagnosis: Lumbar spondylosis [M47.816]    Disposition: Home or self care    Patient Instructions:   Current Discharge Medication List        CONTINUE these medications which have NOT CHANGED    Details   ERGOCALCIFEROL, VITAMIN D2, (VITAMIN D ORAL) Take 1 capsule by mouth every morning.       estradioL (ESTRACE) 1 MG tablet TAKE 1 TABLET BY MOUTH EVERY DAY  Qty: 30 tablet, Refills: 0    Comments: Needs appointment for additional refills  Associated Diagnoses: Menopausal symptoms      ferrous sulfate 325 (65 FE) MG EC tablet TAKE 1 TABLET BY MOUTH EVERY DAY  Qty: 90 tablet, Refills: 3    Associated Diagnoses: RLS (restless legs syndrome)      gabapentin (NEURONTIN) 100 MG capsule TAKE 1 CAPSULE BY MOUTH EVERY EVENING 2 HOURS BEFORE BEDTIME.  Qty: 270 capsule, Refills: 3    Associated Diagnoses: RLS (restless legs syndrome); Postmenopausal status      L GASSERI/B BIFIDUM/B LONGUM ([employer]ent ORAL) Take 1 tablet by mouth 2 (two) times daily.      linaCLOtide (LINZESS) 72 mcg Cap capsule Take 1 capsule (72 mcg total) by mouth once daily.  Qty: 30 capsule, Refills: 5      magnesium oxide-pyridoxine (BEELITH) 362-20 mg Tab Take 1 tablet by mouth once daily.  Qty: 90 tablet, Refills: 1    Associated Diagnoses: Nocturnal muscle cramp      meloxicam (MOBIC) 7.5 MG tablet TAKE 1 TABLET BY MOUTH EVERY DAY  Qty: 90 tablet, Refills: 1      VITAMIN E ACETATE (VITAMIN E ORAL) Take 1 capsule by mouth every morning.                  Discharge Diagnosis: Lumbar spondylosis [M47.816]  Condition on Discharge: Stable with no complications to procedure   Diet on Discharge: Same as before.  Activity: as per instruction sheet.  Discharge to: Home with a  responsible adult.  Follow up: 2-4 weeks       Please call my office or pager at [phone] if experienced any weakness or loss of sensation, fever > 101.5, pain uncontrolled with oral medications, persistent nausea/vomiting/or diarrhea, redness or drainage from the incisions, or any other worrisome concerns. If physician on call was not reached or could not communicate with our office for any reason please go to the nearest emergency department      Shabnam Skaggs  09/27/2022

## 2022-09-27 NOTE — H&P
"HPI  Patient presenting for Procedure(s) (LRB):  Radiofrequency Ablation Right L2-L5 (Right)     Patient on Anti-coagulation No    No health changes since previous encounter    Past Medical History:   Diagnosis Date    Arthritis     Draining cutaneous sinus tract     Headache(784.0)     Incisional hernia     Insomnia     Nocturnal leg cramps     Nonhealing surgical wound     PONV (postoperative nausea and vomiting)     with last surgery per patient, "Severe" PONV 2-27-14    Postmenopausal status     Rectosigmoid cancer      Past Surgical History:   Procedure Laterality Date    BREAST BIOPSY Left     CHOLECYSTECTOMY  02/24/2014    with incisional hernia repair with mesh    COLONOSCOPY N/A 8/23/2016    Procedure: COLONOSCOPY;  Surgeon: Cuco Meraz MD;  Location: [facility] ENDO (4TH FLR);  Service: Endoscopy;  Laterality: N/A;    COLONOSCOPY N/A 12/9/2019    Procedure: COLONOSCOPY;  Surgeon: KISHORE Curry MD;  Location: [facility] ENDO (4TH FLR);  Service: Endoscopy;  Laterality: N/A;  ADHESIVE Allergy    ESOPHAGOGASTRODUODENOSCOPY N/A 4/22/2021    Procedure: EGD (ESOPHAGOGASTRODUODENOSCOPY);  Surgeon: Lukasz Lindquist MD;  Location: [facility] ENDO (4TH FLR);  Service: Endoscopy;  Laterality: N/A;  3/25-covid + 7/2020 at [facility]-pt getting results to us to see if correct form of test-MS    HYSTERECTOMY      INJECTION OF ANESTHETIC AGENT AROUND NERVE Right 6/28/2022    Procedure: BLOCK, NERVE, RIGHT L2-L3-L4-L5;  Surgeon: Shabnam Skaggs MD;  Location: [facility] PAIN MGT;  Service: Pain Management;  Laterality: Right;    INJECTION OF ANESTHETIC AGENT AROUND NERVE Right 8/2/2022    Procedure: BLOCK, NERVE, RIGHT L2-L5 MEDIAL BRANCH;  Surgeon: Shabnam Skaggs MD;  Location: [facility] PAIN MGT;  Service: Pain Management;  Laterality: Right;    KNEE ARTHROSCOPY      KNEE SURGERY      right and left knee repair    OOPHORECTOMY      REPAIR OF RECURRENT INCISIONAL HERNIA  7/10/2018    Procedure: REPAIR, HERNIA, INCISIONAL, RECURRENT;  " "Surgeon: Cuco Meraz MD;  Location: [facility] OR [address] [place] [address];  Service: Colon and Rectal;;    SIGMOIDECTOMY  03/20/2006    LAR     Review of patient's allergies indicates:   Allergen Reactions    Adhesive Rash     Asking that we use Paper Tape,  Uses "Sensitive Skin" band aids    Amoxil [amoxicillin] Hives    Penicillins Hives and Swelling     Swelling of ears and neck    Sulfa (sulfonamide antibiotics) Hives and Itching      Current Facility-Administered Medications   Medication    0.9%  NaCl infusion     Facility-Administered Medications Ordered in Other Encounters   Medication    0.9%  NaCl infusion       PMHx, PSHx, Allergies, Medications reviewed in epic    ROS negative except pain complaints in HPI    OBJECTIVE:    BP (!) 145/67 (BP Location: Right arm, Patient Position: Lying)   Pulse 65   Temp 97.8 °F (36.6 °C) (Oral)   Resp 14   Ht 5' 3" (1.6 m)   Wt 84.8 kg (187 lb)   SpO2 98%   Breastfeeding No   BMI 33.13 kg/m²     PHYSICAL EXAMINATION:    GENERAL: Well appearing, in no acute distress, alert and oriented x3.  PSYCH:  Mood and affect appropriate.  SKIN: Skin color, texture, turgor normal, no rashes or lesions which will impact the procedure.  CV: Extremities warm  PULM: No evidence of respiratory difficulty, symmetric chest rise.   NEURO: Cranial nerves grossly intact.    Plan:    Proceed with procedure as planned Procedure(s) (LRB):  Radiofrequency Ablation Right L2-L5 (Right)    Quan Benoit  09/27/2022            "

## 2022-09-28 ENCOUNTER — PATIENT MESSAGE (OUTPATIENT)
Dept: PAIN MEDICINE | Facility: OTHER | Age: 72
End: 2022-09-28
Payer: MEDICARE

## 2022-10-25 ENCOUNTER — TELEPHONE (OUTPATIENT)
Dept: PAIN MEDICINE | Facility: CLINIC | Age: 72
End: 2022-10-25
Payer: MEDICARE

## 2022-10-25 NOTE — TELEPHONE ENCOUNTER
Sta LVM for patient in regards of upcoming appointment with Dr. Shabnam Skaggs 10/26/2022 at 10:15 am

## 2022-10-26 ENCOUNTER — OFFICE VISIT (OUTPATIENT)
Dept: PAIN MEDICINE | Facility: CLINIC | Age: 72
End: 2022-10-26
Payer: MEDICARE

## 2022-10-26 ENCOUNTER — OFFICE VISIT (OUTPATIENT)
Dept: INTERNAL MEDICINE | Facility: CLINIC | Age: 72
End: 2022-10-26
Attending: INTERNAL MEDICINE
Payer: MEDICARE

## 2022-10-26 ENCOUNTER — LAB VISIT (OUTPATIENT)
Dept: LAB | Facility: OTHER | Age: 72
End: 2022-10-26
Attending: INTERNAL MEDICINE
Payer: MEDICARE

## 2022-10-26 VITALS
BODY MASS INDEX: 33.75 KG/M2 | DIASTOLIC BLOOD PRESSURE: 80 MMHG | OXYGEN SATURATION: 98 % | HEART RATE: 65 BPM | SYSTOLIC BLOOD PRESSURE: 134 MMHG | HEIGHT: 63 IN | WEIGHT: 190.5 LBS

## 2022-10-26 VITALS
WEIGHT: 190.69 LBS | HEART RATE: 64 BPM | SYSTOLIC BLOOD PRESSURE: 138 MMHG | HEIGHT: 63 IN | BODY MASS INDEX: 33.79 KG/M2 | DIASTOLIC BLOOD PRESSURE: 75 MMHG

## 2022-10-26 DIAGNOSIS — R07.89 ATYPICAL CHEST PAIN: ICD-10-CM

## 2022-10-26 DIAGNOSIS — R19.00 INTRA-ABDOMINAL AND PELVIC SWELLING, MASS AND LUMP, UNSPECIFIED SITE: ICD-10-CM

## 2022-10-26 DIAGNOSIS — G25.81 RLS (RESTLESS LEGS SYNDROME): ICD-10-CM

## 2022-10-26 DIAGNOSIS — N95.1 MENOPAUSAL SYMPTOMS: ICD-10-CM

## 2022-10-26 DIAGNOSIS — R23.4 CHANGES IN SKIN TEXTURE: ICD-10-CM

## 2022-10-26 DIAGNOSIS — R25.2 LEG CRAMPS: ICD-10-CM

## 2022-10-26 DIAGNOSIS — R79.9 ABNORMAL FINDING OF BLOOD CHEMISTRY, UNSPECIFIED: ICD-10-CM

## 2022-10-26 DIAGNOSIS — D50.8 OTHER IRON DEFICIENCY ANEMIA: ICD-10-CM

## 2022-10-26 DIAGNOSIS — Z78.0 POSTMENOPAUSAL STATUS: ICD-10-CM

## 2022-10-26 DIAGNOSIS — M47.816 LUMBAR SPONDYLOSIS: Primary | ICD-10-CM

## 2022-10-26 DIAGNOSIS — K43.2 INCISIONAL HERNIA, WITHOUT OBSTRUCTION OR GANGRENE: ICD-10-CM

## 2022-10-26 DIAGNOSIS — Z85.038 HISTORY OF COLON CANCER: Primary | ICD-10-CM

## 2022-10-26 LAB
ALBUMIN SERPL BCP-MCNC: 4.3 G/DL (ref 3.5–5.2)
ALP SERPL-CCNC: 92 U/L (ref 55–135)
ALT SERPL W/O P-5'-P-CCNC: 18 U/L (ref 10–44)
ANION GAP SERPL CALC-SCNC: 6 MMOL/L (ref 8–16)
AST SERPL-CCNC: 19 U/L (ref 10–40)
BASOPHILS # BLD AUTO: 0.08 K/UL (ref 0–0.2)
BASOPHILS NFR BLD: 1.1 % (ref 0–1.9)
BILIRUB SERPL-MCNC: 0.5 MG/DL (ref 0.1–1)
BUN SERPL-MCNC: 13 MG/DL (ref 8–23)
CALCIUM SERPL-MCNC: 9.7 MG/DL (ref 8.7–10.5)
CHLORIDE SERPL-SCNC: 107 MMOL/L (ref 95–110)
CO2 SERPL-SCNC: 28 MMOL/L (ref 23–29)
CREAT SERPL-MCNC: 0.7 MG/DL (ref 0.5–1.4)
D DIMER PPP IA.FEU-MCNC: 0.49 MG/L FEU
DIFFERENTIAL METHOD: NORMAL
EOSINOPHIL # BLD AUTO: 0.2 K/UL (ref 0–0.5)
EOSINOPHIL NFR BLD: 3.2 % (ref 0–8)
ERYTHROCYTE [DISTWIDTH] IN BLOOD BY AUTOMATED COUNT: 12.8 % (ref 11.5–14.5)
EST. GFR  (NO RACE VARIABLE): >60 ML/MIN/1.73 M^2
GLUCOSE SERPL-MCNC: 91 MG/DL (ref 70–110)
HCT VFR BLD AUTO: 44.5 % (ref 37–48.5)
HGB BLD-MCNC: 14.3 G/DL (ref 12–16)
IMM GRANULOCYTES # BLD AUTO: 0.03 K/UL (ref 0–0.04)
IMM GRANULOCYTES NFR BLD AUTO: 0.4 % (ref 0–0.5)
LYMPHOCYTES # BLD AUTO: 1.9 K/UL (ref 1–4.8)
LYMPHOCYTES NFR BLD: 25.3 % (ref 18–48)
MCH RBC QN AUTO: 30.1 PG (ref 27–31)
MCHC RBC AUTO-ENTMCNC: 32.1 G/DL (ref 32–36)
MCV RBC AUTO: 94 FL (ref 82–98)
MONOCYTES # BLD AUTO: 0.5 K/UL (ref 0.3–1)
MONOCYTES NFR BLD: 6.3 % (ref 4–15)
NEUTROPHILS # BLD AUTO: 4.7 K/UL (ref 1.8–7.7)
NEUTROPHILS NFR BLD: 63.7 % (ref 38–73)
NRBC BLD-RTO: 0 /100 WBC
PLATELET # BLD AUTO: 261 K/UL (ref 150–450)
PMV BLD AUTO: 10.6 FL (ref 9.2–12.9)
POTASSIUM SERPL-SCNC: 4.1 MMOL/L (ref 3.5–5.1)
PROT SERPL-MCNC: 7.4 G/DL (ref 6–8.4)
RBC # BLD AUTO: 4.75 M/UL (ref 4–5.4)
SODIUM SERPL-SCNC: 141 MMOL/L (ref 136–145)
TSH SERPL DL<=0.005 MIU/L-ACNC: 2.24 UIU/ML (ref 0.4–4)
WBC # BLD AUTO: 7.44 K/UL (ref 3.9–12.7)

## 2022-10-26 PROCEDURE — 85379 FIBRIN DEGRADATION QUANT: CPT | Performed by: INTERNAL MEDICINE

## 2022-10-26 PROCEDURE — 3288F PR FALLS RISK ASSESSMENT DOCUMENTED: ICD-10-PCS | Mod: CPTII,S$GLB,, | Performed by: ANESTHESIOLOGY

## 2022-10-26 PROCEDURE — 99999 PR PBB SHADOW E&M-EST. PATIENT-LVL IV: ICD-10-PCS | Mod: PBBFAC,,, | Performed by: INTERNAL MEDICINE

## 2022-10-26 PROCEDURE — 85025 COMPLETE CBC W/AUTO DIFF WBC: CPT | Performed by: INTERNAL MEDICINE

## 2022-10-26 PROCEDURE — 1126F AMNT PAIN NOTED NONE PRSNT: CPT | Mod: CPTII,S$GLB,, | Performed by: INTERNAL MEDICINE

## 2022-10-26 PROCEDURE — 1160F PR REVIEW ALL MEDS BY PRESCRIBER/CLIN PHARMACIST DOCUMENTED: ICD-10-PCS | Mod: CPTII,S$GLB,, | Performed by: ANESTHESIOLOGY

## 2022-10-26 PROCEDURE — 1101F PR PT FALLS ASSESS DOC 0-1 FALLS W/OUT INJ PAST YR: ICD-10-PCS | Mod: CPTII,S$GLB,, | Performed by: INTERNAL MEDICINE

## 2022-10-26 PROCEDURE — 3078F PR MOST RECENT DIASTOLIC BLOOD PRESSURE < 80 MM HG: ICD-10-PCS | Mod: CPTII,S$GLB,, | Performed by: ANESTHESIOLOGY

## 2022-10-26 PROCEDURE — 99999 PR PBB SHADOW E&M-EST. PATIENT-LVL III: ICD-10-PCS | Mod: PBBFAC,,, | Performed by: ANESTHESIOLOGY

## 2022-10-26 PROCEDURE — 3075F SYST BP GE 130 - 139MM HG: CPT | Mod: CPTII,S$GLB,, | Performed by: INTERNAL MEDICINE

## 2022-10-26 PROCEDURE — 1101F PT FALLS ASSESS-DOCD LE1/YR: CPT | Mod: CPTII,S$GLB,, | Performed by: ANESTHESIOLOGY

## 2022-10-26 PROCEDURE — 1160F RVW MEDS BY RX/DR IN RCRD: CPT | Mod: CPTII,S$GLB,, | Performed by: INTERNAL MEDICINE

## 2022-10-26 PROCEDURE — 3075F PR MOST RECENT SYSTOLIC BLOOD PRESS GE 130-139MM HG: ICD-10-PCS | Mod: CPTII,S$GLB,, | Performed by: INTERNAL MEDICINE

## 2022-10-26 PROCEDURE — 82306 VITAMIN D 25 HYDROXY: CPT | Performed by: INTERNAL MEDICINE

## 2022-10-26 PROCEDURE — 1160F RVW MEDS BY RX/DR IN RCRD: CPT | Mod: CPTII,S$GLB,, | Performed by: ANESTHESIOLOGY

## 2022-10-26 PROCEDURE — 99999 PR PBB SHADOW E&M-EST. PATIENT-LVL III: CPT | Mod: PBBFAC,,, | Performed by: ANESTHESIOLOGY

## 2022-10-26 PROCEDURE — 3079F PR MOST RECENT DIASTOLIC BLOOD PRESSURE 80-89 MM HG: ICD-10-PCS | Mod: CPTII,S$GLB,, | Performed by: INTERNAL MEDICINE

## 2022-10-26 PROCEDURE — 1125F AMNT PAIN NOTED PAIN PRSNT: CPT | Mod: CPTII,S$GLB,, | Performed by: ANESTHESIOLOGY

## 2022-10-26 PROCEDURE — 99214 PR OFFICE/OUTPT VISIT, EST, LEVL IV, 30-39 MIN: ICD-10-PCS | Mod: S$GLB,,, | Performed by: ANESTHESIOLOGY

## 2022-10-26 PROCEDURE — 1101F PR PT FALLS ASSESS DOC 0-1 FALLS W/OUT INJ PAST YR: ICD-10-PCS | Mod: CPTII,S$GLB,, | Performed by: ANESTHESIOLOGY

## 2022-10-26 PROCEDURE — 3075F PR MOST RECENT SYSTOLIC BLOOD PRESS GE 130-139MM HG: ICD-10-PCS | Mod: CPTII,S$GLB,, | Performed by: ANESTHESIOLOGY

## 2022-10-26 PROCEDURE — 36415 COLL VENOUS BLD VENIPUNCTURE: CPT | Performed by: INTERNAL MEDICINE

## 2022-10-26 PROCEDURE — 3078F DIAST BP <80 MM HG: CPT | Mod: CPTII,S$GLB,, | Performed by: ANESTHESIOLOGY

## 2022-10-26 PROCEDURE — 99214 PR OFFICE/OUTPT VISIT, EST, LEVL IV, 30-39 MIN: ICD-10-PCS | Mod: S$GLB,,, | Performed by: INTERNAL MEDICINE

## 2022-10-26 PROCEDURE — 99214 OFFICE O/P EST MOD 30 MIN: CPT | Mod: S$GLB,,, | Performed by: INTERNAL MEDICINE

## 2022-10-26 PROCEDURE — 3288F FALL RISK ASSESSMENT DOCD: CPT | Mod: CPTII,S$GLB,, | Performed by: ANESTHESIOLOGY

## 2022-10-26 PROCEDURE — 1125F PR PAIN SEVERITY QUANTIFIED, PAIN PRESENT: ICD-10-PCS | Mod: CPTII,S$GLB,, | Performed by: ANESTHESIOLOGY

## 2022-10-26 PROCEDURE — 1126F PR PAIN SEVERITY QUANTIFIED, NO PAIN PRESENT: ICD-10-PCS | Mod: CPTII,S$GLB,, | Performed by: INTERNAL MEDICINE

## 2022-10-26 PROCEDURE — 83036 HEMOGLOBIN GLYCOSYLATED A1C: CPT | Performed by: INTERNAL MEDICINE

## 2022-10-26 PROCEDURE — 1159F PR MEDICATION LIST DOCUMENTED IN MEDICAL RECORD: ICD-10-PCS | Mod: CPTII,S$GLB,, | Performed by: ANESTHESIOLOGY

## 2022-10-26 PROCEDURE — 1101F PT FALLS ASSESS-DOCD LE1/YR: CPT | Mod: CPTII,S$GLB,, | Performed by: INTERNAL MEDICINE

## 2022-10-26 PROCEDURE — 1159F PR MEDICATION LIST DOCUMENTED IN MEDICAL RECORD: ICD-10-PCS | Mod: CPTII,S$GLB,, | Performed by: INTERNAL MEDICINE

## 2022-10-26 PROCEDURE — 3288F PR FALLS RISK ASSESSMENT DOCUMENTED: ICD-10-PCS | Mod: CPTII,S$GLB,, | Performed by: INTERNAL MEDICINE

## 2022-10-26 PROCEDURE — 99214 OFFICE O/P EST MOD 30 MIN: CPT | Mod: S$GLB,,, | Performed by: ANESTHESIOLOGY

## 2022-10-26 PROCEDURE — 80053 COMPREHEN METABOLIC PANEL: CPT | Performed by: INTERNAL MEDICINE

## 2022-10-26 PROCEDURE — 1159F MED LIST DOCD IN RCRD: CPT | Mod: CPTII,S$GLB,, | Performed by: INTERNAL MEDICINE

## 2022-10-26 PROCEDURE — 3079F DIAST BP 80-89 MM HG: CPT | Mod: CPTII,S$GLB,, | Performed by: INTERNAL MEDICINE

## 2022-10-26 PROCEDURE — 3288F FALL RISK ASSESSMENT DOCD: CPT | Mod: CPTII,S$GLB,, | Performed by: INTERNAL MEDICINE

## 2022-10-26 PROCEDURE — 80061 LIPID PANEL: CPT | Performed by: INTERNAL MEDICINE

## 2022-10-26 PROCEDURE — 84443 ASSAY THYROID STIM HORMONE: CPT | Performed by: INTERNAL MEDICINE

## 2022-10-26 PROCEDURE — 3075F SYST BP GE 130 - 139MM HG: CPT | Mod: CPTII,S$GLB,, | Performed by: ANESTHESIOLOGY

## 2022-10-26 PROCEDURE — 1160F PR REVIEW ALL MEDS BY PRESCRIBER/CLIN PHARMACIST DOCUMENTED: ICD-10-PCS | Mod: CPTII,S$GLB,, | Performed by: INTERNAL MEDICINE

## 2022-10-26 PROCEDURE — 1159F MED LIST DOCD IN RCRD: CPT | Mod: CPTII,S$GLB,, | Performed by: ANESTHESIOLOGY

## 2022-10-26 PROCEDURE — 99999 PR PBB SHADOW E&M-EST. PATIENT-LVL IV: CPT | Mod: PBBFAC,,, | Performed by: INTERNAL MEDICINE

## 2022-10-26 RX ORDER — ERYTHROMYCIN 5 MG/G
OINTMENT OPHTHALMIC
COMMUNITY
Start: 2022-09-08 | End: 2023-02-27

## 2022-10-26 RX ORDER — ESTRADIOL 1 MG/1
1 TABLET ORAL DAILY
Qty: 30 TABLET | Refills: 0 | Status: SHIPPED | OUTPATIENT
Start: 2022-10-26 | End: 2022-11-23

## 2022-10-26 RX ORDER — GABAPENTIN 100 MG/1
200 CAPSULE ORAL NIGHTLY
Qty: 270 CAPSULE | Refills: 3
Start: 2022-10-26 | End: 2023-02-27

## 2022-10-26 RX ORDER — TOBRAMYCIN AND DEXAMETHASONE 3; 1 MG/ML; MG/ML
1 SUSPENSION/ DROPS OPHTHALMIC 3 TIMES DAILY
COMMUNITY
Start: 2022-09-13 | End: 2023-02-27

## 2022-10-26 NOTE — PROGRESS NOTES
"Subjective:       Patient ID: Tanesha Davis is a 71 y.o. female.    Chief Complaint: Annual Exam    Here for urgent visit      6 month hx of increased abdomen. Hx of ventral hernia and colon CA. She is due for colonoscopy. No other GI symptoms. Chronic constipation improved. Denies dizziness with exertion, shortness of breath out of proportion to level of activity, frequent or sustained palpitations, orthopnea, PND, or LE edema. She reports a several week hx of chest tightness, constant in nature, upper chest, no alleviating or exacerbating factors. No associated SOB, wheezing, palpitations. No better or worse with bending or twisting. Denies any increase in classic reflux symptoms. Her chest tightness started around the same time she ran our of her oral estrogen. Hot flashes are back and is requesting refill.           Review of Systems   Constitutional:  Negative for chills, fatigue, fever and unexpected weight change.   HENT:  Negative for ear pain, hearing loss, postnasal drip, tinnitus, trouble swallowing and voice change.    Respiratory:  Negative for cough, chest tightness, shortness of breath and wheezing.    Cardiovascular:  Negative for chest pain, palpitations and leg swelling.   Gastrointestinal:  Negative for abdominal pain, blood in stool, diarrhea, nausea and vomiting.   Endocrine: Negative for polydipsia, polyphagia and polyuria.   Genitourinary:  Negative for difficulty urinating, dysuria, hematuria and vaginal bleeding.   Skin:  Negative for rash.   Allergic/Immunologic: Negative for food allergies.   Neurological:  Negative for dizziness, numbness and headaches.   Hematological:  Does not bruise/bleed easily.   Psychiatric/Behavioral:  The patient is not nervous/anxious.      Objective:      Vitals:    10/26/22 1104 10/26/22 1248   BP: (!) 142/64 134/80   BP Location: Right arm    Pulse: 65    SpO2: 98%    Weight: 86.4 kg (190 lb 7.6 oz)    Height: 5' 3" (1.6 m)       Physical Exam  Vitals and " nursing note reviewed.   Constitutional:       General: She is not in acute distress.     Appearance: Normal appearance. She is well-developed.   HENT:      Head: Normocephalic and atraumatic.      Mouth/Throat:      Pharynx: No oropharyngeal exudate.   Eyes:      General: No scleral icterus.     Conjunctiva/sclera: Conjunctivae normal.      Pupils: Pupils are equal, round, and reactive to light.   Neck:      Thyroid: No thyromegaly.   Cardiovascular:      Rate and Rhythm: Normal rate and regular rhythm.      Heart sounds: Normal heart sounds. No murmur heard.  Pulmonary:      Effort: Pulmonary effort is normal.      Breath sounds: Normal breath sounds. No wheezing or rales.   Chest:      Chest wall: No mass, swelling, tenderness or edema.   Abdominal:      General: There is no distension.   Musculoskeletal:         General: No tenderness.   Lymphadenopathy:      Cervical: No cervical adenopathy.   Skin:     General: Skin is warm and dry.   Neurological:      Mental Status: She is alert and oriented to person, place, and time.   Psychiatric:         Behavior: Behavior normal.       Assessment:       1. History of colon cancer    2. Menopausal symptoms    3. Intra-abdominal and pelvic swelling, mass and lump, unspecified site    4. Postmenopausal status    5. Incisional hernia, without obstruction or gangrene    6. Other iron deficiency anemia    7. Abnormal finding of blood chemistry, unspecified    8. Changes in skin texture    9. Body mass index (BMI) 33.0-33.9, adult    10. Atypical chest pain    11. RLS (restless legs syndrome)    12. Postmenopausal status          Plan:       Tanesha was seen today for annual exam.    Diagnoses and all orders for this visit:    History of colon cancer  -     Ambulatory referral/consult to Endo Procedure ; Future    Menopausal symptoms  F/u GYN as this is one time Rx. Pt made aware  -     estradioL (ESTRACE) 1 MG tablet; Take 1 tablet (1 mg total) by mouth once  daily.  -     Vitamin D; Future    Intra-abdominal and pelvic swelling, mass and lump, unspecified site   Suspect worsening ventral hernia as cause for change in abdominal bulge and location of pain, but with her colon CA Hx rec CT now and she is due for colonoscopy 12/2023. Office and Emergency Department prompts discussed.    -     CT Abdomen With Contrast; Future  -     Comprehensive Metabolic Panel; Future  -     Lipid Panel; Future  -     TSH; Future  -     CBC Auto Differential; Future  -     Hemoglobin A1C; Future    Postmenopausal status  -     gabapentin (NEURONTIN) 100 MG capsule; Take 2 capsules (200 mg total) by mouth every evening.    Incisional hernia, without obstruction or gangrene    Other iron deficiency anemia    Abnormal finding of blood chemistry, unspecified  -     Lipid Panel; Future    Changes in skin texture  -     TSH; Future    Body mass index (BMI) 33.0-33.9, adult  -     Vitamin D; Future    Atypical chest pain   With oral HRT Hx and hx of colon CA enough to get a d-dimer.   -     D-DIMER, QUANTITATIVE; Future  -     SCHEDULED EKG 12-LEAD (to Muse); Future    RLS (restless legs syndrome)  Comments:  Check iron, B12. Rec iron daily. Discussed constipation, hydration. Cont Linzess. Low dose Gabapentin; discussed sedation, titrating. Stretching. Consider PT  Orders:  -     gabapentin (NEURONTIN) 100 MG capsule; Take 2 capsules (200 mg total) by mouth every evening.    Postmenopausal status  Comments:  Start low-dose gabapentin 100 as above.  Discussed potential sedation. On HRT.  Orders:  -     gabapentin (NEURONTIN) 100 MG capsule; Take 2 capsules (200 mg total) by mouth every evening.       RTC in 6 months or sooner prn     Caleb Ceballos MD  Internal Medicine-[facility]        Side effects of medication(s) were discussed in detail and patient voiced understanding.  Patient will call back for any issues or complications.

## 2022-10-26 NOTE — PROGRESS NOTES
PCP: Caleb Hernandez MD     REFERRING PHYSICIAN: No ref. provider found     CHIEF COMPLAINT: low back pain     SUBJECTIVE:  Interval History 10/26/22:  Patient is here for f/u of chronic right LBP. She is s/p Right L2,3,4,5 RFA on 9/27/2022. She reports excellent relief and her pain today is 1/10. She has been continuing her HEP/HSP and reports it is going very well and she is able to continue w/o pain. She has also d/c'd her Flexeril because her back pain is no longer keeping her up at night. She continues to take Gabapentin qhs for paraesthesia and cramping sensations that occur in her b/l feet at night. She reports Gabapentin helps some but does not completely alleviate her symptoms. Patient denies bowel/bladder incontinence, saddle anesthesia and weakness/sensory deficits.    Interval History 6/2/22:  Patient is here for follow up of chronic right sided abdominal/ flank pain. She reports no relief with TPI performed during her last visit. MRI was obtained as it seemed like her pain was more focused in the lumbar region. She feels that her pain is getting worse and now more consistently wraps around to her lower back on the right side. Pain is worse when going for a sitting to standing position. No relief with Tizanidine and feels like she gets a dry mouth when taking.     Interval History 4/25/2022:  Mrs Davis presents for continued pain to right flank and now radiating and associated with right sided lower lumbar pain. Pt has continued PT with dry needling and TENS and gets some relief but next day pain returns. She has no radicular pain at this time. Emesis associated continues intermittently. She is currently prescribed neurontin, mobic, and zanaflex all with benefit without SE.       Interval History 1/7/2022:  The pain is located at midline low back and radiates anteriorly to lateral right side. The pain has been there for about 1 month. The pain is described as stabbing. Has had two episodes of  "nighttime vomiting in December with "yellow" emesis due to pain. Sometimes wakes up at night due to pain. Exacerbating factors: lying on back, right lateral flexion, cold weather. Mitigating factors: none identified. Symptoms interfere with bending to the right. The patient feels like symptoms have been worsening. Patient denies saddle anesthesia, bowel and bladder incontinence, acute limb weakness, ataxia, and falls. No abdominal pain.        Interval History 12/21/2021:  Pt presents for evaluation of Right sided flank and lower back pain. Pt states exercise doing abdominal rotation at gym started this pain and  moving from house to house and initially thought it was muscular. She states vomiting associated with 1st time pain starting and this Sunday night having 2nd episode of this pain and  bilious emesis approx 4 times. States no pain with eating. Denies dysuria or hematuria. Denies any constipation or change in BM.  Neurontin 100mg being taken qhs. She is not currently on mobic.       Interval History 4/28/2021:  The patient presents for follow-up of lower back pain and leg radiculopathy/cramping.  She states lower back pain states and there thigh and posterior calf cramping intermittently.  She states is associated paresthesias to both feet that correlate when the legs are cramping in her back it is exacerbated.  She also states other times the paresthesias to her feet are just at night and not correlated with back pain or thigh cramping she is taking baclofen with some minimal benefit and also states that gabapentin 100 mg q.h.s. is beneficial but has had decreased benefit over time.     HPI:  Mrs Davis is a 71y/o female presenting with complaint of over a year of LBP and Leg pain/cramping and feet paresthesias. Pt states LBP is bilateral, cramping is now bilateral to thighs but more to left anterior thigh, groin, and calf. She does not focally state leg heaviness or weakness when standing. She has pain and " "paresthesias to dorsum of feet. She voices morning stiffness, some pain with valsalva and pain from sitting to standing. She denies any loss of b/b or saddle paresthesias. She is currently taking neurontin and mobic with mild benefit. She has had PT For her legs but not specifically for her back per Pt it was beneficial. She has no recent imaging.     Original Pain scores:  PAIN SCORES:  Best: Pain is 7  Worst: Pain is 10  Usually: Pain is 8  Current: Pain is 9     6 weeks of Conservative therapy (PT/Chiro/Home Exercises with Dates)  PT 10 weeks, ended 12/2021 - helped with left sided back pain  PT: Again 2/22-4/22 without relief of this right flank pain     Treatments / Medications: (Ice/Heat/NSAIDS/APAP/etc):  Baclofen - no benefit  Neurontin - helps  Meloxicam - no benefit  Tizanidine - no longer taking  Flexeril - no longer taking      Report: Reviewed     Interventional Pain Procedures: (Previous injections)  TPI 4/27/22- 0% relief  Right MBB L2,3,4,5 - 6/28/2022 & 08/02/2022 - excellent relief  Right L2,3,4,5 RFA 9/27/2022 - 90% relief at one month and ongoing    Past Medical History:   Diagnosis Date    Arthritis      Draining cutaneous sinus tract      Headache(784.0)      Incisional hernia      Insomnia      Nocturnal leg cramps      Nonhealing surgical wound      PONV (postoperative nausea and vomiting)       with last surgery per patient, "Severe" PONV 2-27-14    Postmenopausal status      Rectosigmoid cancer              Past Surgical History:   Procedure Laterality Date    BREAST BIOPSY Left      CHOLECYSTECTOMY   02/24/2014     with incisional hernia repair with mesh    COLONOSCOPY N/A 8/23/2016     Procedure: COLONOSCOPY;  Surgeon: Cuco Meraz MD;  Location: [facility] ([address] [place] [address]);  Service: Endoscopy;  Laterality: N/A;    COLONOSCOPY N/A 12/9/2019     Procedure: COLONOSCOPY;  Surgeon: KISHORE Curry MD;  [facility]  Service: Endoscopy;  Laterality: N/A;  ADHESIVE Allergy    " ESOPHAGOGASTRODUODENOSCOPY N/A 4/22/2021     Procedure: EGD (ESOPHAGOGASTRODUODENOSCOPY);  Surgeon: Lukasz Lindquist MD;  Location: [facility] (4TH FLR);  Service: Endoscopy;  Laterality: N/A;  3/25-covid + 7/2020 at [facility]-pt getting results to us to see if correct form of test-MS    HYSTERECTOMY        KNEE ARTHROSCOPY        KNEE SURGERY         right and left knee repair    OOPHORECTOMY        REPAIR OF RECURRENT INCISIONAL HERNIA   7/10/2018     Procedure: REPAIR, HERNIA, INCISIONAL, RECURRENT;  Surgeon: Cuco Meraz MD;  Location: [facility] OR 2ND FLR;  Service: Colon and Rectal;;    SIGMOIDECTOMY   03/20/2006     LAR      Social History               Socioeconomic History    Marital status:        Spouse name: francis    Number of children: 2   Occupational History    Occupation: Retired       Employer: Self   Tobacco Use    Smoking status: Never Smoker    Smokeless tobacco: Never Used   Substance and Sexual Activity    Alcohol use: Yes       Alcohol/week: 1.0 standard drink       Types: 1 Glasses of wine per week       Comment: socially    Drug use: No    Sexual activity: Yes       Partners: Male       Birth control/protection: Post-menopausal      Social Determinants of Health          Financial Resource Strain: Low Risk     Difficulty of Paying Living Expenses: Not hard at all   Food Insecurity: No Food Insecurity    Worried About Running Out of Food in the Last Year: Never true    Ran Out of Food in the Last Year: Never true   Transportation Needs: No Transportation Needs    Lack of Transportation (Medical): No    Lack of Transportation (Non-Medical): No   Physical Activity: Insufficiently Active    Days of Exercise per Week: 5 days    Minutes of Exercise per Session: 20 min   Stress: No Stress Concern Present    Feeling of Stress : Not at all   Social Connections: Unknown    Frequency of Communication with Friends and Family: Three times a week    Frequency of Social Gatherings with Friends and Family:  "Once a week    Active Member of Clubs or Organizations: No    Attends Club or Organization Meetings: Never    Marital Status:    Housing Stability: Low Risk     Unable to Pay for Housing in the Last Year: No    Number of Places Lived in the Last Year: 2    Unstable Housing in the Last Year: No               Family History   Problem Relation Age of Onset    Breast cancer Mother 56    Cancer Mother           BREAST    Heart disease Father      Heart disease Maternal Grandmother      Diabetes Brother      Colon polyps Neg Hx      Ovarian cancer Neg Hx      Anesthesia problems Neg Hx                 Review of patient's allergies indicates:   Allergen Reactions    Adhesive Rash       Asking that we use Paper Tape,  Uses "Sensitive Skin" band aids    Amoxil [amoxicillin] Hives    Penicillins Hives and Swelling       Swelling of ears and neck    Sulfa (sulfonamide antibiotics) Hives and Itching        Current Outpatient Medications:     ERGOCALCIFEROL, VITAMIN D2, (VITAMIN D ORAL), Take 1 capsule by mouth every morning. , Disp: , Rfl:     estradioL (ESTRACE) 1 MG tablet, TAKE 1 TABLET BY MOUTH EVERY DAY, Disp: 30 tablet, Rfl: 0    gabapentin (NEURONTIN) 100 MG capsule, TAKE 1 CAPSULE BY MOUTH EVERY EVENING 2 HOURS BEFORE BEDTIME., Disp: 270 capsule, Rfl: 3    L GASSERI/B BIFIDUM/B LONGUM (8 Securities ORAL), Take 1 tablet by mouth 2 (two) times daily., Disp: , Rfl:     linaCLOtide (LINZESS) 72 mcg Cap capsule, Take 1 capsule (72 mcg total) by mouth once daily., Disp: 30 capsule, Rfl: 5    magnesium oxide-pyridoxine (BEELITH) 362-20 mg Tab, Take 1 tablet by mouth once daily., Disp: 90 tablet, Rfl: 1    VITAMIN E ACETATE (VITAMIN E ORAL), Take 1 capsule by mouth every morning. , Disp: , Rfl:     ferrous sulfate 325 (65 FE) MG EC tablet, TAKE 1 TABLET BY MOUTH EVERY DAY (Patient not taking: Reported on 10/26/2022), Disp: 90 tablet, Rfl: 3    meloxicam (MOBIC) 7.5 MG tablet, TAKE 1 TABLET BY MOUTH EVERY DAY " (Patient not taking: Reported on 10/26/2022), Disp: 90 tablet, Rfl: 1  No current facility-administered medications for this visit.    Facility-Administered Medications Ordered in Other Visits:     0.9%  NaCl infusion, 500 mL, Intravenous, Continuous, Ray Vieira MD        ROS:  GENERAL: No fever. No chills. No fatigue. Denies weight loss. Denies weight gain.  HEENT: Denies headaches. Denies vision change. Denies eye pain. Denies double vision. Denies ear pain.   CV: Denies chest pain.   PULM: Denies of shortness of breath.  GI: Denies constipation. No diarrhea. No abdominal pain. Denies nausea. Denies vomiting. No blood in stool.  HEME: Denies bleeding problems.  : Denies urgency. No painful urination. No blood in urine.  MS: Denies joint stiffness. Denies joint swelling. Minimal back pain.   SKIN: Denies rash.   NEURO: Denies seizures. No weakness.  PSYCH:  No difficulty sleeping. No anxiety. Denies depression. No suicidal thoughts.         VITALS:   Vitals:    10/26/22 1011   BP: 138/75   Pulse: 64          PHYSICAL EXAM:   GENERAL: Well appearing, in no acute distress, alert and oriented x3.  PSYCH:  Mood and affect appropriate.  SKIN: Skin color, texture, turgor normal, no rashes or lesions.  HEENT:  Normocephalic, atraumatic. Cranial nerves grossly intact.  PULM: No evidence of respiratory difficulty, symmetric chest rise.  GI:  Non-distended, no gross bulge. Pain to palpation of right flank.   BACK: ROM wnl w/o pain. No TTP to lumbar paraspinals, spinous processes or facet joints. Negative facet loading b/l. There is no pain with palpation over the sacroiliac joints bilaterally. Straight leg raising in the supine position is negative to radicular pain.   EXTREMITIES: No deformities, edema, or skin discoloration.   MUSCULOSKELETAL: Bilateral lower extremity strength is normal and symmetric. No atrophy is noted.  NEURO: Sensation is equal and appropriate bilaterally. Bilateral lower extremity coordination  and muscle stretch reflexes are physiologic and symmetric. Plantar response are downgoing.   GAIT: normal.        IMAGIN.) XR Lumbar Spine 21  Scoliosis and degenerative change     2.) MRI Lumbar Spine 21  There are multilevel degenerative changes within the spinal canal which are further detailed above.  At the L3-4 and L4-5 levels there is a mild degree of spinal canal narrowing and at L5-S1 there is a moderate degree of bilateral neural foraminal narrowing    3.) MRI LUMBAR SPINE WITHOUT CONTRAST 22:  Lumbar spine alignment demonstrates mild dextroscoliosis with grade 1 retrolisthesis of T12 on L1 and grade 1 anterolisthesis of L3 on L4 and L4 on L5, similar when compared to the previous MRI.  No spondylolysis.  Vertebral body heights are well maintained without evidence for fracture.  No marrow signal abnormality to suggest an infiltrative process.  There is degenerative disc space narrowing and desiccation throughout the visualized thoracolumbar spine most pronounced at T11-T12, T12-L1 and L5-S1 noting chronic degenerative endplate changes.  No endplate edema.  Distal spinal cord demonstrates normal contour and signal intensity.  Cauda equina appears normal without findings to suggest arachnoiditis.  Conus medullaris terminates at L1.  Multiple T2 hyperintense bilateral cortical renal lesions, likely cysts.  Remaining visualized abdominal/pelvic structures demonstrate no significant abnormalities.  There is mild fatty degeneration of the posterior paraspinal musculature.  SI joints are symmetric.     T12-L1: Grade 1 retrolisthesis.  Circumferential disc bulge that encroaches into the right neural foramen.  Right facet arthropathy and bilateral ligamentum flavum buckling.  Findings contribute to mild right neural foraminal narrowing.  No spinal canal stenosis.  L1-L2: Circumferential disc bulge encroaches into the left neural foramen.  Bilateral facet arthropathy and bilateral ligamentum  flavum buckling.  Findings contribute to mild left neural foraminal narrowing.  No spinal canal stenosis.  L2-L3: Circumferential disc bulge, bilateral facet arthropathy and bilateral ligamentum flavum buckling.  Findings contribute to mild left neural foraminal narrowing.  No spinal canal stenosis.  L3-L4: Grade 1 anterolisthesis with uncovering of the intervertebral discs.  Bilateral facet arthropathy and bilateral ligamentum flavum buckling.  Findings contribute to mild-to-moderate spinal canal stenosis.  No neural foraminal narrowing.  L4-L5: Grade 1 anterolisthesis with uncovering of the intervertebral disc.  Bilateral facet arthropathy and bilateral ligamentum flavum buckling.  Findings contribute to moderate spinal canal stenosis and mild left neural foraminal narrowing.  L5-S1: Circumferential disc bulge encroaches into the bilateral foraminal zones and abuts the right descending S1 nerve root.  Bilateral facet arthropathy.  Findings contribute to mild bilateral neural foraminal narrowing.     Impression:  1. Lumbar degenerative changes, most pronounced at L3-L4 and L4-L5 and slightly progressed when compared to MRI dated 04/26/2021.          ASSESSMENT: 71 y.o. year old female with lumbar spondylosis.      DISCUSSION: Ms. Davis developed right lateral abdominal wall pain while doing PT exercise with twisting motion.  After failing muscle relaxors, PT, and TPIs she underwent right L2,3,4,5 MBBx2 and RFA with excellent relief. She is currently pain free upon exam in clinic today and doing well. She does complain of increased paraesthesias and crampy sensations in her BL feet at night consistent with RLS.      PLAN:  1)  Continue with HEP  2)  Discuss increased Gabapentin dosage QHS for nighttime symptoms with Dr. Hernandez (she notes this helps)  3)  Could consider restarting Flexeril QHS if necessary  4)  Follow up in 6 months to determine if repeat RFA indicated    1. RLS (restless legs syndrome)             Shamir Hawk MD  10/26/2022

## 2022-10-27 LAB
25(OH)D3+25(OH)D2 SERPL-MCNC: 43 NG/ML (ref 30–96)
CHOLEST SERPL-MCNC: 237 MG/DL (ref 120–199)
CHOLEST/HDLC SERPL: 4.2 {RATIO} (ref 2–5)
ESTIMATED AVG GLUCOSE: 100 MG/DL (ref 68–131)
HBA1C MFR BLD: 5.1 % (ref 4–5.6)
HDLC SERPL-MCNC: 56 MG/DL (ref 40–75)
HDLC SERPL: 23.6 % (ref 20–50)
LDLC SERPL CALC-MCNC: 151.8 MG/DL (ref 63–159)
NONHDLC SERPL-MCNC: 181 MG/DL
TRIGL SERPL-MCNC: 146 MG/DL (ref 30–150)

## 2022-10-27 NOTE — PROGRESS NOTES
"I have reviewed your recent lab work.  Your labs look good, except:    1) Your LDL or "bad cholesterol" is elevated which places you at increased risk for heart attacks and strokes.  I recommend a low cholesterol, low carbohydrate diet and exercise to achieve weight loss. In addition to this I would like to start you on a medication called atorvastatin 40mg one tablet daily, which is very effective at lowering your cholesterol. If you are amenable to this plan please let me know and I will send in a prescription to your pharmacy. If while taking you develop severe, frequent muscle aches please stop the medication and notify me or my staff electronically or by phone.  Consensus of guidelines recommend starting a statin medication like atorvastatin when a patient's 10 year cardiovascular risk is 7.5% or greater. Your % risk is below:  The 10-year ASCVD risk score (Leelee ANDERSON, et al., 2019) is: 9.8%    Values used to calculate the score:      Age: 71 years      Sex: Female      Is Non- : No      Diabetic: No      Tobacco smoker: No      Systolic Blood Pressure: 134 mmHg      Is BP treated: No      HDL Cholesterol: 56 mg/dL      Total Cholesterol: 237 mg/dL      Your kidney function is normal.  Your liver studies are normal.  You do not have diabetes.  Your thyroid studies are normal.  No signs of blood clot on labs test.  Vitamin D looks good.    If you have any questions or concerns about particular lab results please notify me via [facility] messaging or by calling our office at [phone].    Respectfully,  Caleb Ceballos    "

## 2022-10-31 ENCOUNTER — HOSPITAL ENCOUNTER (OUTPATIENT)
Dept: RADIOLOGY | Facility: OTHER | Age: 72
Discharge: HOME | End: 2022-10-31
Attending: INTERNAL MEDICINE
Payer: MEDICARE

## 2022-10-31 ENCOUNTER — HOSPITAL ENCOUNTER (OUTPATIENT)
Dept: CARDIOLOGY | Facility: OTHER | Age: 72
Discharge: HOME | End: 2022-10-31
Attending: INTERNAL MEDICINE
Payer: MEDICARE

## 2022-10-31 DIAGNOSIS — R19.00 INTRA-ABDOMINAL AND PELVIC SWELLING, MASS AND LUMP, UNSPECIFIED SITE: ICD-10-CM

## 2022-10-31 DIAGNOSIS — R07.89 ATYPICAL CHEST PAIN: ICD-10-CM

## 2022-10-31 PROCEDURE — 74177 CT ABD & PELVIS W/CONTRAST: CPT | Mod: 26,,, | Performed by: RADIOLOGY

## 2022-10-31 PROCEDURE — 93010 EKG 12-LEAD: ICD-10-PCS | Mod: ,,, | Performed by: INTERNAL MEDICINE

## 2022-10-31 PROCEDURE — 93010 ELECTROCARDIOGRAM REPORT: CPT | Mod: ,,, | Performed by: INTERNAL MEDICINE

## 2022-10-31 PROCEDURE — 74177 CT ABD & PELVIS W/CONTRAST: CPT | Mod: TC

## 2022-10-31 PROCEDURE — 93005 ELECTROCARDIOGRAM TRACING: CPT

## 2022-10-31 PROCEDURE — 74177 CT ABDOMEN PELVIS WITH CONTRAST: ICD-10-PCS | Mod: 26,,, | Performed by: RADIOLOGY

## 2022-10-31 PROCEDURE — 25500020 PHARM REV CODE 255: Performed by: INTERNAL MEDICINE

## 2022-10-31 RX ADMIN — IOHEXOL 100 ML: 350 INJECTION, SOLUTION INTRAVENOUS at 09:10

## 2022-11-03 ENCOUNTER — PATIENT MESSAGE (OUTPATIENT)
Dept: INTERNAL MEDICINE | Facility: CLINIC | Age: 72
End: 2022-11-03
Payer: MEDICARE

## 2022-11-07 RX ORDER — ATORVASTATIN CALCIUM 40 MG/1
40 TABLET, FILM COATED ORAL DAILY
Qty: 90 TABLET | Refills: 3 | Status: SHIPPED | OUTPATIENT
Start: 2022-11-07 | End: 2023-12-23 | Stop reason: SDUPTHER

## 2022-11-14 ENCOUNTER — PATIENT MESSAGE (OUTPATIENT)
Dept: INTERNAL MEDICINE | Facility: CLINIC | Age: 72
End: 2022-11-14
Payer: MEDICARE

## 2022-11-14 DIAGNOSIS — R94.31 ABNORMAL EKG: Primary | ICD-10-CM

## 2022-11-15 ENCOUNTER — PATIENT MESSAGE (OUTPATIENT)
Dept: INTERNAL MEDICINE | Facility: CLINIC | Age: 72
End: 2022-11-15
Payer: MEDICARE

## 2022-11-22 ENCOUNTER — OFFICE VISIT (OUTPATIENT)
Dept: SURGERY | Facility: CLINIC | Age: 72
End: 2022-11-22
Payer: MEDICARE

## 2022-11-22 VITALS
SYSTOLIC BLOOD PRESSURE: 137 MMHG | HEIGHT: 63 IN | DIASTOLIC BLOOD PRESSURE: 63 MMHG | HEART RATE: 68 BPM | WEIGHT: 184.94 LBS | BODY MASS INDEX: 32.77 KG/M2

## 2022-11-22 DIAGNOSIS — E66.09 CLASS 1 OBESITY DUE TO EXCESS CALORIES WITHOUT SERIOUS COMORBIDITY WITH BODY MASS INDEX (BMI) OF 32.0 TO 32.9 IN ADULT: ICD-10-CM

## 2022-11-22 DIAGNOSIS — K43.6 VENTRAL HERNIA WITH OBSTRUCTION AND WITHOUT GANGRENE: Primary | ICD-10-CM

## 2022-11-22 DIAGNOSIS — L76.82 OTHER POSTOPERATIVE COMPLICATION OF SKIN: ICD-10-CM

## 2022-11-22 DIAGNOSIS — Z85.038 HISTORY OF COLON CANCER: ICD-10-CM

## 2022-11-22 PROBLEM — K56.609 SMALL BOWEL OBSTRUCTION: Status: RESOLVED | Noted: 2022-11-08 | Resolved: 2022-11-22

## 2022-11-22 PROBLEM — K56.609 SMALL BOWEL OBSTRUCTION: Status: ACTIVE | Noted: 2022-11-08

## 2022-11-22 PROCEDURE — 3044F PR MOST RECENT HEMOGLOBIN A1C LEVEL <7.0%: ICD-10-PCS | Mod: CPTII,S$GLB,, | Performed by: SURGERY

## 2022-11-22 PROCEDURE — 3075F SYST BP GE 130 - 139MM HG: CPT | Mod: CPTII,S$GLB,, | Performed by: SURGERY

## 2022-11-22 PROCEDURE — 3008F PR BODY MASS INDEX (BMI) DOCUMENTED: ICD-10-PCS | Mod: CPTII,S$GLB,, | Performed by: SURGERY

## 2022-11-22 PROCEDURE — 3008F BODY MASS INDEX DOCD: CPT | Mod: CPTII,S$GLB,, | Performed by: SURGERY

## 2022-11-22 PROCEDURE — 3288F FALL RISK ASSESSMENT DOCD: CPT | Mod: CPTII,S$GLB,, | Performed by: SURGERY

## 2022-11-22 PROCEDURE — 3075F PR MOST RECENT SYSTOLIC BLOOD PRESS GE 130-139MM HG: ICD-10-PCS | Mod: CPTII,S$GLB,, | Performed by: SURGERY

## 2022-11-22 PROCEDURE — 99999 PR PBB SHADOW E&M-EST. PATIENT-LVL III: ICD-10-PCS | Mod: PBBFAC,,, | Performed by: SURGERY

## 2022-11-22 PROCEDURE — 99214 PR OFFICE/OUTPT VISIT, EST, LEVL IV, 30-39 MIN: ICD-10-PCS | Mod: GC,S$GLB,, | Performed by: SURGERY

## 2022-11-22 PROCEDURE — 1125F PR PAIN SEVERITY QUANTIFIED, PAIN PRESENT: ICD-10-PCS | Mod: CPTII,S$GLB,, | Performed by: SURGERY

## 2022-11-22 PROCEDURE — 3078F DIAST BP <80 MM HG: CPT | Mod: CPTII,S$GLB,, | Performed by: SURGERY

## 2022-11-22 PROCEDURE — 1101F PT FALLS ASSESS-DOCD LE1/YR: CPT | Mod: CPTII,S$GLB,, | Performed by: SURGERY

## 2022-11-22 PROCEDURE — 1160F RVW MEDS BY RX/DR IN RCRD: CPT | Mod: CPTII,S$GLB,, | Performed by: SURGERY

## 2022-11-22 PROCEDURE — 3288F PR FALLS RISK ASSESSMENT DOCUMENTED: ICD-10-PCS | Mod: CPTII,S$GLB,, | Performed by: SURGERY

## 2022-11-22 PROCEDURE — 1159F PR MEDICATION LIST DOCUMENTED IN MEDICAL RECORD: ICD-10-PCS | Mod: CPTII,S$GLB,, | Performed by: SURGERY

## 2022-11-22 PROCEDURE — 1160F PR REVIEW ALL MEDS BY PRESCRIBER/CLIN PHARMACIST DOCUMENTED: ICD-10-PCS | Mod: CPTII,S$GLB,, | Performed by: SURGERY

## 2022-11-22 PROCEDURE — 99999 PR PBB SHADOW E&M-EST. PATIENT-LVL III: CPT | Mod: PBBFAC,,, | Performed by: SURGERY

## 2022-11-22 PROCEDURE — 1159F MED LIST DOCD IN RCRD: CPT | Mod: CPTII,S$GLB,, | Performed by: SURGERY

## 2022-11-22 PROCEDURE — 99214 OFFICE O/P EST MOD 30 MIN: CPT | Mod: GC,S$GLB,, | Performed by: SURGERY

## 2022-11-22 PROCEDURE — 3044F HG A1C LEVEL LT 7.0%: CPT | Mod: CPTII,S$GLB,, | Performed by: SURGERY

## 2022-11-22 PROCEDURE — 1125F AMNT PAIN NOTED PAIN PRSNT: CPT | Mod: CPTII,S$GLB,, | Performed by: SURGERY

## 2022-11-22 PROCEDURE — 3078F PR MOST RECENT DIASTOLIC BLOOD PRESSURE < 80 MM HG: ICD-10-PCS | Mod: CPTII,S$GLB,, | Performed by: SURGERY

## 2022-11-22 PROCEDURE — 1101F PR PT FALLS ASSESS DOC 0-1 FALLS W/OUT INJ PAST YR: ICD-10-PCS | Mod: CPTII,S$GLB,, | Performed by: SURGERY

## 2022-11-22 NOTE — PROGRESS NOTES
General Surgery Office Visit   History and Physical    Patient Name: Tanesha Davis  YOB: 1950 (71 y.o.)  MRN: [id number]  Today's Date: 11/22/2022    Referring Md:   Aaareferral Self  No address on file    SUBJECTIVE:     Chief Complaint: incisional hernia    History of Present Illness:  Tanesha Davis is a 71 y.o. female with PMHx of recurrent incisional hernia, colon cancer who presents to the clinic today complaining of pain from a known incisional hernia which she first noticed back in 2015. Patient describes the pain as intermittent (1-2x weekly) and rates it as 8/10 in severity. Patient states the sxs are aggravated by standing too long, long car rides and alleviated by laying flat. She denies fever, chills, unintentional weight loss, n/v/d, constipation, hematochezia, dysuria, hematuria, CP, SOB, and all other symptoms. Patient reports being compliant with home medication regimen.     Pt initially presented to Dr Muse for an incisional hernia repair in 2014. Pt reports the wound had difficulty healing; she had to return to the OR with Dr Muse a year later and then again in 2018 with Dr. Meraz, at which point they removed mesh. The wound at that point stopped draining, so the patient stopped pursuing surgical treatments despite the hernia persisting. She was hospitalized at [facility] Nov 7-10, 22 for an SBO which resolved with conservative management. CT at [facility] showed a transition point near her hernia.     She is concerned about another hernia repair because she does not want to go through another four years of poor wound healing.     Patient denies personal history of MI, CVA, lung disease, DM  Previous abdominal surgeries include: colectomy 2006, recurrent incisional hernia repair + mesh excision 2018, cholecystectomy, hysterectomy  Denies alcohol, tobacco, and elicit drug use.   Not currently on any anticoagulants      Review of patient's allergies indicates:   Allergen  "Reactions    Adhesive Rash     Asking that we use Paper Tape,  Uses "Sensitive Skin" band aids    Amoxil [amoxicillin] Hives    Penicillins Hives and Swelling     Swelling of ears and neck    Sulfa (sulfonamide antibiotics) Hives and Itching       Past Medical History:   Diagnosis Date    Arthritis     Draining cutaneous sinus tract     Headache(784.0)     Incisional hernia     Insomnia     Nocturnal leg cramps     Nonhealing surgical wound     PONV (postoperative nausea and vomiting)     with last surgery per patient, "Severe" PONV 2-27-14    Postmenopausal status     Rectosigmoid cancer      Past Surgical History:   Procedure Laterality Date    BREAST BIOPSY Left     CHOLECYSTECTOMY  02/24/2014    with incisional hernia repair with mesh    COLONOSCOPY N/A 8/23/2016    Procedure: COLONOSCOPY;  Surgeon: Cuco Meraz MD;  [facility]  Service: Endoscopy;  Laterality: N/A;    COLONOSCOPY N/A 12/9/2019    Procedure: COLONOSCOPY;  Surgeon: KISHORE Curry MD;  [facility]  Service: Endoscopy;  Laterality: N/A;  ADHESIVE Allergy    ESOPHAGOGASTRODUODENOSCOPY N/A 4/22/2021    Procedure: EGD (ESOPHAGOGASTRODUODENOSCOPY);  Surgeon: Lukasz Lindquist MD;  [facility]  Service: Endoscopy;  Laterality: N/A;  3/25-covid [place] 7[place] [facility] getting results to us to see if correct form of test-MS    HYSTERECTOMY      INJECTION OF ANESTHETIC AGENT AROUND NERVE Right 6/28/2022    Procedure: BLOCK, NERVE, RIGHT L2-L3-L4-L5;  Surgeon: Shabnam Skaggs MD;  Location: [facility] PAIN MGT;  Service: Pain Management;  Laterality: Right;    INJECTION OF ANESTHETIC AGENT AROUND NERVE Right 8/2/2022    Procedure: BLOCK, NERVE, RIGHT L2-L5 MEDIAL BRANCH;  Surgeon: Shabnam Skaggs MD;  Location: [facility] PAIN MGT;  Service: Pain Management;  Laterality: Right;    KNEE ARTHROSCOPY      KNEE SURGERY      right and left knee repair    OOPHORECTOMY      " "RADIOFREQUENCY ABLATION Right 9/27/2022    Procedure: Radiofrequency Ablation Right L2-L5;  Surgeon: Shabnam Skaggs MD;  Location: [facility] PAIN MGT;  Service: Pain Management;  Laterality: Right;    REPAIR OF RECURRENT INCISIONAL HERNIA  7/10/2018    Procedure: REPAIR, HERNIA, INCISIONAL, RECURRENT;  Surgeon: Cuco Meraz MD;  [facility];  Service: Colon and Rectal;;    SIGMOIDECTOMY  03/20/2006    LAR     Family History   Problem Relation Age of Onset    Breast cancer Mother 56    Cancer Mother         BREAST    Heart disease Father     Heart disease Maternal Grandmother     Diabetes Brother     Colon polyps Neg Hx     Ovarian cancer Neg Hx     Anesthesia problems Neg Hx      Social History     Tobacco Use    Smoking status: Never    Smokeless tobacco: Never   Substance Use Topics    Alcohol use: Yes     Alcohol/week: 1.0 standard drink     Types: 1 Glasses of wine per week     Comment: socially    Drug use: No        Review of Systems:  Review of Systems   Constitutional:  Negative for chills and fever.   HENT:  Negative for hearing loss and tinnitus.    Eyes:  Negative for blurred vision and double vision.   Respiratory:  Negative for cough and hemoptysis.    Cardiovascular:  Negative for chest pain and palpitations.   Gastrointestinal:  Positive for abdominal pain. Negative for nausea and vomiting.   Genitourinary:  Negative for dysuria and urgency.   Musculoskeletal:  Negative for myalgias and neck pain.   Neurological:  Negative for dizziness and headaches.   Psychiatric/Behavioral:  Negative for depression and substance abuse.      OBJECTIVE:     Vital Signs (Most Recent)  /63   Pulse 68   Ht 5' 3" (1.6 m)   Wt 83.9 kg (184 lb 15.5 oz)   BMI 32.77 kg/m²     Physical Exam  Vitals and nursing note reviewed.   Constitutional:       Appearance: Normal appearance. She is not ill-appearing.   HENT:      Head: Normocephalic.      Mouth/Throat:      Mouth: Mucous membranes " are moist.      Pharynx: Oropharynx is clear.   Eyes:      General: No scleral icterus.     Extraocular Movements: Extraocular movements intact.      Conjunctiva/sclera: Conjunctivae normal.   Cardiovascular:      Rate and Rhythm: Normal rate.      Pulses: Normal pulses.   Pulmonary:      Effort: Pulmonary effort is normal. No respiratory distress.      Breath sounds: No wheezing.   Abdominal:      General: Abdomen is flat. There is no distension.      Palpations: Abdomen is soft.      Tenderness: There is no abdominal tenderness.      Comments: Unable to palpate defect on exam this afternoon    Musculoskeletal:         General: No swelling or tenderness. Normal range of motion.      Cervical back: Normal range of motion.   Skin:     General: Skin is warm and dry.      Coloration: Skin is not jaundiced or pale.      Comments: Midline incisional scar   Neurological:      General: No focal deficit present.      Mental Status: She is alert and oriented to person, place, and time.   Psychiatric:         Mood and Affect: Mood normal.       Labs:     Lab Results   Component Value Date    WBC 7.44 10/26/2022    HGB 14.3 10/26/2022    HCT 44.5 10/26/2022    MCV 94 10/26/2022     10/26/2022         CMP  Sodium   Date Value Ref Range Status   10/26/2022 141 136 - 145 mmol/L Final     Potassium   Date Value Ref Range Status   10/26/2022 4.1 3.5 - 5.1 mmol/L Final     Chloride   Date Value Ref Range Status   10/26/2022 107 95 - 110 mmol/L Final     CO2   Date Value Ref Range Status   10/26/2022 28 23 - 29 mmol/L Final     Glucose   Date Value Ref Range Status   10/26/2022 91 70 - 110 mg/dL Final     BUN   Date Value Ref Range Status   10/26/2022 13 8 - 23 mg/dL Final     Creatinine   Date Value Ref Range Status   10/26/2022 0.7 0.5 - 1.4 mg/dL Final     Calcium   Date Value Ref Range Status   10/26/2022 9.7 8.7 - 10.5 mg/dL Final     Total Protein   Date Value Ref Range Status   10/26/2022 7.4 6.0 - 8.4 g/dL Final      Albumin   Date Value Ref Range Status   10/26/2022 4.3 3.5 - 5.2 g/dL Final     Total Bilirubin   Date Value Ref Range Status   10/26/2022 0.5 0.1 - 1.0 mg/dL Final     Comment:     For infants and newborns, interpretation of results should be based  on gestational age, weight and in agreement with clinical  observations.    Premature Infant recommended reference ranges:  Up to 24 hours.............<8.0 mg/dL  Up to 48 hours............<12.0 mg/dL  3-5 days..................<15.0 mg/dL  6-29 days.................<15.0 mg/dL       Alkaline Phosphatase   Date Value Ref Range Status   10/26/2022 92 55 - 135 U/L Final     AST   Date Value Ref Range Status   10/26/2022 19 10 - 40 U/L Final     ALT   Date Value Ref Range Status   10/26/2022 18 10 - 44 U/L Final     Anion Gap   Date Value Ref Range Status   10/26/2022 6 (L) 8 - 16 mmol/L Final     eGFR if    Date Value Ref Range Status   12/21/2021 >60 >60 mL/min/1.73 m^2 Final     eGFR if non    Date Value Ref Range Status   12/21/2021 >60 >60 mL/min/1.73 m^2 Final     Comment:     Calculation used to obtain the estimated glomerular filtration  rate (eGFR) is the CKD-EPI equation.              Imaging: Impression:     Large anterior abdominal wall hernias containing fat and bowel, no obstructive changes.     Hepatic and kidney lesions, the largest represent cysts, the smaller ones are too small to characterize, they are favored to represent cysts, they are unchanged.     Hysterectomy.     Cholecystectomy.        Electronically signed by: Batsheva Casas MD  Date:                                            10/31/2022  Time:                                           11:39      ASSESSMENT/PLAN:     There are no diagnoses linked to this encounter.    Tanesha Davis is a 71 y.o. female with recurrent incisional hernias and recent SBO.     - Discussed the option of surgical repair with the patient; she would like to think about it and  will contact clinic should she decide to proceed  - Obtain imaging from [facility] (pt asked to bring images on disc to clinic)  - Patient instructed to call clinic with any questions, concerns, or new symptoms  - Patient understands and in agreement with plan; all questions answered    Case discussed with Dr. Ramírez.      ALISSON Paz M.D.  General Surgery PGY-1  [facility]

## 2022-12-08 ENCOUNTER — PES CALL (OUTPATIENT)
Dept: ADMINISTRATIVE | Facility: CLINIC | Age: 72
End: 2022-12-08
Payer: MEDICARE

## 2023-01-09 ENCOUNTER — PES CALL (OUTPATIENT)
Dept: ADMINISTRATIVE | Facility: CLINIC | Age: 73
End: 2023-01-09
Payer: MEDICARE

## 2023-01-11 ENCOUNTER — OFFICE VISIT (OUTPATIENT)
Dept: OBSTETRICS AND GYNECOLOGY | Facility: CLINIC | Age: 73
End: 2023-01-11
Payer: MEDICARE

## 2023-01-11 VITALS
HEIGHT: 63 IN | DIASTOLIC BLOOD PRESSURE: 75 MMHG | BODY MASS INDEX: 32.11 KG/M2 | WEIGHT: 181.19 LBS | SYSTOLIC BLOOD PRESSURE: 123 MMHG

## 2023-01-11 DIAGNOSIS — N95.1 MENOPAUSAL SYMPTOMS: ICD-10-CM

## 2023-01-11 DIAGNOSIS — Z12.31 SCREENING MAMMOGRAM, ENCOUNTER FOR: ICD-10-CM

## 2023-01-11 DIAGNOSIS — Z01.419 ROUTINE GYNECOLOGICAL EXAMINATION: Primary | ICD-10-CM

## 2023-01-11 PROCEDURE — 3008F BODY MASS INDEX DOCD: CPT | Mod: HCNC,CPTII,S$GLB, | Performed by: PHYSICIAN ASSISTANT

## 2023-01-11 PROCEDURE — 3074F SYST BP LT 130 MM HG: CPT | Mod: HCNC,CPTII,S$GLB, | Performed by: PHYSICIAN ASSISTANT

## 2023-01-11 PROCEDURE — 3008F PR BODY MASS INDEX (BMI) DOCUMENTED: ICD-10-PCS | Mod: HCNC,CPTII,S$GLB, | Performed by: PHYSICIAN ASSISTANT

## 2023-01-11 PROCEDURE — 1126F PR PAIN SEVERITY QUANTIFIED, NO PAIN PRESENT: ICD-10-PCS | Mod: HCNC,CPTII,S$GLB, | Performed by: PHYSICIAN ASSISTANT

## 2023-01-11 PROCEDURE — 99999 PR PBB SHADOW E&M-EST. PATIENT-LVL III: ICD-10-PCS | Mod: PBBFAC,HCNC,, | Performed by: PHYSICIAN ASSISTANT

## 2023-01-11 PROCEDURE — 99999 PR PBB SHADOW E&M-EST. PATIENT-LVL III: CPT | Mod: PBBFAC,HCNC,, | Performed by: PHYSICIAN ASSISTANT

## 2023-01-11 PROCEDURE — 1126F AMNT PAIN NOTED NONE PRSNT: CPT | Mod: HCNC,CPTII,S$GLB, | Performed by: PHYSICIAN ASSISTANT

## 2023-01-11 PROCEDURE — 3074F PR MOST RECENT SYSTOLIC BLOOD PRESSURE < 130 MM HG: ICD-10-PCS | Mod: HCNC,CPTII,S$GLB, | Performed by: PHYSICIAN ASSISTANT

## 2023-01-11 PROCEDURE — 1159F PR MEDICATION LIST DOCUMENTED IN MEDICAL RECORD: ICD-10-PCS | Mod: HCNC,CPTII,S$GLB, | Performed by: PHYSICIAN ASSISTANT

## 2023-01-11 PROCEDURE — G0101 CA SCREEN;PELVIC/BREAST EXAM: HCPCS | Mod: HCNC,S$GLB,, | Performed by: PHYSICIAN ASSISTANT

## 2023-01-11 PROCEDURE — 1160F PR REVIEW ALL MEDS BY PRESCRIBER/CLIN PHARMACIST DOCUMENTED: ICD-10-PCS | Mod: HCNC,CPTII,S$GLB, | Performed by: PHYSICIAN ASSISTANT

## 2023-01-11 PROCEDURE — G0101 PR CA SCREEN;PELVIC/BREAST EXAM: ICD-10-PCS | Mod: HCNC,S$GLB,, | Performed by: PHYSICIAN ASSISTANT

## 2023-01-11 PROCEDURE — 1159F MED LIST DOCD IN RCRD: CPT | Mod: HCNC,CPTII,S$GLB, | Performed by: PHYSICIAN ASSISTANT

## 2023-01-11 PROCEDURE — 1160F RVW MEDS BY RX/DR IN RCRD: CPT | Mod: HCNC,CPTII,S$GLB, | Performed by: PHYSICIAN ASSISTANT

## 2023-01-11 PROCEDURE — 3078F DIAST BP <80 MM HG: CPT | Mod: HCNC,CPTII,S$GLB, | Performed by: PHYSICIAN ASSISTANT

## 2023-01-11 PROCEDURE — 3078F PR MOST RECENT DIASTOLIC BLOOD PRESSURE < 80 MM HG: ICD-10-PCS | Mod: HCNC,CPTII,S$GLB, | Performed by: PHYSICIAN ASSISTANT

## 2023-01-11 RX ORDER — ESTRADIOL 0.5 MG/1
0.5 TABLET ORAL DAILY
Qty: 30 TABLET | Refills: 11 | Status: SHIPPED | OUTPATIENT
Start: 2023-01-11 | End: 2023-02-02

## 2023-01-11 NOTE — PROGRESS NOTES
"CC: Well woman exam    Tanesha Davis is a 72 y.o. female  presents for well woman exam.  LMP: No LMP recorded. Patient has had a hysterectomy. She is taking estrace 1mg QD and would like to continue. Tries taking QOD and works sometimes but then other times will have horrible night sweats.  Abdominal hernia causing blockage. Had reaction in the past to mesh so avoiding surgery. Working on losing weight to help. Taking otc mg to help keep bowels regular.    Mammogram: 22 TC 9.6 %  Pap: s/p hyst  PCP: Caleb Hernandez MD  Routine Screening Labs: 10/26/22  Colonoscopy: 2019  DEXA: 2019 normal    Past Medical History:   Diagnosis Date    Arthritis     Draining cutaneous sinus tract     Headache(784.0)     Incisional hernia     Insomnia     Nocturnal leg cramps     Nonhealing surgical wound     PONV (postoperative nausea and vomiting)     with last surgery per patient, "Severe" PONV 2-27-14    Postmenopausal status     Rectosigmoid cancer     Small bowel obstruction 2022     Past Surgical History:   Procedure Laterality Date    BREAST BIOPSY Left     CHOLECYSTECTOMY  2014    with incisional hernia repair with mesh    COLONOSCOPY N/A 2016    Procedure: COLONOSCOPY;  Surgeon: Cuco Meraz MD;  Location: [address]);  Service: Endoscopy;  Laterality: N/A;    COLONOSCOPY N/A 2019    Procedure: COLONOSCOPY;  Surgeon: KISHORE Curry MD;  Location: [address]);  Service: Endoscopy;  Laterality: N/A;  ADHESIVE Allergy    ESOPHAGOGASTRODUODENOSCOPY N/A 2021    Procedure: EGD (ESOPHAGOGASTRODUODENOSCOPY);  Surgeon: Lukasz Lindquist MD;  Location: [address]);  Service: Endoscopy;  Laterality: N/A;  [place] [facility] [place] to see if correct form of test-MS    HYSTERECTOMY      INJECTION OF ANESTHETIC AGENT AROUND NERVE Right 2022    Procedure: BLOCK, NERVE, RIGHT L2-L3-L4-L5;  Surgeon: Shabnam Skaggs MD;  Location: [facility] PAIN [facility];  Service: Pain Management;  Laterality: Right;    INJECTION OF ANESTHETIC AGENT AROUND NERVE Right 8/2/2022    Procedure: BLOCK, NERVE, RIGHT L2-L5 MEDIAL BRANCH;  Surgeon: Shabnam Skaggs MD;  Location: [facility] PAIN MGT;  Service: Pain Management;  Laterality: Right;    KNEE ARTHROSCOPY      KNEE SURGERY      right and left knee repair    OOPHORECTOMY      RADIOFREQUENCY ABLATION Right 9/27/2022    Procedure: Radiofrequency Ablation Right L2-L5;  Surgeon: Shabnam Skaggs MD;  Location: [facility] PAIN MGT;  Service: Pain Management;  Laterality: Right;    REPAIR OF RECURRENT INCISIONAL HERNIA  7/10/2018    Procedure: REPAIR, HERNIA, INCISIONAL, RECURRENT;  Surgeon: Cuco Meraz MD;  Location: [facility] OR 2ND FLR;  Service: Colon and Rectal;;    SIGMOIDECTOMY  03/20/2006    LAR     Social History     Socioeconomic History    Marital status:      Spouse name: francis    Number of children: 2   Occupational History    Occupation: Retired     Employer: Self   Tobacco Use    Smoking status: Never    Smokeless tobacco: Never   Substance and Sexual Activity    Alcohol use: Yes     Alcohol/week: 1.0 standard drink     Types: 1 Glasses of wine per week     Comment: socially    Drug use: No    Sexual activity: Yes     Partners: Male     Birth control/protection: Post-menopausal     Social Determinants of Health     Financial Resource Strain: Unknown    Difficulty of Paying Living Expenses: Patient refused   Food Insecurity: Unknown    Worried About Running Out of Food in the Last Year: Patient refused    Ran Out of Food in the Last Year: Patient refused   Transportation Needs: No Transportation Needs    Lack of Transportation (Medical): No    Lack of Transportation (Non-Medical): No   Physical Activity: Sufficiently Active    Days of Exercise per Week: 4 days    Minutes of Exercise per Session: 60 min   Stress: Stress Concern Present    Feeling of Stress : To some extent   Social Connections: Unknown    Frequency  of Communication with Friends and Family: Twice a week    Frequency of Social Gatherings with Friends and Family: Once a week    Active Member of Clubs or Organizations: Yes    Attends Club or Organization Meetings: 1 to 4 times per year    Marital Status:    Housing Stability: Unknown    Unable to Pay for Housing in the Last Year: Patient refused    Unstable Housing in the Last Year: Patient refused     Family History   Problem Relation Age of Onset    Breast cancer Mother 56    Cancer Mother         BREAST    Heart disease Father     Heart disease Maternal Grandmother     Diabetes Brother     Colon polyps Neg Hx     Ovarian cancer Neg Hx     Anesthesia problems Neg Hx      OB History          4    Para   4    Term   4            AB        Living             SAB        IAB        Ectopic        Multiple        Live Births                     Current Outpatient Medications:     atorvastatin (LIPITOR) 40 MG tablet, Take 1 tablet (40 mg total) by mouth once daily., Disp: 90 tablet, Rfl: 3    ERGOCALCIFEROL, VITAMIN D2, (VITAMIN D ORAL), Take 1 capsule by mouth every morning. , Disp: , Rfl:     erythromycin (ROMYCIN) ophthalmic ointment, SMARTSI Application Left Eye 4 Times Daily, Disp: , Rfl:     estradioL (ESTRACE) 0.5 MG tablet, Take 1 tablet (0.5 mg total) by mouth once daily., Disp: 30 tablet, Rfl: 11    ferrous sulfate 325 (65 FE) MG EC tablet, TAKE 1 TABLET BY MOUTH EVERY DAY, Disp: 90 tablet, Rfl: 3    gabapentin (NEURONTIN) 100 MG capsule, Take 2 capsules (200 mg total) by mouth every evening., Disp: 270 capsule, Rfl: 3    L GASSERI/B BIFIDUM/B LONGUM ([facility] HEALTH ORAL), Take 1 tablet by mouth 2 (two) times daily., Disp: , Rfl:     linaCLOtide (LINZESS) 72 mcg Cap capsule, Take 1 capsule (72 mcg total) by mouth once daily., Disp: 30 capsule, Rfl: 5    magnesium oxide-pyridoxine (BEELITH) 362-20 mg Tab, Take 1 tablet by mouth once daily., Disp: 90 tablet, Rfl: 1     "tobramycin-dexAMETHasone 0.3-0.1% (TOBRADEX) 0.3-0.1 % DrpS, Place 1 drop into the left eye 3 (three) times daily., Disp: , Rfl:     VITAMIN E ACETATE (VITAMIN E ORAL), Take 1 capsule by mouth every morning. , Disp: , Rfl:   No current facility-administered medications for this visit.    Facility-Administered Medications Ordered in Other Visits:     0.9%  NaCl infusion, 500 mL, Intravenous, Continuous, Ray Vieira MD    The 10-year ASCVD risk score (Leelee ANDERSON, et al., 2019) is: 9.2%    Values used to calculate the score:      Age: 72 years      Sex: Female      Is Non- : No      Diabetic: No      Tobacco smoker: No      Systolic Blood Pressure: 123 mmHg      Is BP treated: No      HDL Cholesterol: 56 mg/dL      Total Cholesterol: 237 mg/dL    /75   Ht 5' 3" (1.6 m)   Wt 82.2 kg (181 lb 3.2 oz)   BMI 32.10 kg/m²       ROS:  GENERAL: Denies weight gain or weight loss. Feeling well overall.   SKIN: Denies rash or lesions.   HEAD: Denies head injury or headache.   NODES: Denies enlarged lymph nodes.   CHEST: Denies chest pain or shortness of breath.   CARDIOVASCULAR: Denies palpitations or left sided chest pain.   ABDOMEN: No  constipation, diarrhea, nausea, vomiting or rectal bleeding. +abdominal pain  URINARY: No frequency, dysuria, hematuria, or burning on urination.  REPRODUCTIVE: See HPI.   BREASTS: Denies pain, lumps, or nipple discharge.   HEMATOLOGIC: No easy bruisability or excessive bleeding.   MUSCULOSKELETAL: Denies joint pain or swelling.   NEUROLOGIC: Denies syncope or weakness.   PSYCHIATRIC: Denies depression, anxiety or mood swings.    PHYSICAL EXAM:  APPEARANCE: Well nourished, well developed, in no acute distress.  AFFECT: WNL, alert and oriented x 3  CHEST: Good respiratory effect  ABDOMEN: Soft.  No tenderness or masses.  No hepatosplenomegaly.  No hernias.  BREASTS: Symmetrical, no skin changes or visible lesions.  No palpable masses, nipple discharge " bilaterally.  PELVIC: Normal external genitalia without lesions.  Normal hair distribution.  Adequate perineal body, normal urethral meatus.  Vagina moist and well rugated without lesions or discharge.  Cervix and Uterus are surgically absent. Adnexa without masses or tenderness.    EXTREMITIES: No edema.    ASSESSMENT:   Routine gynecological examination    Screening mammogram, encounter for    Menopausal symptoms  -     estradioL (ESTRACE) 0.5 MG tablet; Take 1 tablet (0.5 mg total) by mouth once daily.  Dispense: 30 tablet; Refill: 11        PLAN:   Annual mammogram.  DEXA has been ordered by PCP.  Reviewed vitamin D and calcium.  Reviewed cardiovascular risk with advancing age and HRT. HRT not contraindicated.   Trial of reducing to estrace 0.5mg QD.  Follow up annually for HRT or sooner PRN.    Patient was counseled today on A.C.S. Pap guidelines and recommendations for yearly pelvic exams, mammograms and monthly self breast exams; to see her PCP for other health maintenance.

## 2023-01-12 ENCOUNTER — HOSPITAL ENCOUNTER (OUTPATIENT)
Dept: RADIOLOGY | Facility: CLINIC | Age: 73
Discharge: HOME | End: 2023-01-12
Attending: INTERNAL MEDICINE
Payer: MEDICARE

## 2023-01-12 DIAGNOSIS — Z78.0 ASYMPTOMATIC MENOPAUSAL STATE: ICD-10-CM

## 2023-01-12 PROCEDURE — 77080 DXA BONE DENSITY AXIAL: CPT | Mod: 26,HCNC,, | Performed by: INTERNAL MEDICINE

## 2023-01-12 PROCEDURE — 77080 DEXA BONE DENSITY SPINE HIP: ICD-10-PCS | Mod: 26,HCNC,, | Performed by: INTERNAL MEDICINE

## 2023-01-12 PROCEDURE — 77080 DXA BONE DENSITY AXIAL: CPT | Mod: TC,HCNC

## 2023-01-23 ENCOUNTER — PATIENT OUTREACH (OUTPATIENT)
Dept: ADMINISTRATIVE | Facility: HOSPITAL | Age: 73
End: 2023-01-23
Payer: MEDICARE

## 2023-01-27 ENCOUNTER — PES CALL (OUTPATIENT)
Dept: ADMINISTRATIVE | Facility: CLINIC | Age: 73
End: 2023-01-27
Payer: MEDICARE

## 2023-01-31 ENCOUNTER — TELEPHONE (OUTPATIENT)
Dept: GASTROENTEROLOGY | Facility: CLINIC | Age: 73
End: 2023-01-31
Payer: MEDICARE

## 2023-01-31 ENCOUNTER — HOSPITAL ENCOUNTER (OUTPATIENT)
Facility: HOSPITAL | Age: 73
Discharge: HOME | End: 2023-02-01
Attending: EMERGENCY MEDICINE | Admitting: STUDENT IN AN ORGANIZED HEALTH CARE EDUCATION/TRAINING PROGRAM
Payer: MEDICARE

## 2023-01-31 DIAGNOSIS — K56.609 SMALL BOWEL OBSTRUCTION: ICD-10-CM

## 2023-01-31 DIAGNOSIS — R10.9 ABDOMINAL PAIN, UNSPECIFIED ABDOMINAL LOCATION: ICD-10-CM

## 2023-01-31 DIAGNOSIS — R11.14 BILIOUS VOMITING WITH NAUSEA: ICD-10-CM

## 2023-01-31 DIAGNOSIS — K43.6 INCARCERATED VENTRAL HERNIA: ICD-10-CM

## 2023-01-31 DIAGNOSIS — K56.609 INTESTINAL OBSTRUCTION, UNSPECIFIED CAUSE, UNSPECIFIED WHETHER PARTIAL OR COMPLETE: Primary | ICD-10-CM

## 2023-01-31 LAB
ALBUMIN SERPL BCP-MCNC: 4.1 G/DL (ref 3.5–5.2)
ALP SERPL-CCNC: 94 U/L (ref 55–135)
ALT SERPL W/O P-5'-P-CCNC: 13 U/L (ref 10–44)
ANION GAP SERPL CALC-SCNC: 11 MMOL/L (ref 8–16)
AST SERPL-CCNC: 17 U/L (ref 10–40)
BACTERIA #/AREA URNS AUTO: NORMAL /HPF
BASOPHILS # BLD AUTO: 0.02 K/UL (ref 0–0.2)
BASOPHILS NFR BLD: 0.2 % (ref 0–1.9)
BILIRUB SERPL-MCNC: 0.7 MG/DL (ref 0.1–1)
BILIRUB UR QL STRIP: NEGATIVE
BUN SERPL-MCNC: 10 MG/DL (ref 8–23)
BUN SERPL-MCNC: 11 MG/DL (ref 6–30)
CALCIUM SERPL-MCNC: 9.8 MG/DL (ref 8.7–10.5)
CHLORIDE SERPL-SCNC: 104 MMOL/L (ref 95–110)
CHLORIDE SERPL-SCNC: 107 MMOL/L (ref 95–110)
CLARITY UR REFRACT.AUTO: ABNORMAL
CO2 SERPL-SCNC: 25 MMOL/L (ref 23–29)
COLOR UR AUTO: ABNORMAL
CREAT SERPL-MCNC: 0.7 MG/DL (ref 0.5–1.4)
CREAT SERPL-MCNC: 0.8 MG/DL (ref 0.5–1.4)
DIFFERENTIAL METHOD: ABNORMAL
EOSINOPHIL # BLD AUTO: 0.1 K/UL (ref 0–0.5)
EOSINOPHIL NFR BLD: 0.5 % (ref 0–8)
ERYTHROCYTE [DISTWIDTH] IN BLOOD BY AUTOMATED COUNT: 12.9 % (ref 11.5–14.5)
EST. GFR  (NO RACE VARIABLE): >60 ML/MIN/1.73 M^2
GLUCOSE SERPL-MCNC: 100 MG/DL (ref 70–110)
GLUCOSE SERPL-MCNC: 99 MG/DL (ref 70–110)
GLUCOSE UR QL STRIP: NEGATIVE
HCT VFR BLD AUTO: 41.2 % (ref 37–48.5)
HCT VFR BLD CALC: 40 %PCV (ref 36–54)
HCV AB SERPL QL IA: NORMAL
HGB BLD-MCNC: 13.2 G/DL (ref 12–16)
HGB UR QL STRIP: NEGATIVE
HIV 1+2 AB+HIV1 P24 AG SERPL QL IA: NORMAL
HYALINE CASTS UR QL AUTO: 0 /LPF
IMM GRANULOCYTES # BLD AUTO: 0.02 K/UL (ref 0–0.04)
IMM GRANULOCYTES NFR BLD AUTO: 0.2 % (ref 0–0.5)
KETONES UR QL STRIP: ABNORMAL
LACTATE SERPL-SCNC: 0.6 MMOL/L (ref 0.5–2.2)
LEUKOCYTE ESTERASE UR QL STRIP: NEGATIVE
LIPASE SERPL-CCNC: 12 U/L (ref 4–60)
LYMPHOCYTES # BLD AUTO: 1.6 K/UL (ref 1–4.8)
LYMPHOCYTES NFR BLD: 17.3 % (ref 18–48)
MCH RBC QN AUTO: 29.1 PG (ref 27–31)
MCHC RBC AUTO-ENTMCNC: 32 G/DL (ref 32–36)
MCV RBC AUTO: 91 FL (ref 82–98)
MICROSCOPIC COMMENT: NORMAL
MONOCYTES # BLD AUTO: 0.6 K/UL (ref 0.3–1)
MONOCYTES NFR BLD: 6 % (ref 4–15)
NEUTROPHILS # BLD AUTO: 6.9 K/UL (ref 1.8–7.7)
NEUTROPHILS NFR BLD: 75.8 % (ref 38–73)
NITRITE UR QL STRIP: NEGATIVE
NRBC BLD-RTO: 0 /100 WBC
PH UR STRIP: 5 [PH] (ref 5–8)
PLATELET # BLD AUTO: 291 K/UL (ref 150–450)
PMV BLD AUTO: 10.6 FL (ref 9.2–12.9)
POC IONIZED CALCIUM: 1.09 MMOL/L (ref 1.06–1.42)
POC TCO2 (MEASURED): 26 MMOL/L (ref 23–29)
POTASSIUM BLD-SCNC: 3.6 MMOL/L (ref 3.5–5.1)
POTASSIUM SERPL-SCNC: 3.6 MMOL/L (ref 3.5–5.1)
PROT SERPL-MCNC: 7.3 G/DL (ref 6–8.4)
PROT UR QL STRIP: ABNORMAL
RBC # BLD AUTO: 4.54 M/UL (ref 4–5.4)
RBC #/AREA URNS AUTO: 1 /HPF (ref 0–4)
SAMPLE: NORMAL
SODIUM BLD-SCNC: 142 MMOL/L (ref 136–145)
SODIUM SERPL-SCNC: 143 MMOL/L (ref 136–145)
SP GR UR STRIP: 1.02 (ref 1–1.03)
SQUAMOUS #/AREA URNS AUTO: 8 /HPF
URN SPEC COLLECT METH UR: ABNORMAL
WBC # BLD AUTO: 9.17 K/UL (ref 3.9–12.7)
WBC #/AREA URNS AUTO: 2 /HPF (ref 0–5)

## 2023-01-31 PROCEDURE — 99285 PR EMERGENCY DEPT VISIT,LEVEL V: ICD-10-PCS | Mod: HCNC,,, | Performed by: EMERGENCY MEDICINE

## 2023-01-31 PROCEDURE — 96375 TX/PRO/DX INJ NEW DRUG ADDON: CPT | Mod: HCNC

## 2023-01-31 PROCEDURE — 99285 EMERGENCY DEPT VISIT HI MDM: CPT | Mod: HCNC,,, | Performed by: EMERGENCY MEDICINE

## 2023-01-31 PROCEDURE — 96361 HYDRATE IV INFUSION ADD-ON: CPT

## 2023-01-31 PROCEDURE — 96374 THER/PROPH/DIAG INJ IV PUSH: CPT | Mod: HCNC,59

## 2023-01-31 PROCEDURE — 25500020 PHARM REV CODE 255: Mod: HCNC | Performed by: EMERGENCY MEDICINE

## 2023-01-31 PROCEDURE — 80047 BASIC METABLC PNL IONIZED CA: CPT | Mod: HCNC,91

## 2023-01-31 PROCEDURE — 83605 ASSAY OF LACTIC ACID: CPT | Mod: HCNC | Performed by: NURSE PRACTITIONER

## 2023-01-31 PROCEDURE — 99223 1ST HOSP IP/OBS HIGH 75: CPT | Mod: AI,HCNC,, | Performed by: STUDENT IN AN ORGANIZED HEALTH CARE EDUCATION/TRAINING PROGRAM

## 2023-01-31 PROCEDURE — 87389 HIV-1 AG W/HIV-1&-2 AB AG IA: CPT | Mod: HCNC | Performed by: PHYSICIAN ASSISTANT

## 2023-01-31 PROCEDURE — 80053 COMPREHEN METABOLIC PANEL: CPT | Mod: HCNC | Performed by: NURSE PRACTITIONER

## 2023-01-31 PROCEDURE — G0378 HOSPITAL OBSERVATION PER HR: HCPCS | Mod: HCNC

## 2023-01-31 PROCEDURE — 86803 HEPATITIS C AB TEST: CPT | Mod: HCNC | Performed by: PHYSICIAN ASSISTANT

## 2023-01-31 PROCEDURE — 85025 COMPLETE CBC W/AUTO DIFF WBC: CPT | Mod: HCNC | Performed by: NURSE PRACTITIONER

## 2023-01-31 PROCEDURE — 63600175 PHARM REV CODE 636 W HCPCS: Mod: HCNC | Performed by: NURSE PRACTITIONER

## 2023-01-31 PROCEDURE — 83690 ASSAY OF LIPASE: CPT | Mod: HCNC | Performed by: NURSE PRACTITIONER

## 2023-01-31 PROCEDURE — 82565 ASSAY OF CREATININE: CPT | Mod: HCNC,91

## 2023-01-31 PROCEDURE — 99285 EMERGENCY DEPT VISIT HI MDM: CPT | Mod: 25,HCNC

## 2023-01-31 PROCEDURE — 96361 HYDRATE IV INFUSION ADD-ON: CPT | Mod: HCNC

## 2023-01-31 PROCEDURE — 82962 GLUCOSE BLOOD TEST: CPT | Mod: HCNC

## 2023-01-31 PROCEDURE — 99223 PR INITIAL HOSPITAL CARE,LEVL III: ICD-10-PCS | Mod: AI,HCNC,, | Performed by: STUDENT IN AN ORGANIZED HEALTH CARE EDUCATION/TRAINING PROGRAM

## 2023-01-31 PROCEDURE — 81001 URINALYSIS AUTO W/SCOPE: CPT | Mod: HCNC | Performed by: NURSE PRACTITIONER

## 2023-01-31 PROCEDURE — 63600175 PHARM REV CODE 636 W HCPCS: Mod: HCNC

## 2023-01-31 RX ORDER — LIDOCAINE HYDROCHLORIDE 10 MG/ML
1 INJECTION, SOLUTION EPIDURAL; INFILTRATION; INTRACAUDAL; PERINEURAL ONCE AS NEEDED
Status: DISCONTINUED | OUTPATIENT
Start: 2023-01-31 | End: 2023-02-01 | Stop reason: HOSPADM

## 2023-01-31 RX ORDER — PROCHLORPERAZINE EDISYLATE 5 MG/ML
5 INJECTION INTRAMUSCULAR; INTRAVENOUS EVERY 6 HOURS PRN
Status: DISCONTINUED | OUTPATIENT
Start: 2023-01-31 | End: 2023-02-01 | Stop reason: HOSPADM

## 2023-01-31 RX ORDER — SODIUM CHLORIDE, SODIUM LACTATE, POTASSIUM CHLORIDE, CALCIUM CHLORIDE 600; 310; 30; 20 MG/100ML; MG/100ML; MG/100ML; MG/100ML
INJECTION, SOLUTION INTRAVENOUS CONTINUOUS
Status: DISCONTINUED | OUTPATIENT
Start: 2023-01-31 | End: 2023-02-01

## 2023-01-31 RX ORDER — KETOROLAC TROMETHAMINE 30 MG/ML
10 INJECTION, SOLUTION INTRAMUSCULAR; INTRAVENOUS
Status: COMPLETED | OUTPATIENT
Start: 2023-01-31 | End: 2023-01-31

## 2023-01-31 RX ORDER — ACETAMINOPHEN 325 MG/1
650 TABLET ORAL EVERY 4 HOURS PRN
Status: DISCONTINUED | OUTPATIENT
Start: 2023-01-31 | End: 2023-02-01 | Stop reason: HOSPADM

## 2023-01-31 RX ORDER — ONDANSETRON 8 MG/1
8 TABLET, ORALLY DISINTEGRATING ORAL EVERY 8 HOURS PRN
Status: DISCONTINUED | OUTPATIENT
Start: 2023-01-31 | End: 2023-02-01 | Stop reason: HOSPADM

## 2023-01-31 RX ORDER — SODIUM CHLORIDE 0.9 % (FLUSH) 0.9 %
10 SYRINGE (ML) INJECTION
Status: DISCONTINUED | OUTPATIENT
Start: 2023-01-31 | End: 2023-02-01 | Stop reason: HOSPADM

## 2023-01-31 RX ORDER — TALC
6 POWDER (GRAM) TOPICAL NIGHTLY PRN
Status: DISCONTINUED | OUTPATIENT
Start: 2023-01-31 | End: 2023-02-01 | Stop reason: HOSPADM

## 2023-01-31 RX ORDER — ONDANSETRON 2 MG/ML
4 INJECTION INTRAMUSCULAR; INTRAVENOUS
Status: COMPLETED | OUTPATIENT
Start: 2023-01-31 | End: 2023-01-31

## 2023-01-31 RX ORDER — MORPHINE SULFATE 2 MG/ML
2 INJECTION, SOLUTION INTRAMUSCULAR; INTRAVENOUS
Status: COMPLETED | OUTPATIENT
Start: 2023-01-31 | End: 2023-01-31

## 2023-01-31 RX ADMIN — KETOROLAC TROMETHAMINE 10 MG: 30 INJECTION, SOLUTION INTRAMUSCULAR; INTRAVENOUS at 12:01

## 2023-01-31 RX ADMIN — ONDANSETRON 4 MG: 2 INJECTION INTRAMUSCULAR; INTRAVENOUS at 12:01

## 2023-01-31 RX ADMIN — MORPHINE SULFATE 2 MG: 2 INJECTION, SOLUTION INTRAMUSCULAR; INTRAVENOUS at 03:01

## 2023-01-31 RX ADMIN — SODIUM CHLORIDE, POTASSIUM CHLORIDE, SODIUM LACTATE AND CALCIUM CHLORIDE: 600; 310; 30; 20 INJECTION, SOLUTION INTRAVENOUS at 04:01

## 2023-01-31 RX ADMIN — IOHEXOL 100 ML: 350 INJECTION, SOLUTION INTRAVENOUS at 01:01

## 2023-01-31 NOTE — ASSESSMENT & PLAN NOTE
72 year old female with PMH of colon cancer s/p LAR in 2006, multiple ventral hernia repairs with delayed wound healing, recurrent ventral hernias who presents with acute onset abdominal pain, nausea and vomiting. Imaging concerning for SBO.    Admit to obs  NPO, MIVF  NG tube if patient starts vomiting  SCDs  PRN pain and nausea medication

## 2023-01-31 NOTE — CONSULTS
[facility] - Emergency Dept  General Surgery  Consult Note    Patient Name: Tanesha Davis  MRN: [id number]  Code Status: Prior  Admission Date: 1/31/2023  Hospital Length of Stay: 0 days  Attending Physician: Leo Marquez III, MD  Primary Care Provider: [facility] MD    Patient information was obtained from patient, past medical records and ER records.     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 Patient reports that she has been having intermittent cramping abdominal pain for several weeks, however it acutely worsened overnight and was accompanied by bilious vomiting and diarrhea. Her last episode of vomiting was around 6:00 this morning, last bowel movement was around that time also. She denies fevers or chills, blood in her stool and unexplained weight loss. She was admitted in November for a SBO at [facility] which was successfully managed conservatively.     On evaluation, patient is HDS on RA. She complains of lower abdominal pain and intermittent nausea. Labs without significant findings. CT scan shows Persistent lower ventral abdominal wall hernia containing loops of small bowel noting new mild circumferential wall thickening of small bowel and mild distension with air-fluid level with concern for SBO.       Current Facility-Administered Medications on File Prior to Encounter   Medication    0.9%  NaCl infusion     Current Outpatient Medications on File Prior to Encounter   Medication Sig    atorvastatin (LIPITOR) 40 MG tablet Take 1 tablet (40 mg total) by mouth once daily.    ERGOCALCIFEROL,  "VITAMIN D2, (VITAMIN D ORAL) Take 1 capsule by mouth every morning.     erythromycin (ROMYCIN) ophthalmic ointment SMARTSI Application Left Eye 4 Times Daily    estradioL (ESTRACE) 0.5 MG tablet Take 1 tablet (0.5 mg total) by mouth once daily.    ferrous sulfate 325 (65 FE) MG EC tablet TAKE 1 TABLET BY MOUTH EVERY DAY    gabapentin (NEURONTIN) 100 MG capsule Take 2 capsules (200 mg total) by mouth every evening.    L GASSERI/B BIFIDUM/B LONGUM ([employer] HEALTH ORAL) Take 1 tablet by mouth 2 (two) times daily.    linaCLOtide (LINZESS) 72 mcg Cap capsule Take 1 capsule (72 mcg total) by mouth once daily.    magnesium oxide-pyridoxine (BEELITH) 362-20 mg Tab Take 1 tablet by mouth once daily.    tobramycin-dexAMETHasone 0.3-0.1% (TOBRADEX) 0.3-0.1 % DrpS Place 1 drop into the left eye 3 (three) times daily.    VITAMIN E ACETATE (VITAMIN E ORAL) Take 1 capsule by mouth every morning.        Review of patient's allergies indicates:   Allergen Reactions    Adhesive Rash     Asking that we use Paper Tape,  Uses "Sensitive Skin" band aids    Amoxil [amoxicillin] Hives    Penicillins Hives and Swelling     Swelling of ears and neck    Sulfa (sulfonamide antibiotics) Hives and Itching       Past Medical History:   Diagnosis Date    Arthritis     Draining cutaneous sinus tract     Headache(784.0)     Incisional hernia     Insomnia     Nocturnal leg cramps     Nonhealing surgical wound     PONV (postoperative nausea and vomiting)     with last surgery per patient, "Severe" PONV 2-27-14    Postmenopausal status     Rectosigmoid cancer     Small bowel obstruction 2022     Past Surgical History:   Procedure Laterality Date    BREAST BIOPSY Left     CHOLECYSTECTOMY  2014    with incisional hernia repair with mesh    COLONOSCOPY N/A 2016    Procedure: COLONOSCOPY;  Surgeon: Cuco Meraz MD;  Location: [address]);  Service: Endoscopy;  Laterality: N/A;    COLONOSCOPY " N/A 12/9/2019    Procedure: COLONOSCOPY;  Surgeon: KISHORE Curry MD;  Location: [facility] ENDO (4TH FLR);  Service: Endoscopy;  Laterality: N/A;  ADHESIVE Allergy    ESOPHAGOGASTRODUODENOSCOPY N/A 4/22/2021    Procedure: EGD (ESOPHAGOGASTRODUODENOSCOPY);  Surgeon: Lukasz Lindquist MD;  Location: [facility] ENDO (4TH FLR);  Service: Endoscopy;  Laterality: N/A;  3/25-covid + 7/2020 at [facility]-pt getting results to us to see if correct form of test-MS    HYSTERECTOMY      INJECTION OF ANESTHETIC AGENT AROUND NERVE Right 6/28/2022    Procedure: BLOCK, NERVE, RIGHT L2-L3-L4-L5;  Surgeon: Shabnam Skaggs MD;  Location: [facility] PAIN MGT;  Service: Pain Management;  Laterality: Right;    INJECTION OF ANESTHETIC AGENT AROUND NERVE Right 8/2/2022    Procedure: BLOCK, NERVE, RIGHT L2-L5 MEDIAL BRANCH;  Surgeon: Shabnam Skaggs MD;  Location: [facility] PAIN MGT;  Service: Pain Management;  Laterality: Right;    KNEE ARTHROSCOPY      KNEE SURGERY      right and left knee repair    OOPHORECTOMY      RADIOFREQUENCY ABLATION Right 9/27/2022    Procedure: Radiofrequency Ablation Right L2-L5;  Surgeon: Shabnam Skaggs MD;  Location: [facility] PAIN MGT;  Service: Pain Management;  Laterality: Right;    REPAIR OF RECURRENT INCISIONAL HERNIA  7/10/2018    Procedure: REPAIR, HERNIA, INCISIONAL, RECURRENT;  Surgeon: Cuco Meraz MD;  Location: [facility] OR 2ND FLR;  Service: Colon and Rectal;;    SIGMOIDECTOMY  03/20/2006    LAR     Family History       Problem Relation (Age of Onset)    Breast cancer Mother (56)    Cancer Mother    Diabetes Brother    Heart disease Father, Maternal Grandmother          Tobacco Use    Smoking status: Never    Smokeless tobacco: Never   Substance and Sexual Activity    Alcohol use: Yes     Alcohol/week: 1.0 standard drink     Types: 1 Glasses of wine per week     Comment: socially    Drug use: No    Sexual activity: Yes     Partners: Male     Birth control/protection: Post-menopausal     Review of Systems    Constitutional:  Negative for appetite change, chills, fever and unexpected weight change.   Respiratory:  Negative for cough and shortness of breath.    Cardiovascular:  Negative for chest pain and leg swelling.   Gastrointestinal:  Positive for abdominal pain, diarrhea, nausea and vomiting. Negative for blood in stool and constipation.   Genitourinary:  Negative for dysuria.   Musculoskeletal:  Positive for arthralgias.   Skin:  Negative for color change.   Objective:     Vital Signs (Most Recent):  Temp: 98.6 °F (37 °C) (01/31/23 1542)  Pulse: 61 (01/31/23 1542)  Resp: 16 (01/31/23 1542)  BP: 121/62 (01/31/23 1542)  SpO2: 96 % (01/31/23 1542) Vital Signs (24h Range):  Temp:  [97.6 °F (36.4 °C)-98.6 °F (37 °C)] 98.6 °F (37 °C)  Pulse:  [60-74] 61  Resp:  [16-18] 16  SpO2:  [96 %-97 %] 96 %  BP: (121-139)/(59-65) 121/62     Weight: 79.4 kg (175 lb)  Body mass index is 31 kg/m².    Physical Exam  Constitutional:       General: She is not in acute distress.  HENT:      Head: Normocephalic and atraumatic.   Eyes:      Pupils: Pupils are equal, round, and reactive to light.   Cardiovascular:      Rate and Rhythm: Normal rate.   Pulmonary:      Effort: Pulmonary effort is normal. No respiratory distress.   Abdominal:      General: There is no distension.      Palpations: Abdomen is soft.      Comments: Mildly tender to palpation in lower abdomen.    Incarcerated ventral hernias, bowel sounds present when palpated. No overlying skin changes.    Large well healed midline incision.   Skin:     General: Skin is warm and dry.   Neurological:      General: No focal deficit present.      Mental Status: She is alert and oriented to person, place, and time.       Significant Labs:  I have reviewed all pertinent lab results within the past 24 hours.  CBC:   Recent Labs   Lab 01/31/23  1203 01/31/23  1208   WBC 9.17  --    RBC 4.54  --    HGB 13.2  --    HCT 41.2 40     --    MCV 91  --    MCH 29.1  --    MCHC 32.0  --       BMP:   Recent Labs   Lab 01/31/23  1203   GLU 99      K 3.6      CO2 25   BUN 10   CREATININE 0.8   CALCIUM 9.8       Significant Diagnostics:  I have reviewed all pertinent imaging results/findings within the past 24 hours.      Assessment/Plan:     Intestinal obstruction  72 year old female with PMH of colon cancer s/p LAR in 2006, multiple ventral hernia repairs with delayed wound healing, recurrent ventral hernias who presents with acute onset abdominal pain, nausea and vomiting. Imaging concerning for SBO.    Admit to obs  NPO, MIVF  NG tube if patient starts vomiting  SCDs  PRN pain and nausea medication        VTE Risk Mitigation (From admission, onward)    None          Thank you for your consult. I will follow-up with patient. Please contact us if you have any additional questions.    Belem Guerrero MD  General Surgery  [facility] - Emergency Dept

## 2023-01-31 NOTE — HPI
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 Patient reports that she has been having intermittent cramping abdominal pain for several weeks, however it acutely worsened overnight and was accompanied by bilious vomiting and diarrhea. Her last episode of vomiting was around 6:00 this morning, last bowel movement was around that time also. She denies fevers or chills, blood in her stool and unexplained weight loss. She was admitted in November for a SBO at [facility] which was successfully managed conservatively.     On evaluation, patient is HDS on RA. She complains of lower abdominal pain and intermittent nausea. Labs without significant findings. CT scan shows Persistent lower ventral abdominal wall hernia containing loops of small bowel noting new mild circumferential wall thickening of small bowel and mild distension with air-fluid level with concern for SBO.

## 2023-01-31 NOTE — H&P
[facility] - Emergency Dept  General Surgery  History & Physical    Patient Name: Tanesha Davis  MRN: [id number]  Admission Date: 1/31/2023  Attending Physician: Andrea Cannon MD  Primary Care Provider: [facility] MD    Please see consult note for full H&P.    Belem Guerrero MD  General Surgery  [facility] - Emergency Dept

## 2023-01-31 NOTE — TELEPHONE ENCOUNTER
----- Message from Nick Mathur MA sent at 1/31/2023  9:31 AM CST -----  Regarding: FW: today appt  Contact: @[phone]    ----- Message -----  From: Yamil Briseno  Sent: 1/31/2023   9:11 AM CST  To: [facility] Gastro Clinical Staff  Subject: today appt                                       Pt is calling in to be seen today due to throwing up bile and severe diarrhea, please call to discuss further.

## 2023-01-31 NOTE — ED TRIAGE NOTES
Patient presents to the ER with complaints of abdominal pain intermittently for 3 weeks. Patient states it progressively got worse last night. Patient states she had an intestinal blockage. Vomiting bile last time at 0530 this morning and diarrhea last time at 0700 this morning. Patient denies chest pain or shortness of breath at this time.

## 2023-01-31 NOTE — SUBJECTIVE & OBJECTIVE
"Current Facility-Administered Medications on File Prior to Encounter   Medication    0.9%  NaCl infusion     Current Outpatient Medications on File Prior to Encounter   Medication Sig    atorvastatin (LIPITOR) 40 MG tablet Take 1 tablet (40 mg total) by mouth once daily.    ERGOCALCIFEROL, VITAMIN D2, (VITAMIN D ORAL) Take 1 capsule by mouth every morning.     erythromycin (ROMYCIN) ophthalmic ointment SMARTSI Application Left Eye 4 Times Daily    estradioL (ESTRACE) 0.5 MG tablet Take 1 tablet (0.5 mg total) by mouth once daily.    ferrous sulfate 325 (65 FE) MG EC tablet TAKE 1 TABLET BY MOUTH EVERY DAY    gabapentin (NEURONTIN) 100 MG capsule Take 2 capsules (200 mg total) by mouth every evening.    L GASSERI/B BIFIDUM/B LONGUM ([employer] HEALTH ORAL) Take 1 tablet by mouth 2 (two) times daily.    linaCLOtide (LINZESS) 72 mcg Cap capsule Take 1 capsule (72 mcg total) by mouth once daily.    magnesium oxide-pyridoxine (BEELITH) 362-20 mg Tab Take 1 tablet by mouth once daily.    tobramycin-dexAMETHasone 0.3-0.1% (TOBRADEX) 0.3-0.1 % DrpS Place 1 drop into the left eye 3 (three) times daily.    VITAMIN E ACETATE (VITAMIN E ORAL) Take 1 capsule by mouth every morning.        Review of patient's allergies indicates:   Allergen Reactions    Adhesive Rash     Asking that we use Paper Tape,  Uses "Sensitive Skin" band aids    Amoxil [amoxicillin] Hives    Penicillins Hives and Swelling     Swelling of ears and neck    Sulfa (sulfonamide antibiotics) Hives and Itching       Past Medical History:   Diagnosis Date    Arthritis     Draining cutaneous sinus tract     Headache(784.0)     Incisional hernia     Insomnia     Nocturnal leg cramps     Nonhealing surgical wound     PONV (postoperative nausea and vomiting)     with last surgery per patient, "Severe" PONV 2-14    Postmenopausal status     Rectosigmoid cancer     Small bowel obstruction 2022     Past Surgical History:   Procedure Laterality Date    " BREAST BIOPSY Left     CHOLECYSTECTOMY  02/24/2014    with incisional hernia repair with mesh    COLONOSCOPY N/A 8/23/2016    Procedure: COLONOSCOPY;  Surgeon: Cuco Meraz MD;  Location: [facility] ENDO (4TH FLR);  Service: Endoscopy;  Laterality: N/A;    COLONOSCOPY N/A 12/9/2019    Procedure: COLONOSCOPY;  Surgeon: KISHORE Curry MD;  Location: [facility] ENDO (4TH FLR);  Service: Endoscopy;  Laterality: N/A;  ADHESIVE Allergy    ESOPHAGOGASTRODUODENOSCOPY N/A 4/22/2021    Procedure: EGD (ESOPHAGOGASTRODUODENOSCOPY);  Surgeon: Lukasz Lnidquist MD;  Location: [facility] ENDO (4TH FLR);  Service: Endoscopy;  Laterality: N/A;  3/25-covid + 7/2020 at [facility]- getting results to us to see if correct form of test-MS    HYSTERECTOMY      INJECTION OF ANESTHETIC AGENT AROUND NERVE Right 6/28/2022    Procedure: BLOCK, NERVE, RIGHT L2-L3-L4-L5;  Surgeon: Shabnam Skaggs MD;  Location: [facility] PAIN MGT;  Service: Pain Management;  Laterality: Right;    INJECTION OF ANESTHETIC AGENT AROUND NERVE Right 8/2/2022    Procedure: BLOCK, NERVE, RIGHT L2-L5 MEDIAL BRANCH;  Surgeon: Shabnam Skaggs MD;  Location: [facility] PAIN MGT;  Service: Pain Management;  Laterality: Right;    KNEE ARTHROSCOPY      KNEE SURGERY      right and left knee repair    OOPHORECTOMY      RADIOFREQUENCY ABLATION Right 9/27/2022    Procedure: Radiofrequency Ablation Right L2-L5;  Surgeon: Shabnam Skaggs MD;  Location: [facility] PAIN MGT;  Service: Pain Management;  Laterality: Right;    REPAIR OF RECURRENT INCISIONAL HERNIA  7/10/2018    Procedure: REPAIR, HERNIA, INCISIONAL, RECURRENT;  Surgeon: Cuco Meraz MD;  Location: [facility] OR 2ND FLR;  Service: Colon and Rectal;;    SIGMOIDECTOMY  03/20/2006    LAR     Family History       Problem Relation (Age of Onset)    Breast cancer Mother (56)    Cancer Mother    Diabetes Brother    Heart disease Father, Maternal Grandmother          Tobacco Use    Smoking status: Never    Smokeless tobacco: Never   Substance and Sexual  Activity    Alcohol use: Yes     Alcohol/week: 1.0 standard drink     Types: 1 Glasses of wine per week     Comment: socially    Drug use: No    Sexual activity: Yes     Partners: Male     Birth control/protection: Post-menopausal     Review of Systems   Constitutional:  Negative for appetite change, chills, fever and unexpected weight change.   Respiratory:  Negative for cough and shortness of breath.    Cardiovascular:  Negative for chest pain and leg swelling.   Gastrointestinal:  Positive for abdominal pain, diarrhea, nausea and vomiting. Negative for blood in stool and constipation.   Genitourinary:  Negative for dysuria.   Musculoskeletal:  Positive for arthralgias.   Skin:  Negative for color change.   Objective:     Vital Signs (Most Recent):  Temp: 98.6 °F (37 °C) (01/31/23 1542)  Pulse: 61 (01/31/23 1542)  Resp: 16 (01/31/23 1542)  BP: 121/62 (01/31/23 1542)  SpO2: 96 % (01/31/23 1542) Vital Signs (24h Range):  Temp:  [97.6 °F (36.4 °C)-98.6 °F (37 °C)] 98.6 °F (37 °C)  Pulse:  [60-74] 61  Resp:  [16-18] 16  SpO2:  [96 %-97 %] 96 %  BP: (121-139)/(59-65) 121/62     Weight: 79.4 kg (175 lb)  Body mass index is 31 kg/m².    Physical Exam  Constitutional:       General: She is not in acute distress.  HENT:      Head: Normocephalic and atraumatic.   Eyes:      Pupils: Pupils are equal, round, and reactive to light.   Cardiovascular:      Rate and Rhythm: Normal rate.   Pulmonary:      Effort: Pulmonary effort is normal. No respiratory distress.   Abdominal:      General: There is no distension.      Palpations: Abdomen is soft.      Comments: Mildly tender to palpation in lower abdomen.    Incarcerated ventral hernias, bowel sounds present when palpated. No overlying skin changes.    Large well healed midline incision.   Skin:     General: Skin is warm and dry.   Neurological:      General: No focal deficit present.      Mental Status: She is alert and oriented to person, place, and time.       Significant  Labs:  I have reviewed all pertinent lab results within the past 24 hours.  CBC:   Recent Labs   Lab 01/31/23  1203 01/31/23  1208   WBC 9.17  --    RBC 4.54  --    HGB 13.2  --    HCT 41.2 40     --    MCV 91  --    MCH 29.1  --    MCHC 32.0  --      BMP:   Recent Labs   Lab 01/31/23  1203   GLU 99      K 3.6      CO2 25   BUN 10   CREATININE 0.8   CALCIUM 9.8       Significant Diagnostics:  I have reviewed all pertinent imaging results/findings within the past 24 hours.

## 2023-01-31 NOTE — ED NOTES
I-STAT Chem-8+ Results:   Value Reference Range   Sodium 142 136-145 mmol/L   Potassium  3.6 3.5-5.1 mmol/L   Chloride 104  mmol/L   Ionized Calcium 1.09 1.06-1.42 mmol/L   CO2 (measured) 26 23-29 mmol/L   Glucose 100  mg/dL   BUN 11 6-30 mg/dL   Creatinine 0.7 0.5-1.4 mg/dL   Hematocrit 40 36-54%

## 2023-01-31 NOTE — ED PROVIDER NOTES
"Encounter Date: 1/31/2023       History     Chief Complaint   Patient presents with    Abdominal Pain     Vomiting bile and diarrhea , hx colectomy in 2007     73 y/o female with hx colon cancer, headache and insomnia which presents to the ED with generalized abdominal pain with N/V/D. 2 loose stools this morning and a few episodes of green bile vomit. Hx of cholecystectomy and incisional hernia. Pt states she has had a bowel obstruction before and it feels the same way. Denies fevers.    The history is provided by the patient.   Review of patient's allergies indicates:   Allergen Reactions    Adhesive Rash     Asking that we use Paper Tape,  Uses "Sensitive Skin" band aids    Amoxil [amoxicillin] Hives    Penicillins Hives and Swelling     Swelling of ears and neck    Sulfa (sulfonamide antibiotics) Hives and Itching     Past Medical History:   Diagnosis Date    Arthritis     Draining cutaneous sinus tract     Headache(784.0)     Incisional hernia     Insomnia     Nocturnal leg cramps     Nonhealing surgical wound     PONV (postoperative nausea and vomiting)     with last surgery per patient, "Severe" PONV 2-27-14    Postmenopausal status     Rectosigmoid cancer     Small bowel obstruction 11/8/2022     Past Surgical History:   Procedure Laterality Date    BREAST BIOPSY Left     CHOLECYSTECTOMY  02/24/2014    with incisional hernia repair with mesh    COLONOSCOPY N/A 8/23/2016    Procedure: COLONOSCOPY;  Surgeon: Cuco Meraz MD;  Location: [facility] ([address] [place] [address]);  Service: Endoscopy;  Laterality: N/A;    COLONOSCOPY N/A 12/9/2019    Procedure: COLONOSCOPY;  Surgeon: KISHORE Curry MD;  Location: [address]);  Service: Endoscopy;  Laterality: N/A;  ADHESIVE Allergy    ESOPHAGOGASTRODUODENOSCOPY N/A 4/22/2021    Procedure: EGD (ESOPHAGOGASTRODUODENOSCOPY);  Surgeon: Lukasz Lindquist MD;  Location: [facility] ([address] [place] [address]);  Service: Endoscopy;  Laterality: N/A;  3/25-covid + 7/2020 at [facility] [place]-pt getting results to " us to see if correct form of test-MS    HYSTERECTOMY      INJECTION OF ANESTHETIC AGENT AROUND NERVE Right 6/28/2022    Procedure: BLOCK, NERVE, RIGHT L2-L3-L4-L5;  Surgeon: Shabnam Skaggs MD;  Location: [facility];  Service: Pain Management;  Laterality: Right;    INJECTION OF ANESTHETIC AGENT AROUND NERVE Right 8/2/2022    Procedure: BLOCK, NERVE, RIGHT L2-L5 MEDIAL BRANCH;  Surgeon: Shabnam Skaggs MD;  Location: [facility];  Service: Pain Management;  Laterality: Right;    KNEE ARTHROSCOPY      KNEE SURGERY      right and left knee repair    OOPHORECTOMY      RADIOFREQUENCY ABLATION Right 9/27/2022    Procedure: Radiofrequency Ablation Right L2-L5;  Surgeon: Shabnam Skaggs MD;  Location: [facility];  Service: Pain Management;  Laterality: Right;    REPAIR OF RECURRENT INCISIONAL HERNIA  7/10/2018    Procedure: REPAIR, HERNIA, INCISIONAL, RECURRENT;  Surgeon: Cuco Meraz MD;  [facility];  Service: Colon and Rectal;;    SIGMOIDECTOMY  03/20/2006    LAR     Family History   Problem Relation Age of Onset    Breast cancer Mother 56    Cancer Mother         BREAST    Heart disease Father     Heart disease Maternal Grandmother     Diabetes Brother     Colon polyps Neg Hx     Ovarian cancer Neg Hx     Anesthesia problems Neg Hx      Social History     Tobacco Use    Smoking status: Never    Smokeless tobacco: Never   Substance Use Topics    Alcohol use: Yes     Alcohol/week: 1.0 standard drink     Types: 1 Glasses of wine per week     Comment: socially    Drug use: No     Review of Systems   Constitutional:  Negative for fever.   HENT:  Negative for sore throat.    Respiratory:  Negative for shortness of breath.    Cardiovascular:  Negative for chest pain.   Gastrointestinal:  Positive for abdominal pain, diarrhea (resolved- 2 loose stools this am), nausea and vomiting.   Genitourinary:  Negative for dysuria.   Musculoskeletal:  Negative for back pain.   Skin:  Negative for rash.    Neurological:  Negative for weakness.   Hematological:  Does not bruise/bleed easily.   All other systems reviewed and are negative.    Physical Exam     Initial Vitals [01/31/23 1122]   BP Pulse Resp Temp SpO2   139/65 74 18 97.6 °F (36.4 °C) 96 %      MAP       --         Physical Exam    Nursing note and vitals reviewed.  Constitutional: She appears well-developed and well-nourished.   HENT:   Head: Normocephalic and atraumatic.   Eyes: Conjunctivae and EOM are normal. Pupils are equal, round, and reactive to light.   Neck:   Normal range of motion.  Cardiovascular:  Normal rate, regular rhythm, normal heart sounds and intact distal pulses.     Exam reveals no gallop and no friction rub.       No murmur heard.  Pulmonary/Chest: Breath sounds normal. No respiratory distress. She has no wheezes. She has no rhonchi. She has no rales. She exhibits no tenderness.   Abdominal: Abdomen is soft. She exhibits no distension. There is abdominal tenderness.   Right upper/lower hyperactive bowel sounds- decreased bowel sounds to left lower quadrant   Musculoskeletal:         General: Normal range of motion.      Cervical back: Normal range of motion.     Neurological: She is alert and oriented to person, place, and time. She has normal strength. GCS score is 15. GCS eye subscore is 4. GCS verbal subscore is 5. GCS motor subscore is 6.   Skin: Skin is warm. Capillary refill takes less than 2 seconds.   Psychiatric: She has a normal mood and affect.     ED Course   Procedures  Labs Reviewed   CBC W/ AUTO DIFFERENTIAL - Abnormal; Notable for the following components:       Result Value    Gran % 75.8 (*)     Lymph % 17.3 (*)     All other components within normal limits   URINALYSIS, REFLEX TO URINE CULTURE - Abnormal; Notable for the following components:    Appearance, UA Hazy (*)     Protein, UA 1+ (*)     Ketones, UA Trace (*)     All other components within normal limits    Narrative:     Specimen Source->Urine   HIV 1 /  2 ANTIBODY    Narrative:     Release to patient->Immediate   HEPATITIS C ANTIBODY    Narrative:     Release to patient->Immediate   COMPREHENSIVE METABOLIC PANEL   LIPASE   URINALYSIS MICROSCOPIC    Narrative:     Specimen Source->Urine   LACTIC ACID, PLASMA   ISTAT PROCEDURE          Imaging Results               CT Abdomen Pelvis With Contrast (Final result)  Result time 01/31/23 14:36:47      Final result by Lexa Mccray IV, MD (01/31/23 14:36:47)                   Impression:      Persistent lower ventral abdominal wall hernia containing loops of small bowel noting new mild circumferential wall thickening of small bowel and mild distension with air-fluid level.  Findings could represent early developing obstruction and/or ischemia.  Recommend clinical correlation and surgical consultation.    Additional findings as above.    This report was flagged in Epic as abnormal.      Electronically signed by: Lexa Mccray  Date:    01/31/2023  Time:    14:36               Narrative:    EXAMINATION:  CT ABDOMEN PELVIS WITH CONTRAST    CLINICAL HISTORY:  Bowel obstruction suspected;Abdominal pain, acute, nonlocalized;    TECHNIQUE:  Low dose axial images, sagittal and coronal reformations were obtained from the lung bases to the pubic symphysis following the IV administration of 100 mL of Omnipaque 350 .  Oral contrast was not given.    COMPARISON:  CT abdomen/pelvis from 10/31/2022.    FINDINGS:  Lung bases are well aerated.  No focal consolidation.  No significant pleural fluid.    Partially visualized heart is unremarkable.  No significant pericardial fluid.    Liver is normal in size.  Multiple stable scattered hypodensities, largest demonstrates fluid attenuation compatible with cysts.  Additional stable hypoattenuating area along the falciform ligament, likely represents focal fatty infiltration.  No new focal suspicious hepatic lesion.  Portal vein is patent.    Gallbladder surgically absent.  Stable mild  prominence of common bile duct.  No new abnormal intrahepatic biliary duct dilatation.    Pancreas, spleen, bilateral adrenal glands are unremarkable.    Kidneys are stable in appearance noting symmetric contrast enhancement and multiple bilateral fluid attenuating lesions compatible cysts.  Multiple additional hypodensities which are too small to characterize.  No nephrolithiasis.  No hydronephrosis or hydroureter.    Bladder is nondistended.    Uterus is surgically absent.    Stomach and duodenum are unremarkable.    Persistent lower ventral abdominal wall hernia containing fat and multiple loops of small bowel.  Loops of small bowel within the hernia demonstrate mild distension with air-fluid level and mild circumferential wall thickening.    Colon is predominantly decompressed.  Suture line noted near the rectosigmoid junction.    Persistent mesenteric edema/stranding, slightly more prominent about loops of small bowel related to hernia.    No free intraperitoneal air.  No significant ascites.    Normal sized retroperitoneal and mesenteric lymph nodes.  No new suspicious lymphadenopathy.    Abdominal aorta demonstrates normal caliber and course.  Minimal atherosclerotic plaque.    Degenerative changes.  No acute fracture no aggressive osseous lesion.                                       Medications   LIDOcaine (PF) 10 mg/ml (1%) injection 10 mg (has no administration in time range)   sodium chloride 0.9% flush 10 mL (has no administration in time range)   ondansetron disintegrating tablet 8 mg (has no administration in time range)   melatonin tablet 6 mg (has no administration in time range)   lactated ringers infusion ( Intravenous New Bag 1/31/23 1648)   acetaminophen tablet 650 mg (has no administration in time range)   prochlorperazine injection Soln 5 mg (has no administration in time range)   ondansetron injection 4 mg (4 mg Intravenous Given 1/31/23 1223)   ketorolac injection 9.999 mg (9.999 mg  Intravenous Given 1/31/23 1223)   iohexoL (OMNIPAQUE 350) injection 100 mL (100 mLs Intravenous Given 1/31/23 1309)   morphine injection 2 mg (2 mg Intravenous Given 1/31/23 1510)     Medical Decision Making:   Initial Assessment:   71 y/o female which presents to the ED with abdominal pain and N/V/D. Still having N/V but no more diarrhea. Labs and CT pending. Concerns for obstruction.  Differential Diagnosis:   gastroenteritis, gastritis, ulcer, cholecystitis, gallstones, pancreatitis, ileus, small bowel obstruction, appendicitis, constipation  Clinical Tests:   Lab Tests: Ordered and Reviewed  The following lab test(s) were unremarkable: CBC, CMP and Lipase  Radiological Study: Ordered and Reviewed  ED Management:  Pt examined and has generalized abdominal pain but it is worse in the LUQ/epigastric area. No vomiting in while in the ED. Labs unremarkable. CT with concerns of bowel obstruction. General surgery consulted for admission. Patient care was transitioned to GERONIMO Mcadams for final disposition. Patient has been updated on results and admission.           Attending Attestation:     Physician Attestation Statement for NP/PA:   I discussed this assessment and plan of this patient with the NP/PA, but I did not personally examine the patient. The face to face encounter was performed by the NP/PA.            ED Course as of 01/31/23 1718 Tue Jan 31, 2023   1242 BP: 139/65 [AT]   1242 Temp: 97.6 °F (36.4 °C) [AT]   1242 Temp src: Oral [AT]   1242 Pulse: 74 [AT]   1242 Resp: 18 [AT]   1242 SpO2: 96 % [AT]   1303 HIV 1/2 Ag/Ab: Non-reactive [AT]   1303 Hepatitis C Ab: Non-reactive [AT]      ED Course User Index  [AT] OUMOU Chang                 Clinical Impression:   Final diagnoses:  [K56.609] Intestinal obstruction, unspecified cause, unspecified whether partial or complete (Primary)  [R10.9] Abdominal pain, unspecified abdominal location  [R11.14] Bilious vomiting with nausea        ED Disposition  Condition    Observation                 Charlotte Krishna, OUMOU  01/31/23 1500       Leo Marquze III, MD  01/31/23 0682

## 2023-01-31 NOTE — ED PROVIDER NOTES
ED Physician Hand-off Note:    ED Course: I assumed care of patient from off-going ED physician team. Briefly, Patient is a 73 yo presenting with abdominal pain, vomiting.    At the time of signout plan was pending General surgery evaluation.  CT concerning for developing bowel obstruction.     3:37 PM Discussed with Gen surg.  They will evaluate the patient in the ED.     Medications given in the ED:    Medications   LIDOcaine (PF) 10 mg/ml (1%) injection 10 mg (has no administration in time range)   sodium chloride 0.9% flush 10 mL (has no administration in time range)   ondansetron disintegrating tablet 8 mg (has no administration in time range)   melatonin tablet 6 mg (has no administration in time range)   lactated ringers infusion (has no administration in time range)   acetaminophen tablet 650 mg (has no administration in time range)   prochlorperazine injection Soln 5 mg (has no administration in time range)   ondansetron injection 4 mg (4 mg Intravenous Given 1/31/23 1223)   ketorolac injection 9.999 mg (9.999 mg Intravenous Given 1/31/23 1223)   iohexoL (OMNIPAQUE 350) injection 100 mL (100 mLs Intravenous Given 1/31/23 1309)   morphine injection 2 mg (2 mg Intravenous Given 1/31/23 1510)       Disposition: Admit    Impression:  Intestinal obstruction         Deborah Ireland PA-C  01/31/23 4575

## 2023-02-01 VITALS
TEMPERATURE: 99 F | SYSTOLIC BLOOD PRESSURE: 132 MMHG | HEART RATE: 62 BPM | WEIGHT: 175 LBS | DIASTOLIC BLOOD PRESSURE: 62 MMHG | HEIGHT: 63 IN | RESPIRATION RATE: 15 BRPM | OXYGEN SATURATION: 96 % | BODY MASS INDEX: 31.01 KG/M2

## 2023-02-01 PROBLEM — R10.9 ABDOMINAL PAIN: Status: RESOLVED | Noted: 2023-01-31 | Resolved: 2023-02-01

## 2023-02-01 PROBLEM — K56.609 SMALL BOWEL OBSTRUCTION: Status: RESOLVED | Noted: 2022-11-08 | Resolved: 2023-02-01

## 2023-02-01 PROBLEM — K43.6 INCARCERATED VENTRAL HERNIA: Status: ACTIVE | Noted: 2023-02-01

## 2023-02-01 PROBLEM — R11.14 BILIOUS VOMITING WITH NAUSEA: Status: RESOLVED | Noted: 2023-01-31 | Resolved: 2023-02-01

## 2023-02-01 LAB
ANION GAP SERPL CALC-SCNC: 11 MMOL/L (ref 8–16)
BASOPHILS # BLD AUTO: 0.03 K/UL (ref 0–0.2)
BASOPHILS NFR BLD: 0.5 % (ref 0–1.9)
BUN SERPL-MCNC: 13 MG/DL (ref 8–23)
CALCIUM SERPL-MCNC: 9.1 MG/DL (ref 8.7–10.5)
CHLORIDE SERPL-SCNC: 109 MMOL/L (ref 95–110)
CO2 SERPL-SCNC: 23 MMOL/L (ref 23–29)
CREAT SERPL-MCNC: 0.7 MG/DL (ref 0.5–1.4)
DIFFERENTIAL METHOD: ABNORMAL
EOSINOPHIL # BLD AUTO: 0.2 K/UL (ref 0–0.5)
EOSINOPHIL NFR BLD: 2.6 % (ref 0–8)
ERYTHROCYTE [DISTWIDTH] IN BLOOD BY AUTOMATED COUNT: 13 % (ref 11.5–14.5)
EST. GFR  (NO RACE VARIABLE): >60 ML/MIN/1.73 M^2
GLUCOSE SERPL-MCNC: 80 MG/DL (ref 70–110)
HCT VFR BLD AUTO: 36.7 % (ref 37–48.5)
HGB BLD-MCNC: 11.4 G/DL (ref 12–16)
IMM GRANULOCYTES # BLD AUTO: 0.01 K/UL (ref 0–0.04)
IMM GRANULOCYTES NFR BLD AUTO: 0.2 % (ref 0–0.5)
LYMPHOCYTES # BLD AUTO: 1.9 K/UL (ref 1–4.8)
LYMPHOCYTES NFR BLD: 29.1 % (ref 18–48)
MAGNESIUM SERPL-MCNC: 1.8 MG/DL (ref 1.6–2.6)
MCH RBC QN AUTO: 29.1 PG (ref 27–31)
MCHC RBC AUTO-ENTMCNC: 31.1 G/DL (ref 32–36)
MCV RBC AUTO: 94 FL (ref 82–98)
MONOCYTES # BLD AUTO: 0.5 K/UL (ref 0.3–1)
MONOCYTES NFR BLD: 7.4 % (ref 4–15)
NEUTROPHILS # BLD AUTO: 4 K/UL (ref 1.8–7.7)
NEUTROPHILS NFR BLD: 60.2 % (ref 38–73)
NRBC BLD-RTO: 0 /100 WBC
PHOSPHATE SERPL-MCNC: 3.1 MG/DL (ref 2.7–4.5)
PLATELET # BLD AUTO: 213 K/UL (ref 150–450)
PMV BLD AUTO: 10.5 FL (ref 9.2–12.9)
POTASSIUM SERPL-SCNC: 3.2 MMOL/L (ref 3.5–5.1)
RBC # BLD AUTO: 3.92 M/UL (ref 4–5.4)
SODIUM SERPL-SCNC: 143 MMOL/L (ref 136–145)
WBC # BLD AUTO: 6.63 K/UL (ref 3.9–12.7)

## 2023-02-01 PROCEDURE — G0378 HOSPITAL OBSERVATION PER HR: HCPCS | Mod: HCNC

## 2023-02-01 PROCEDURE — 25000003 PHARM REV CODE 250: Mod: HCNC

## 2023-02-01 PROCEDURE — 36415 COLL VENOUS BLD VENIPUNCTURE: CPT | Mod: HCNC

## 2023-02-01 PROCEDURE — 63600175 PHARM REV CODE 636 W HCPCS: Mod: HCNC | Performed by: STUDENT IN AN ORGANIZED HEALTH CARE EDUCATION/TRAINING PROGRAM

## 2023-02-01 PROCEDURE — 83735 ASSAY OF MAGNESIUM: CPT | Mod: HCNC

## 2023-02-01 PROCEDURE — 96375 TX/PRO/DX INJ NEW DRUG ADDON: CPT

## 2023-02-01 PROCEDURE — 99238 HOSP IP/OBS DSCHRG MGMT 30/<: CPT | Mod: HCNC,,, | Performed by: STUDENT IN AN ORGANIZED HEALTH CARE EDUCATION/TRAINING PROGRAM

## 2023-02-01 PROCEDURE — 96372 THER/PROPH/DIAG INJ SC/IM: CPT | Performed by: STUDENT IN AN ORGANIZED HEALTH CARE EDUCATION/TRAINING PROGRAM

## 2023-02-01 PROCEDURE — 85025 COMPLETE CBC W/AUTO DIFF WBC: CPT | Mod: HCNC

## 2023-02-01 PROCEDURE — 84100 ASSAY OF PHOSPHORUS: CPT | Mod: HCNC

## 2023-02-01 PROCEDURE — 96361 HYDRATE IV INFUSION ADD-ON: CPT

## 2023-02-01 PROCEDURE — 80048 BASIC METABOLIC PNL TOTAL CA: CPT | Mod: HCNC

## 2023-02-01 PROCEDURE — 99238 PR HOSPITAL DISCHARGE DAY,<30 MIN: ICD-10-PCS | Mod: HCNC,,, | Performed by: STUDENT IN AN ORGANIZED HEALTH CARE EDUCATION/TRAINING PROGRAM

## 2023-02-01 RX ORDER — BISACODYL 10 MG
10 SUPPOSITORY, RECTAL RECTAL DAILY
Status: DISCONTINUED | OUTPATIENT
Start: 2023-02-01 | End: 2023-02-01 | Stop reason: HOSPADM

## 2023-02-01 RX ORDER — ENOXAPARIN SODIUM 100 MG/ML
40 INJECTION SUBCUTANEOUS EVERY 24 HOURS
Status: DISCONTINUED | OUTPATIENT
Start: 2023-02-01 | End: 2023-02-01 | Stop reason: HOSPADM

## 2023-02-01 RX ADMIN — POTASSIUM BICARBONATE 50 MEQ: 978 TABLET, EFFERVESCENT ORAL at 09:02

## 2023-02-01 RX ADMIN — ENOXAPARIN SODIUM 40 MG: 40 INJECTION SUBCUTANEOUS at 04:02

## 2023-02-01 NOTE — PROGRESS NOTES
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 Patient reports that she has been having intermittent cramping abdominal pain for several weeks, however it acutely worsened overnight and was accompanied by bilious vomiting and diarrhea. Her last episode of vomiting was around 6:00 this morning, last bowel movement was around that time also. She denies fevers or chills, blood in her stool and unexplained weight loss. She was admitted in November for a SBO at [facility] which was successfully managed conservatively.     On evaluation, patient is HDS on RA. She complains of lower abdominal pain and intermittent nausea. Labs without significant findings. CT scan shows Persistent lower ventral abdominal wall hernia containing loops of small bowel noting new mild circumferential wall thickening of small bowel and mild distension with air-fluid level with concern for SBO.       Post-Op Info:  * No surgery found *         Interval History: No acute events overnight. HDS on RA. Passing gas this morning, no bowel movement.     Medications:  Continuous Infusions:   lactated ringers 125 mL/hr at 01/31/23 1648     Scheduled Meds:   potassium bicarbonate  50 mEq Oral Once     PRN Meds:acetaminophen, LIDOcaine (PF) 10 mg/ml (1%), melatonin, ondansetron, prochlorperazine, sodium chloride 0.9%     Review of patient's allergies indicates:   Allergen Reactions    Adhesive Rash     Asking that we use Paper Tape,  Uses "Sensitive Skin" band aids    Amoxil [amoxicillin] Hives    Penicillins Hives and Swelling     Swelling of ears and neck    Sulfa (sulfonamide antibiotics) Hives and Itching     Objective:     Vital Signs (Most Recent):  Temp: 96.2 °F (35.7 °C) (02/01/23 " 0748)  Pulse: 67 (02/01/23 0748)  Resp: 16 (02/01/23 0748)  BP: (!) 149/70 (02/01/23 0748)  SpO2: 95 % (02/01/23 0748)   Vital Signs (24h Range):  Temp:  [96.2 °F (35.7 °C)-98.7 °F (37.1 °C)] 96.2 °F (35.7 °C)  Pulse:  [60-74] 67  Resp:  [16-20] 16  SpO2:  [91 %-97 %] 95 %  BP: (121-149)/(59-78) 149/70     Weight: 79.4 kg (175 lb)  Body mass index is 31 kg/m².    Intake/Output - Last 3 Shifts       None            Physical Exam  Constitutional:       General: She is not in acute distress.  HENT:      Head: Normocephalic and atraumatic.   Eyes:      Pupils: Pupils are equal, round, and reactive to light.   Cardiovascular:      Rate and Rhythm: Normal rate.   Pulmonary:      Effort: Pulmonary effort is normal. No respiratory distress.   Abdominal:      General: There is no distension.      Palpations: Abdomen is soft.      Comments: Mildly tender to palpation in lower abdomen.    Incarcerated ventral hernias, bowel sounds present when palpated. No overlying skin changes.    Large well healed midline incision.   Skin:     General: Skin is warm and dry.   Neurological:      General: No focal deficit present.      Mental Status: She is alert and oriented to person, place, and time.       Significant Labs:  I have reviewed all pertinent lab results within the past 24 hours.  CBC:   Recent Labs   Lab 02/01/23  0248   WBC 6.63   RBC 3.92*   HGB 11.4*   HCT 36.7*      MCV 94   MCH 29.1   MCHC 31.1*     BMP:   Recent Labs   Lab 02/01/23  0248   GLU 80      K 3.2*      CO2 23   BUN 13   CREATININE 0.7   CALCIUM 9.1   MG 1.8       Significant Diagnostics:  I have reviewed all pertinent imaging results/findings within the past 24 hours.    Assessment/Plan:     Intestinal obstruction  72 year old female with PMH of colon cancer s/p LAR in 2006, multiple ventral hernia repairs with delayed wound healing, recurrent ventral hernias who presents with acute onset abdominal pain, nausea and vomiting. Imaging  concerning for SBO. Now passing    Regular diet  PRN pain and nausea medications  Replace K this morning    Dispo: possible d/c in pm            Belem Guerrero MD  General Surgery  [facility]

## 2023-02-01 NOTE — PLAN OF CARE
[facility]  Discharge Assessment    Primary Care Provider: [facility] MD     Discharge Assessment (most recent)       BRIEF DISCHARGE ASSESSMENT - 02/01/23 1031          Discharge Planning    Assessment Type Discharge Planning Brief Assessment     Resource/Environmental Concerns none     Support Systems Spouse/significant other;Children     Equipment Currently Used at Home none     Current Living Arrangements home     Patient/Family Anticipates Transition to home with family     Patient/Family Anticipated Services at Transition none     DME Needed Upon Discharge  none     Discharge Plan A Home with family     Discharge Plan B Home Health;Home with family                   Patient lives with spouse and 41 y/o mentally challenged son. Patient spouse is primary caregiver and will provide patient care upon hospital discharge. Patient does not feel she will need any post acute services upon hospital discharge. Spouse to provide transportation home.

## 2023-02-01 NOTE — ASSESSMENT & PLAN NOTE
72 year old female with PMH of colon cancer s/p LAR in 2006, multiple ventral hernia repairs with delayed wound healing, recurrent ventral hernias who presents with acute onset abdominal pain, nausea and vomiting. Imaging concerning for SBO. Now passing    Regular diet  PRN pain and nausea medications  Replace K this morning    Dispo: possible d/c in pm

## 2023-02-01 NOTE — HOSPITAL COURSE
Ms. Davis presented with symptoms of SBO. She was treated conservatively with resolution of symptoms and she was discharged on HD#1. At that time, she was tolerating a regular diet, ambulating, voiding spontaneously, having  BMs, and had adequate pain control. Discharge instructions were provided along with a recommended time for follow up.

## 2023-02-01 NOTE — PLAN OF CARE
RN assumed care of this patient at 2300. AOX4 showing no signs of respiratory or circulatory distress. Vital signs WNL. Safety measures in place as appropriate and charted. NPO per MD orders. Reports of mild headache, however due to NPO status ice pack provided for non pharmacological relief. No reports of nausea/vomiting since assumption of care by this RN, or for previous RN this evening per handoff report. Patient states no unmet needs at this time, will continue with plan of care. 1:5      Problem: Adult Inpatient Plan of Care  Goal: Plan of Care Review  Outcome: Ongoing, Progressing

## 2023-02-01 NOTE — DISCHARGE SUMMARY
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 Patient reports that she has been having intermittent cramping abdominal pain for several weeks, however it acutely worsened overnight and was accompanied by bilious vomiting and diarrhea. Her last episode of vomiting was around 6:00 this morning, last bowel movement was around that time also. She denies fevers or chills, blood in her stool and unexplained weight loss. She was admitted in November for a SBO at [facility] which was successfully managed conservatively.     On evaluation, patient is HDS on RA. She complains of lower abdominal pain and intermittent nausea. Labs without significant findings. CT scan shows Persistent lower ventral abdominal wall hernia containing loops of small bowel noting new mild circumferential wall thickening of small bowel and mild distension with air-fluid level with concern for SBO.       * No surgery found *      Indwelling Lines/Drains at time of discharge:   Lines/Drains/Airways     None               Hospital Course: Ms. Davis presented with symptoms of SBO. She was treated conservatively with resolution of symptoms and she was discharged on HD#1. At that time, she was tolerating a regular diet, ambulating, voiding spontaneously, having  BMs, and had adequate pain control. Discharge instructions were provided along with a recommended time for follow up.         Goals of Care Treatment Preferences:  Code Status: Full  Code      Consults:   Consults (From admission, onward)        Status Ordering Provider     Inpatient consult to General surgery  Once        Provider:  (Not yet assigned)    Completed TERESA COOPER          Significant Diagnostic Studies: Labs:   BMP:   Recent Labs   Lab 01/31/23  1203 02/01/23  0248   GLU 99 80    143   K 3.6 3.2*    109   CO2 25 23   BUN 10 13   CREATININE 0.8 0.7   CALCIUM 9.8 9.1   MG  --  1.8    and CMP   Recent Labs   Lab 01/31/23  1203 02/01/23  0248    143   K 3.6 3.2*    109   CO2 25 23   GLU 99 80   BUN 10 13   CREATININE 0.8 0.7   CALCIUM 9.8 9.1   PROT 7.3  --    ALBUMIN 4.1  --    BILITOT 0.7  --    ALKPHOS 94  --    AST 17  --    ALT 13  --    ANIONGAP 11 11       Pending Diagnostic Studies:     None        Final Active Diagnoses:    Diagnosis Date Noted POA    Incarcerated ventral hernia [K43.6] 02/01/2023 Yes      Problems Resolved During this Admission:    Diagnosis Date Noted Date Resolved POA    PRINCIPAL PROBLEM:  Small bowel obstruction [K56.609] 11/08/2022 02/01/2023 Yes    Abdominal pain [R10.9] 01/31/2023 02/01/2023 Yes    Bilious vomiting with nausea [R11.14] 01/31/2023 02/01/2023 Yes      Discharged Condition: good    Disposition: Home or Self Care    Follow Up:   Follow-up Information     Andrea Cannon MD Follow up.    Specialty: General Surgery  Why: As needed  Contact information:  [address] [place] [address] [phone]                       Patient Instructions:      Lifting restrictions   Order Comments: Do not lift anything >10 lbs (about a gallon of milk) for 6 weeks     Notify your health care provider if you experience any of the following:  temperature >100.4     Notify your health care provider if you experience any of the following:  persistent nausea and vomiting or diarrhea     Notify your health care provider if you experience any of the following:  severe uncontrolled pain     Notify your health care  provider if you experience any of the following:  redness, tenderness, or signs of infection (pain, swelling, redness, odor or green/yellow discharge around incision site)     Notify your health care provider if you experience any of the following:  difficulty breathing or increased cough     Notify your health care provider if you experience any of the following:  severe persistent headache     Notify your health care provider if you experience any of the following:  worsening rash     Notify your health care provider if you experience any of the following:  increased confusion or weakness     Activity as tolerated     Medications:  Reconciled Home Medications:      Medication List      CONTINUE taking these medications    atorvastatin 40 MG tablet  Commonly known as: LIPITOR  Take 1 tablet (40 mg total) by mouth once daily.     estradioL 0.5 MG tablet  Commonly known as: ESTRACE  Take 1 tablet (0.5 mg total) by mouth once daily.     ferrous sulfate 325 (65 FE) MG EC tablet  TAKE 1 TABLET BY MOUTH EVERY DAY     gabapentin 100 MG capsule  Commonly known as: NEURONTIN  Take 2 capsules (200 mg total) by mouth every evening.     linaCLOtide 72 mcg Cap capsule  Commonly known as: LINZESS  Take 1 capsule (72 mcg total) by mouth once daily.     magnesium oxide-pyridoxine 362-20 mg Tab  Commonly known as: BEELITH  Take 1 tablet by mouth once daily.     MENDENHALL' COLON HEALTH ORAL  Take 1 tablet by mouth 2 (two) times daily.     VITAMIN D ORAL  Take 1 capsule by mouth every morning.     VITAMIN E ORAL  Take 1 capsule by mouth every morning.        ASK your doctor about these medications    erythromycin ophthalmic ointment  Commonly known as: ROMYCIN  SMARTSI Application Left Eye 4 Times Daily     tobramycin-dexAMETHasone 0.3-0.1% 0.3-0.1 % Drps  Commonly known as: TOBRADEX  Place 1 drop into the left eye 3 (three) times daily.          Time spent on the discharge of patient: 10 minutes    Belem Guerrero MD  General  Surgery  [facility]

## 2023-02-01 NOTE — NURSING
AAAX4.  Calm/cooperative.  R E/U.  Denies SOB/CP.  Ambulatory with a steady/independent gait.  NADN; upon departure.  Will continue to monitor.

## 2023-02-01 NOTE — SUBJECTIVE & OBJECTIVE
"Interval History: No acute events overnight. HDS on RA. Passing gas this morning, no bowel movement.     Medications:  Continuous Infusions:   lactated ringers 125 mL/hr at 01/31/23 1648     Scheduled Meds:   potassium bicarbonate  50 mEq Oral Once     PRN Meds:acetaminophen, LIDOcaine (PF) 10 mg/ml (1%), melatonin, ondansetron, prochlorperazine, sodium chloride 0.9%     Review of patient's allergies indicates:   Allergen Reactions    Adhesive Rash     Asking that we use Paper Tape,  Uses "Sensitive Skin" band aids    Amoxil [amoxicillin] Hives    Penicillins Hives and Swelling     Swelling of ears and neck    Sulfa (sulfonamide antibiotics) Hives and Itching     Objective:     Vital Signs (Most Recent):  Temp: 96.2 °F (35.7 °C) (02/01/23 0748)  Pulse: 67 (02/01/23 0748)  Resp: 16 (02/01/23 0748)  BP: (!) 149/70 (02/01/23 0748)  SpO2: 95 % (02/01/23 0748)   Vital Signs (24h Range):  Temp:  [96.2 °F (35.7 °C)-98.7 °F (37.1 °C)] 96.2 °F (35.7 °C)  Pulse:  [60-74] 67  Resp:  [16-20] 16  SpO2:  [91 %-97 %] 95 %  BP: (121-149)/(59-78) 149/70     Weight: 79.4 kg (175 lb)  Body mass index is 31 kg/m².    Intake/Output - Last 3 Shifts       None            Physical Exam  Constitutional:       General: She is not in acute distress.  HENT:      Head: Normocephalic and atraumatic.   Eyes:      Pupils: Pupils are equal, round, and reactive to light.   Cardiovascular:      Rate and Rhythm: Normal rate.   Pulmonary:      Effort: Pulmonary effort is normal. No respiratory distress.   Abdominal:      General: There is no distension.      Palpations: Abdomen is soft.      Comments: Mildly tender to palpation in lower abdomen.    Incarcerated ventral hernias, bowel sounds present when palpated. No overlying skin changes.    Large well healed midline incision.   Skin:     General: Skin is warm and dry.   Neurological:      General: No focal deficit present.      Mental Status: She is alert and oriented to person, place, and time. "       Significant Labs:  I have reviewed all pertinent lab results within the past 24 hours.  CBC:   Recent Labs   Lab 02/01/23 0248   WBC 6.63   RBC 3.92*   HGB 11.4*   HCT 36.7*      MCV 94   MCH 29.1   MCHC 31.1*     BMP:   Recent Labs   Lab 02/01/23 0248   GLU 80      K 3.2*      CO2 23   BUN 13   CREATININE 0.7   CALCIUM 9.1   MG 1.8       Significant Diagnostics:  I have reviewed all pertinent imaging results/findings within the past 24 hours.

## 2023-02-02 NOTE — NURSING
Discharge instructions given and reviewed with pt. Pt verbalizes understanding. IV removed. No prescriptions ordered at this time. Pt ready for discharge, waiting for ride.

## 2023-02-02 NOTE — PLAN OF CARE
[facility] - Surgery  Discharge Final Note    Primary Care Provider: Caleb Hernandez MD    Expected Discharge Date: 2/1/2023    Final Discharge Note (most recent)       Final Note - 02/01/23 1940          Final Note    Assessment Type Final Discharge Note     Anticipated Discharge Disposition Home or Self Care     Hospital Resources/Appts/Education Provided Provided patient/caregiver with written discharge plan information;Appointments scheduled by Navigator/Coordinator                   Future Appointments   Date Time Provider Department Center   2/22/2023  9:00 AM KISHORE Curry MD [facility] COLSURG [facility]   2/27/2023  1:00 PM Willam Duque MD [facility] GASTRO [facility]   3/1/2023 10:45 AM [facility] OIC-MAMMO2 [facility] MAMMOIC [facility]   3/20/2023 10:00 AM PRE-ADMIT, ENDO -[facility] ENDOPP4 [facility]        Contact Info       Andrea Cannon MD   Specialty: General Surgery    [address]  [place] [address]   Phone: [phone]       Next Steps: Follow up    Instructions: As needed

## 2023-02-07 DIAGNOSIS — Z00.00 ENCOUNTER FOR MEDICARE ANNUAL WELLNESS EXAM: ICD-10-CM

## 2023-02-09 DIAGNOSIS — Z00.00 ENCOUNTER FOR MEDICARE ANNUAL WELLNESS EXAM: ICD-10-CM

## 2023-02-22 ENCOUNTER — OFFICE VISIT (OUTPATIENT)
Dept: SURGERY | Facility: CLINIC | Age: 73
End: 2023-02-22
Payer: MEDICARE

## 2023-02-22 VITALS
SYSTOLIC BLOOD PRESSURE: 148 MMHG | HEART RATE: 62 BPM | HEIGHT: 63 IN | BODY MASS INDEX: 30.37 KG/M2 | DIASTOLIC BLOOD PRESSURE: 67 MMHG | WEIGHT: 171.38 LBS

## 2023-02-22 DIAGNOSIS — K43.6 VENTRAL HERNIA WITH OBSTRUCTION AND WITHOUT GANGRENE: ICD-10-CM

## 2023-02-22 DIAGNOSIS — Z85.038 ENCOUNTER FOR FOLLOW-UP SURVEILLANCE OF COLON CANCER: Primary | ICD-10-CM

## 2023-02-22 DIAGNOSIS — Z08 ENCOUNTER FOR FOLLOW-UP SURVEILLANCE OF COLON CANCER: Primary | ICD-10-CM

## 2023-02-22 PROCEDURE — 99204 PR OFFICE/OUTPT VISIT, NEW, LEVL IV, 45-59 MIN: ICD-10-PCS | Mod: HCNC,S$GLB,, | Performed by: COLON & RECTAL SURGERY

## 2023-02-22 PROCEDURE — 1125F PR PAIN SEVERITY QUANTIFIED, PAIN PRESENT: ICD-10-PCS | Mod: HCNC,CPTII,S$GLB, | Performed by: COLON & RECTAL SURGERY

## 2023-02-22 PROCEDURE — 1101F PR PT FALLS ASSESS DOC 0-1 FALLS W/OUT INJ PAST YR: ICD-10-PCS | Mod: HCNC,CPTII,S$GLB, | Performed by: COLON & RECTAL SURGERY

## 2023-02-22 PROCEDURE — 99999 PR PBB SHADOW E&M-EST. PATIENT-LVL III: ICD-10-PCS | Mod: PBBFAC,HCNC,, | Performed by: COLON & RECTAL SURGERY

## 2023-02-22 PROCEDURE — 1125F AMNT PAIN NOTED PAIN PRSNT: CPT | Mod: HCNC,CPTII,S$GLB, | Performed by: COLON & RECTAL SURGERY

## 2023-02-22 PROCEDURE — 3077F PR MOST RECENT SYSTOLIC BLOOD PRESSURE >= 140 MM HG: ICD-10-PCS | Mod: HCNC,CPTII,S$GLB, | Performed by: COLON & RECTAL SURGERY

## 2023-02-22 PROCEDURE — 99999 PR PBB SHADOW E&M-EST. PATIENT-LVL III: CPT | Mod: PBBFAC,HCNC,, | Performed by: COLON & RECTAL SURGERY

## 2023-02-22 PROCEDURE — 3078F DIAST BP <80 MM HG: CPT | Mod: HCNC,CPTII,S$GLB, | Performed by: COLON & RECTAL SURGERY

## 2023-02-22 PROCEDURE — 1159F PR MEDICATION LIST DOCUMENTED IN MEDICAL RECORD: ICD-10-PCS | Mod: HCNC,CPTII,S$GLB, | Performed by: COLON & RECTAL SURGERY

## 2023-02-22 PROCEDURE — 3008F BODY MASS INDEX DOCD: CPT | Mod: HCNC,CPTII,S$GLB, | Performed by: COLON & RECTAL SURGERY

## 2023-02-22 PROCEDURE — 3288F PR FALLS RISK ASSESSMENT DOCUMENTED: ICD-10-PCS | Mod: HCNC,CPTII,S$GLB, | Performed by: COLON & RECTAL SURGERY

## 2023-02-22 PROCEDURE — 1101F PT FALLS ASSESS-DOCD LE1/YR: CPT | Mod: HCNC,CPTII,S$GLB, | Performed by: COLON & RECTAL SURGERY

## 2023-02-22 PROCEDURE — 3008F PR BODY MASS INDEX (BMI) DOCUMENTED: ICD-10-PCS | Mod: HCNC,CPTII,S$GLB, | Performed by: COLON & RECTAL SURGERY

## 2023-02-22 PROCEDURE — 1159F MED LIST DOCD IN RCRD: CPT | Mod: HCNC,CPTII,S$GLB, | Performed by: COLON & RECTAL SURGERY

## 2023-02-22 PROCEDURE — 3078F PR MOST RECENT DIASTOLIC BLOOD PRESSURE < 80 MM HG: ICD-10-PCS | Mod: HCNC,CPTII,S$GLB, | Performed by: COLON & RECTAL SURGERY

## 2023-02-22 PROCEDURE — 3288F FALL RISK ASSESSMENT DOCD: CPT | Mod: HCNC,CPTII,S$GLB, | Performed by: COLON & RECTAL SURGERY

## 2023-02-22 PROCEDURE — 3077F SYST BP >= 140 MM HG: CPT | Mod: HCNC,CPTII,S$GLB, | Performed by: COLON & RECTAL SURGERY

## 2023-02-22 PROCEDURE — 99204 OFFICE O/P NEW MOD 45 MIN: CPT | Mod: HCNC,S$GLB,, | Performed by: COLON & RECTAL SURGERY

## 2023-02-22 NOTE — PROGRESS NOTES
HPI:  Tanesha Davis is a 72 y.o. female with history of colon cancer s/p sigmoid colectomy     3-  sigmoid resection    7-  ventral hernia repair    - history of constipation following colon cancer surgery. She was started on Linzess 145 mcg daily.  She was unable to take this every day because it gave her too much diarrhea. So the dose was lowered to 75 mcg daily.   Symptoms have improved, bowel movements every other day.   She generally moves her bowels 4hrs after her dose.   Continues to have cramping abdominal pain in the LLQ and RLQ, that gets better after defecation.      12- colonoscopy  Findings:        The perianal and digital rectal examinations were normal.        There was evidence of a prior end-to-end colo-colonic anastomosis in        the proximal rectum. This was patent and was characterized by        healthy appearing mucosa. The anastomosis was traversed. No biopsies        or other specimens were collected for this exam.        A few small-mouthed diverticula were found in the descending colon.        The terminal ileum appeared normal.        The retroflexed view of the distal rectum and anal verge was normal        and showed no anal or rectal abnormalities.    1-  CT ABDOMEN PELVIS WITH CONTRAST     CLINICAL HISTORY:  Bowel obstruction suspected;Abdominal pain, acute, nonlocalized;     TECHNIQUE:  Low dose axial images, sagittal and coronal reformations were obtained from the lung bases to the pubic symphysis following the IV administration of 100 mL of Omnipaque 350 .  Oral contrast was not given.     COMPARISON:  CT abdomen/pelvis from 10/31/2022.     FINDINGS:  Lung bases are well aerated.  No focal consolidation.  No significant pleural fluid.     Partially visualized heart is unremarkable.  No significant pericardial fluid.     Liver is normal in size.  Multiple stable scattered hypodensities, largest demonstrates fluid attenuation compatible with cysts.   Additional stable hypoattenuating area along the falciform ligament, likely represents focal fatty infiltration.  No new focal suspicious hepatic lesion.  Portal vein is patent.     Gallbladder surgically absent.  Stable mild prominence of common bile duct.  No new abnormal intrahepatic biliary duct dilatation.     Pancreas, spleen, bilateral adrenal glands are unremarkable.     Kidneys are stable in appearance noting symmetric contrast enhancement and multiple bilateral fluid attenuating lesions compatible cysts.  Multiple additional hypodensities which are too small to characterize.  No nephrolithiasis.  No hydronephrosis or hydroureter.     Bladder is nondistended.     Uterus is surgically absent.     Stomach and duodenum are unremarkable.     Persistent lower ventral abdominal wall hernia containing fat and multiple loops of small bowel.  Loops of small bowel within the hernia demonstrate mild distension with air-fluid level and mild circumferential wall thickening.     Colon is predominantly decompressed.  Suture line noted near the rectosigmoid junction.     Persistent mesenteric edema/stranding, slightly more prominent about loops of small bowel related to hernia.     No free intraperitoneal air.  No significant ascites.     Normal sized retroperitoneal and mesenteric lymph nodes.  No new suspicious lymphadenopathy.     Abdominal aorta demonstrates normal caliber and course.  Minimal atherosclerotic plaque.     Degenerative changes.  No acute fracture no aggressive osseous lesion.     Impression:     Persistent lower ventral abdominal wall hernia containing loops of small bowel noting new mild circumferential wall thickening of small bowel and mild distension with air-fluid level.  Findings could represent early developing obstruction and/or ischemia.  Recommend clinical correlation and surgical consultation.     Additional findings as above.     This report was flagged in Epic as abnormal.       "  Electronically signed by: Lexa Mccray  Date:                                            01/31/2023  Time:                                           14:36  2-  Interval hx:    Had SBO in Nov 2022  - [facility] - resolved with NGT  End of January admitted to  with another SBO - resolved without NGT    Now:    Loose stools. No blood.  Pain after eating and bloating post prandial.      Detailed consultation noted from Dr Darrell galvin ventral hernia repair - reviewed.        Past Medical History:   Diagnosis Date    Arthritis     Draining cutaneous sinus tract     Headache(784.0)     Incisional hernia     Insomnia     Nocturnal leg cramps     Nonhealing surgical wound     PONV (postoperative nausea and vomiting)     with last surgery per patient, "Severe" PONV 2-27-14    Postmenopausal status     Rectosigmoid cancer     Small bowel obstruction 11/8/2022        Past Surgical History:   Procedure Laterality Date    BREAST BIOPSY Left     CHOLECYSTECTOMY  02/24/2014    with incisional hernia repair with mesh    COLONOSCOPY N/A 8/23/2016    Procedure: COLONOSCOPY;  Surgeon: Cuco Meraz MD;  Location: [facility] ([address] [place] [address]);  Service: Endoscopy;  Laterality: N/A;    COLONOSCOPY N/A 12/9/2019    Procedure: COLONOSCOPY;  Surgeon: KISHORE Curry MD;  Location: [facility] ([address] [place] [address]);  Service: Endoscopy;  Laterality: N/A;  ADHESIVE Allergy    ESOPHAGOGASTRODUODENOSCOPY N/A 4/22/2021    Procedure: EGD (ESOPHAGOGASTRODUODENOSCOPY);  Surgeon: Lukasz Lindquist MD;  Location: [address]);  Service: Endoscopy;  Laterality: N/A;  3/25-covid + 7/[facility] getting results to us to see if correct form of test-MS    HYSTERECTOMY      INJECTION OF ANESTHETIC AGENT AROUND NERVE Right 6/28/2022    Procedure: BLOCK, NERVE, RIGHT L2-L3-L4-L5;  Surgeon: Shabnam Skaggs MD;  Location: [facility] PAIN MGT;  Service: Pain Management;  Laterality: Right;    INJECTION OF ANESTHETIC AGENT AROUND NERVE Right 8/2/2022    " "Procedure: BLOCK, NERVE, RIGHT L2-L5 MEDIAL BRANCH;  Surgeon: Shabnam Skaggs MD;  Location: [facility] PAIN MGT;  Service: Pain Management;  Laterality: Right;    KNEE ARTHROSCOPY      KNEE SURGERY      right and left knee repair    OOPHORECTOMY      RADIOFREQUENCY ABLATION Right 9/27/2022    Procedure: Radiofrequency Ablation Right L2-L5;  Surgeon: Shabnam Skaggs MD;  Location: [facility] PAIN MGT;  Service: Pain Management;  Laterality: Right;    REPAIR OF RECURRENT INCISIONAL HERNIA  7/10/2018    Procedure: REPAIR, HERNIA, INCISIONAL, RECURRENT;  Surgeon: Cuco Meraz MD;  Location: [facility] OR 2ND FLR;  Service: Colon and Rectal;;    SIGMOIDECTOMY  03/20/2006    LAR       Review of patient's allergies indicates:   Allergen Reactions    Adhesive Rash     Asking that we use Paper Tape,  Uses "Sensitive Skin" band aids    Amoxil [amoxicillin] Hives    Penicillins Hives and Swelling     Swelling of ears and neck    Sulfa (sulfonamide antibiotics) Hives and Itching       Family History   Problem Relation Age of Onset    Breast cancer Mother 56    Cancer Mother         BREAST    Heart disease Father     Heart disease Maternal Grandmother     Diabetes Brother     Colon polyps Neg Hx     Ovarian cancer Neg Hx     Anesthesia problems Neg Hx        Social History     Socioeconomic History    Marital status:      Spouse name: francis    Number of children: 2   Occupational History    Occupation: Retired     Employer: Self   Tobacco Use    Smoking status: Never    Smokeless tobacco: Never   Substance and Sexual Activity    Alcohol use: Yes     Alcohol/week: 1.0 standard drink     Types: 1 Glasses of wine per week     Comment: socially    Drug use: No    Sexual activity: Yes     Partners: Male     Birth control/protection: Post-menopausal     Social Determinants of Health     Financial Resource Strain: Unknown    Difficulty of Paying Living Expenses: Patient refused   Food Insecurity: Unknown    Worried About Running Out of " "Food in the Last Year: Patient refused    Ran Out of Food in the Last Year: Patient refused   Transportation Needs: No Transportation Needs    Lack of Transportation (Medical): No    Lack of Transportation (Non-Medical): No   Physical Activity: Sufficiently Active    Days of Exercise per Week: 4 days    Minutes of Exercise per Session: 60 min   Stress: Stress Concern Present    Feeling of Stress : To some extent   Social Connections: Unknown    Frequency of Communication with Friends and Family: Twice a week    Frequency of Social Gatherings with Friends and Family: Once a week    Active Member of Clubs or Organizations: Yes    Attends Club or Organization Meetings: 1 to 4 times per year    Marital Status:    Housing Stability: Unknown    Unable to Pay for Housing in the Last Year: Patient refused    Unstable Housing in the Last Year: Patient refused       ROS:  GENERAL: No fever, chills, fatigability or weight loss.  Integument: No rashes, redness, icterus  CHEST: Denies SAN, cyanosis, wheezing, cough and sputum production.  CARDIOVASCULAR: Denies chest pain, PND, orthopnea or reduced exercise tolerance.  GI: Denies abd pain, dysphagia, nausea, vomiting, no hematemesis   : Denies burning on urination, no hematuria, no bacteriuria  MSK: No deformities, swelling, joint pain swelling  Neurologic: No HAs, seizures, weakness, paresthesias, gait problems    PE:  General appearance non toxic  BP (!) 148/67 (BP Location: Left arm, Patient Position: Sitting, BP Method: Large (Automatic))   Pulse 62   Ht 5' 3" (1.6 m)   Wt 77.7 kg (171 lb 6.4 oz)   BMI 30.36 kg/m²     Sclera/ Skin anicteric  LN none palpable  AT NC EOMI  Neck supple trachea midline   Chest symmetric, nl excursion, no retractions, breathing comfortably  Abdomen  Palpable large defect of incision.  Non tender.  No incarceration  ND soft NT.  no masses, no organomegaly  EXT - no CCE  Neuro:  Mood/ affect nl, alert and oriented x 3, moves all ext's, " gait nl      Assessment:  SBO and ventral hernia  History of colon cancer - needs surveillance colonoscopy    Plan:  Referral placed by Dr Ceballos for colonoscopy  Follow up with Dr Ramírez re SBO and ventral hernia

## 2023-02-27 ENCOUNTER — OFFICE VISIT (OUTPATIENT)
Dept: GASTROENTEROLOGY | Facility: CLINIC | Age: 73
End: 2023-02-27
Payer: MEDICARE

## 2023-02-27 ENCOUNTER — TELEPHONE (OUTPATIENT)
Dept: BARIATRICS | Facility: CLINIC | Age: 73
End: 2023-02-27

## 2023-02-27 VITALS
SYSTOLIC BLOOD PRESSURE: 134 MMHG | DIASTOLIC BLOOD PRESSURE: 62 MMHG | HEIGHT: 65 IN | WEIGHT: 172 LBS | BODY MASS INDEX: 28.66 KG/M2 | HEART RATE: 63 BPM

## 2023-02-27 DIAGNOSIS — K63.9 THICKENED SMALL BOWEL: Primary | ICD-10-CM

## 2023-02-27 PROCEDURE — 3075F SYST BP GE 130 - 139MM HG: CPT | Mod: HCNC,CPTII,GC,S$GLB | Performed by: STUDENT IN AN ORGANIZED HEALTH CARE EDUCATION/TRAINING PROGRAM

## 2023-02-27 PROCEDURE — 3288F PR FALLS RISK ASSESSMENT DOCUMENTED: ICD-10-PCS | Mod: HCNC,CPTII,GC,S$GLB | Performed by: STUDENT IN AN ORGANIZED HEALTH CARE EDUCATION/TRAINING PROGRAM

## 2023-02-27 PROCEDURE — 3008F BODY MASS INDEX DOCD: CPT | Mod: HCNC,CPTII,GC,S$GLB | Performed by: STUDENT IN AN ORGANIZED HEALTH CARE EDUCATION/TRAINING PROGRAM

## 2023-02-27 PROCEDURE — 3288F FALL RISK ASSESSMENT DOCD: CPT | Mod: HCNC,CPTII,GC,S$GLB | Performed by: STUDENT IN AN ORGANIZED HEALTH CARE EDUCATION/TRAINING PROGRAM

## 2023-02-27 PROCEDURE — 3008F PR BODY MASS INDEX (BMI) DOCUMENTED: ICD-10-PCS | Mod: HCNC,CPTII,GC,S$GLB | Performed by: STUDENT IN AN ORGANIZED HEALTH CARE EDUCATION/TRAINING PROGRAM

## 2023-02-27 PROCEDURE — 1101F PR PT FALLS ASSESS DOC 0-1 FALLS W/OUT INJ PAST YR: ICD-10-PCS | Mod: HCNC,CPTII,GC,S$GLB | Performed by: STUDENT IN AN ORGANIZED HEALTH CARE EDUCATION/TRAINING PROGRAM

## 2023-02-27 PROCEDURE — 1101F PT FALLS ASSESS-DOCD LE1/YR: CPT | Mod: HCNC,CPTII,GC,S$GLB | Performed by: STUDENT IN AN ORGANIZED HEALTH CARE EDUCATION/TRAINING PROGRAM

## 2023-02-27 PROCEDURE — 99214 PR OFFICE/OUTPT VISIT, EST, LEVL IV, 30-39 MIN: ICD-10-PCS | Mod: HCNC,GC,S$GLB, | Performed by: STUDENT IN AN ORGANIZED HEALTH CARE EDUCATION/TRAINING PROGRAM

## 2023-02-27 PROCEDURE — 3075F PR MOST RECENT SYSTOLIC BLOOD PRESS GE 130-139MM HG: ICD-10-PCS | Mod: HCNC,CPTII,GC,S$GLB | Performed by: STUDENT IN AN ORGANIZED HEALTH CARE EDUCATION/TRAINING PROGRAM

## 2023-02-27 PROCEDURE — 1160F PR REVIEW ALL MEDS BY PRESCRIBER/CLIN PHARMACIST DOCUMENTED: ICD-10-PCS | Mod: HCNC,CPTII,GC,S$GLB | Performed by: STUDENT IN AN ORGANIZED HEALTH CARE EDUCATION/TRAINING PROGRAM

## 2023-02-27 PROCEDURE — 3078F DIAST BP <80 MM HG: CPT | Mod: HCNC,CPTII,GC,S$GLB | Performed by: STUDENT IN AN ORGANIZED HEALTH CARE EDUCATION/TRAINING PROGRAM

## 2023-02-27 PROCEDURE — 99214 OFFICE O/P EST MOD 30 MIN: CPT | Mod: HCNC,GC,S$GLB, | Performed by: STUDENT IN AN ORGANIZED HEALTH CARE EDUCATION/TRAINING PROGRAM

## 2023-02-27 PROCEDURE — 1159F PR MEDICATION LIST DOCUMENTED IN MEDICAL RECORD: ICD-10-PCS | Mod: HCNC,CPTII,GC,S$GLB | Performed by: STUDENT IN AN ORGANIZED HEALTH CARE EDUCATION/TRAINING PROGRAM

## 2023-02-27 PROCEDURE — 99999 PR PBB SHADOW E&M-EST. PATIENT-LVL IV: ICD-10-PCS | Mod: PBBFAC,HCNC,GC, | Performed by: STUDENT IN AN ORGANIZED HEALTH CARE EDUCATION/TRAINING PROGRAM

## 2023-02-27 PROCEDURE — 99999 PR PBB SHADOW E&M-EST. PATIENT-LVL IV: CPT | Mod: PBBFAC,HCNC,GC, | Performed by: STUDENT IN AN ORGANIZED HEALTH CARE EDUCATION/TRAINING PROGRAM

## 2023-02-27 PROCEDURE — 1160F RVW MEDS BY RX/DR IN RCRD: CPT | Mod: HCNC,CPTII,GC,S$GLB | Performed by: STUDENT IN AN ORGANIZED HEALTH CARE EDUCATION/TRAINING PROGRAM

## 2023-02-27 PROCEDURE — 1159F MED LIST DOCD IN RCRD: CPT | Mod: HCNC,CPTII,GC,S$GLB | Performed by: STUDENT IN AN ORGANIZED HEALTH CARE EDUCATION/TRAINING PROGRAM

## 2023-02-27 PROCEDURE — 3078F PR MOST RECENT DIASTOLIC BLOOD PRESSURE < 80 MM HG: ICD-10-PCS | Mod: HCNC,CPTII,GC,S$GLB | Performed by: STUDENT IN AN ORGANIZED HEALTH CARE EDUCATION/TRAINING PROGRAM

## 2023-02-27 RX ORDER — AMITRIPTYLINE HYDROCHLORIDE 25 MG/1
25 TABLET, FILM COATED ORAL NIGHTLY
Qty: 30 TABLET | Refills: 11 | Status: SHIPPED | OUTPATIENT
Start: 2023-02-27 | End: 2023-10-11

## 2023-02-27 NOTE — PATIENT INSTRUCTIONS
Please schedule colonoscopy & Ct scan    Start taking amitriptyline at night before bedtime. May make you drowsy. Do not drive at night after taking this medication. Can cause dry mouth & urinary retention. Let us know if that happens.    Follow up with Dr. Ramírez

## 2023-02-27 NOTE — TELEPHONE ENCOUNTER
----- Message from Willam Duque MD sent at 2/27/2023  2:00 PM CST -----  Regarding: Clinic follow up with Dr. Ramírez  I am 1 of the gastroenterology fellows who saw this patient today.  Ordering CT enterography for her.  She will need follow-up with Dr. Fox in clinic for planning of possible surgery.      Thank you.

## 2023-02-27 NOTE — PROGRESS NOTES
[facility]    Reason for visit: The encounter diagnosis was Thickened small bowel.  Referring Provider/PCP: Caleb Hernandez MD    History of Present Illness:  Tanesha Davis is a 72 y.o. 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     She has severe pain in her upper abdomen (starting RUQ), abdomen feels bloated.Sometimes has nausea or vomiting. No real relation to food intake. The pain lasts for 3 hours and goes away spontaneously.  She was admitted 11/8/22 - 11/10/22 with a small bowel obstruction that was managed nonoperatively and resolved following Gastrografin challenge. Earlier this month, she presented to the ER with  N/V and abdominal pain. CT scan shows Persistent lower ventral abdominal wall hernia containing loops of small bowel noting new mild circumferential wall thickening of small bowel and mild distension with air-fluid level with concern for SBO. She was treated for SBO conservatively and discharged.     Patient underwent EGD in April 2021 which was essentially unremarkable apart from a small hiatal hernia & benign gastric polyp. Duodenal and gastric biopsies unremarkable. She was prescribed Nexium by Dr. Lindquist, to be taken 30 minutes before supper. For constipation, she takes Wibiya daily, Linzess 72 mcg every other day as needed and consumes 8-12 12oz water/day. On this regimen, she has 1 BM every other day. After taking Linzess, she may have diarrhea 2-3 times daily.     PEndoHx:  EGD (4/22/21):                         - Small hiatal hernia.                          - Gastric fundic gland polyps (benign).                          - Normal examined duodenum, biopsied to evaluate                          for disaccharidase and  "Celiac disease.                          - Biopsies were taken for Helicobacter pylori                          testing.     Colonoscopy (12/9/19):                       - Patent end-to-end colo-colonic anastomosis,                         characterized by healthy appearing mucosa. No                         specimens collected.                         - Diverticulosis in the descending colon.                         - The examined portion of the ileum was normal.                         - The distal rectum and anal verge are normal on                         retroflexion view.     Review of Systems   Constitutional:  Positive for weight loss. Negative for chills and fever.   HENT:  Negative for congestion and hearing loss.    Eyes:  Negative for pain and discharge.   Respiratory:  Negative for cough and shortness of breath.    Cardiovascular:  Negative for chest pain and leg swelling.   Gastrointestinal:  Positive for abdominal pain, constipation and diarrhea. Negative for blood in stool, melena, nausea and vomiting.   Skin:  Negative for itching and rash.   Neurological:  Negative for tremors and seizures.   Psychiatric/Behavioral:  Negative for depression, substance abuse and suicidal ideas.      Medical History:  Past Medical History:   Diagnosis Date    Arthritis     Draining cutaneous sinus tract     Headache(784.0)     Incisional hernia     Insomnia     Nocturnal leg cramps     Nonhealing surgical wound     PONV (postoperative nausea and vomiting)     with last surgery per patient, "Severe" PONV 2-27-14    Postmenopausal status     Rectosigmoid cancer     Small bowel obstruction 11/8/2022       Past Surgical History:   Procedure Laterality Date    BREAST BIOPSY Left     CHOLECYSTECTOMY  02/24/2014    with incisional hernia repair with mesh    COLONOSCOPY N/A 8/23/2016    Procedure: COLONOSCOPY;  Surgeon: Cuco Meraz MD;  Location: [address];  Service: Endoscopy;  Laterality: N/A;    COLONOSCOPY " N/A 12/9/2019    Procedure: COLONOSCOPY;  Surgeon: KISHORE Curry MD;  Location: [facility] ENDO (4TH FLR);  Service: Endoscopy;  Laterality: N/A;  ADHESIVE Allergy    ESOPHAGOGASTRODUODENOSCOPY N/A 4/22/2021    Procedure: EGD (ESOPHAGOGASTRODUODENOSCOPY);  Surgeon: Lukasz Lindquist MD;  Location: [facility] ENDO (4TH FLR);  Service: Endoscopy;  Laterality: N/A;  3/25-covid + 7/2020 at [facility]-pt getting results to us to see if correct form of test-MS    HYSTERECTOMY      INJECTION OF ANESTHETIC AGENT AROUND NERVE Right 6/28/2022    Procedure: BLOCK, NERVE, RIGHT L2-L3-L4-L5;  Surgeon: Shabnam Skaggs MD;  Location: [facility] PAIN MGT;  Service: Pain Management;  Laterality: Right;    INJECTION OF ANESTHETIC AGENT AROUND NERVE Right 8/2/2022    Procedure: BLOCK, NERVE, RIGHT L2-L5 MEDIAL BRANCH;  Surgeon: Shabnam Skaggs MD;  Location: [facility] PAIN MGT;  Service: Pain Management;  Laterality: Right;    KNEE ARTHROSCOPY      KNEE SURGERY      right and left knee repair    OOPHORECTOMY      RADIOFREQUENCY ABLATION Right 9/27/2022    Procedure: Radiofrequency Ablation Right L2-L5;  Surgeon: Shabnam Skaggs MD;  Location: [facility] PAIN MGT;  Service: Pain Management;  Laterality: Right;    REPAIR OF RECURRENT INCISIONAL HERNIA  7/10/2018    Procedure: REPAIR, HERNIA, INCISIONAL, RECURRENT;  Surgeon: Cuco Meraz MD;  Location: [facility] OR 2ND FLR;  Service: Colon and Rectal;;    SIGMOIDECTOMY  03/20/2006    LAR       Family History   Problem Relation Age of Onset    Breast cancer Mother 56    Cancer Mother         BREAST    Heart disease Father     Heart disease Maternal Grandmother     Diabetes Brother     Colon polyps Neg Hx     Ovarian cancer Neg Hx     Anesthesia problems Neg Hx        Social History     Socioeconomic History    Marital status:      Spouse name: francis    Number of children: 2   Occupational History    Occupation: Retired     Employer: Self   Tobacco Use    Smoking status: Never    Smokeless tobacco: Never    Substance and Sexual Activity    Alcohol use: Yes     Alcohol/week: 1.0 standard drink     Types: 1 Glasses of wine per week     Comment: socially    Drug use: No    Sexual activity: Yes     Partners: Male     Birth control/protection: Post-menopausal     Social Determinants of Health     Financial Resource Strain: Unknown    Difficulty of Paying Living Expenses: Patient refused   Food Insecurity: Unknown    Worried About Running Out of Food in the Last Year: Patient refused    Ran Out of Food in the Last Year: Patient refused   Transportation Needs: No Transportation Needs    Lack of Transportation (Medical): No    Lack of Transportation (Non-Medical): No   Physical Activity: Sufficiently Active    Days of Exercise per Week: 4 days    Minutes of Exercise per Session: 60 min   Stress: Stress Concern Present    Feeling of Stress : To some extent   Social Connections: Unknown    Frequency of Communication with Friends and Family: Twice a week    Frequency of Social Gatherings with Friends and Family: Once a week    Active Member of Clubs or Organizations: Yes    Attends Club or Organization Meetings: 1 to 4 times per year    Marital Status:    Housing Stability: Unknown    Unable to Pay for Housing in the Last Year: Patient refused    Unstable Housing in the Last Year: Patient refused       Current Outpatient Medications on File Prior to Visit   Medication Sig Dispense Refill    atorvastatin (LIPITOR) 40 MG tablet Take 1 tablet (40 mg total) by mouth once daily. 90 tablet 3    ERGOCALCIFEROL, VITAMIN D2, (VITAMIN D ORAL) Take 1 capsule by mouth every morning.       estradioL (ESTRACE) 0.5 MG tablet TAKE 1 TABLET BY MOUTH ONCE DAILY. 90 tablet 4    ferrous sulfate 325 (65 FE) MG EC tablet TAKE 1 TABLET BY MOUTH EVERY DAY 90 tablet 3    L GASSERI/B BIFIDUM/B LONGUM ([facility] ORAL) Take 1 tablet by mouth 2 (two) times daily.      linaCLOtide (LINZESS) 72 mcg Cap capsule Take 1 capsule (72 mcg  "total) by mouth once daily. 30 capsule 5    magnesium oxide-pyridoxine (BEELITH) 362-20 mg Tab Take 1 tablet by mouth once daily. 90 tablet 1    VITAMIN E ACETATE (VITAMIN E ORAL) Take 1 capsule by mouth every morning.       [DISCONTINUED] erythromycin (ROMYCIN) ophthalmic ointment SMARTSI Application Left Eye 4 Times Daily      [DISCONTINUED] gabapentin (NEURONTIN) 100 MG capsule Take 2 capsules (200 mg total) by mouth every evening. 270 capsule 3    [DISCONTINUED] tobramycin-dexAMETHasone 0.3-0.1% (TOBRADEX) 0.3-0.1 % DrpS Place 1 drop into the left eye 3 (three) times daily.       Current Facility-Administered Medications on File Prior to Visit   Medication Dose Route Frequency Provider Last Rate Last Admin    0.9%  NaCl infusion  500 mL Intravenous Continuous Ray Vieira MD           Review of patient's allergies indicates:   Allergen Reactions    Adhesive Rash     Asking that we use Paper Tape,  Uses "Sensitive Skin" band aids    Amoxil [amoxicillin] Hives    Penicillins Hives and Swelling     Swelling of ears and neck    Sulfa (sulfonamide antibiotics) Hives and Itching       Physical Exam:  Constitutional: healthy,  alert,  not in acute distress, oriented to time, place, and person, and well developed  HENT: Head: Normal, normocephalic, atraumatic.  Eyes: conjunctiva clear and sclera nonicteric  Cardiovascular: regular rate and rhythm and no murmur  Respiratory: normal chest expansion & respiratory effort   and no accessory muscle use  GI: soft, non-tender, without masses or organomegaly, normal bowel sounds, nontender, without guarding, and without rebound. Large ventral abdominal hernia +nt.   Musculoskeletal: no muscular tenderness noted  Skin: normal color and no jaundice  Neurological: alert, oriented x3  speech normal in context and clarity  memory intact grossly  Psychiatric: oriented to time, place and person, mood and affect are within normal limits, pt is a good historian; no memory problems " were noted    Laboratory:  Lab Results   Component Value Date     02/01/2023    K 3.2 (L) 02/01/2023     02/01/2023    CO2 23 02/01/2023    BUN 13 02/01/2023    CREATININE 0.7 02/01/2023    CALCIUM 9.1 02/01/2023    ANIONGAP 11 02/01/2023    ESTGFRAFRICA >60 12/21/2021    EGFRNONAA >60 12/21/2021       Lab Results   Component Value Date    ALT 13 01/31/2023    AST 17 01/31/2023    ALKPHOS 94 01/31/2023    BILITOT 0.7 01/31/2023       Lab Results   Component Value Date    WBC 6.63 02/01/2023    HGB 11.4 (L) 02/01/2023    HCT 36.7 (L) 02/01/2023    MCV 94 02/01/2023     02/01/2023       Microbiology:  - gastric biopsies from 2021 negative for H pylori.     Imaging:  - CT abdomen & pelvis with contrast (1/31/23): Persistent lower ventral abdominal wall hernia containing loops of small bowel noting new mild circumferential wall thickening of small bowel and mild distension with air-fluid level.  Findings could represent early developing obstruction and/or ischemia.      Assessment:  Tanesha Davis is a 72 y.o. female who is presenting for follow-up evaluation of upper abdominal pain. Patient has a hx of recurrent ventral hernia complicated by delayed wound healing. Two admissions in the last 2 months for SBO, which responded to conservative measures. Now with upper abdominal pain that is not related to BM or food intake. Due for colonoscopy. To follow up with Dr. Ramríez for planning of her  surgery.     Problems:  Upper abdominal pain  Small bowel wall thickening  Rectosigmoid cancer s/p LAR in 2006  Multiple ventral hernia repairs with delayed wound healing      Plan:  Will start amitriptyline 25 mg QHS. Patient counseled on side effects, including drowsiness and constipation. She was advised to increase the frequency of Linzess 72 mcg to daily if constipated.   Will order CT enterography for small bowel thickening seen on imaging.   Patient to follow up with Dr. Ramírez re SBO and ventral  hernia  Patient due for repeat colonoscopy. Referral has been placed.   Avoid opiates which may slow down colonic transit.    Follow up in about 2 months (around 4/27/2023) for upper abdominal pain.    Willam Duque MD  Gastroenterology Fellow    Orders Placed This Encounter   Procedures    CT Enterography Abd_Pelvis With Contrast

## 2023-03-01 ENCOUNTER — HOSPITAL ENCOUNTER (OUTPATIENT)
Dept: RADIOLOGY | Facility: HOSPITAL | Age: 73
Discharge: HOME | End: 2023-03-01
Attending: INTERNAL MEDICINE
Payer: MEDICARE

## 2023-03-01 DIAGNOSIS — Z12.31 ENCOUNTER FOR SCREENING MAMMOGRAM FOR MALIGNANT NEOPLASM OF BREAST: ICD-10-CM

## 2023-03-01 PROCEDURE — 77067 SCR MAMMO BI INCL CAD: CPT | Mod: 26,HCNC,, | Performed by: RADIOLOGY

## 2023-03-01 PROCEDURE — 77067 SCR MAMMO BI INCL CAD: CPT | Mod: TC,HCNC

## 2023-03-01 PROCEDURE — 77063 MAMMO DIGITAL SCREENING BILAT WITH TOMO: ICD-10-PCS | Mod: 26,HCNC,, | Performed by: RADIOLOGY

## 2023-03-01 PROCEDURE — 77067 MAMMO DIGITAL SCREENING BILAT WITH TOMO: ICD-10-PCS | Mod: 26,HCNC,, | Performed by: RADIOLOGY

## 2023-03-01 PROCEDURE — 77063 BREAST TOMOSYNTHESIS BI: CPT | Mod: 26,HCNC,, | Performed by: RADIOLOGY

## 2023-03-07 ENCOUNTER — PATIENT MESSAGE (OUTPATIENT)
Dept: GASTROENTEROLOGY | Facility: CLINIC | Age: 73
End: 2023-03-07
Payer: MEDICARE

## 2023-03-08 NOTE — TELEPHONE ENCOUNTER
Called patient to return her message. She reportsthat since starting amitriptyline, she has dry mouth & swollen tongue, which makes it difficult for her to speak. Also reports difficulty swallowing due to lack of saliva. Also reports abdominal pain in the AM & around 6 PM in the evening.     I advised her to try sipping on water more frequently (which she does) and to try Biotene artificial saliva (she has tried). Does not want to continue amitriptyline.    - I advised her to hold amitriptyline for now.   - Messaged staff to schedule CT enterography.       Willam Duque MD  PGY-IV, GI

## 2023-03-15 ENCOUNTER — HOSPITAL ENCOUNTER (OUTPATIENT)
Dept: RADIOLOGY | Facility: HOSPITAL | Age: 73
Discharge: HOME | End: 2023-03-15
Attending: STUDENT IN AN ORGANIZED HEALTH CARE EDUCATION/TRAINING PROGRAM
Payer: MEDICARE

## 2023-03-15 DIAGNOSIS — K63.9 THICKENED SMALL BOWEL: ICD-10-CM

## 2023-03-15 LAB
CREAT SERPL-MCNC: 0.8 MG/DL (ref 0.5–1.4)
SAMPLE: NORMAL

## 2023-03-15 PROCEDURE — 74177 CT ABD & PELVIS W/CONTRAST: CPT | Mod: 26,HCNC,, | Performed by: RADIOLOGY

## 2023-03-15 PROCEDURE — 25500020 PHARM REV CODE 255: Mod: HCNC | Performed by: STUDENT IN AN ORGANIZED HEALTH CARE EDUCATION/TRAINING PROGRAM

## 2023-03-15 PROCEDURE — 74177 CT ABD & PELVIS W/CONTRAST: CPT | Mod: TC,HCNC

## 2023-03-15 PROCEDURE — 74177 CT ENTEROGRAPHY ABD_PELVIS WITH CONTRAST: ICD-10-PCS | Mod: 26,HCNC,, | Performed by: RADIOLOGY

## 2023-03-15 RX ADMIN — IOHEXOL 100 ML: 350 INJECTION, SOLUTION INTRAVENOUS at 11:03

## 2023-03-15 NOTE — PROGRESS NOTES
Ms. Davis,     Your results look fine. It did show hernia, but no more small bowel thickening. No obstruction seen. Good news.      Please contact me if you have any additional concerns.    Sincerely,    Willam Duque

## 2023-03-17 NOTE — PROGRESS NOTES
[facility]   Physical Therapy Treatment Note     Name: Tanesha Davis  Clinic Number: [id number]    Therapy Diagnosis:   No diagnosis found.  Physician: Kaylah Stokes MD    Visit Date: 3/21/2022    Physician: Kaylah Stokes MD     Physician Orders: PT Eval and Treat   Medical Diagnosis from Referral: Musculoskeletal back pain  Evaluation Date: 2/7/2022  Authorization Period Expiration: 05/07/2022  Plan of Care Expiration: 5-7-22  Visit # / Visits authorized: 5/ 20  Progress Note Due: 4-8-22  FOTO: 1/ 1     Precautions: Standard    Time In: 1045AM  Time Out: 1140AM  Total Billable Time: 45 minutes      SUBJECTIVE     Pt reports: stinging sensation on right side.      She was compliant with home exercise program.  Response to previous treatment: None  Functional change: No change    Pain: 5/10  Location: right lower back pain and radiated to lateral side     OBJECTIVE     Objective Measures updated at progress report unless specified.       TREATMENT     Alice received the treatments listed below:      received therapeutic exercises to develop strength and ROM for 40 minutes including:    Nustep 6 min  Seated  QL stretch with bosu ball to R QL only 5x30 sec   Open book R side only 30x   LTR with yoga ball 2 min   Supine QL stretch 5x30 sec-NP  Pelvic tilt 3x10 hold 2 sec   Hamstring stretch 3x30 sec  + R Sidebend w/ 7.5lb KB 15x (weight in right hand)  +Paloff Press w/ RTB 20x/ea    Alice received the following manual therapy techniques: Soft tissue Mobilization were applied to the: R QL for 5 minutes, including:  STM to right QL    Pt cleared of contraindications and verbal and written consent acquired. Pt given option of copy of consent form. Pt educated on benefits and potential side effects of DN. DN for 10 minutes with pt in L sidelying position with involved side R QL. Lumbosacral spine protocol with  Winding. Pt received 10 min E-stim at parameters of 2 hz and 250 us.   Patient  provided written and verbal consent to receive functional dry needling at today's visit (see consent form scanned into chart). FDN performed to R QL mm. FDN performed to reduce pain and muscle tension, promote blood flow, and improve ROM and function. Pt tolerated tx well without adverse effects. She was educated on what to expect following the procedure and she verbalized understanding.      Pt received 10 min of heat pack in side lying position to decrease pain and promote healing.       PATIENT EDUCATION AND HOME EXERCISES     Home Exercises and Patient Education Provided     Education provided:   - Perform HEP 2 times per day      Written Home Exercises Provided: Patient instructed to cont prior HEP.  Exercises were reviewed and Alice was able to demonstrate them prior to the end of the session.  Alice demonstrated good  understanding of the education provided.      See EMR under Patient Instructions for exercises provided 2/7/2022.    ASSESSMENT     Alice tolerated the above tx session well with minimal c/o pain. Pt presents with reports of stinging sensation on right side. Added sidebending and paloff press for QL and core strengthening, and there were no adverse effects reported. Pt will cont benefiting from skilled PT services to decrease R QL pain and return to PLOF.     Alice Is progressing well towards her goals.   Pt prognosis is Good.     Pt will continue to benefit from skilled outpatient physical therapy to address the deficits listed in the problem list box on initial evaluation, provide pt/family education and to maximize pt's level of independence in the home and community environment.     Pt's spiritual, cultural and educational needs considered and pt agreeable to plan of care and goals.     Anticipated barriers to physical therapy: none      GOALS: Short Term Goals:  4 weeks  1. Report decreased in pain at worse less than  <   / =  5  /10  to increase tolerance for functional activities.Goal  not met 3-8-22  2. Increased R LE MMT 1/2  to increase tolerance for ADL and work activities. Goal not met 3-8-22  3. Pt to tolerate HEP to improve ROM and independence with ADL's. Goal not met 3-8-22  4. Pt to improve range of motion by 25% to allow for improved functional mobility to allow for improvement in IADLs. Goal not met 3-8-22     Long Term Goals: 8 weeks  1. Report decreased in pain at worse less than  <   / =  2 /10  to increase tolerance for functional mobility.  On going  2. Increased R LEMMT 1 grade to increase tolerance for ADL and work activities.On going  3. Pt will report at 34% or less limitation on FOTO Lumbar spine survey  to demonstrate decrease in disability and improvement in back pain.On going  4. Pt to be Independent with HEP to improve ROM and independence with ADL's. On going  5. Pt to demonstrate negative Bridge Test in order to show improved core strength for lumbar stabilization. On going  6. Pt to improve range of motion by 75% to allow for improved functional mobility to allow for improvement in IADLs. On going    PLAN     Plan of care Certification: 2/7/2022 to 5-7-22    Alisa Billings, PTA   03/21/2022           impaired balance/decreased strength

## 2023-03-20 ENCOUNTER — CLINICAL SUPPORT (OUTPATIENT)
Dept: ENDOSCOPY | Facility: HOSPITAL | Age: 73
End: 2023-03-20
Attending: INTERNAL MEDICINE
Payer: MEDICARE

## 2023-03-20 ENCOUNTER — TELEPHONE (OUTPATIENT)
Dept: ENDOSCOPY | Facility: HOSPITAL | Age: 73
End: 2023-03-20

## 2023-03-20 DIAGNOSIS — Z85.038 HISTORY OF COLON CANCER: ICD-10-CM

## 2023-03-20 RX ORDER — SODIUM, POTASSIUM,MAG SULFATES 17.5-3.13G
1 SOLUTION, RECONSTITUTED, ORAL ORAL DAILY
Qty: 1 KIT | Refills: 0 | Status: SHIPPED | OUTPATIENT
Start: 2023-03-20 | End: 2023-03-22

## 2023-03-22 ENCOUNTER — TELEPHONE (OUTPATIENT)
Dept: SURGERY | Facility: CLINIC | Age: 73
End: 2023-03-22
Payer: MEDICARE

## 2023-03-22 ENCOUNTER — OFFICE VISIT (OUTPATIENT)
Dept: SURGERY | Facility: CLINIC | Age: 73
End: 2023-03-22
Payer: MEDICARE

## 2023-03-22 VITALS
HEART RATE: 77 BPM | HEIGHT: 65 IN | WEIGHT: 170.75 LBS | SYSTOLIC BLOOD PRESSURE: 134 MMHG | BODY MASS INDEX: 28.45 KG/M2 | DIASTOLIC BLOOD PRESSURE: 61 MMHG

## 2023-03-22 DIAGNOSIS — K43.6 VENTRAL HERNIA WITH OBSTRUCTION AND WITHOUT GANGRENE: Primary | ICD-10-CM

## 2023-03-22 DIAGNOSIS — K43.2 VENTRAL INCISIONAL HERNIA: ICD-10-CM

## 2023-03-22 DIAGNOSIS — E66.09 CLASS 1 OBESITY DUE TO EXCESS CALORIES WITHOUT SERIOUS COMORBIDITY WITH BODY MASS INDEX (BMI) OF 32.0 TO 32.9 IN ADULT: ICD-10-CM

## 2023-03-22 DIAGNOSIS — Z85.038 HISTORY OF COLON CANCER: ICD-10-CM

## 2023-03-22 PROCEDURE — 99215 OFFICE O/P EST HI 40 MIN: CPT | Mod: S$GLB,,, | Performed by: SURGERY

## 2023-03-22 PROCEDURE — 99999 PR PBB SHADOW E&M-EST. PATIENT-LVL III: ICD-10-PCS | Mod: PBBFAC,,, | Performed by: SURGERY

## 2023-03-22 PROCEDURE — 99999 PR PBB SHADOW E&M-EST. PATIENT-LVL III: CPT | Mod: PBBFAC,,, | Performed by: SURGERY

## 2023-03-22 PROCEDURE — 1160F RVW MEDS BY RX/DR IN RCRD: CPT | Mod: CPTII,S$GLB,, | Performed by: SURGERY

## 2023-03-22 PROCEDURE — 1159F MED LIST DOCD IN RCRD: CPT | Mod: CPTII,S$GLB,, | Performed by: SURGERY

## 2023-03-22 PROCEDURE — 3078F DIAST BP <80 MM HG: CPT | Mod: CPTII,S$GLB,, | Performed by: SURGERY

## 2023-03-22 PROCEDURE — 1125F AMNT PAIN NOTED PAIN PRSNT: CPT | Mod: CPTII,S$GLB,, | Performed by: SURGERY

## 2023-03-22 PROCEDURE — 1101F PR PT FALLS ASSESS DOC 0-1 FALLS W/OUT INJ PAST YR: ICD-10-PCS | Mod: CPTII,S$GLB,, | Performed by: SURGERY

## 2023-03-22 PROCEDURE — 3075F PR MOST RECENT SYSTOLIC BLOOD PRESS GE 130-139MM HG: ICD-10-PCS | Mod: CPTII,S$GLB,, | Performed by: SURGERY

## 2023-03-22 PROCEDURE — 3078F PR MOST RECENT DIASTOLIC BLOOD PRESSURE < 80 MM HG: ICD-10-PCS | Mod: CPTII,S$GLB,, | Performed by: SURGERY

## 2023-03-22 PROCEDURE — 3288F PR FALLS RISK ASSESSMENT DOCUMENTED: ICD-10-PCS | Mod: CPTII,S$GLB,, | Performed by: SURGERY

## 2023-03-22 PROCEDURE — 3288F FALL RISK ASSESSMENT DOCD: CPT | Mod: CPTII,S$GLB,, | Performed by: SURGERY

## 2023-03-22 PROCEDURE — 1101F PT FALLS ASSESS-DOCD LE1/YR: CPT | Mod: CPTII,S$GLB,, | Performed by: SURGERY

## 2023-03-22 PROCEDURE — 3075F SYST BP GE 130 - 139MM HG: CPT | Mod: CPTII,S$GLB,, | Performed by: SURGERY

## 2023-03-22 PROCEDURE — 1159F PR MEDICATION LIST DOCUMENTED IN MEDICAL RECORD: ICD-10-PCS | Mod: CPTII,S$GLB,, | Performed by: SURGERY

## 2023-03-22 PROCEDURE — 99215 PR OFFICE/OUTPT VISIT, EST, LEVL V, 40-54 MIN: ICD-10-PCS | Mod: S$GLB,,, | Performed by: SURGERY

## 2023-03-22 PROCEDURE — 3008F BODY MASS INDEX DOCD: CPT | Mod: CPTII,S$GLB,, | Performed by: SURGERY

## 2023-03-22 PROCEDURE — 1125F PR PAIN SEVERITY QUANTIFIED, PAIN PRESENT: ICD-10-PCS | Mod: CPTII,S$GLB,, | Performed by: SURGERY

## 2023-03-22 PROCEDURE — 3008F PR BODY MASS INDEX (BMI) DOCUMENTED: ICD-10-PCS | Mod: CPTII,S$GLB,, | Performed by: SURGERY

## 2023-03-22 PROCEDURE — 1160F PR REVIEW ALL MEDS BY PRESCRIBER/CLIN PHARMACIST DOCUMENTED: ICD-10-PCS | Mod: CPTII,S$GLB,, | Performed by: SURGERY

## 2023-03-22 NOTE — PROGRESS NOTES
.GENERAL SURGERY CLINIC NOTE    Chief Complaint: incisional hernia  Today's date: 03/22/2023  History of Present Illness: 11/22/2022  Tanesha Davis is a 72y.o. female with PMHx of recurrent incisional hernia, recent SBO, and colon cancer who presents to the clinic today complaining of pain from a known incisional hernia which she first noticed back in 2015. Patient describes the pain as intermittent (1-2x weekly) and rates it as 8/10 in severity. Patient states the sxs are aggravated by standing too long, long car rides and alleviated by laying flat. She denies fever, chills, unintentional weight loss, n/v/d, constipation, hematochezia, dysuria, hematuria, CP, SOB, and all other symptoms. Patient reports being compliant with home medication regimen.      Pt initially presented to Dr Muse for an incisional hernia repair in 2014. Pt reports the wound had difficulty healing; she had to return to the OR with Dr Muse a year later and then again in 2018 with Dr. Meraz, at which point they removed mesh. The wound at that point stopped draining, so the patient stopped pursuing surgical treatments despite the hernia persisting. She was hospitalized at [facility] Nov 7-10, 22 for an SBO which resolved with conservative management. CT at [facility] showed a transition point near her hernia.      She is concerned about another hernia repair because she does not want to go through another four years of poor wound healing.      Patient denies personal history of MI, CVA, lung disease, DM  Previous abdominal surgeries include: colectomy 2006, recurrent incisional hernia repair + mesh excision 2018, cholecystectomy, hysterectomy  Denies alcohol, tobacco, and elicit drug use.   Not currently on any anticoagulants   PLAN  - Discussed the option of surgical repair with the patient; she would like to think about it and will contact clinic should she decide to proceed  - Obtain imaging from [facility] (pt asked to bring images on  "disc to clinic)      INTERVAL HISTORY 03/22/2023  Today she presents to clinic, she says that pain attacks have been getting worse.  She still describes it as intermittent but have became more frequent (4x weekly) and rates it as 8/10 in severity. Patient states the sxs are aggravated by doing exercise, eating late at night, standing too long, long car rides and alleviated by laying flat. She is taking amyLast one was on Thursday night. She has been eating less because certain foods and eating at night make her pain attacks worse. Stopped doing exercise because it would trigger her.  She states that she does not have bowel movements unless she takes a laxative, which would cause her to have diarrhea for 3-4 hours. Last diarrhea episode was on Saturday.  Denies fever, denies recent vomiting (last on January 31st).  She has lost 27 pounds since November 7th.   She takes Nexium every morning, no symptoms of GERD reflux    ROS: Denies shortness of breath, bleeding, easy bruising    Past Medical History:   Diagnosis Date    Arthritis     Draining cutaneous sinus tract     Headache(784.0)     Incisional hernia     Insomnia     Nocturnal leg cramps     Nonhealing surgical wound     PONV (postoperative nausea and vomiting)     with last surgery per patient, "Severe" PONV 2-27-14    Postmenopausal status     Rectosigmoid cancer     Small bowel obstruction 11/8/2022       Past Surgical History:   Procedure Laterality Date    BREAST BIOPSY Left     CHOLECYSTECTOMY  02/24/2014    with incisional hernia repair with mesh    COLONOSCOPY N/A 8/23/2016    Procedure: COLONOSCOPY;  Surgeon: Cuco Meraz MD;  Location: [address]);  Service: Endoscopy;  Laterality: N/A;    COLONOSCOPY N/A 12/9/2019    Procedure: COLONOSCOPY;  Surgeon: KISHORE Curry MD;  Location: [address]);  Service: Endoscopy;  Laterality: N/A;  ADHESIVE Allergy    ESOPHAGOGASTRODUODENOSCOPY N/A 4/22/2021    Procedure: EGD " (ESOPHAGOGASTRODUODENOSCOPY);  Surgeon: Lukasz Lindquist MD;  Location: [facility] ENDO (4TH FLR);  Service: Endoscopy;  Laterality: N/A;  3/25-covid + 7/2020 at [facility] [place]-pt getting results to us to see if correct form of test-MS    HYSTERECTOMY      INJECTION OF ANESTHETIC AGENT AROUND NERVE Right 6/28/2022    Procedure: BLOCK, NERVE, RIGHT L2-L3-L4-L5;  Surgeon: Shabnam Skaggs MD;  Location: [facility] PAIN MGT;  Service: Pain Management;  Laterality: Right;    INJECTION OF ANESTHETIC AGENT AROUND NERVE Right 8/2/2022    Procedure: BLOCK, NERVE, RIGHT L2-L5 MEDIAL BRANCH;  Surgeon: Shabnam Skaggs MD;  Location: [facility] PAIN MGT;  Service: Pain Management;  Laterality: Right;    KNEE ARTHROSCOPY      KNEE SURGERY      right and left knee repair    OOPHORECTOMY      RADIOFREQUENCY ABLATION Right 9/27/2022    Procedure: Radiofrequency Ablation Right L2-L5;  Surgeon: Shabnam Skaggs MD;  Location: [facility] PAIN MGT;  Service: Pain Management;  Laterality: Right;    REPAIR OF RECURRENT INCISIONAL HERNIA  7/10/2018    Procedure: REPAIR, HERNIA, INCISIONAL, RECURRENT;  Surgeon: Cuco Meraz MD;  Location: [facility] OR 2ND FLR;  Service: Colon and Rectal;;    SIGMOIDECTOMY  03/20/2006    LAR       Social History     Socioeconomic History    Marital status:      Spouse name: francis    Number of children: 2   Occupational History    Occupation: Retired     Employer: Self   Tobacco Use    Smoking status: Never    Smokeless tobacco: Never   Substance and Sexual Activity    Alcohol use: Yes     Alcohol/week: 1.0 standard drink     Types: 1 Glasses of wine per week     Comment: socially    Drug use: No    Sexual activity: Yes     Partners: Male     Birth control/protection: Post-menopausal     Social Determinants of Health     Financial Resource Strain: Unknown    Difficulty of Paying Living Expenses: Patient refused   Food Insecurity: Unknown    Worried About Running Out of Food in the Last Year: Patient refused    Ran Out of Food in  "the Last Year: Patient refused   Transportation Needs: No Transportation Needs    Lack of Transportation (Medical): No    Lack of Transportation (Non-Medical): No   Physical Activity: Sufficiently Active    Days of Exercise per Week: 4 days    Minutes of Exercise per Session: 60 min   Stress: Stress Concern Present    Feeling of Stress : To some extent   Social Connections: Unknown    Frequency of Communication with Friends and Family: Twice a week    Frequency of Social Gatherings with Friends and Family: Once a week    Active Member of Clubs or Organizations: Yes    Attends Club or Organization Meetings: 1 to 4 times per year    Marital Status:    Housing Stability: Unknown    Unable to Pay for Housing in the Last Year: Patient refused    Unstable Housing in the Last Year: Patient refused       Review of patient's allergies indicates:   Allergen Reactions    Adhesive Rash     Asking that we use Paper Tape,  Uses "Sensitive Skin" band aids    Amoxil [amoxicillin] Hives    Penicillins Hives and Swelling     Swelling of ears and neck    Sulfa (sulfonamide antibiotics) Hives and Itching         PHYSICAL EXAM:  There were no vitals filed for this visit.    General: NAD  HEENT: normocephalic, atraumatic, no scleral icterus or conjunctival injection  Neuro: AAOx3  Cardio: RRR  Resp: no respiratory distress, unlabored breathing  Abd: Soft, non-distended, non-tender.  Ext: Warm and well perfused  Skin: dry, no pallor  Psych: appropriate mood and behavior      PERTINENT LABS:  Reviewed. None.      PERTINENT IMAGIN2023 CT enterography abdomen  FINDINGS:     : Normal in size and location.  Numerous bilateral cystic lesions, multiple measuring slightly higher than water density, however measuring simple fluid on 2023 CT.  These are similar in number and slightly increased in size dating back to 2014 CT, likely simple to minimally complex cysts.  Mild right hydronephrosis with no evidence of " obstruction.  No left hydronephrosis.  No nephrolithiasis.  Bladder demonstrates smooth contours without bladder wall thickening.       GI TRACT/MESENTERY: Stomach and duodenum are normal appearance.  Visualized loops of small and large bowel are normal in caliber without evidence for inflammation or obstruction.  Postsurgical changes about the rectosigmoid junction.  Numerous small bowel loops located within an abdominal wall hernia with resolution of previously seen mild distension and wall thickening.  There is some nondistended loops of small bowel making evaluation of wall thickening limited.  Persistent mild mesenteric stranding and scattered nodes.      ABDOMINAL WALL: Stable midline abdominal wall hernia containing numerous loops of small bowel.      Impression:     1. Numerous small bowel loops located within a midline abdominal hernia with resolution of previously seen mild distension and wall thickening.  2. Additional findings above.        ASSESSMENT/PLAN:  72 y.o. female with 72y.o. female with PMHx of recurrent incisional hernia, recent SBO, and colon cancer who presents to the clinic for follow up on the plan to do a surgical repair of her hernia. Patient is willing to proceed with the scheduling of her surgery.    PLAN  Open ventral hernia repair with component separation (schedule for June)

## 2023-04-06 ENCOUNTER — PATIENT MESSAGE (OUTPATIENT)
Dept: GASTROENTEROLOGY | Facility: CLINIC | Age: 73
End: 2023-04-06
Payer: MEDICARE

## 2023-04-06 NOTE — TELEPHONE ENCOUNTER
Spoke with pt and she stated her incision has been draining blood since she left. Appt made for her to see Dr. Meraz Wednesday.    Prednisone Counseling:  I discussed with the patient the risks of prolonged use of prednisone including but not limited to weight gain, insomnia, osteoporosis, mood changes, diabetes, susceptibility to infection, glaucoma and high blood pressure.  In cases where prednisone use is prolonged, patients should be monitored with blood pressure checks, serum glucose levels and an eye exam.  Additionally, the patient may need to be placed on GI prophylaxis, PCP prophylaxis, and calcium and vitamin D supplementation and/or a bisphosphonate.  The patient verbalized understanding of the proper use and the possible adverse effects of prednisone.  All of the patient's questions and concerns were addressed.

## 2023-04-26 RX ORDER — CEFAZOLIN SODIUM 2 G/50ML
2 SOLUTION INTRAVENOUS
Status: CANCELLED | OUTPATIENT
Start: 2023-04-26

## 2023-04-26 RX ORDER — SODIUM CHLORIDE 9 MG/ML
INJECTION, SOLUTION INTRAVENOUS CONTINUOUS
Status: CANCELLED | OUTPATIENT
Start: 2023-04-26

## 2023-05-01 ENCOUNTER — PES CALL (OUTPATIENT)
Dept: ADMINISTRATIVE | Facility: CLINIC | Age: 73
End: 2023-05-01
Payer: MEDICARE

## 2023-05-02 DIAGNOSIS — K43.6 VENTRAL HERNIA WITH OBSTRUCTION AND WITHOUT GANGRENE: Primary | ICD-10-CM

## 2023-05-04 ENCOUNTER — TELEPHONE (OUTPATIENT)
Dept: INTERVENTIONAL RADIOLOGY/VASCULAR | Facility: CLINIC | Age: 73
End: 2023-05-04
Payer: MEDICARE

## 2023-05-04 NOTE — TELEPHONE ENCOUNTER
Spoke to patient to schedule soft tissue injection. Patient schedule on 5/15th at 1030am.  Offered pt a sooner appt. Patient stated she schedule for a colonoscopy on 5/9 and have other appointments that week.  Gave pt preop instructions, arrival time and location.

## 2023-05-08 ENCOUNTER — TELEPHONE (OUTPATIENT)
Dept: ENDOSCOPY | Facility: HOSPITAL | Age: 73
End: 2023-05-08
Payer: MEDICARE

## 2023-05-08 NOTE — H&P
"COLONOSCOPY HISTORY AND PE    Procedure : Colonoscopy      INDICATIONS: asymptomatic screening exam and personal history of colon cancer      Past Medical History:   Diagnosis Date    Arthritis     Draining cutaneous sinus tract     Headache(784.0)     Incisional hernia     Insomnia     Nocturnal leg cramps     Nonhealing surgical wound     PONV (postoperative nausea and vomiting)     with last surgery per patient, "Severe" PONV 2-27-14    Postmenopausal status     Rectosigmoid cancer     Small bowel obstruction 11/8/2022       Past Surgical History:   Procedure Laterality Date    BREAST BIOPSY Left     CHOLECYSTECTOMY  02/24/2014    with incisional hernia repair with mesh    COLONOSCOPY N/A 8/23/2016    Procedure: COLONOSCOPY;  Surgeon: Cuco Meraz MD;  Location: [facility] ENDO (4TH FLR);  Service: Endoscopy;  Laterality: N/A;    COLONOSCOPY N/A 12/9/2019    Procedure: COLONOSCOPY;  Surgeon: KISHORE Curry MD;  [facility]  Service: Endoscopy;  Laterality: N/A;  ADHESIVE Allergy    ESOPHAGOGASTRODUODENOSCOPY N/A 4/22/2021    Procedure: EGD (ESOPHAGOGASTRODUODENOSCOPY);  Surgeon: Lukasz Lindquist MD;  [facility]  Service: Endoscopy;  Laterality: N/A;  [place] [facility] [place] us to see if correct form of test-MS    HYSTERECTOMY      INJECTION OF ANESTHETIC AGENT AROUND NERVE Right 6/28/2022    Procedure: BLOCK, NERVE, RIGHT L2-L3-L4-L5;  Surgeon: Shabnam Skaggs MD;  [facility]: [facility];  Service: Pain Management;  Laterality: Right;    INJECTION OF ANESTHETIC AGENT AROUND NERVE Right 8/2/2022    Procedure: BLOCK, NERVE, RIGHT L2-L5 MEDIAL BRANCH;  Surgeon: Shabnam Skaggs MD;  [facility];  Service: Pain Management;  Laterality: Right;    KNEE ARTHROSCOPY      KNEE SURGERY      right and left knee repair    OOPHORECTOMY      RADIOFREQUENCY ABLATION Right 9/27/2022    Procedure: Radiofrequency Ablation Right L2-L5;  Surgeon: " "Shabnam Skaggs MD;  Location: [facility] [place] [facility]IN MGT;  Service: Pain Management;  Laterality: Right;    REPAIR OF RECURRENT INCISIONAL HERNIA  7/10/2018    Procedure: REPAIR, HERNIA, INCISIONAL, RECURRENT;  Surgeon: Cuco Merza MD;  Location: [facility] OR [address] [place] [address];  Service: Colon and Rectal;;    SIGMOIDECTOMY  03/20/2006    LAR       Review of patient's allergies indicates:   Allergen Reactions    Adhesive Rash     Asking that we use Paper Tape,  Uses "Sensitive Skin" band aids    Amoxil [amoxicillin] Hives    Penicillins Hives and Swelling     Swelling of ears and neck    Sulfa (sulfonamide antibiotics) Hives and Itching       Current Facility-Administered Medications on File Prior to Encounter   Medication Dose Route Frequency Provider Last Rate Last Admin    0.9%  NaCl infusion  500 mL Intravenous Continuous Ray Vieira MD         Current Outpatient Medications on File Prior to Encounter   Medication Sig Dispense Refill    amitriptyline (ELAVIL) 25 MG tablet Take 1 tablet (25 mg total) by mouth every evening. 30 tablet 11    atorvastatin (LIPITOR) 40 MG tablet Take 1 tablet (40 mg total) by mouth once daily. 90 tablet 3    ERGOCALCIFEROL, VITAMIN D2, (VITAMIN D ORAL) Take 1 capsule by mouth every morning.       estradioL (ESTRACE) 0.5 MG tablet TAKE 1 TABLET BY MOUTH ONCE DAILY. 90 tablet 4    ferrous sulfate 325 (65 FE) MG EC tablet TAKE 1 TABLET BY MOUTH EVERY DAY 90 tablet 3    L GASSERI/B BIFIDUM/B LONGUM ([facility] ORAL) Take 1 tablet by mouth 2 (two) times daily.      magnesium oxide-pyridoxine (BEELITH) 362-20 mg Tab Take 1 tablet by mouth once daily. 90 tablet 1    VITAMIN E ACETATE (VITAMIN E ORAL) Take 1 capsule by mouth every morning.          Family History   Problem Relation Age of Onset    Breast cancer Mother 56    Cancer Mother         BREAST    Heart disease Father     Heart disease Maternal Grandmother     Diabetes Brother     Colon polyps Neg Hx     Ovarian cancer Neg Hx     " Anesthesia problems Neg Hx        Social History     Socioeconomic History    Marital status:      Spouse name: francis    Number of children: 2   Occupational History    Occupation: Retired     Employer: Self   Tobacco Use    Smoking status: Never    Smokeless tobacco: Never   Substance and Sexual Activity    Alcohol use: Yes     Alcohol/week: 1.0 standard drink     Types: 1 Glasses of wine per week     Comment: socially    Drug use: No    Sexual activity: Yes     Partners: Male     Birth control/protection: Post-menopausal     Social Determinants of Health     Financial Resource Strain: Unknown    Difficulty of Paying Living Expenses: Patient refused   Food Insecurity: Unknown    Worried About Running Out of Food in the Last Year: Patient refused    Ran Out of Food in the Last Year: Patient refused   Transportation Needs: No Transportation Needs    Lack of Transportation (Medical): No    Lack of Transportation (Non-Medical): No   Physical Activity: Sufficiently Active    Days of Exercise per Week: 4 days    Minutes of Exercise per Session: 60 min   Stress: Stress Concern Present    Feeling of Stress : To some extent   Social Connections: Unknown    Frequency of Communication with Friends and Family: Twice a week    Frequency of Social Gatherings with Friends and Family: Once a week    Active Member of Clubs or Organizations: Yes    Attends Club or Organization Meetings: 1 to 4 times per year    Marital Status:    Housing Stability: Unknown    Unable to Pay for Housing in the Last Year: Patient refused    Unstable Housing in the Last Year: Patient refused       Review of Systems -    Respiratory : no cough, shortness of breath, or wheezing  Cardiovascular  no chest pain or dyspnea on exertion  Gastrointestinal no abdominal pain, change in bowel habits, or black or bloody stools  Musculoskeletal no deformities, swelling  Neurological no TIA or stroke symptoms        Physical Exam:  General: NAD  AT NC  EOMI  Mallampati Score   Neck supple, trachea midline  Lungs: nl excursions, no retractions.  Breathing comfortably  Abdomen ND soft NT.  No masses  Extremities: No CCE.      ASA:  II    PLAN  COLONOSCOPY.  The details of the procedure, the possible need for biopsy or polypectomy and the potential risks including bleeding, perforation, missed polyps were discussed in detail.

## 2023-05-09 ENCOUNTER — HOSPITAL ENCOUNTER (OUTPATIENT)
Facility: HOSPITAL | Age: 73
Discharge: HOME | End: 2023-05-09
Attending: COLON & RECTAL SURGERY | Admitting: COLON & RECTAL SURGERY
Payer: MEDICARE

## 2023-05-09 ENCOUNTER — ANESTHESIA EVENT (OUTPATIENT)
Dept: ENDOSCOPY | Facility: HOSPITAL | Age: 73
End: 2023-05-09
Payer: MEDICARE

## 2023-05-09 ENCOUNTER — ANESTHESIA (OUTPATIENT)
Dept: ENDOSCOPY | Facility: HOSPITAL | Age: 73
End: 2023-05-09
Payer: MEDICARE

## 2023-05-09 VITALS
TEMPERATURE: 98 F | DIASTOLIC BLOOD PRESSURE: 58 MMHG | HEIGHT: 65 IN | SYSTOLIC BLOOD PRESSURE: 138 MMHG | OXYGEN SATURATION: 97 % | RESPIRATION RATE: 18 BRPM | WEIGHT: 132 LBS | BODY MASS INDEX: 21.99 KG/M2 | HEART RATE: 70 BPM

## 2023-05-09 DIAGNOSIS — Z12.11 SCREEN FOR COLON CANCER: ICD-10-CM

## 2023-05-09 PROCEDURE — E9220 PRA ENDO ANESTHESIA: HCPCS | Mod: PT,HCNC,, | Performed by: NURSE ANESTHETIST, CERTIFIED REGISTERED

## 2023-05-09 PROCEDURE — 25000003 PHARM REV CODE 250: Mod: HCNC | Performed by: NURSE ANESTHETIST, CERTIFIED REGISTERED

## 2023-05-09 PROCEDURE — E9220 PRA ENDO ANESTHESIA: ICD-10-PCS | Mod: PT,HCNC,, | Performed by: NURSE ANESTHETIST, CERTIFIED REGISTERED

## 2023-05-09 PROCEDURE — 37000008 HC ANESTHESIA 1ST 15 MINUTES: Mod: HCNC | Performed by: COLON & RECTAL SURGERY

## 2023-05-09 PROCEDURE — G0105 COLORECTAL SCRN; HI RISK IND: HCPCS | Mod: ,,, | Performed by: COLON & RECTAL SURGERY

## 2023-05-09 PROCEDURE — 37000009 HC ANESTHESIA EA ADD 15 MINS: Mod: HCNC | Performed by: COLON & RECTAL SURGERY

## 2023-05-09 PROCEDURE — G0105 COLORECTAL SCRN; HI RISK IND: ICD-10-PCS | Mod: ,,, | Performed by: COLON & RECTAL SURGERY

## 2023-05-09 PROCEDURE — 63600175 PHARM REV CODE 636 W HCPCS: Mod: HCNC | Performed by: NURSE ANESTHETIST, CERTIFIED REGISTERED

## 2023-05-09 PROCEDURE — G0105 COLORECTAL SCRN; HI RISK IND: HCPCS | Performed by: COLON & RECTAL SURGERY

## 2023-05-09 RX ORDER — DIPHENHYDRAMINE HYDROCHLORIDE 50 MG/ML
INJECTION INTRAMUSCULAR; INTRAVENOUS
Status: DISCONTINUED | OUTPATIENT
Start: 2023-05-09 | End: 2023-05-09

## 2023-05-09 RX ORDER — PROPOFOL 10 MG/ML
VIAL (ML) INTRAVENOUS CONTINUOUS PRN
Status: DISCONTINUED | OUTPATIENT
Start: 2023-05-09 | End: 2023-05-09

## 2023-05-09 RX ORDER — LIDOCAINE HYDROCHLORIDE 20 MG/ML
INJECTION INTRAVENOUS
Status: DISCONTINUED | OUTPATIENT
Start: 2023-05-09 | End: 2023-05-09

## 2023-05-09 RX ORDER — PROPOFOL 10 MG/ML
VIAL (ML) INTRAVENOUS
Status: DISCONTINUED | OUTPATIENT
Start: 2023-05-09 | End: 2023-05-09

## 2023-05-09 RX ADMIN — LIDOCAINE HYDROCHLORIDE 50 MG: 20 INJECTION INTRAVENOUS at 08:05

## 2023-05-09 RX ADMIN — Medication 160 MCG/KG/MIN: at 08:05

## 2023-05-09 RX ADMIN — PROPOFOL 75 MG: 10 INJECTION, EMULSION INTRAVENOUS at 08:05

## 2023-05-09 RX ADMIN — DIPHENHYDRAMINE HYDROCHLORIDE 25 MG: 50 INJECTION, SOLUTION INTRAMUSCULAR; INTRAVENOUS at 08:05

## 2023-05-09 RX ADMIN — SODIUM CHLORIDE: 0.9 INJECTION, SOLUTION INTRAVENOUS at 08:05

## 2023-05-09 NOTE — ANESTHESIA PREPROCEDURE EVALUATION
"                                                                                                             05/09/2023  Tanesha Davis is a 72 y.o., female.  Past Medical History:   Diagnosis Date    Arthritis     Draining cutaneous sinus tract     Headache(784.0)     Incisional hernia     Insomnia     Nocturnal leg cramps     Nonhealing surgical wound     PONV (postoperative nausea and vomiting)     with last surgery per patient, "Severe" PONV 2-27-14    Postmenopausal status     Rectosigmoid cancer     Small bowel obstruction 11/8/2022     Past Surgical History:   Procedure Laterality Date    BREAST BIOPSY Left     CHOLECYSTECTOMY  02/24/2014    with incisional hernia repair with mesh    COLONOSCOPY N/A 8/23/2016    Procedure: COLONOSCOPY;  Surgeon: Cuco Meraz MD;  [facility]  Service: Endoscopy;  Laterality: N/A;    COLONOSCOPY N/A 12/9/2019    Procedure: COLONOSCOPY;  Surgeon: KISHORE Curry MD;  [facility]  Service: Endoscopy;  Laterality: N/A;  ADHESIVE Allergy    ESOPHAGOGASTRODUODENOSCOPY N/A 4/22/2021    Procedure: EGD (ESOPHAGOGASTRODUODENOSCOPY);  Surgeon: Lukasz Lindquist MD;  [facility]  Service: Endoscopy;  Laterality: N/A;  3[place]25[place] 7[place] [facility] [place] getting results to us to see if correct form of test-MS    HYSTERECTOMY      INJECTION OF ANESTHETIC AGENT AROUND NERVE Right 6/28/2022    Procedure: BLOCK, NERVE, RIGHT L2-L3-L4-L5;  Surgeon: Shabnam Skaggs MD;  Location: [facility] PAIN MGT;  Service: Pain Management;  Laterality: Right;    INJECTION OF ANESTHETIC AGENT AROUND NERVE Right 8/2/2022    Procedure: BLOCK, NERVE, RIGHT L2-L5 MEDIAL BRANCH;  Surgeon: Shabnam Skaggs MD;  Location: [facility] PAIN MGT;  Service: Pain Management;  Laterality: Right;    KNEE ARTHROSCOPY      KNEE SURGERY      right and left knee repair    OOPHORECTOMY      RADIOFREQUENCY ABLATION Right 9/27/2022    Procedure: " Radiofrequency Ablation Right L2-L5;  Surgeon: Shabnam Skaggs MD;  [facility]  Service: Pain Management;  Laterality: Right;    REPAIR OF RECURRENT INCISIONAL HERNIA  7/10/2018    Procedure: REPAIR, HERNIA, INCISIONAL, RECURRENT;  Surgeon: Cuco Meraz MD;  [facility];  Service: Colon and Rectal;;    SIGMOIDECTOMY  03/20/2006    LAR         Pre-op Assessment    I have reviewed the Patient Summary Reports.     I have reviewed the Nursing Notes. I have reviewed the NPO Status.   I have reviewed the Medications.     Review of Systems  Anesthesia Hx:  No problems with previous Anesthesia  Denies Family Hx of Anesthesia complications.   Denies Personal Hx of Anesthesia complications.   Hematology/Oncology:  Hematology Normal        Cardiovascular:  Cardiovascular Normal     Hepatic/GI:   Bowel Prep.    Musculoskeletal:   Arthritis     Neurological:  Neurology Normal Headaches    Dermatological:  Skin Normal        Physical Exam  General: Well nourished    Airway:  Mallampati: II   Mouth Opening: Normal  TM Distance: Normal  Tongue: Normal  Neck ROM: Normal ROM    Dental:  Intact        Anesthesia Plan  Type of Anesthesia, risks & benefits discussed:    Anesthesia Type: Gen Natural Airway  Intra-op Monitoring Plan: Standard ASA Monitors  Informed Consent: Informed consent signed with the Patient and all parties understand the risks and agree with anesthesia plan.  All questions answered.   ASA Score: 2  Day of Surgery Review of History & Physical: H&P Update referred to the surgeon/provider.I have interviewed and examined the patient. I have reviewed the patient's H&P dated: There are no significant changes.     Ready For Surgery From Anesthesia Perspective.     .

## 2023-05-09 NOTE — TRANSFER OF CARE
"Anesthesia Transfer of Care Note    Patient: Tanesha Davis    Procedure(s) Performed: Procedure(s) (LRB):  COLONOSCOPY (N/A)    Patient location: GI    Anesthesia Type: general    Transport from OR: Transported from OR on room air with adequate spontaneous ventilation    Post pain: adequate analgesia    Post assessment: no apparent anesthetic complications    Post vital signs: stable    Level of consciousness: awake    Nausea/Vomiting: no nausea/vomiting    Complications: none    Transfer of care protocol was followed      Last vitals:   Visit Vitals  BP (!) 112/56 (BP Location: Left arm, Patient Position: Lying)   Pulse 77   Temp 36.6 °C (97.8 °F) (Temporal)   Resp 16   Ht 5' 5" (1.651 m)   Wt 59.9 kg (132 lb)   SpO2 97%   Breastfeeding No   BMI 21.97 kg/m²     "

## 2023-05-09 NOTE — PROVATION PATIENT INSTRUCTIONS
Discharge Summary/Instructions after an Endoscopic Procedure  Patient Name: Tanesha Davis  Patient MRN: [id number]  Patient YOB: 1950  Tuesday, May 9, 2023  Cuco Curry MD  Dear patient,  As a result of recent federal legislation (The Federal Cures Act), you may   receive lab or pathology results from your procedure in your [facility]   account before your physician is able to contact you. Your physician or   their representative will relay the results to you with their   recommendations at their soonest availability.  Thank you,  RESTRICTIONS:  During your procedure today, you received medications for sedation.  These   medications may affect your judgment, balance and coordination.  Therefore,   for 24 hours, you have the following restrictions:   - DO NOT drive a car, operate machinery, make legal/financial decisions,   sign important papers or drink alcohol.    ACTIVITY:  Today: no heavy lifting, straining or running due to procedural   sedation/anesthesia.  The following day: return to full activity including work.  DIET:  Eat and drink normally unless instructed otherwise.     TREATMENT FOR COMMON SIDE EFFECTS:  - Mild abdominal pain, nausea, belching, bloating or excessive gas:  rest,   eat lightly and use a heating pad.  - Sore Throat: treat with throat lozenges and/or gargle with warm salt   water.  - Because air was used during the procedure, expelling large amounts of air   from your rectum or belching is normal.  - If a bowel prep was taken, you may not have a bowel movement for 1-3 days.    This is normal.  SYMPTOMS TO WATCH FOR AND REPORT TO YOUR PHYSICIAN:  1. Abdominal pain or bloating, other than gas cramps.  2. Chest pain.  3. Back pain.  4. Signs of infection such as: chills or fever occurring within 24 hours   after the procedure.  5. Rectal bleeding, which would show as bright red, maroon, or black stools.   (A tablespoon of blood from the rectum is not serious, especially if    hemorrhoids are present.)  6. Vomiting.  7. Weakness or dizziness.  GO DIRECTLY TO THE NEAREST EMERGENCY ROOM IF YOU HAVE ANY OF THE FOLLOWING:      Difficulty breathing              Chills and/or fever over 101 F   Persistent vomiting and/or vomiting blood   Severe abdominal pain   Severe chest pain   Black, tarry stools   Bleeding- more than one tablespoon   Any other symptom or condition that you feel may need urgent attention  Your doctor recommends these additional instructions:  If any biopsies were taken, your doctors clinic will contact you in 1 to 2   weeks with any results.  - Discharge patient to home (ambulatory).   - Patient has a contact number available for emergencies.  The signs and   symptoms of potential delayed complications were discussed with the   patient.  Return to normal activities tomorrow.  Written discharge   instructions were provided to the patient.   - Resume previous diet.   - Continue present medications.   - Repeat colonoscopy in 3 years for surveillance.  For questions, problems or results please call your physician - Cuco Curry MD at Work:  [phone].  [facility], EMERGENCY ROOM PHONE NUMBER: [phone]  IF A COMPLICATION OR EMERGENCY SITUATION ARISES AND YOU ARE UNABLE TO REACH   YOUR PHYSICIAN - GO DIRECTLY TO THE EMERGENCY ROOM.  Cuco Curry MD  5/9/2023 8:38:16 AM  This report has been verified and signed electronically.  Dear patient,  As a result of recent federal legislation (The Federal Cures Act), you may   receive lab or pathology results from your procedure in your [facility]   account before your physician is able to contact you. Your physician or   their representative will relay the results to you with their   recommendations at their soonest availability.  Thank you,  PROVATION

## 2023-05-10 NOTE — ANESTHESIA POSTPROCEDURE EVALUATION
Anesthesia Post Evaluation    Patient: Tanesha Davis    Procedure(s) Performed: Procedure(s) (LRB):  COLONOSCOPY (N/A)    Final Anesthesia Type: general      Patient location during evaluation: GI PACU  Patient participation: Yes- Able to Participate  Level of consciousness: awake and alert  Post-procedure vital signs: reviewed and stable  Pain management: adequate  Airway patency: patent    PONV status at discharge: No PONV  Anesthetic complications: no      Cardiovascular status: stable  Respiratory status: spontaneous ventilation and face mask  Hydration status: euvolemic  Follow-up not needed.          Vitals Value Taken Time   /58 05/09/23 0900   Temp 36.6 °C (97.8 °F) 05/09/23 0838   Pulse 70 05/09/23 0900   Resp 18 05/09/23 0900   SpO2 97 % 05/09/23 0900         Event Time   Out of Recovery 09:24:22         Pain/Alondra Score: Alondra Score: 10 (5/9/2023  9:00 AM)

## 2023-05-12 ENCOUNTER — LAB VISIT (OUTPATIENT)
Dept: LAB | Facility: HOSPITAL | Age: 73
End: 2023-05-12
Attending: SURGERY
Payer: MEDICARE

## 2023-05-12 ENCOUNTER — TELEPHONE (OUTPATIENT)
Dept: INTERVENTIONAL RADIOLOGY/VASCULAR | Facility: HOSPITAL | Age: 73
End: 2023-05-12
Payer: MEDICARE

## 2023-05-12 DIAGNOSIS — K43.6 VENTRAL HERNIA WITH OBSTRUCTION AND WITHOUT GANGRENE: ICD-10-CM

## 2023-05-12 LAB
ANION GAP SERPL CALC-SCNC: 5 MMOL/L (ref 8–16)
BASOPHILS # BLD AUTO: 0.06 K/UL (ref 0–0.2)
BASOPHILS NFR BLD: 1.2 % (ref 0–1.9)
BUN SERPL-MCNC: 8 MG/DL (ref 8–23)
CALCIUM SERPL-MCNC: 9.3 MG/DL (ref 8.7–10.5)
CHLORIDE SERPL-SCNC: 105 MMOL/L (ref 95–110)
CO2 SERPL-SCNC: 31 MMOL/L (ref 23–29)
CREAT SERPL-MCNC: 0.7 MG/DL (ref 0.5–1.4)
DIFFERENTIAL METHOD: ABNORMAL
EOSINOPHIL # BLD AUTO: 0.2 K/UL (ref 0–0.5)
EOSINOPHIL NFR BLD: 3 % (ref 0–8)
ERYTHROCYTE [DISTWIDTH] IN BLOOD BY AUTOMATED COUNT: 13.1 % (ref 11.5–14.5)
EST. GFR  (NO RACE VARIABLE): >60 ML/MIN/1.73 M^2
GLUCOSE SERPL-MCNC: 73 MG/DL (ref 70–110)
HCT VFR BLD AUTO: 40.1 % (ref 37–48.5)
HGB BLD-MCNC: 12.3 G/DL (ref 12–16)
IMM GRANULOCYTES # BLD AUTO: 0.01 K/UL (ref 0–0.04)
IMM GRANULOCYTES NFR BLD AUTO: 0.2 % (ref 0–0.5)
LYMPHOCYTES # BLD AUTO: 1.4 K/UL (ref 1–4.8)
LYMPHOCYTES NFR BLD: 27.1 % (ref 18–48)
MCH RBC QN AUTO: 28.6 PG (ref 27–31)
MCHC RBC AUTO-ENTMCNC: 30.7 G/DL (ref 32–36)
MCV RBC AUTO: 93 FL (ref 82–98)
MONOCYTES # BLD AUTO: 0.4 K/UL (ref 0.3–1)
MONOCYTES NFR BLD: 7.4 % (ref 4–15)
NEUTROPHILS # BLD AUTO: 3.1 K/UL (ref 1.8–7.7)
NEUTROPHILS NFR BLD: 61.1 % (ref 38–73)
NRBC BLD-RTO: 0 /100 WBC
PLATELET # BLD AUTO: 211 K/UL (ref 150–450)
PMV BLD AUTO: 10.7 FL (ref 9.2–12.9)
POTASSIUM SERPL-SCNC: 3.7 MMOL/L (ref 3.5–5.1)
RBC # BLD AUTO: 4.3 M/UL (ref 4–5.4)
SODIUM SERPL-SCNC: 141 MMOL/L (ref 136–145)
WBC # BLD AUTO: 4.99 K/UL (ref 3.9–12.7)

## 2023-05-12 PROCEDURE — 85025 COMPLETE CBC W/AUTO DIFF WBC: CPT | Mod: HCNC | Performed by: SURGERY

## 2023-05-12 PROCEDURE — 80048 BASIC METABOLIC PNL TOTAL CA: CPT | Mod: HCNC | Performed by: SURGERY

## 2023-05-12 PROCEDURE — 36415 COLL VENOUS BLD VENIPUNCTURE: CPT | Mod: HCNC | Performed by: SURGERY

## 2023-05-15 ENCOUNTER — HOSPITAL ENCOUNTER (OUTPATIENT)
Dept: INTERVENTIONAL RADIOLOGY/VASCULAR | Facility: HOSPITAL | Age: 73
Discharge: HOME | End: 2023-05-15
Admitting: RADIOLOGY
Payer: MEDICARE

## 2023-05-15 VITALS
SYSTOLIC BLOOD PRESSURE: 127 MMHG | HEIGHT: 65 IN | OXYGEN SATURATION: 97 % | TEMPERATURE: 98 F | RESPIRATION RATE: 16 BRPM | BODY MASS INDEX: 26.66 KG/M2 | DIASTOLIC BLOOD PRESSURE: 25 MMHG | WEIGHT: 160 LBS | HEART RATE: 57 BPM

## 2023-05-15 DIAGNOSIS — K43.6 VENTRAL HERNIA WITH OBSTRUCTION AND WITHOUT GANGRENE: ICD-10-CM

## 2023-05-15 PROCEDURE — 64646 CHEMODENERV TRUNK MUSC 1-5: CPT | Performed by: RADIOLOGY

## 2023-05-15 PROCEDURE — 63600175 PHARM REV CODE 636 W HCPCS: Performed by: RADIOLOGY

## 2023-05-15 PROCEDURE — A4215 STERILE NEEDLE: HCPCS

## 2023-05-15 PROCEDURE — 27200940 IR ULTRASOUND GUIDANCE

## 2023-05-15 PROCEDURE — 64646 IR ULTRASOUND GUIDANCE: ICD-10-PCS | Mod: ,,, | Performed by: RADIOLOGY

## 2023-05-15 RX ORDER — SODIUM CHLORIDE 9 MG/ML
INJECTION, SOLUTION INTRAVENOUS CONTINUOUS
Status: DISCONTINUED | OUTPATIENT
Start: 2023-05-15 | End: 2023-05-16 | Stop reason: HOSPADM

## 2023-05-15 RX ORDER — MIDAZOLAM HYDROCHLORIDE 1 MG/ML
INJECTION INTRAMUSCULAR; INTRAVENOUS
Status: COMPLETED | OUTPATIENT
Start: 2023-05-15 | End: 2023-05-15

## 2023-05-15 RX ORDER — LIDOCAINE HYDROCHLORIDE 10 MG/ML
1 INJECTION, SOLUTION EPIDURAL; INFILTRATION; INTRACAUDAL; PERINEURAL ONCE
Status: DISCONTINUED | OUTPATIENT
Start: 2023-05-15 | End: 2023-05-16 | Stop reason: HOSPADM

## 2023-05-15 RX ORDER — FENTANYL CITRATE 50 UG/ML
INJECTION, SOLUTION INTRAMUSCULAR; INTRAVENOUS
Status: COMPLETED | OUTPATIENT
Start: 2023-05-15 | End: 2023-05-15

## 2023-05-15 RX ADMIN — MIDAZOLAM HYDROCHLORIDE 1 MG: 1 INJECTION INTRAMUSCULAR; INTRAVENOUS at 10:05

## 2023-05-15 RX ADMIN — FENTANYL CITRATE 25 MCG: 50 INJECTION, SOLUTION INTRAMUSCULAR; INTRAVENOUS at 11:05

## 2023-05-15 RX ADMIN — ONABOTULINUMTOXINA 300 UNITS: 100 INJECTION, POWDER, LYOPHILIZED, FOR SOLUTION INTRADERMAL; INTRAMUSCULAR at 11:05

## 2023-05-15 RX ADMIN — MIDAZOLAM HYDROCHLORIDE 0.5 MG: 1 INJECTION INTRAMUSCULAR; INTRAVENOUS at 11:05

## 2023-05-15 RX ADMIN — FENTANYL CITRATE 50 MCG: 50 INJECTION, SOLUTION INTRAMUSCULAR; INTRAVENOUS at 10:05

## 2023-05-15 NOTE — H&P
"Vascular and Interventional Radiology History & Physical    Date:  5/15/2023    Chief Complaint:   Ventral abdominal wall hernia    History of Present Illness:  Tanesha Davis is a 72 y.o. female who presents for botox injections into bilateral muscle layers surrounding ventral abdominal wall hernia.     Past Medical History:  Past Medical History:   Diagnosis Date    Arthritis     Draining cutaneous sinus tract     Headache(784.0)     Incisional hernia     Insomnia     Nocturnal leg cramps     Nonhealing surgical wound     PONV (postoperative nausea and vomiting)     with last surgery per patient, "Severe" PONV 2-27-14    Postmenopausal status     Rectosigmoid cancer     Small bowel obstruction 11/8/2022       Past Surgical History:  Past Surgical History:   Procedure Laterality Date    BREAST BIOPSY Left     CHOLECYSTECTOMY  02/24/2014    with incisional hernia repair with mesh    COLONOSCOPY N/A 8/23/2016    Procedure: COLONOSCOPY;  Surgeon: Cuco Meraz MD;  [facility]  Service: Endoscopy;  Laterality: N/A;    COLONOSCOPY N/A 12/9/2019    Procedure: COLONOSCOPY;  Surgeon: KISHORE Curry MD;  [facility]  Service: Endoscopy;  Laterality: N/A;  ADHESIVE Allergy    COLONOSCOPY N/A 5/9/2023    Procedure: COLONOSCOPY;  Surgeon: KISHORE Curry MD;  [facility]  Service: Endoscopy;  Laterality: N/A;  Instructions to portal.EC  precall no answer 5/3 EB    ESOPHAGOGASTRODUODENOSCOPY N/A 4/22/2021    Procedure: EGD (ESOPHAGOGASTRODUODENOSCOPY);  Surgeon: Lukasz Lindquist MD;  [facility]  Service: Endoscopy;  Laterality: N/A;  3/25[place] [facility] to see if correct form of test-MS    HYSTERECTOMY      INJECTION OF ANESTHETIC AGENT AROUND NERVE Right 6/28/2022    Procedure: BLOCK, NERVE, RIGHT L2-L3-L4-L5;  Surgeon: Shabnam Skaggs MD;  Location: [facility];  Service: Pain Management;  Laterality: " Right;    INJECTION OF ANESTHETIC AGENT AROUND NERVE Right 8/2/2022    Procedure: BLOCK, NERVE, RIGHT L2-L5 MEDIAL BRANCH;  Surgeon: Shabnam Skaggs MD;  Location: [facility] [place];  Service: Pain Management;  Laterality: Right;    KNEE ARTHROSCOPY      KNEE SURGERY      right and left knee repair    OOPHORECTOMY      RADIOFREQUENCY ABLATION Right 9/27/2022    Procedure: Radiofrequency Ablation Right L2-L5;  Surgeon: Shabnam Skaggs MD;  Location: [facility] [place], operated [facility]IN [facility];  Service: Pain Management;  Laterality: Right;    REPAIR OF RECURRENT INCISIONAL HERNIA  7/10/2018    Procedure: REPAIR, HERNIA, INCISIONAL, RECURRENT;  Surgeon: Cuco Meraz MD;  [facility];  Service: Colon and Rectal;;    SIGMOIDECTOMY  03/20/2006    LAR        Sedation History:    Denies any adverse reactions.  Denies problems laying flat.    Social History:  Social History     Tobacco Use    Smoking status: Never    Smokeless tobacco: Never   Substance Use Topics    Alcohol use: Yes     Alcohol/week: 1.0 standard drink     Types: 1 Glasses of wine per week     Comment: socially    Drug use: Never        Home Medications:   Prior to Admission medications    Medication Sig Start Date End Date Taking? Authorizing Provider   amitriptyline (ELAVIL) 25 MG tablet Take 1 tablet (25 mg total) by mouth every evening. 2/27/23 2/27/24 Yes Willam Duque MD   atorvastatin (LIPITOR) 40 MG tablet Take 1 tablet (40 mg total) by mouth once daily. 11/7/22 11/7/23 Yes Caleb Hernandez MD   ERGOCALCIFEROL, VITAMIN D2, (VITAMIN D ORAL) Take 1 capsule by mouth every morning.    Yes Historical Provider   estradioL (ESTRACE) 0.5 MG tablet TAKE 1 TABLET BY MOUTH ONCE DAILY. 2/2/23  Yes Zuleika Elmore PA-C   ferrous sulfate 325 (65 FE) MG EC tablet TAKE 1 TABLET BY MOUTH EVERY DAY 4/12/21  Yes Caleb Hernandez MD   L GASSERI/B BIFIDUM/B LONGUM ([facility] HEALTH ORAL) Take 1 tablet by mouth 2 (two) times daily.   Yes Historical Provider   VITAMIN  "E ACETATE (VITAMIN E ORAL) Take 1 capsule by mouth every morning.    Yes Historical Provider   linaCLOtide (LINZESS) 72 mcg Cap capsule Take 1 capsule (72 mcg total) by mouth once daily. 4/6/23   Willam Duque MD   magnesium oxide-pyridoxine (BEELITH) 362-20 mg Tab Take 1 tablet by mouth once daily. 11/7/16   Caleb Hernandez MD       Inpatient Medications:    Current Outpatient Medications:     amitriptyline (ELAVIL) 25 MG tablet, Take 1 tablet (25 mg total) by mouth every evening., Disp: 30 tablet, Rfl: 11    atorvastatin (LIPITOR) 40 MG tablet, Take 1 tablet (40 mg total) by mouth once daily., Disp: 90 tablet, Rfl: 3    ERGOCALCIFEROL, VITAMIN D2, (VITAMIN D ORAL), Take 1 capsule by mouth every morning. , Disp: , Rfl:     estradioL (ESTRACE) 0.5 MG tablet, TAKE 1 TABLET BY MOUTH ONCE DAILY., Disp: 90 tablet, Rfl: 4    ferrous sulfate 325 (65 FE) MG EC tablet, TAKE 1 TABLET BY MOUTH EVERY DAY, Disp: 90 tablet, Rfl: 3    L GASSERI/B BIFIDUM/B LONGUM ([facility] ORAL), Take 1 tablet by mouth 2 (two) times daily., Disp: , Rfl:     VITAMIN E ACETATE (VITAMIN E ORAL), Take 1 capsule by mouth every morning. , Disp: , Rfl:     linaCLOtide (LINZESS) 72 mcg Cap capsule, Take 1 capsule (72 mcg total) by mouth once daily., Disp: 30 capsule, Rfl: 5    magnesium oxide-pyridoxine (BEELITH) 362-20 mg Tab, Take 1 tablet by mouth once daily., Disp: 90 tablet, Rfl: 1    Current Facility-Administered Medications:     0.9%  NaCl infusion, , Intravenous, Continuous, Kaylah Palmer NP    LIDOcaine (PF) 10 mg/ml (1%) injection 10 mg, 1 mL, Other, Once, Kaylah Palmer NP    Facility-Administered Medications Ordered in Other Encounters:     0.9%  NaCl infusion, 500 mL, Intravenous, Continuous, Ray Vieira MD     Anticoagulants/Antiplatelets:   no anticoagulation    Allergies:   Review of patient's allergies indicates:   Allergen Reactions    Adhesive Rash     Asking that we use Paper Tape,  Uses "Sensitive " "Skin" band aids    Amoxil [amoxicillin] Hives    Penicillins Hives and Swelling     Swelling of ears and neck    Sulfa (sulfonamide antibiotics) Hives and Itching       Review of Systems:   As documented in primary provider H&P.    Vital Signs (Most Recent):  Temp: 97.9 °F (36.6 °C) (05/15/23 0900)  Pulse: 64 (05/15/23 0900)  Resp: 16 (05/15/23 0900)  BP: (!) 120/57 (05/15/23 0915)  SpO2: 97 % (05/15/23 0900)    Physical Exam:  No acute distress, laying comfortably in bed, pleasant and cooperative  Regular rate and rhythm  Breathing unlabored  Abdomen benign  Extremities warm and well perfused    Sedation Exam:  ASA: II - Patient appears to have mild systemic disease, adequately controlled   Mallampati: II (hard and soft palate, upper portion of tonsils anduvula visible)     Laboratory:  Lab Results   Component Value Date    INR 1.0 02/27/2014       Lab Results   Component Value Date    WBC 4.99 05/12/2023    HGB 12.3 05/12/2023    HCT 40.1 05/12/2023    MCV 93 05/12/2023     05/12/2023      Lab Results   Component Value Date    GLU 73 05/12/2023     05/12/2023    K 3.7 05/12/2023     05/12/2023    CO2 31 (H) 05/12/2023    BUN 8 05/12/2023    CREATININE 0.7 05/12/2023    CALCIUM 9.3 05/12/2023    MG 1.8 02/01/2023    ALT 13 01/31/2023    AST 17 01/31/2023    ALBUMIN 4.1 01/31/2023    BILITOT 0.7 01/31/2023       Imaging:  Reviewed.      ASSESSMENT/PLAN:                     Sedation Plan: Up to moderate  Patient will undergo: Botox injections into bilateral muscle layers surrounding ventral abdominal wall hernia.     Valdo Jaramillo MD  Radiology PGY-5   "

## 2023-05-15 NOTE — PLAN OF CARE
Pt arrived to 190 for Soft tissue injection. Pt oriented to unit and staff. Plan of care reviewed with patient, patient verbalizes understanding. Comfort measures utilized. Pt safely transferred from stretcher to procedural table. Fall risk reviewed with patient, fall risk interventions maintained. Safety strap applied, positioner pillows utilized to minimize pressure points. Blankets applied. Pt prepped and draped utilizing standard sterile technique. Patient placed on continuous monitoring, as required by sedation policy. Timeouts completed utilizing standard universal time-out, per department and facility policy. RN to remain at bedside, continuous monitoring maintained. Pt resting comfortably. Denies pain/discomfort. Will continue to monitor. See flow sheets for monitoring, medication administration, and updates.

## 2023-05-15 NOTE — NURSING
Procedure recovery complete. Patient is awake and alert. VSS. Patient denies pain. Patient verbalizes understanding of discharge instructions including when to call the doctor. PIV removed. Patient to be discharged via wheelchair accompanied by patient transport.

## 2023-05-15 NOTE — DISCHARGE SUMMARY
Radiology Discharge Summary      Hospital Course: No complications    Admit Date: 5/15/2023  Discharge Date: 05/15/2023     Instructions Given to Patient: Yes  Diet: Resume prior diet  Activity: activity as tolerated    Description of Condition on Discharge: Stable  Vital Signs (Most Recent): Temp: 97.9 °F (36.6 °C) (05/15/23 0900)  Pulse: 64 (05/15/23 1130)  Resp: 15 (05/15/23 1130)  BP: (!) 119/56 (05/15/23 1130)  SpO2: 99 % (05/15/23 1130)    Discharge Disposition: Home    Discharge Diagnosis: Complex hernia s/p Botox injection     Follow-up: ALPA Mello MD  Diagnostic and Interventional Radiologist  Department of Radiology  Pager: [phone]

## 2023-05-15 NOTE — PLAN OF CARE
Procedure complete. Pt tolerated well. Recovery for 1 hour. VSS. Site CDI. Pt transferred to MPU bay 7 and report to be given bedside.

## 2023-05-15 NOTE — PROCEDURES
Radiology Post-Procedure Note    Pre Op Diagnosis: Complex ventral hernia  Post Op Diagnosis: Same    Procedure: US guided abdominal wall muscle Botox injection    Procedure performed by: Jorge Mello MD    Written Informed Consent Obtained: Yes  Specimen Removed: NO  Estimated Blood Loss: Minimal    Findings:   3 separate injection sites selected along the anterior axillary line on each side. 15units Botox injected in the transversus, IO, and EO muscles at site.  Needles removed.  No complications.    Patient tolerated procedure well.    Jorge Mello MD  Diagnostic and Interventional Radiologist  Department of Radiology  Pager: [phone]

## 2023-05-15 NOTE — DISCHARGE INSTRUCTIONS
Please call with any questions or concerns.      Monday thru Friday 8:00 am - 4:30 pm    Interventional Radiology   [phone]    After Hours    Ask for the Radiology Resident on call  [phone]

## 2023-06-09 ENCOUNTER — ANESTHESIA EVENT (OUTPATIENT)
Dept: SURGERY | Facility: HOSPITAL | Age: 73
DRG: 336 | End: 2023-06-09
Payer: MEDICARE

## 2023-06-09 ENCOUNTER — TELEPHONE (OUTPATIENT)
Dept: SURGERY | Facility: CLINIC | Age: 73
End: 2023-06-09
Payer: MEDICARE

## 2023-06-09 NOTE — PRE-PROCEDURE INSTRUCTIONS
PREOP INSTRUCTIONS:  No food,milk or milk products for 8 hours before surgery.  Clear liquids like water,gatorade,apple juice are allowed up until 2 hours before surgery.  Instructed to follow the surgeon's instructions if they differ from these.  Shower instructions as well as directions to the Surgery Center were given.  Encouraged to wear loose fitting,comfortable clothing.  Medication instructions for pm prior to and am of procedure reviewed.  Instructed to avoid taking vitamins,supplements,aspirin and ibuprofen the morning of surgery.    Patient denies any side effects or issues with anesthesia or sedation other than PONV

## 2023-06-09 NOTE — ANESTHESIA PREPROCEDURE EVALUATION
06/09/2023  Tanesha Davis is a 72 y.o., female.      Pre-op Assessment    I have reviewed the Patient Summary Reports.     I have reviewed the Nursing Notes.    I have reviewed the Medications.     Review of Systems  Anesthesia Hx:  No problems with previous Anesthesia  History of prior surgery of interest to airway management or planning: Denies Family Hx of Anesthesia complications.  Personal Hx of Anesthesia complications, Post-Operative Nausea/Vomiting   Social:  Non-Smoker    Hematology/Oncology:  Hematology Normal   Oncology Normal     EENT/Dental:EENT/Dental Normal   Cardiovascular:  Cardiovascular Normal     Pulmonary:  Pulmonary Normal    Renal/:  Renal/ Normal     Hepatic/GI:  Hepatic/GI Normal    Musculoskeletal:   Arthritis     Neurological:  Neurology Normal    Endocrine:  Endocrine Normal    Psych:  Psychiatric Normal           Physical Exam  General: Well nourished and Cooperative    Airway:  Mallampati: II   Mouth Opening: Normal  TM Distance: Normal  Tongue: Normal  Neck ROM: Normal ROM    Dental:  Intact    Chest/Lungs:  Clear to auscultation, Normal Respiratory Rate    Heart:  Rate: Normal  Rhythm: Regular Rhythm  Sounds: Normal        Anesthesia Plan  Type of Anesthesia, risks & benefits discussed:    Anesthesia Type: Gen ETT  Intra-op Monitoring Plan: Standard ASA Monitors  Post Op Pain Control Plan: multimodal analgesia  Induction:  IV  Airway Plan: , Post-Induction  Informed Consent: Informed consent signed with the Patient and all parties understand the risks and agree with anesthesia plan.  All questions answered.   ASA Score: 2  Day of Surgery Review of History & Physical: H&P Update referred to the surgeon/provider.    Ready For Surgery From Anesthesia Perspective.     .

## 2023-06-12 ENCOUNTER — HOSPITAL ENCOUNTER (INPATIENT)
Facility: HOSPITAL | Age: 73
LOS: 5 days | Discharge: HOME | DRG: 336 | End: 2023-06-17
Attending: SURGERY | Admitting: SURGERY
Payer: MEDICARE

## 2023-06-12 ENCOUNTER — ANESTHESIA (OUTPATIENT)
Dept: SURGERY | Facility: HOSPITAL | Age: 73
DRG: 336 | End: 2023-06-12
Payer: MEDICARE

## 2023-06-12 DIAGNOSIS — K43.6 VENTRAL HERNIA WITH OBSTRUCTION AND WITHOUT GANGRENE: ICD-10-CM

## 2023-06-12 DIAGNOSIS — K43.2 VENTRAL INCISIONAL HERNIA: Primary | ICD-10-CM

## 2023-06-12 LAB
BASOPHILS # BLD AUTO: 0.02 K/UL (ref 0–0.2)
BASOPHILS NFR BLD: 0.2 % (ref 0–1.9)
CREAT SERPL-MCNC: 0.7 MG/DL (ref 0.5–1.4)
DIFFERENTIAL METHOD: ABNORMAL
EOSINOPHIL # BLD AUTO: 0 K/UL (ref 0–0.5)
EOSINOPHIL NFR BLD: 0 % (ref 0–8)
ERYTHROCYTE [DISTWIDTH] IN BLOOD BY AUTOMATED COUNT: 13 % (ref 11.5–14.5)
EST. GFR  (NO RACE VARIABLE): >60 ML/MIN/1.73 M^2
HCT VFR BLD AUTO: 37.6 % (ref 37–48.5)
HGB BLD-MCNC: 12 G/DL (ref 12–16)
IMM GRANULOCYTES # BLD AUTO: 0.03 K/UL (ref 0–0.04)
IMM GRANULOCYTES NFR BLD AUTO: 0.3 % (ref 0–0.5)
INR PPP: 1 (ref 0.8–1.2)
LYMPHOCYTES # BLD AUTO: 0.4 K/UL (ref 1–4.8)
LYMPHOCYTES NFR BLD: 3.5 % (ref 18–48)
MCH RBC QN AUTO: 28.6 PG (ref 27–31)
MCHC RBC AUTO-ENTMCNC: 31.9 G/DL (ref 32–36)
MCV RBC AUTO: 90 FL (ref 82–98)
MONOCYTES # BLD AUTO: 0.6 K/UL (ref 0.3–1)
MONOCYTES NFR BLD: 5.7 % (ref 4–15)
NEUTROPHILS # BLD AUTO: 9.7 K/UL (ref 1.8–7.7)
NEUTROPHILS NFR BLD: 90.3 % (ref 38–73)
NRBC BLD-RTO: 0 /100 WBC
PLATELET # BLD AUTO: 194 K/UL (ref 150–450)
PMV BLD AUTO: 10.4 FL (ref 9.2–12.9)
PROTHROMBIN TIME: 10.4 SEC (ref 9–12.5)
RBC # BLD AUTO: 4.19 M/UL (ref 4–5.4)
WBC # BLD AUTO: 10.79 K/UL (ref 3.9–12.7)

## 2023-06-12 PROCEDURE — 15734 MUSCLE-SKIN GRAFT TRUNK: CPT | Mod: 51,,, | Performed by: SURGERY

## 2023-06-12 PROCEDURE — 88302 TISSUE EXAM BY PATHOLOGIST: CPT | Mod: 26,,, | Performed by: PATHOLOGY

## 2023-06-12 PROCEDURE — D9220A PRA ANESTHESIA: Mod: CRNA,,, | Performed by: NURSE ANESTHETIST, CERTIFIED REGISTERED

## 2023-06-12 PROCEDURE — 25000003 PHARM REV CODE 250: Performed by: NURSE ANESTHETIST, CERTIFIED REGISTERED

## 2023-06-12 PROCEDURE — D9220A PRA ANESTHESIA: ICD-10-PCS | Mod: CRNA,,, | Performed by: NURSE ANESTHETIST, CERTIFIED REGISTERED

## 2023-06-12 PROCEDURE — 71000039 HC RECOVERY, EACH ADD'L HOUR: Performed by: SURGERY

## 2023-06-12 PROCEDURE — 88302 PR  SURG PATH,LEVEL II: ICD-10-PCS | Mod: 26,,, | Performed by: PATHOLOGY

## 2023-06-12 PROCEDURE — 94761 N-INVAS EAR/PLS OXIMETRY MLT: CPT

## 2023-06-12 PROCEDURE — 25000003 PHARM REV CODE 250: Performed by: STUDENT IN AN ORGANIZED HEALTH CARE EDUCATION/TRAINING PROGRAM

## 2023-06-12 PROCEDURE — C1781 MESH (IMPLANTABLE): HCPCS | Performed by: SURGERY

## 2023-06-12 PROCEDURE — 88305 TISSUE EXAM BY PATHOLOGIST: CPT | Mod: 26,,, | Performed by: PATHOLOGY

## 2023-06-12 PROCEDURE — 36000707: Performed by: SURGERY

## 2023-06-12 PROCEDURE — 82565 ASSAY OF CREATININE: CPT | Performed by: SURGERY

## 2023-06-12 PROCEDURE — 15734 PR MUSCLE-SKIN FLAP,TRUNK: ICD-10-PCS | Mod: 51,,, | Performed by: SURGERY

## 2023-06-12 PROCEDURE — 71000015 HC POSTOP RECOV 1ST HR: Performed by: SURGERY

## 2023-06-12 PROCEDURE — 88304 TISSUE EXAM BY PATHOLOGIST: CPT | Mod: 59 | Performed by: PATHOLOGY

## 2023-06-12 PROCEDURE — 25000003 PHARM REV CODE 250

## 2023-06-12 PROCEDURE — 11008 PR REMOVE MESH FROM ABD WALL FOR INFECTION: ICD-10-PCS | Mod: 59,,, | Performed by: SURGERY

## 2023-06-12 PROCEDURE — 63600175 PHARM REV CODE 636 W HCPCS: Performed by: NURSE ANESTHETIST, CERTIFIED REGISTERED

## 2023-06-12 PROCEDURE — 37000009 HC ANESTHESIA EA ADD 15 MINS: Performed by: SURGERY

## 2023-06-12 PROCEDURE — 63600175 PHARM REV CODE 636 W HCPCS: Performed by: STUDENT IN AN ORGANIZED HEALTH CARE EDUCATION/TRAINING PROGRAM

## 2023-06-12 PROCEDURE — 63600175 PHARM REV CODE 636 W HCPCS: Performed by: SURGERY

## 2023-06-12 PROCEDURE — 11000001 HC ACUTE MED/SURG PRIVATE ROOM

## 2023-06-12 PROCEDURE — 88305 TISSUE EXAM BY PATHOLOGIST: ICD-10-PCS | Mod: 26,,, | Performed by: PATHOLOGY

## 2023-06-12 PROCEDURE — D9220A PRA ANESTHESIA: Mod: ANES,,, | Performed by: ANESTHESIOLOGY

## 2023-06-12 PROCEDURE — 49617 PR REPAIR ANT ABD HERNIA RECURR OVER 10 CM REDUCIBLE: ICD-10-PCS | Mod: 22,,, | Performed by: SURGERY

## 2023-06-12 PROCEDURE — 27201423 OPTIME MED/SURG SUP & DEVICES STERILE SUPPLY: Performed by: SURGERY

## 2023-06-12 PROCEDURE — C1729 CATH, DRAINAGE: HCPCS | Performed by: SURGERY

## 2023-06-12 PROCEDURE — 11008 RMV PRSTC MTRL/MESH ABD WALL: CPT | Mod: 59,,, | Performed by: SURGERY

## 2023-06-12 PROCEDURE — 63600175 PHARM REV CODE 636 W HCPCS: Performed by: ANESTHESIOLOGY

## 2023-06-12 PROCEDURE — 25000003 PHARM REV CODE 250: Performed by: SURGERY

## 2023-06-12 PROCEDURE — 85610 PROTHROMBIN TIME: CPT | Performed by: FAMILY MEDICINE

## 2023-06-12 PROCEDURE — 49617 RPR AA HRN RCR > 10 RDC: CPT | Mod: 22,,, | Performed by: SURGERY

## 2023-06-12 PROCEDURE — 27000221 HC OXYGEN, UP TO 24 HOURS

## 2023-06-12 PROCEDURE — 88300 SURGICAL PATH GROSS: CPT | Performed by: PATHOLOGY

## 2023-06-12 PROCEDURE — 71000033 HC RECOVERY, INTIAL HOUR: Performed by: SURGERY

## 2023-06-12 PROCEDURE — D9220A PRA ANESTHESIA: ICD-10-PCS | Mod: ANES,,, | Performed by: ANESTHESIOLOGY

## 2023-06-12 PROCEDURE — 37000008 HC ANESTHESIA 1ST 15 MINUTES: Performed by: SURGERY

## 2023-06-12 PROCEDURE — 88302 TISSUE EXAM BY PATHOLOGIST: CPT | Performed by: PATHOLOGY

## 2023-06-12 PROCEDURE — 88305 TISSUE EXAM BY PATHOLOGIST: CPT | Mod: 59 | Performed by: PATHOLOGY

## 2023-06-12 PROCEDURE — 88300 PR  SURG PATH,GROSS,LEVEL I: ICD-10-PCS | Mod: 26,59,, | Performed by: PATHOLOGY

## 2023-06-12 PROCEDURE — 85025 COMPLETE CBC W/AUTO DIFF WBC: CPT | Performed by: FAMILY MEDICINE

## 2023-06-12 PROCEDURE — 71000016 HC POSTOP RECOV ADDL HR: Performed by: SURGERY

## 2023-06-12 PROCEDURE — 36000706: Performed by: SURGERY

## 2023-06-12 PROCEDURE — 88300 SURGICAL PATH GROSS: CPT | Mod: 26,59,, | Performed by: PATHOLOGY

## 2023-06-12 DEVICE — MESH SOFT 12 X12: Type: IMPLANTABLE DEVICE | Site: ABDOMEN | Status: FUNCTIONAL

## 2023-06-12 RX ORDER — ENOXAPARIN SODIUM 100 MG/ML
40 INJECTION SUBCUTANEOUS EVERY 24 HOURS
Status: DISCONTINUED | OUTPATIENT
Start: 2023-06-13 | End: 2023-06-17 | Stop reason: HOSPADM

## 2023-06-12 RX ORDER — MIDAZOLAM HYDROCHLORIDE 1 MG/ML
INJECTION, SOLUTION INTRAMUSCULAR; INTRAVENOUS
Status: DISCONTINUED | OUTPATIENT
Start: 2023-06-12 | End: 2023-06-12

## 2023-06-12 RX ORDER — GABAPENTIN 300 MG/1
300 CAPSULE ORAL 3 TIMES DAILY
Status: DISCONTINUED | OUTPATIENT
Start: 2023-06-12 | End: 2023-06-13

## 2023-06-12 RX ORDER — TALC
6 POWDER (GRAM) TOPICAL NIGHTLY PRN
Status: DISCONTINUED | OUTPATIENT
Start: 2023-06-12 | End: 2023-06-15

## 2023-06-12 RX ORDER — AMITRIPTYLINE HYDROCHLORIDE 25 MG/1
25 TABLET, FILM COATED ORAL NIGHTLY
Status: DISCONTINUED | OUTPATIENT
Start: 2023-06-12 | End: 2023-06-17 | Stop reason: HOSPADM

## 2023-06-12 RX ORDER — SODIUM CHLORIDE 9 MG/ML
INJECTION, SOLUTION INTRAVENOUS CONTINUOUS
Status: DISCONTINUED | OUTPATIENT
Start: 2023-06-12 | End: 2023-06-12

## 2023-06-12 RX ORDER — OXYCODONE HYDROCHLORIDE 10 MG/1
10 TABLET ORAL EVERY 4 HOURS PRN
Status: DISCONTINUED | OUTPATIENT
Start: 2023-06-12 | End: 2023-06-16

## 2023-06-12 RX ORDER — LIDOCAINE HYDROCHLORIDE 20 MG/ML
INJECTION INTRAVENOUS
Status: DISCONTINUED | OUTPATIENT
Start: 2023-06-12 | End: 2023-06-12

## 2023-06-12 RX ORDER — MUPIROCIN 20 MG/G
OINTMENT TOPICAL 2 TIMES DAILY
Status: DISCONTINUED | OUTPATIENT
Start: 2023-06-12 | End: 2023-06-17 | Stop reason: HOSPADM

## 2023-06-12 RX ORDER — ATORVASTATIN CALCIUM 40 MG/1
40 TABLET, FILM COATED ORAL DAILY
Status: DISCONTINUED | OUTPATIENT
Start: 2023-06-13 | End: 2023-06-17 | Stop reason: HOSPADM

## 2023-06-12 RX ORDER — HALOPERIDOL 5 MG/ML
0.5 INJECTION INTRAMUSCULAR EVERY 10 MIN PRN
Status: DISCONTINUED | OUTPATIENT
Start: 2023-06-12 | End: 2023-06-12 | Stop reason: HOSPADM

## 2023-06-12 RX ORDER — DEXAMETHASONE SODIUM PHOSPHATE 4 MG/ML
INJECTION, SOLUTION INTRA-ARTICULAR; INTRALESIONAL; INTRAMUSCULAR; INTRAVENOUS; SOFT TISSUE
Status: DISCONTINUED | OUTPATIENT
Start: 2023-06-12 | End: 2023-06-12

## 2023-06-12 RX ORDER — SODIUM CHLORIDE, SODIUM LACTATE, POTASSIUM CHLORIDE, CALCIUM CHLORIDE 600; 310; 30; 20 MG/100ML; MG/100ML; MG/100ML; MG/100ML
INJECTION, SOLUTION INTRAVENOUS CONTINUOUS
Status: DISCONTINUED | OUTPATIENT
Start: 2023-06-12 | End: 2023-06-14

## 2023-06-12 RX ORDER — MORPHINE SULFATE 2 MG/ML
4 INJECTION, SOLUTION INTRAMUSCULAR; INTRAVENOUS EVERY 4 HOURS PRN
Status: DISCONTINUED | OUTPATIENT
Start: 2023-06-12 | End: 2023-06-16

## 2023-06-12 RX ORDER — FENTANYL CITRATE 50 UG/ML
INJECTION, SOLUTION INTRAMUSCULAR; INTRAVENOUS
Status: DISCONTINUED | OUTPATIENT
Start: 2023-06-12 | End: 2023-06-12

## 2023-06-12 RX ORDER — ACETAMINOPHEN 10 MG/ML
1000 INJECTION, SOLUTION INTRAVENOUS EVERY 8 HOURS
Status: DISCONTINUED | OUTPATIENT
Start: 2023-06-12 | End: 2023-06-12

## 2023-06-12 RX ORDER — SCOLOPAMINE TRANSDERMAL SYSTEM 1 MG/1
PATCH, EXTENDED RELEASE TRANSDERMAL
Status: DISCONTINUED | OUTPATIENT
Start: 2023-06-12 | End: 2023-06-12

## 2023-06-12 RX ORDER — OXYCODONE HYDROCHLORIDE 5 MG/1
5 TABLET ORAL EVERY 4 HOURS PRN
Status: DISCONTINUED | OUTPATIENT
Start: 2023-06-12 | End: 2023-06-16

## 2023-06-12 RX ORDER — PROPOFOL 10 MG/ML
VIAL (ML) INTRAVENOUS
Status: DISCONTINUED | OUTPATIENT
Start: 2023-06-12 | End: 2023-06-12

## 2023-06-12 RX ORDER — NEOSTIGMINE METHYLSULFATE 0.5 MG/ML
INJECTION, SOLUTION INTRAVENOUS
Status: DISCONTINUED | OUTPATIENT
Start: 2023-06-12 | End: 2023-06-12

## 2023-06-12 RX ORDER — SODIUM CHLORIDE 0.9 % (FLUSH) 0.9 %
10 SYRINGE (ML) INJECTION
Status: DISCONTINUED | OUTPATIENT
Start: 2023-06-12 | End: 2023-06-17 | Stop reason: HOSPADM

## 2023-06-12 RX ORDER — ONDANSETRON 2 MG/ML
4 INJECTION INTRAMUSCULAR; INTRAVENOUS DAILY PRN
Status: DISCONTINUED | OUTPATIENT
Start: 2023-06-12 | End: 2023-06-12 | Stop reason: HOSPADM

## 2023-06-12 RX ORDER — ACETAMINOPHEN 10 MG/ML
INJECTION, SOLUTION INTRAVENOUS
Status: DISCONTINUED | OUTPATIENT
Start: 2023-06-12 | End: 2023-06-12

## 2023-06-12 RX ORDER — ONDANSETRON 2 MG/ML
INJECTION INTRAMUSCULAR; INTRAVENOUS
Status: DISCONTINUED | OUTPATIENT
Start: 2023-06-12 | End: 2023-06-12

## 2023-06-12 RX ORDER — ACETAMINOPHEN 10 MG/ML
1000 INJECTION, SOLUTION INTRAVENOUS EVERY 8 HOURS
Status: DISCONTINUED | OUTPATIENT
Start: 2023-06-12 | End: 2023-06-13

## 2023-06-12 RX ORDER — FENTANYL CITRATE 50 UG/ML
25 INJECTION, SOLUTION INTRAMUSCULAR; INTRAVENOUS EVERY 5 MIN PRN
Status: DISCONTINUED | OUTPATIENT
Start: 2023-06-12 | End: 2023-06-12 | Stop reason: HOSPADM

## 2023-06-12 RX ORDER — SCOLOPAMINE TRANSDERMAL SYSTEM 1 MG/1
PATCH, EXTENDED RELEASE TRANSDERMAL
Status: COMPLETED
Start: 2023-06-12 | End: 2023-06-12

## 2023-06-12 RX ORDER — BUPIVACAINE HYDROCHLORIDE 2.5 MG/ML
INJECTION, SOLUTION EPIDURAL; INFILTRATION; INTRACAUDAL
Status: DISCONTINUED | OUTPATIENT
Start: 2023-06-12 | End: 2023-06-12 | Stop reason: HOSPADM

## 2023-06-12 RX ORDER — DEXMEDETOMIDINE HYDROCHLORIDE 100 UG/ML
INJECTION, SOLUTION INTRAVENOUS
Status: DISCONTINUED | OUTPATIENT
Start: 2023-06-12 | End: 2023-06-12

## 2023-06-12 RX ORDER — ROCURONIUM BROMIDE 10 MG/ML
INJECTION, SOLUTION INTRAVENOUS
Status: DISCONTINUED | OUTPATIENT
Start: 2023-06-12 | End: 2023-06-12

## 2023-06-12 RX ORDER — SODIUM CHLORIDE 0.9 % (FLUSH) 0.9 %
10 SYRINGE (ML) INJECTION
Status: DISCONTINUED | OUTPATIENT
Start: 2023-06-12 | End: 2023-06-12 | Stop reason: HOSPADM

## 2023-06-12 RX ADMIN — FENTANYL CITRATE 25 MCG: 50 INJECTION, SOLUTION INTRAMUSCULAR; INTRAVENOUS at 07:06

## 2023-06-12 RX ADMIN — ROCURONIUM BROMIDE 10 MG: 10 INJECTION INTRAVENOUS at 12:06

## 2023-06-12 RX ADMIN — GLYCOPYRROLATE 0.6 MG: 0.2 INJECTION, SOLUTION INTRAMUSCULAR; INTRAVENOUS at 02:06

## 2023-06-12 RX ADMIN — DEXAMETHASONE SODIUM PHOSPHATE 4 MG: 4 INJECTION, SOLUTION INTRAMUSCULAR; INTRAVENOUS at 07:06

## 2023-06-12 RX ADMIN — FENTANYL CITRATE 25 MCG: 50 INJECTION, SOLUTION INTRAMUSCULAR; INTRAVENOUS at 10:06

## 2023-06-12 RX ADMIN — FENTANYL CITRATE 25 MCG: 0.05 INJECTION, SOLUTION INTRAMUSCULAR; INTRAVENOUS at 04:06

## 2023-06-12 RX ADMIN — SODIUM CHLORIDE, POTASSIUM CHLORIDE, SODIUM LACTATE AND CALCIUM CHLORIDE: 600; 310; 30; 20 INJECTION, SOLUTION INTRAVENOUS at 05:06

## 2023-06-12 RX ADMIN — LIDOCAINE HYDROCHLORIDE 80 MG: 20 INJECTION INTRAVENOUS at 07:06

## 2023-06-12 RX ADMIN — FENTANYL CITRATE 50 MCG: 50 INJECTION, SOLUTION INTRAMUSCULAR; INTRAVENOUS at 12:06

## 2023-06-12 RX ADMIN — GABAPENTIN 300 MG: 300 CAPSULE ORAL at 04:06

## 2023-06-12 RX ADMIN — ROCURONIUM BROMIDE 10 MG: 10 INJECTION INTRAVENOUS at 09:06

## 2023-06-12 RX ADMIN — FENTANYL CITRATE 25 MCG: 50 INJECTION, SOLUTION INTRAMUSCULAR; INTRAVENOUS at 02:06

## 2023-06-12 RX ADMIN — ROCURONIUM BROMIDE 10 MG: 10 INJECTION INTRAVENOUS at 01:06

## 2023-06-12 RX ADMIN — FENTANYL CITRATE 25 MCG: 0.05 INJECTION, SOLUTION INTRAMUSCULAR; INTRAVENOUS at 05:06

## 2023-06-12 RX ADMIN — FENTANYL CITRATE 50 MCG: 50 INJECTION, SOLUTION INTRAMUSCULAR; INTRAVENOUS at 07:06

## 2023-06-12 RX ADMIN — MIDAZOLAM HYDROCHLORIDE 2 MG: 1 INJECTION, SOLUTION INTRAMUSCULAR; INTRAVENOUS at 07:06

## 2023-06-12 RX ADMIN — PROPOFOL 170 MG: 10 INJECTION, EMULSION INTRAVENOUS at 07:06

## 2023-06-12 RX ADMIN — SODIUM CHLORIDE: 0.9 INJECTION, SOLUTION INTRAVENOUS at 07:06

## 2023-06-12 RX ADMIN — ACETAMINOPHEN 1000 MG: 10 INJECTION INTRAVENOUS at 10:06

## 2023-06-12 RX ADMIN — SODIUM CHLORIDE, SODIUM GLUCONATE, SODIUM ACETATE, POTASSIUM CHLORIDE, MAGNESIUM CHLORIDE, SODIUM PHOSPHATE, DIBASIC, AND POTASSIUM PHOSPHATE: .53; .5; .37; .037; .03; .012; .00082 INJECTION, SOLUTION INTRAVENOUS at 12:06

## 2023-06-12 RX ADMIN — SCOPALAMINE 1 PATCH: 1 PATCH, EXTENDED RELEASE TRANSDERMAL at 07:06

## 2023-06-12 RX ADMIN — ROCURONIUM BROMIDE 40 MG: 10 INJECTION INTRAVENOUS at 07:06

## 2023-06-12 RX ADMIN — ONDANSETRON 4 MG: 2 INJECTION INTRAMUSCULAR; INTRAVENOUS at 04:06

## 2023-06-12 RX ADMIN — FENTANYL CITRATE 25 MCG: 50 INJECTION, SOLUTION INTRAMUSCULAR; INTRAVENOUS at 12:06

## 2023-06-12 RX ADMIN — CEFAZOLIN 2 G: 2 INJECTION, POWDER, FOR SOLUTION INTRAMUSCULAR; INTRAVENOUS at 07:06

## 2023-06-12 RX ADMIN — MUPIROCIN: 20 OINTMENT TOPICAL at 10:06

## 2023-06-12 RX ADMIN — SODIUM CHLORIDE, SODIUM GLUCONATE, SODIUM ACETATE, POTASSIUM CHLORIDE, MAGNESIUM CHLORIDE, SODIUM PHOSPHATE, DIBASIC, AND POTASSIUM PHOSPHATE: .53; .5; .37; .037; .03; .012; .00082 INJECTION, SOLUTION INTRAVENOUS at 08:06

## 2023-06-12 RX ADMIN — ROCURONIUM BROMIDE 10 MG: 10 INJECTION INTRAVENOUS at 10:06

## 2023-06-12 RX ADMIN — ROCURONIUM BROMIDE 10 MG: 10 INJECTION INTRAVENOUS at 08:06

## 2023-06-12 RX ADMIN — DEXMEDETOMIDINE HYDROCHLORIDE 12 MCG: 100 INJECTION, SOLUTION INTRAVENOUS at 02:06

## 2023-06-12 RX ADMIN — SODIUM CHLORIDE, SODIUM GLUCONATE, SODIUM ACETATE, POTASSIUM CHLORIDE, MAGNESIUM CHLORIDE, SODIUM PHOSPHATE, DIBASIC, AND POTASSIUM PHOSPHATE: .53; .5; .37; .037; .03; .012; .00082 INJECTION, SOLUTION INTRAVENOUS at 11:06

## 2023-06-12 RX ADMIN — FENTANYL CITRATE 25 MCG: 50 INJECTION, SOLUTION INTRAMUSCULAR; INTRAVENOUS at 08:06

## 2023-06-12 RX ADMIN — OXYCODONE HYDROCHLORIDE 5 MG: 5 TABLET ORAL at 04:06

## 2023-06-12 RX ADMIN — CEFAZOLIN 2 G: 2 INJECTION, POWDER, FOR SOLUTION INTRAMUSCULAR; INTRAVENOUS at 11:06

## 2023-06-12 RX ADMIN — Medication 6 MG: at 10:06

## 2023-06-12 RX ADMIN — GABAPENTIN 300 MG: 300 CAPSULE ORAL at 10:06

## 2023-06-12 RX ADMIN — SODIUM CHLORIDE, SODIUM GLUCONATE, SODIUM ACETATE, POTASSIUM CHLORIDE, MAGNESIUM CHLORIDE, SODIUM PHOSPHATE, DIBASIC, AND POTASSIUM PHOSPHATE: .53; .5; .37; .037; .03; .012; .00082 INJECTION, SOLUTION INTRAVENOUS at 02:06

## 2023-06-12 RX ADMIN — NEOSTIGMINE METHYLSULFATE 4 MG: 0.5 INJECTION INTRAVENOUS at 02:06

## 2023-06-12 RX ADMIN — ONDANSETRON 4 MG: 2 INJECTION INTRAMUSCULAR; INTRAVENOUS at 02:06

## 2023-06-12 RX ADMIN — ACETAMINOPHEN 1000 MG: 10 INJECTION, SOLUTION INTRAVENOUS at 02:06

## 2023-06-12 RX ADMIN — ACETAMINOPHEN 1000 MG: 10 INJECTION, SOLUTION INTRAVENOUS at 07:06

## 2023-06-12 RX ADMIN — ROCURONIUM BROMIDE 10 MG: 10 INJECTION INTRAVENOUS at 11:06

## 2023-06-12 RX ADMIN — AMITRIPTYLINE HYDROCHLORIDE 25 MG: 25 TABLET, FILM COATED ORAL at 10:06

## 2023-06-12 NOTE — H&P
Interval H&P    The patient has been examined and the H&P has been reviewed:    I concur with the findings and changes have been noted since the H&P was written: colonoscopy since visit, WNL. No other major changes.      Surgery risks, benefits and alternative options discussed and understood by patient/family.    Radha Bettencourt MD  PGY-3, General Surgery  [facility]      History of Present Illness: 11/22/2022  Tanesha Davis is a 72y.o. female with PMHx of recurrent incisional hernia, recent SBO, and colon cancer who presents to the clinic today complaining of pain from a known incisional hernia which she first noticed back in 2015. Patient describes the pain as intermittent (1-2x weekly) and rates it as 8/10 in severity. Patient states the sxs are aggravated by standing too long, long car rides and alleviated by laying flat. She denies fever, chills, unintentional weight loss, n/v/d, constipation, hematochezia, dysuria, hematuria, CP, SOB, and all other symptoms. Patient reports being compliant with home medication regimen.      Pt initially presented to Dr Muse for an incisional hernia repair in 2014. Pt reports the wound had difficulty healing; she had to return to the OR with Dr Muse a year later and then again in 2018 with Dr. Meraz, at which point they removed mesh. The wound at that point stopped draining, so the patient stopped pursuing surgical treatments despite the hernia persisting. She was hospitalized at [facility] Nov 7-10, 22 for an SBO which resolved with conservative management. CT at [facility] showed a transition point near her hernia.      She is concerned about another hernia repair because she does not want to go through another four years of poor wound healing.      Patient denies personal history of MI, CVA, lung disease, DM  Previous abdominal surgeries include: colectomy 2006, recurrent incisional hernia repair + mesh excision 2018, cholecystectomy, hysterectomy  Denies  "alcohol, tobacco, and elicit drug use.   Not currently on any anticoagulants   PLAN  - Discussed the option of surgical repair with the patient; she would like to think about it and will contact clinic should she decide to proceed  - Obtain imaging from [facility] (pt asked to bring images on disc to clinic)        INTERVAL HISTORY 03/22/2023  Today she presents to clinic, she says that pain attacks have been getting worse.  She still describes it as intermittent but have became more frequent (4x weekly) and rates it as 8/10 in severity. Patient states the sxs are aggravated by doing exercise, eating late at night, standing too long, long car rides and alleviated by laying flat. She is taking amyLast one was on Thursday night. She has been eating less because certain foods and eating at night make her pain attacks worse. Stopped doing exercise because it would trigger her.  She states that she does not have bowel movements unless she takes a laxative, which would cause her to have diarrhea for 3-4 hours. Last diarrhea episode was on Saturday.  Denies fever, denies recent vomiting (last on January 31st).  She has lost 27 pounds since November 7th.   She takes Nexium every morning, no symptoms of GERD reflux     ROS: Denies shortness of breath, bleeding, easy bruising          Past Medical History:   Diagnosis Date    Arthritis      Draining cutaneous sinus tract      Headache(784.0)      Incisional hernia      Insomnia      Nocturnal leg cramps      Nonhealing surgical wound      PONV (postoperative nausea and vomiting)       with last surgery per patient, "Severe" PONV 2-27-14    Postmenopausal status      Rectosigmoid cancer      Small bowel obstruction 11/8/2022               Past Surgical History:   Procedure Laterality Date    BREAST BIOPSY Left      CHOLECYSTECTOMY   02/24/2014     with incisional hernia repair with mesh    COLONOSCOPY N/A 8/23/2016     Procedure: COLONOSCOPY;  Surgeon: Cuco Meraz MD;  " Location: [facility] ENDO (4TH FLR);  Service: Endoscopy;  Laterality: N/A;    COLONOSCOPY N/A 12/9/2019     Procedure: COLONOSCOPY;  Surgeon: KISHORE Curry MD;  Location: [facility] ENDO (4TH FLR);  Service: Endoscopy;  Laterality: N/A;  ADHESIVE Allergy    ESOPHAGOGASTRODUODENOSCOPY N/A 4/22/2021     Procedure: EGD (ESOPHAGOGASTRODUODENOSCOPY);  Surgeon: Lukasz Lindquist MD;  Location: [facility] ENDO (4TH FLR);  Service: Endoscopy;  Laterality: N/A;  3/25-covid + 7/2020 at [facility] [place]- getting results to us to see if correct form of test-MS    HYSTERECTOMY        INJECTION OF ANESTHETIC AGENT AROUND NERVE Right 6/28/2022     Procedure: BLOCK, NERVE, RIGHT L2-L3-L4-L5;  Surgeon: Shabnam Skaggs MD;  Location: [facility] PAIN MGT;  Service: Pain Management;  Laterality: Right;    INJECTION OF ANESTHETIC AGENT AROUND NERVE Right 8/2/2022     Procedure: BLOCK, NERVE, RIGHT L2-L5 MEDIAL BRANCH;  Surgeon: Shabnam Skaggs MD;  Location: [facility] PAIN MGT;  Service: Pain Management;  Laterality: Right;    KNEE ARTHROSCOPY        KNEE SURGERY         right and left knee repair    OOPHORECTOMY        RADIOFREQUENCY ABLATION Right 9/27/2022     Procedure: Radiofrequency Ablation Right L2-L5;  Surgeon: Shabnam Skaggs MD;  Location: [facility] PAIN MGT;  Service: Pain Management;  Laterality: Right;    REPAIR OF RECURRENT INCISIONAL HERNIA   7/10/2018     Procedure: REPAIR, HERNIA, INCISIONAL, RECURRENT;  Surgeon: Cuco Meraz MD;  Location: [facility] OR [facility] FLR;  Service: Colon and Rectal;;    SIGMOIDECTOMY   03/20/2006     LAR         Social History               Socioeconomic History    Marital status:        Spouse name: francis    Number of children: 2   Occupational History    Occupation: Retired       Employer: Self   Tobacco Use    Smoking status: Never    Smokeless tobacco: Never   Substance and Sexual Activity    Alcohol use: Yes       Alcohol/week: 1.0 standard drink       Types: 1 Glasses of wine per week       Comment: socially     "Drug use: No    Sexual activity: Yes       Partners: Male       Birth control/protection: Post-menopausal      Social Determinants of Health          Financial Resource Strain: Unknown    Difficulty of Paying Living Expenses: Patient refused   Food Insecurity: Unknown    Worried About Running Out of Food in the Last Year: Patient refused    Ran Out of Food in the Last Year: Patient refused   Transportation Needs: No Transportation Needs    Lack of Transportation (Medical): No    Lack of Transportation (Non-Medical): No   Physical Activity: Sufficiently Active    Days of Exercise per Week: 4 days    Minutes of Exercise per Session: 60 min   Stress: Stress Concern Present    Feeling of Stress : To some extent   Social Connections: Unknown    Frequency of Communication with Friends and Family: Twice a week    Frequency of Social Gatherings with Friends and Family: Once a week    Active Member of Clubs or Organizations: Yes    Attends Club or Organization Meetings: 1 to 4 times per year    Marital Status:    Housing Stability: Unknown    Unable to Pay for Housing in the Last Year: Patient refused    Unstable Housing in the Last Year: Patient refused                  Review of patient's allergies indicates:   Allergen Reactions    Adhesive Rash       Asking that we use Paper Tape,  Uses "Sensitive Skin" band aids    Amoxil [amoxicillin] Hives    Penicillins Hives and Swelling       Swelling of ears and neck    Sulfa (sulfonamide antibiotics) Hives and Itching            PHYSICAL EXAM:  There were no vitals filed for this visit.     General: NAD  HEENT: normocephalic, atraumatic, no scleral icterus or conjunctival injection  Neuro: AAOx3  Cardio: RRR  Resp: no respiratory distress, unlabored breathing  Abd: Soft, non-distended, non-tender.  Ext: Warm and well perfused  Skin: dry, no pallor  Psych: appropriate mood and behavior        PERTINENT LABS:  Reviewed. None.        PERTINENT IMAGIN2023 CT " enterography abdomen  FINDINGS:      : Normal in size and location.  Numerous bilateral cystic lesions, multiple measuring slightly higher than water density, however measuring simple fluid on 01/31/2023 CT.  These are similar in number and slightly increased in size dating back to 02/05/2014 CT, likely simple to minimally complex cysts.  Mild right hydronephrosis with no evidence of obstruction.  No left hydronephrosis.  No nephrolithiasis.  Bladder demonstrates smooth contours without bladder wall thickening.        GI TRACT/MESENTERY: Stomach and duodenum are normal appearance.  Visualized loops of small and large bowel are normal in caliber without evidence for inflammation or obstruction.  Postsurgical changes about the rectosigmoid junction.  Numerous small bowel loops located within an abdominal wall hernia with resolution of previously seen mild distension and wall thickening.  There is some nondistended loops of small bowel making evaluation of wall thickening limited.  Persistent mild mesenteric stranding and scattered nodes.      ABDOMINAL WALL: Stable midline abdominal wall hernia containing numerous loops of small bowel.      Impression:     1. Numerous small bowel loops located within a midline abdominal hernia with resolution of previously seen mild distension and wall thickening.  2. Additional findings above.           ASSESSMENT/PLAN:  72 y.o. female with 72y.o. female with PMHx of recurrent incisional hernia, recent SBO, and colon cancer who presents to the clinic for follow up on the plan to do a surgical repair of her hernia. Patient is willing to proceed with the scheduling of her surgery.     PLAN  Open ventral hernia repair with component separation (schedule for June)

## 2023-06-12 NOTE — ANESTHESIA RELEASE NOTE
"Anesthesia Release from PACU Note    Patient: Tanesha Davis    Procedure(s) Performed: Procedure(s) (LRB):  REPAIR, HERNIA, INCISIONAL OR VENTRAL, WITH HISTORY OF PRIOR  REPAIR (WITH MESH AND POSTERIOR COMPONENTS SEPARATION) (N/A)  RELEASE-FASCIA -TRANSVERSUS ABDOMINIS (Bilateral)    Anesthesia type: general    Post pain: Adequate analgesia    Post assessment: no apparent anesthetic complications    Last Vitals:   Visit Vitals  /67   Pulse 84   Temp 36.7 °C (98.1 °F) (Temporal)   Resp (!) 9   Ht 5' 5" (1.651 m)   Wt 72.6 kg (160 lb)   SpO2 97%   Breastfeeding No   BMI 26.63 kg/m²       Post vital signs: stable    Level of consciousness: alert  and lethargic    Nausea/Vomiting: no nausea/no vomiting    Complications: none    Airway Patency: patent    Respiratory: unassisted, spontaneous ventilation, nasal cannula    Cardiovascular: stable and blood pressure at baseline    Hydration: euvolemic  "

## 2023-06-12 NOTE — ANESTHESIA PROCEDURE NOTES
Intubation    Date/Time: 6/12/2023 7:19 AM  Performed by: Jessica Garza CRNA  Authorized by: Kezia Alvarenga MD     Intubation:     Induction:  Intravenous    Intubated:  Postinduction    Mask Ventilation:  Easy mask    Attempts:  1    Attempted By:  CRNA    Method of Intubation:  Video laryngoscopy    Blade:  Gabriel 3    Laryngeal View Grade: Grade I - full view of cords      Difficult Airway Encountered?: No      Complications:  None    Airway Device:  Oral endotracheal tube    Airway Device Size:  7.0    Style/Cuff Inflation:  Cuffed    Inflation Amount (mL):  7    Tube secured:  21    Secured at:  The lips    Placement Verified By:  Capnometry    Complicating Factors:  None    Findings Post-Intubation:  BS equal bilateral and atraumatic/condition of teeth unchanged  Notes:      Loose molar still intact post intubation, no changes to dentition

## 2023-06-12 NOTE — TRANSFER OF CARE
"Anesthesia Transfer of Care Note    Patient: Tanesha Davis    Procedure(s) Performed: Procedure(s) (LRB):  REPAIR, HERNIA, INCISIONAL OR VENTRAL, WITH HISTORY OF PRIOR  REPAIR (WITH MESH AND POSTERIOR COMPONENTS SEPARATION) (N/A)  RELEASE-FASCIA -TRANSVERSUS ABDOMINIS (Bilateral)    Patient location: PACU    Anesthesia Type: general    Transport from OR: Transported from OR on 6-10 L/min O2 by face mask with adequate spontaneous ventilation    Post pain: adequate analgesia    Post assessment: no apparent anesthetic complications and tolerated procedure well    Post vital signs: stable    Level of consciousness: sedated and responds to stimulation    Nausea/Vomiting: no nausea/vomiting    Complications: none    Transfer of care protocol was followed      Last vitals:   Visit Vitals  BP 91/65 (BP Location: Right arm, Patient Position: Lying)   Pulse 87   Temp 36.4 °C (97.5 °F) (Temporal)   Resp 18   Ht 5' 5" (1.651 m)   Wt 72.6 kg (160 lb)   SpO2 100%   Breastfeeding No   BMI 26.63 kg/m²     "

## 2023-06-12 NOTE — BRIEF OP NOTE
[facility] - Surgery ([facility])  Brief Operative Note    SUMMARY     Surgery Date: 6/12/2023     Surgeon(s) and Role:     * Jody Ramírez MD - Primary     * Olga Nails MD - Resident - Assisting        Pre-op Diagnosis:  Ventral hernia with obstruction and without gangrene [K43.6]    Post-op Diagnosis:  Post-Op Diagnosis Codes:     * Ventral hernia with obstruction and without gangrene [K43.6]    Procedure(s) (LRB):  REPAIR, HERNIA, INCISIONAL OR VENTRAL, WITH HISTORY OF PRIOR  REPAIR (WITH MESH AND POSTERIOR COMPONENTS SEPARATION) (N/A)  RELEASE-FASCIA -TRANSVERSUS ABDOMINIS (Bilateral)    Anesthesia: General    Operative Findings: Explant of prior mesh, posterior compotent separation, transversus abdominis muscle release (TAR). 2 LUIS MIGUEL drains placed beneath anterior abdominal wall fascia     Estimated Blood Loss: 30 ml    Estimated Blood Loss has been documented.         Specimens:   Specimen (24h ago, onward)       Start     Ordered    06/12/23 1438  Specimen to Pathology, Surgery General Surgery  Once        Comments: Pre-op Diagnosis: Ventral hernia with obstruction and without gangrene [K43.6]Procedure(s):REPAIR, HERNIA, INCISIONAL OR VENTRAL, WITHOUT HISTORY OF PRIOR  REPAIR (WITH MESH /COMPONENT SEPARATION Number of specimens: 4Name of specimens: 1- scar tissue- permanent2- explanted mesh- gross3- hernia sac- permanent4- skin and soft tissue- permanent     References:    Click here for ordering Quick Tip   Question Answer Comment   Procedure Type: General Surgery    Specimen Class: Complex case/Special    Which provider would you like to cc? ZEUS THAO    Release to patient Immediate        06/12/23 1438                    [id number]

## 2023-06-13 LAB
ALBUMIN SERPL BCP-MCNC: 2.7 G/DL (ref 3.5–5.2)
ALP SERPL-CCNC: 70 U/L (ref 55–135)
ALT SERPL W/O P-5'-P-CCNC: 10 U/L (ref 10–44)
ANION GAP SERPL CALC-SCNC: 7 MMOL/L (ref 8–16)
AST SERPL-CCNC: 20 U/L (ref 10–40)
BASOPHILS # BLD AUTO: 0.03 K/UL (ref 0–0.2)
BASOPHILS NFR BLD: 0.3 % (ref 0–1.9)
BILIRUB SERPL-MCNC: 0.8 MG/DL (ref 0.1–1)
BUN SERPL-MCNC: 11 MG/DL (ref 8–23)
CALCIUM SERPL-MCNC: 8.1 MG/DL (ref 8.7–10.5)
CHLORIDE SERPL-SCNC: 106 MMOL/L (ref 95–110)
CO2 SERPL-SCNC: 24 MMOL/L (ref 23–29)
CREAT SERPL-MCNC: 0.6 MG/DL (ref 0.5–1.4)
DIFFERENTIAL METHOD: ABNORMAL
EOSINOPHIL # BLD AUTO: 0 K/UL (ref 0–0.5)
EOSINOPHIL NFR BLD: 0 % (ref 0–8)
ERYTHROCYTE [DISTWIDTH] IN BLOOD BY AUTOMATED COUNT: 13.2 % (ref 11.5–14.5)
EST. GFR  (NO RACE VARIABLE): >60 ML/MIN/1.73 M^2
GLUCOSE SERPL-MCNC: 95 MG/DL (ref 70–110)
HCT VFR BLD AUTO: 35.6 % (ref 37–48.5)
HGB BLD-MCNC: 11 G/DL (ref 12–16)
IMM GRANULOCYTES # BLD AUTO: 0.02 K/UL (ref 0–0.04)
IMM GRANULOCYTES NFR BLD AUTO: 0.2 % (ref 0–0.5)
LYMPHOCYTES # BLD AUTO: 1.2 K/UL (ref 1–4.8)
LYMPHOCYTES NFR BLD: 13.2 % (ref 18–48)
MAGNESIUM SERPL-MCNC: 2 MG/DL (ref 1.6–2.6)
MCH RBC QN AUTO: 28.4 PG (ref 27–31)
MCHC RBC AUTO-ENTMCNC: 30.9 G/DL (ref 32–36)
MCV RBC AUTO: 92 FL (ref 82–98)
MONOCYTES # BLD AUTO: 0.8 K/UL (ref 0.3–1)
MONOCYTES NFR BLD: 8.8 % (ref 4–15)
NEUTROPHILS # BLD AUTO: 6.9 K/UL (ref 1.8–7.7)
NEUTROPHILS NFR BLD: 77.5 % (ref 38–73)
NRBC BLD-RTO: 0 /100 WBC
PHOSPHATE SERPL-MCNC: 2.5 MG/DL (ref 2.7–4.5)
PLATELET # BLD AUTO: 201 K/UL (ref 150–450)
PMV BLD AUTO: 10.9 FL (ref 9.2–12.9)
POTASSIUM SERPL-SCNC: 3.9 MMOL/L (ref 3.5–5.1)
PROT SERPL-MCNC: 5 G/DL (ref 6–8.4)
RBC # BLD AUTO: 3.88 M/UL (ref 4–5.4)
SODIUM SERPL-SCNC: 137 MMOL/L (ref 136–145)
WBC # BLD AUTO: 8.93 K/UL (ref 3.9–12.7)

## 2023-06-13 PROCEDURE — 97162 PT EVAL MOD COMPLEX 30 MIN: CPT

## 2023-06-13 PROCEDURE — 36415 COLL VENOUS BLD VENIPUNCTURE: CPT | Performed by: STUDENT IN AN ORGANIZED HEALTH CARE EDUCATION/TRAINING PROGRAM

## 2023-06-13 PROCEDURE — 80053 COMPREHEN METABOLIC PANEL: CPT | Performed by: STUDENT IN AN ORGANIZED HEALTH CARE EDUCATION/TRAINING PROGRAM

## 2023-06-13 PROCEDURE — 63600175 PHARM REV CODE 636 W HCPCS: Performed by: SURGERY

## 2023-06-13 PROCEDURE — 83735 ASSAY OF MAGNESIUM: CPT | Performed by: STUDENT IN AN ORGANIZED HEALTH CARE EDUCATION/TRAINING PROGRAM

## 2023-06-13 PROCEDURE — 97530 THERAPEUTIC ACTIVITIES: CPT

## 2023-06-13 PROCEDURE — 97165 OT EVAL LOW COMPLEX 30 MIN: CPT

## 2023-06-13 PROCEDURE — 11000001 HC ACUTE MED/SURG PRIVATE ROOM

## 2023-06-13 PROCEDURE — 25000003 PHARM REV CODE 250: Performed by: STUDENT IN AN ORGANIZED HEALTH CARE EDUCATION/TRAINING PROGRAM

## 2023-06-13 PROCEDURE — 63600175 PHARM REV CODE 636 W HCPCS: Performed by: STUDENT IN AN ORGANIZED HEALTH CARE EDUCATION/TRAINING PROGRAM

## 2023-06-13 PROCEDURE — 85025 COMPLETE CBC W/AUTO DIFF WBC: CPT | Performed by: STUDENT IN AN ORGANIZED HEALTH CARE EDUCATION/TRAINING PROGRAM

## 2023-06-13 PROCEDURE — 94761 N-INVAS EAR/PLS OXIMETRY MLT: CPT

## 2023-06-13 PROCEDURE — 25000003 PHARM REV CODE 250: Performed by: SURGERY

## 2023-06-13 PROCEDURE — 97535 SELF CARE MNGMENT TRAINING: CPT

## 2023-06-13 PROCEDURE — 84100 ASSAY OF PHOSPHORUS: CPT | Performed by: STUDENT IN AN ORGANIZED HEALTH CARE EDUCATION/TRAINING PROGRAM

## 2023-06-13 RX ORDER — METHOCARBAMOL 750 MG/1
750 TABLET, FILM COATED ORAL 4 TIMES DAILY
Status: DISCONTINUED | OUTPATIENT
Start: 2023-06-13 | End: 2023-06-14

## 2023-06-13 RX ORDER — POLYETHYLENE GLYCOL 3350 17 G/17G
17 POWDER, FOR SOLUTION ORAL DAILY
Status: DISCONTINUED | OUTPATIENT
Start: 2023-06-13 | End: 2023-06-14

## 2023-06-13 RX ORDER — KETOROLAC TROMETHAMINE 10 MG/1
10 TABLET, FILM COATED ORAL EVERY 8 HOURS
Status: DISCONTINUED | OUTPATIENT
Start: 2023-06-13 | End: 2023-06-14

## 2023-06-13 RX ORDER — LIDOCAINE 50 MG/G
1 PATCH TOPICAL
Status: DISCONTINUED | OUTPATIENT
Start: 2023-06-13 | End: 2023-06-17 | Stop reason: HOSPADM

## 2023-06-13 RX ORDER — ONDANSETRON 4 MG/1
4 TABLET, ORALLY DISINTEGRATING ORAL EVERY 6 HOURS
Status: DISCONTINUED | OUTPATIENT
Start: 2023-06-13 | End: 2023-06-14

## 2023-06-13 RX ORDER — SODIUM,POTASSIUM PHOSPHATES 280-250MG
1 POWDER IN PACKET (EA) ORAL ONCE
Status: COMPLETED | OUTPATIENT
Start: 2023-06-13 | End: 2023-06-13

## 2023-06-13 RX ORDER — ACETAMINOPHEN 500 MG
1000 TABLET ORAL EVERY 6 HOURS
Status: DISCONTINUED | OUTPATIENT
Start: 2023-06-13 | End: 2023-06-17 | Stop reason: HOSPADM

## 2023-06-13 RX ADMIN — OXYCODONE HYDROCHLORIDE 10 MG: 10 TABLET ORAL at 05:06

## 2023-06-13 RX ADMIN — AMITRIPTYLINE HYDROCHLORIDE 25 MG: 25 TABLET, FILM COATED ORAL at 09:06

## 2023-06-13 RX ADMIN — METHOCARBAMOL 750 MG: 750 TABLET ORAL at 08:06

## 2023-06-13 RX ADMIN — ONDANSETRON 4 MG: 4 TABLET, ORALLY DISINTEGRATING ORAL at 01:06

## 2023-06-13 RX ADMIN — POTASSIUM & SODIUM PHOSPHATES POWDER PACK 280-160-250 MG 1 PACKET: 280-160-250 PACK at 08:06

## 2023-06-13 RX ADMIN — KETOROLAC TROMETHAMINE 10 MG: 10 TABLET, FILM COATED ORAL at 09:06

## 2023-06-13 RX ADMIN — GABAPENTIN 400 MG: 300 CAPSULE ORAL at 08:06

## 2023-06-13 RX ADMIN — ENOXAPARIN SODIUM 40 MG: 40 INJECTION SUBCUTANEOUS at 04:06

## 2023-06-13 RX ADMIN — MUPIROCIN: 20 OINTMENT TOPICAL at 08:06

## 2023-06-13 RX ADMIN — ACETAMINOPHEN 1000 MG: 500 TABLET ORAL at 01:06

## 2023-06-13 RX ADMIN — METHOCARBAMOL 750 MG: 750 TABLET ORAL at 01:06

## 2023-06-13 RX ADMIN — METHOCARBAMOL 750 MG: 750 TABLET ORAL at 06:06

## 2023-06-13 RX ADMIN — MUPIROCIN: 20 OINTMENT TOPICAL at 09:06

## 2023-06-13 RX ADMIN — GABAPENTIN 400 MG: 300 CAPSULE ORAL at 02:06

## 2023-06-13 RX ADMIN — ATORVASTATIN CALCIUM 40 MG: 40 TABLET, FILM COATED ORAL at 08:06

## 2023-06-13 RX ADMIN — KETOROLAC TROMETHAMINE 10 MG: 10 TABLET, FILM COATED ORAL at 02:06

## 2023-06-13 RX ADMIN — GABAPENTIN 400 MG: 300 CAPSULE ORAL at 09:06

## 2023-06-13 RX ADMIN — ACETAMINOPHEN 1000 MG: 500 TABLET ORAL at 06:06

## 2023-06-13 RX ADMIN — ACETAMINOPHEN 1000 MG: 10 INJECTION INTRAVENOUS at 05:06

## 2023-06-13 RX ADMIN — LIDOCAINE 1 PATCH: 50 PATCH TOPICAL at 02:06

## 2023-06-13 RX ADMIN — METHOCARBAMOL 750 MG: 750 TABLET ORAL at 09:06

## 2023-06-13 NOTE — NURSING
New admit report received from Jessie   pacu RN @20:24. Room prepared awaiting patient arrival.    U

## 2023-06-13 NOTE — SUBJECTIVE & OBJECTIVE
"Interval History: No issues overnight. Moderately controlled pain. No nausea or vomiting. Minimal ambulation out of bed. Jeffrey removed this morning     Medications:  Continuous Infusions:   lactated ringers 125 mL/hr at 06/12/23 1710     Scheduled Meds:   acetaminophen  1,000 mg Intravenous Q8H    amitriptyline  25 mg Oral QHS    atorvastatin  40 mg Oral Daily    enoxparin  40 mg Subcutaneous Q24H (prophylaxis, 1700)    gabapentin  400 mg Oral TID    methocarbamoL  750 mg Oral QID    mupirocin   Nasal BID    ondansetron  4 mg Oral Q6H     PRN Meds:melatonin, morphine, oxyCODONE, oxyCODONE, sodium chloride 0.9%     Review of patient's allergies indicates:   Allergen Reactions    Adhesive Rash     Asking that we use Paper Tape,  Uses "Sensitive Skin" band aids    Amoxil [amoxicillin] Hives    Penicillins Hives and Swelling     Swelling of ears and neck    Sulfa (sulfonamide antibiotics) Hives and Itching     Objective:     Vital Signs (Most Recent):  Temp: 97.7 °F (36.5 °C) (06/13/23 0549)  Pulse: 86 (06/13/23 0549)  Resp: 14 (06/13/23 0559)  BP: (!) 101/53 (06/13/23 0549)  SpO2: (!) 90 % (06/13/23 0549) Vital Signs (24h Range):  Temp:  [97.3 °F (36.3 °C)-98.3 °F (36.8 °C)] 97.7 °F (36.5 °C)  Pulse:  [] 86  Resp:  [9-20] 14  SpO2:  [90 %-100 %] 90 %  BP: ()/(45-70) 101/53     Weight: 72.6 kg (160 lb)  Body mass index is 26.63 kg/m².    Intake/Output - Last 3 Shifts         06/11 0700 06/12 0659 06/12 0700 06/13 0659 06/13 0700 06/14 0659    I.V. (mL/kg)  2713 (37.4)     IV Piggyback  3800     Total Intake(mL/kg)  6513 (89.7)     Urine (mL/kg/hr)  1250 (0.7)     Drains  200     Total Output  1450     Net  +[phone]                     Physical Exam  Vitals and nursing note reviewed.   Constitutional:       General: She is not in acute distress.  Cardiovascular:      Rate and Rhythm: Normal rate.      Pulses: Normal pulses.   Pulmonary:      Effort: Pulmonary effort is normal. No respiratory distress. "   Abdominal:      General: There is no distension.      Palpations: Abdomen is soft.      Tenderness: There is no abdominal tenderness.      Comments: Abdominal binder in place; midline incision with dressing in place; LUIS MIGUEL drains with SS output; soft non-tender and non-distended abdomen    Neurological:      Mental Status: She is alert.        Significant Labs:  I have reviewed all pertinent lab results within the past 24 hours.  CBC:   Recent Labs   Lab 06/13/23  0559   WBC 8.93   RBC 3.88*   HGB 11.0*   HCT 35.6*      MCV 92   MCH 28.4   MCHC 30.9*     BMP:   Recent Labs   Lab 06/13/23  0559   GLU 95      K 3.9      CO2 24   BUN 11   CREATININE 0.6   CALCIUM 8.1*   MG 2.0     CMP:   Recent Labs   Lab 06/13/23  0559   GLU 95   CALCIUM 8.1*   ALBUMIN 2.7*   PROT 5.0*      K 3.9   CO2 24      BUN 11   CREATININE 0.6   ALKPHOS 70   ALT 10   AST 20   BILITOT 0.8       Significant Diagnostics:  I have reviewed all pertinent imaging results/findings within the past 24 hours.

## 2023-06-13 NOTE — PT/OT/SLP EVAL
"Occupational Therapy   Evaluation and Treatment    Co-eval with PT to have 2 skilled therapists present to safely assess pt's functional mobility.     Name: Tanesha Davis  MRN: [id number]  Admitting Diagnosis: Ventral hernia with obstruction and without gangrene  Recent Surgery: Procedure(s) (LRB):  REPAIR, HERNIA, INCISIONAL OR VENTRAL, WITH HISTORY OF PRIOR  REPAIR (WITH MESH AND POSTERIOR COMPONENTS SEPARATION) (N/A)  RELEASE-FASCIA -TRANSVERSUS ABDOMINIS (Bilateral) 1 Day Post-Op    Recommendations:     Discharge Recommendations: other (see comments)  Discharge Equipment Recommendations:  other (see comments) (TBD pending progress)  Barriers to discharge:  Decreased caregiver support, Inaccessible home environment    Assessment:     Tanesha Davis is a 72 y.o. female with a medical diagnosis of Ventral hernia with obstruction and without gangrene.  Pt tolerated session well and without incident, transferring to the bedside chair.  Due to her current level of function compared to her PLOF and her home environment, post-acute OT services recommended at d/c to ensure pt's safety in her home environment.  She presents with the following.  Performance deficits affecting function: weakness, impaired endurance, impaired sensation, impaired self care skills, impaired functional mobility, gait instability, impaired balance, decreased upper extremity function, decreased lower extremity function, pain, impaired skin, impaired fine motor.      Rehab Prognosis: Good; patient would benefit from acute skilled OT services to address these deficits and reach maximum level of function.       Plan:     Patient to be seen 4 x/week to address the above listed problems via self-care/home management, therapeutic activities, therapeutic exercises  Plan of Care Expires: 07/12/23  Plan of Care Reviewed with: patient    Subjective     Chief Complaint: abdominal pain and R hand/B toes numbness   Patient/Family Comments/goals: "It hurts " "when I move."    Occupational Profile:  Living Environment: Pt lives with her  and their son in a SSH with 6 JORDY in the front of the house with BHR that can't be reached simultaneously and with 4 JORDY at the side door entrance with BHR that can be reached simultaneously.  She has a tub/shower combo and a walk-in shower with a built-in shower chair.   Previous level of function: Independent with ADLs and mobility; pt was driving.   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  Her  works and their son has special needs and attends [employer].      Pain/Comfort:  Pain Rating 1: 8/10 (at rest)  Location - Orientation 1: generalized  Location 1: abdomen  Pain Addressed 1: Cessation of Activity, Distraction, Reposition  Pain Rating Post-Intervention 1: 10/10 (with mobility)    Patients cultural, spiritual, Roman Catholic conflicts given the current situation: no    Objective:     Communicated with: nursing and PT prior to session.  Patient found HOB elevated with LUIS MIGUEL drain, peripheral IV, SCD upon OT entry to room.    General Precautions: Standard, fall  Orthopedic Precautions: N/A  Braces: N/A  Respiratory Status: Room air    Occupational Performance:    Bed Mobility:    Patient completed Supine to Sit to the L with moderate assistance for trunk management due to pain  Pt scooted to EOB to place her feet on the floor with CGA    Functional Mobility/Transfers:  Patient completed Sit <> Stand Transfer from EOB x 1 trial with minimum assistance  with  hand-held assist   Patient completed Bed <> Chair Transfer using Step Transfer technique with minimum assistance with hand-held assist  Functional Mobility: Pt ambulated ~5 ft from EOB to the bedside chair with Min A with HHA.    Activities of Daily Living:  Feeding:  minimum assistance to drink sips from bottle of water while seated EOB with (A) to " loosen the bottle cap due to R hand numbness.  Pt was able to remove and replace cap once loosened.  Writing therapist observed pt feeding herself while holding a utensil with her R hand.   Grooming: setup assistance to wash her face with a wash cloth while seated in bedside chair  Upper Body Dressing: minimum assistance to tie and manage hospital gown while seated EOB  Lower Body Dressing: Pt was unable to reach/adjust non-skid socks while seated EOB due to abdominal pain.    Cognitive/Visual Perceptual:  Cognitive/Psychosocial Skills:     -       Oriented to: Person, Place, Time, and Situation   -       Follows Commands/attention:Follows multistep  commands  -       Communication: clear/fluent    Physical Exam:  Dominant hand:    -       R-handed  Upper Extremity Range of Motion:     -       Right Upper Extremity: WFL  -       Left Upper Extremity: WFL  Upper Extremity Strength:    -       Right Upper Extremity: 4/5 due to abdominal pain  -       Left Upper Extremity: 4/5 due to abdominal pain   Strength:    -       Right Upper Extremity: Deficits: due to numbness in her R hand  -       Left Upper Extremity: WFL  Fine Motor Coordination:    -       Intact LUE, mild impairment RUE due to numbness in hand     AMPAC 6 Click ADL:  AMPAC Total Score: 15    Treatment & Education:  Pt edu on role of OT, POC, safety when performing self care tasks, benefit of performing OOB activity, and safety when performing functional transfers and mobility.    - Self care tasks completed-- as noted above      Patient left up in chair with all lines intact, call button in reach, and her  present    GOALS:   Multidisciplinary Problems       Occupational Therapy Goals          Problem: Occupational Therapy    Goal Priority Disciplines Outcome Interventions   Occupational Therapy Goal     OT, PT/OT Ongoing, Progressing    Description: Goals to be met by: 6/27/2023     Patient will increase functional independence with ADLs by  "performing:    UE Dressing with Set-up Assistance.  LE Dressing with Stand-by Assistance.  Grooming while standing at sink with Supervision.  Toileting from toilet with Supervision for hygiene and clothing management.   Supine to sit with Stand-by Assistance.  Step transfer with Stand-by Assistance with or without AD.  Toilet transfer to toilet with Stand-by Assistance with or without AD.                         History:     Past Medical History:   Diagnosis Date    Arthritis     Draining cutaneous sinus tract     Headache(784.0)     Incisional hernia     Insomnia     Nocturnal leg cramps     Nonhealing surgical wound     PONV (postoperative nausea and vomiting)     with last surgery per patient, "Severe" PONV 2-27-14    Postmenopausal status     Rectosigmoid cancer     Small bowel obstruction 11/8/2022         Past Surgical History:   Procedure Laterality Date    BREAST BIOPSY Left     CHOLECYSTECTOMY  02/24/2014    with incisional hernia repair with mesh    COLONOSCOPY N/A 8/23/2016    Procedure: COLONOSCOPY;  Surgeon: Cuco Meraz MD;  [facility]  Service: Endoscopy;  Laterality: N/A;    COLONOSCOPY N/A 12/9/2019    Procedure: COLONOSCOPY;  Surgeon: KISHORE Curry MD;  [facility]  Service: Endoscopy;  Laterality: N/A;  ADHESIVE Allergy    COLONOSCOPY N/A 5/9/2023    Procedure: COLONOSCOPY;  Surgeon: KISHORE Curry MD;  Location: [facility] ([facility]);  Service: Endoscopy;  Laterality: N/A;  Instructions to portal.EC  precall no answer 5/3 EB    ESOPHAGOGASTRODUODENOSCOPY N/A 4/22/2021    Procedure: EGD (ESOPHAGOGASTRODUODENOSCOPY);  Surgeon: Lukasz Lindquist MD;  [facility]  Service: Endoscopy;  Laterality: N/A;  3/25[place] [facility] [place] to us to see if correct form of test-MS    HYSTERECTOMY      INJECTION OF ANESTHETIC AGENT AROUND NERVE Right 6/28/2022    Procedure: BLOCK, NERVE, RIGHT L2-L3-L4-L5;  Surgeon: Shabnam MAR" MD Sharifa;  Location: [facility] PAIN MGT;  Service: Pain Management;  Laterality: Right;    INJECTION OF ANESTHETIC AGENT AROUND NERVE Right 8/2/2022    Procedure: BLOCK, NERVE, RIGHT L2-L5 MEDIAL BRANCH;  Surgeon: Shabnam Skaggs MD;  Location: [facility] PAIN [facility]GT;  Service: Pain Management;  Laterality: Right;    KNEE ARTHROSCOPY      KNEE SURGERY      right and left knee repair    OOPHORECTOMY      RADIOFREQUENCY ABLATION Right 9/27/2022    Procedure: Radiofrequency Ablation Right L2-L5;  Surgeon: Shabnam Skaggs MD;  Location: [facility] PAIN [facility]GT;  Service: Pain Management;  Laterality: Right;    RELEASE-FASCIA Bilateral 6/12/2023    Procedure: RELEASE-FASCIA -TRANSVERSUS ABDOMINIS;  Surgeon: Jody Ramírez MD;  [facility];  Service: General;  Laterality: Bilateral;    REPAIR OF RECURRENT INCISIONAL HERNIA  7/10/2018    Procedure: REPAIR, HERNIA, INCISIONAL, RECURRENT;  Surgeon: Cuco Meraz MD;  [facility];  Service: Colon and Rectal;;    REPAIR, HERNIA, INCISIONAL OR VENTRAL, WITHOUT HISTORY OF PRIOR REPAIR N/A 6/12/2023    Procedure: REPAIR, HERNIA, INCISIONAL OR VENTRAL, WITH HISTORY OF PRIOR  REPAIR (WITH MESH AND POSTERIOR COMPONENTS SEPARATION);  Surgeon: Jody Ramírez MD;  [facility];  Service: General;  Laterality: N/A;    SIGMOIDECTOMY  03/20/2006    LAR       Time Tracking:     OT Date of Treatment: 06/13/23  OT Start Time: 1214  OT Stop Time: 1232  OT Total Time (min): 18 min    Billable Minutes:Evaluation 10 min  Self Care/Home Management 8 min    6/13/2023

## 2023-06-13 NOTE — PLAN OF CARE
Problem: Adult Inpatient Plan of Care  Goal: Patient-Specific Goal (Individualized)  Outcome: Ongoing, Progressing  Goal: Optimal Comfort and Wellbeing  Outcome: Ongoing, Progressing  Goal: Readiness for Transition of Care  Outcome: Ongoing, Progressing     Problem: Infection  Goal: Absence of Infection Signs and Symptoms  Outcome: Ongoing, Progressing

## 2023-06-13 NOTE — ASSESSMENT & PLAN NOTE
POD#1 ventral hernia repair, mesh explant with posterior component separation and TAR. Doing well, moderately controlled pain.     - Scheduled IV tylenol  - Multimodal pain regimen: gabapentin, robaxin, lidocaine patch  - Scheduled antiemetics  - OOB, ambulate  - PT/OT  - DVT PPX

## 2023-06-13 NOTE — PLAN OF CARE
Problem: Physical Therapy  Goal: Physical Therapy Goal  Description: Goals to met by 6/27/2023    1. Supine to sit with Modified [place]  2. Sit to supine with Modified [place]  3. Rolling to Left and Right with Modified [place].  4. Sit to stand transfer with Stand-by Assistance  5. Bed to chair transfer with Stand-by Assistance using LRAD  6. Gait  x 75 feet with Stand-by Assistance using LRAD   7. Ascend/descend 5 stair(s) with bilateral Handrails Contact Guard Assistance using No Assistive Device.   8. Stand for 10 minutes with Stand-by Assistance using LRAD  9. Lower extremity exercise program x15 reps per Instruction, with assistance as needed in order to facilitate improved strength, improved postural control, and improvement in functional independence    PT Eval: 6/13/2023

## 2023-06-13 NOTE — PLAN OF CARE
Problem: Occupational Therapy  Goal: Occupational Therapy Goal  Description: Goals to be met by: 6/27/2023     Patient will increase functional independence with ADLs by performing:    UE Dressing with Set-up Assistance.  LE Dressing with Stand-by Assistance.  Grooming while standing at sink with Supervision.  Toileting from toilet with Supervision for hygiene and clothing management.   Supine to sit with Stand-by Assistance.  Step transfer with Stand-by Assistance with or without AD.  Toilet transfer to toilet with Stand-by Assistance with or without AD.    Outcome: Ongoing, Progressing     Pt evaluated and OT goals established.

## 2023-06-13 NOTE — PT/OT/SLP EVAL
Physical Therapy Co-Evaluation and Co-Treatment  Co-evaluation with OT for maximal pt participation, safety, and activity tolerance    Patient Name:  Tanesha Davis   MRN:  [id number]  Admit Date: 6/12/2023  Admitting Diagnosis:  Ventral hernia with obstruction and without gangrene   Length of Stay: 1 days  Recent Surgery: Procedure(s) (LRB):  REPAIR, HERNIA, INCISIONAL OR VENTRAL, WITH HISTORY OF PRIOR  REPAIR (WITH MESH AND POSTERIOR COMPONENTS SEPARATION) (N/A)  RELEASE-FASCIA -TRANSVERSUS ABDOMINIS (Bilateral) 1 Day Post-Op    Recommendations:     Discharge Recommendations:  other (see comments)   Discharge Equipment Recommendations: none   Barriers to discharge: Inaccessible home environment and Evolving Clinical Presentation    Assessment:     Tanesha Davis is a 72 y.o. female admitted with a medical diagnosis of Ventral hernia with obstruction and without gangrene.  She presents with the following impairments/functional limitations: weakness, decreased safety awareness, impaired balance, pain, impaired endurance, impaired self care skills, impaired functional mobility, gait instability, decreased lower extremity function.     Pt agreeable to therapy, fearful of movement due to pain. Pt sits EOB from supine with mod A, SBA for EOB balance, stands from EOB with min A, ambulates with min A few small steps to bedside chair. Pt refusing walker use today, was quite unsteady and would likely do better with a walker, but may have improved gait pattern as pain improves. Continue to progress gait and incorporate stairs when able.    Rehab Prognosis: Good; patient would benefit from acute skilled PT services to address these deficits and reach maximum level of function.      Treatment Tolerated: Fair    Highest level of mobility achieved this visit: small steps to bedside chair with min A    Activity with RN/PCT: transfer with 1 person assist    Plan:     During this hospitalization, patient to be seen 3 x/week to  "address the identified rehab impairments via gait training, therapeutic activities, therapeutic exercises, neuromuscular re-education and progress towards the established goals.    Plan of Care Expires:  07/13/23    Subjective     RN notified prior to session. No family present upon PT entrance into room.    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  Support available: Spouse  Equipment Used: bath bench  Equipment Owned (not using): None  Prior level of function: independent  Work: Employed.   Drive: yes.       Objective:     Additional staff present: BASSAM Clement and PAULA Del Rosario    Patient found HOB elevated with: LUIS MIGUEL drain, peripheral IV, SCD     General Precautions: Standard, Cardiac fall   Orthopedic Precautions:N/A   Braces: N/A   Body mass index is 26.63 kg/m².  Oxygen Device: Room Air    Vitals: BP (!) 114/56   Pulse 85   Temp 96.3 °F (35.7 °C)   Resp 16   Ht 5' 5" (1.651 m)   Wt 72.6 kg (160 lb)   SpO2 (!) 93%   Breastfeeding No   BMI 26.63 kg/m²     Exams:  Cognition:   Alert  Command following: Follows one-step verbal commands  Fluency: clear/fluent  Hearing: Intact  Vision:  Intact  Skin Integrity: Visible skin intact    Physical Exam:   Edema - Mild KRISTIN LEs  ROM - KRISTIN LEs WFL  Strength - KRISTIN hips 4-/5, knees 4+/5, ankles 4/5   Sensation - Intact to light touch, numbness reported in toes and R hand  Coordination - No deficits noted    Outcome Measures:    AM-PAC 6 CLICK MOBILITY  Turning over in bed (including adjusting bedclothes, sheets and blankets)?: 2  Sitting down on and standing up from a chair with arms (e.g., wheelchair, bedside commode, etc.): " 3  Moving from lying on back to sitting on the side of the bed?: 2  Moving to and from a bed to a chair (including a wheelchair)?: 3  Need to walk in hospital room?: 2  Climbing 3-5 steps with a railing?: 1  Basic Mobility Total Score: 13     Functional Mobility:    Bed Mobility:   Supine to Sit: moderate assistance; from L side of bed  Scooting anteriorly to EOB to have both feet planted on floor: minimum assistance    Sitting Balance at Edge of Bed:  Assistance Level Required: Stand-by Assistance  Time: 5 min  Postural deviations noted: mild posterior lean    Transfers:   Sit <> Stand Transfer: minimum assistance with no assistive device  Stand <> Sit Transfer: minimum assistance with no assistive device  [id number] from EOB  Bed <> Chair Transfer: Step Transfer technique with minimum assistance with no assistive device  Chair on patient's R    Standing Balance:  Assistance Level Required: Minimal Assistance  Patient used: no assistive device  Time: 5 min  Postural deviations noted: no deviations noted      Gait:   Patient ambulated: few steps to bedside chair   Patient required: minimal assist  Patient used:  no assistive device  Gait Pattern observed: swing-to gait  Gait Deviation(s): unsteady gait, decreased step length, decreased curtis, and posterior lean  Impairments due to: impaired balance, pain, and decreased endurance  Gait belt utilized    Education:  Time provided for education, counseling and discussion of health disposition in regards to patient's current status  All questions answered within PT scope of practice and to patient's satisfaction  PT role in POC to address current functional deficits  Pt educated on proper body mechanics, safety techniques, and energy conservation with PT facilitation and cueing throughout session    Patient left up in chair with all lines intact, call button in reach, and  present.    GOALS:   Multidisciplinary Problems       Physical Therapy Goals           "Problem: Physical Therapy    Goal Priority Disciplines Outcome Goal Variances Interventions   Physical Therapy Goal     PT, PT/OT Ongoing, Progressing     Description: Goals to met by 6/27/2023    1. Supine to sit with Modified [place]  2. Sit to supine with Modified [place]  3. Rolling to Left and Right with Modified [place].  4. Sit to stand transfer with Stand-by Assistance  5. Bed to chair transfer with Stand-by Assistance using LRAD  6. Gait  x 75 feet with Stand-by Assistance using LRAD   7. Ascend/descend 5 stair(s) with bilateral Handrails Contact Guard Assistance using No Assistive Device.   8. Stand for 10 minutes with Stand-by Assistance using LRAD  9. Lower extremity exercise program x15 reps per Instruction, with assistance as needed in order to facilitate improved strength, improved postural control, and improvement in functional independence                         History:     Past Medical History:   Diagnosis Date    Arthritis     Draining cutaneous sinus tract     Headache(784.0)     Incisional hernia     Insomnia     Nocturnal leg cramps     Nonhealing surgical wound     PONV (postoperative nausea and vomiting)     with last surgery per patient, "Severe" PONV 2-27-14    Postmenopausal status     Rectosigmoid cancer     Small bowel obstruction 11/8/2022       Past Surgical History:   Procedure Laterality Date    BREAST BIOPSY Left     CHOLECYSTECTOMY  02/24/2014    with incisional hernia repair with mesh    COLONOSCOPY N/A 8/23/2016    Procedure: COLONOSCOPY;  Surgeon: Cuco Meraz MD;  Location: [facility] ([address] [place] [address]);  Service: Endoscopy;  Laterality: N/A;    COLONOSCOPY N/A 12/9/2019    Procedure: COLONOSCOPY;  Surgeon: KISHORE Curry MD;  Location: [facility] ([address] [place] [address]);  Service: Endoscopy;  Laterality: N/A;  ADHESIVE Allergy    COLONOSCOPY N/A 5/9/2023    Procedure: COLONOSCOPY;  Surgeon: KISHORE Curry MD;  Location: [facility] ENDO ([address] [place] [address]);  Service: Endoscopy;  Laterality: N/A; "  Instructions to portal.EC  precall no answer 5/3 EB    ESOPHAGOGASTRODUODENOSCOPY N/A 4/22/2021    Procedure: EGD (ESOPHAGOGASTRODUODENOSCOPY);  Surgeon: Lukasz Lindquist MD;  Location: [facility] (4TH FLR);  Service: Endoscopy;  Laterality: N/A;  [place] [facility] [place] to see if correct form of test-MS    HYSTERECTOMY      INJECTION OF ANESTHETIC AGENT AROUND NERVE Right 6/28/2022    Procedure: BLOCK, NERVE, RIGHT L2-L3-L4-L5;  Surgeon: Shabnam Skaggs MD;  Location: [facility] PAIN MGT;  Service: Pain Management;  Laterality: Right;    INJECTION OF ANESTHETIC AGENT AROUND NERVE Right 8/2/2022    Procedure: BLOCK, NERVE, RIGHT L2-L5 MEDIAL BRANCH;  Surgeon: Shabnam Skaggs MD;  Location: [facility]IN [facility]GT;  Service: Pain Management;  Laterality: Right;    KNEE ARTHROSCOPY      KNEE SURGERY      right and left knee repair    OOPHORECTOMY      RADIOFREQUENCY ABLATION Right 9/27/2022    Procedure: Radiofrequency Ablation Right L2-L5;  Surgeon: Shabnam Skaggs MD;  Location: [facility] PAIN [facility]GT;  Service: Pain Management;  Laterality: Right;    RELEASE-FASCIA Bilateral 6/12/2023    Procedure: RELEASE-FASCIA -TRANSVERSUS ABDOMINIS;  Surgeon: Jody Ramírez MD;  Location: [facility] OR 2ND FLR;  Service: General;  Laterality: Bilateral;    REPAIR OF RECURRENT INCISIONAL HERNIA  7/10/2018    Procedure: REPAIR, HERNIA, INCISIONAL, RECURRENT;  Surgeon: Cuco Meraz MD;  [facility];  Service: Colon and Rectal;;    REPAIR, HERNIA, INCISIONAL OR VENTRAL, WITHOUT HISTORY OF PRIOR REPAIR N/A 6/12/2023    Procedure: REPAIR, HERNIA, INCISIONAL OR VENTRAL, WITH HISTORY OF PRIOR  REPAIR (WITH MESH AND POSTERIOR COMPONENTS SEPARATION);  Surgeon: Jody Ramírez MD;  [facility];  Service: General;  Laterality: N/A;    SIGMOIDECTOMY  03/20/2006    LAR       Time Tracking:     PT Received On: 06/13/23  PT Start Time: 1212     PT Stop Time: 1232  PT Total Time (min):  20 min     Billable Minutes: Evaluation 1 procedure and Therapeutic Activity 10 min    Carlitos Yang, PT, DPT  6/13/2023

## 2023-06-13 NOTE — NURSING TRANSFER
Nursing Transfer Note      6/12/2023     Reason patient is being transferred: Recovery care completed, room ready    Transfer To: [address]    Transfer via bed    Transfer with IV pole and pump    Transported by Transport    Chart send with patient: Yes    Notified: spouse, son    Patient reassessed at: 20456/12/23

## 2023-06-13 NOTE — PROGRESS NOTES
"[facility]  General Surgery  Progress Note    Subjective:       Post-Op Info:  Procedure(s) (LRB):  REPAIR, HERNIA, INCISIONAL OR VENTRAL, WITH HISTORY OF PRIOR  REPAIR (WITH MESH AND POSTERIOR COMPONENTS SEPARATION) (N/A)  RELEASE-FASCIA -TRANSVERSUS ABDOMINIS (Bilateral)   1 Day Post-Op     Interval History: No issues overnight. Moderately controlled pain. No nausea or vomiting. Minimal ambulation out of bed. Jeffrey removed this morning (2L output in 24 hrs). LLQ drain with 110 cc/24hrs with SS output; RLQ drain with 90 cc/24hrs with SS output    Medications:  Continuous Infusions:   lactated ringers 125 mL/hr at 06/12/23 1710     Scheduled Meds:   acetaminophen  1,000 mg Intravenous Q8H    amitriptyline  25 mg Oral QHS    atorvastatin  40 mg Oral Daily    enoxparin  40 mg Subcutaneous Q24H (prophylaxis, 1700)    gabapentin  400 mg Oral TID    methocarbamoL  750 mg Oral QID    mupirocin   Nasal BID    ondansetron  4 mg Oral Q6H     PRN Meds:melatonin, morphine, oxyCODONE, oxyCODONE, sodium chloride 0.9%     Review of patient's allergies indicates:   Allergen Reactions    Adhesive Rash     Asking that we use Paper Tape,  Uses "Sensitive Skin" band aids    Amoxil [amoxicillin] Hives    Penicillins Hives and Swelling     Swelling of ears and neck    Sulfa (sulfonamide antibiotics) Hives and Itching     Objective:     Vital Signs (Most Recent):  Temp: 97.7 °F (36.5 °C) (06/13/23 0549)  Pulse: 86 (06/13/23 0549)  Resp: 14 (06/13/23 0559)  BP: (!) 101/53 (06/13/23 0549)  SpO2: (!) 90 % (06/13/23 0549) Vital Signs (24h Range):  Temp:  [97.3 °F (36.3 °C)-98.3 °F (36.8 °C)] 97.7 °F (36.5 °C)  Pulse:  [] 86  Resp:  [9-20] 14  SpO2:  [90 %-100 %] 90 %  BP: ()/(45-70) 101/53     Weight: 72.6 kg (160 lb)  Body mass index is 26.63 kg/m².    Intake/Output - Last 3 Shifts         06/11 0700 06/12 0659 06/12 0700 06/13 0659 06/13 0700 06/14 0659    I.V. (mL/kg)  2713 (37.4)     IV Piggyback  3800     Total " Intake(mL/kg)  6513 (89.7)     Urine (mL/kg/hr)  1250 (0.7)     Drains  200     Total Output  1450     Net  +5063                     Physical Exam  Vitals and nursing note reviewed.   Constitutional:       General: She is not in acute distress.  Cardiovascular:      Rate and Rhythm: Normal rate.      Pulses: Normal pulses.   Pulmonary:      Effort: Pulmonary effort is normal. No respiratory distress.   Abdominal:      General: There is no distension.      Palpations: Abdomen is soft.      Tenderness: There is no abdominal tenderness.      Comments: Abdominal binder in place; midline incision with dressing in place; LUIS MIGUEL drains with SS output; soft non-tender and non-distended abdomen    Neurological:      Mental Status: She is alert.        Significant Labs:  I have reviewed all pertinent lab results within the past 24 hours.  CBC:   Recent Labs   Lab 06/13/23  0559   WBC 8.93   RBC 3.88*   HGB 11.0*   HCT 35.6*      MCV 92   MCH 28.4   MCHC 30.9*     BMP:   Recent Labs   Lab 06/13/23  0559   GLU 95      K 3.9      CO2 24   BUN 11   CREATININE 0.6   CALCIUM 8.1*   MG 2.0     CMP:   Recent Labs   Lab 06/13/23  0559   GLU 95   CALCIUM 8.1*   ALBUMIN 2.7*   PROT 5.0*      K 3.9   CO2 24      BUN 11   CREATININE 0.6   ALKPHOS 70   ALT 10   AST 20   BILITOT 0.8       Significant Diagnostics:  I have reviewed all pertinent imaging results/findings within the past 24 hours.    Assessment/Plan:     * Ventral hernia with obstruction and without gangrene  POD#1 open ventral hernia repair, prior mesh explantation,lysis of adhesions, posterior components separation, TAR, and 30 cm x 30 cm soft Bard mesh placement. Working on pain control and assistance with ambulation, patient will require over 2 midnights (likely 5-7 days) of inpatient monitoring for unstable ambulation and hemodynamic monitoring given extensive abdominal wall reconstruction.     - Scheduled IV tylenol  - Multimodal pain regimen:  gabapentin, robaxin, lidocaine patch  - Scheduled antiemetics  - Strip and record LUIS MIGUEL outputs   - OOB, ambulate  - PT/OT  - DVT PPX    Dispo: We discussed the likely 5-7 days hospital stay for inpatient monitoring, assistance with ambulation and pain control           Olga Nails MD  General Surgery  [facility] - Surgery

## 2023-06-13 NOTE — ANESTHESIA POSTPROCEDURE EVALUATION
Anesthesia Post Evaluation    Patient: Tanesha Davis    Procedure(s) Performed: Procedure(s) (LRB):  REPAIR, HERNIA, INCISIONAL OR VENTRAL, WITH HISTORY OF PRIOR  REPAIR (WITH MESH AND POSTERIOR COMPONENTS SEPARATION) (N/A)  RELEASE-FASCIA -TRANSVERSUS ABDOMINIS (Bilateral)    Final Anesthesia Type: general      Patient location during evaluation: floor  Patient participation: Yes- Able to Participate  Level of consciousness: awake  Post-procedure vital signs: reviewed and stable  Pain management: adequate  Airway patency: patent      Anesthetic complications: no      Cardiovascular status: hemodynamically stable  Respiratory status: unassisted  Follow-up not needed.          Vitals Value Taken Time   /53 06/13/23 0549   Temp 36.5 °C (97.7 °F) 06/13/23 0549   Pulse 86 06/13/23 0549   Resp 14 06/13/23 0559   SpO2 90 % 06/13/23 0549         Event Time   Out of Recovery 19:45:00         Pain/Alondra Score: Pain Rating Prior to Med Admin: 8 (6/13/2023  5:59 AM)  Alondra Score: 9 (6/12/2023  8:00 PM)

## 2023-06-14 LAB
ALBUMIN SERPL BCP-MCNC: 2.8 G/DL (ref 3.5–5.2)
ALP SERPL-CCNC: 71 U/L (ref 55–135)
ALT SERPL W/O P-5'-P-CCNC: 11 U/L (ref 10–44)
ANION GAP SERPL CALC-SCNC: 7 MMOL/L (ref 8–16)
AST SERPL-CCNC: 21 U/L (ref 10–40)
BASOPHILS # BLD AUTO: 0.06 K/UL (ref 0–0.2)
BASOPHILS NFR BLD: 0.6 % (ref 0–1.9)
BILIRUB SERPL-MCNC: 1.2 MG/DL (ref 0.1–1)
BUN SERPL-MCNC: 11 MG/DL (ref 8–23)
CALCIUM SERPL-MCNC: 8.7 MG/DL (ref 8.7–10.5)
CHLORIDE SERPL-SCNC: 102 MMOL/L (ref 95–110)
CO2 SERPL-SCNC: 25 MMOL/L (ref 23–29)
CREAT SERPL-MCNC: 0.7 MG/DL (ref 0.5–1.4)
DIFFERENTIAL METHOD: ABNORMAL
EOSINOPHIL # BLD AUTO: 0.2 K/UL (ref 0–0.5)
EOSINOPHIL NFR BLD: 2.3 % (ref 0–8)
ERYTHROCYTE [DISTWIDTH] IN BLOOD BY AUTOMATED COUNT: 13.5 % (ref 11.5–14.5)
EST. GFR  (NO RACE VARIABLE): >60 ML/MIN/1.73 M^2
GLUCOSE SERPL-MCNC: 89 MG/DL (ref 70–110)
HCT VFR BLD AUTO: 34.5 % (ref 37–48.5)
HGB BLD-MCNC: 10.7 G/DL (ref 12–16)
IMM GRANULOCYTES # BLD AUTO: 0.03 K/UL (ref 0–0.04)
IMM GRANULOCYTES NFR BLD AUTO: 0.3 % (ref 0–0.5)
LYMPHOCYTES # BLD AUTO: 1.9 K/UL (ref 1–4.8)
LYMPHOCYTES NFR BLD: 19.8 % (ref 18–48)
MAGNESIUM SERPL-MCNC: 1.8 MG/DL (ref 1.6–2.6)
MCH RBC QN AUTO: 28.5 PG (ref 27–31)
MCHC RBC AUTO-ENTMCNC: 31 G/DL (ref 32–36)
MCV RBC AUTO: 92 FL (ref 82–98)
MONOCYTES # BLD AUTO: 0.7 K/UL (ref 0.3–1)
MONOCYTES NFR BLD: 7.2 % (ref 4–15)
NEUTROPHILS # BLD AUTO: 6.7 K/UL (ref 1.8–7.7)
NEUTROPHILS NFR BLD: 69.8 % (ref 38–73)
NRBC BLD-RTO: 0 /100 WBC
PHOSPHATE SERPL-MCNC: 2.4 MG/DL (ref 2.7–4.5)
PLATELET # BLD AUTO: 213 K/UL (ref 150–450)
PMV BLD AUTO: 10.8 FL (ref 9.2–12.9)
POTASSIUM SERPL-SCNC: 3.8 MMOL/L (ref 3.5–5.1)
PROT SERPL-MCNC: 5.4 G/DL (ref 6–8.4)
RBC # BLD AUTO: 3.75 M/UL (ref 4–5.4)
SODIUM SERPL-SCNC: 134 MMOL/L (ref 136–145)
WBC # BLD AUTO: 9.62 K/UL (ref 3.9–12.7)

## 2023-06-14 PROCEDURE — 80053 COMPREHEN METABOLIC PANEL: CPT | Performed by: STUDENT IN AN ORGANIZED HEALTH CARE EDUCATION/TRAINING PROGRAM

## 2023-06-14 PROCEDURE — 11000001 HC ACUTE MED/SURG PRIVATE ROOM

## 2023-06-14 PROCEDURE — 63600175 PHARM REV CODE 636 W HCPCS: Performed by: STUDENT IN AN ORGANIZED HEALTH CARE EDUCATION/TRAINING PROGRAM

## 2023-06-14 PROCEDURE — 36415 COLL VENOUS BLD VENIPUNCTURE: CPT | Performed by: STUDENT IN AN ORGANIZED HEALTH CARE EDUCATION/TRAINING PROGRAM

## 2023-06-14 PROCEDURE — 25000003 PHARM REV CODE 250: Performed by: STUDENT IN AN ORGANIZED HEALTH CARE EDUCATION/TRAINING PROGRAM

## 2023-06-14 PROCEDURE — 85025 COMPLETE CBC W/AUTO DIFF WBC: CPT | Performed by: STUDENT IN AN ORGANIZED HEALTH CARE EDUCATION/TRAINING PROGRAM

## 2023-06-14 PROCEDURE — 25000003 PHARM REV CODE 250: Performed by: SURGERY

## 2023-06-14 PROCEDURE — 63600175 PHARM REV CODE 636 W HCPCS: Performed by: SURGERY

## 2023-06-14 PROCEDURE — 83735 ASSAY OF MAGNESIUM: CPT | Performed by: STUDENT IN AN ORGANIZED HEALTH CARE EDUCATION/TRAINING PROGRAM

## 2023-06-14 PROCEDURE — 84100 ASSAY OF PHOSPHORUS: CPT | Performed by: STUDENT IN AN ORGANIZED HEALTH CARE EDUCATION/TRAINING PROGRAM

## 2023-06-14 PROCEDURE — 97535 SELF CARE MNGMENT TRAINING: CPT

## 2023-06-14 RX ORDER — SODIUM,POTASSIUM PHOSPHATES 280-250MG
2 POWDER IN PACKET (EA) ORAL ONCE
Status: DISCONTINUED | OUTPATIENT
Start: 2023-06-14 | End: 2023-06-14

## 2023-06-14 RX ORDER — DEXTROSE MONOHYDRATE, SODIUM CHLORIDE, AND POTASSIUM CHLORIDE 50; 1.49; 4.5 G/1000ML; G/1000ML; G/1000ML
INJECTION, SOLUTION INTRAVENOUS CONTINUOUS
Status: DISCONTINUED | OUTPATIENT
Start: 2023-06-14 | End: 2023-06-15

## 2023-06-14 RX ORDER — POLYETHYLENE GLYCOL 3350 17 G/17G
17 POWDER, FOR SOLUTION ORAL 2 TIMES DAILY
Status: DISCONTINUED | OUTPATIENT
Start: 2023-06-14 | End: 2023-06-17 | Stop reason: HOSPADM

## 2023-06-14 RX ORDER — KETOROLAC TROMETHAMINE 15 MG/ML
15 INJECTION, SOLUTION INTRAMUSCULAR; INTRAVENOUS EVERY 8 HOURS
Status: DISCONTINUED | OUTPATIENT
Start: 2023-06-14 | End: 2023-06-17 | Stop reason: HOSPADM

## 2023-06-14 RX ORDER — FAMOTIDINE 10 MG/ML
20 INJECTION INTRAVENOUS 2 TIMES DAILY
Status: DISCONTINUED | OUTPATIENT
Start: 2023-06-14 | End: 2023-06-17 | Stop reason: HOSPADM

## 2023-06-14 RX ORDER — BISACODYL 10 MG
10 SUPPOSITORY, RECTAL RECTAL ONCE
Status: COMPLETED | OUTPATIENT
Start: 2023-06-14 | End: 2023-06-14

## 2023-06-14 RX ORDER — SCOLOPAMINE TRANSDERMAL SYSTEM 1 MG/1
1 PATCH, EXTENDED RELEASE TRANSDERMAL
Status: DISCONTINUED | OUTPATIENT
Start: 2023-06-14 | End: 2023-06-17 | Stop reason: HOSPADM

## 2023-06-14 RX ORDER — ONDANSETRON 8 MG/1
8 TABLET, ORALLY DISINTEGRATING ORAL EVERY 6 HOURS
Status: DISCONTINUED | OUTPATIENT
Start: 2023-06-14 | End: 2023-06-15

## 2023-06-14 RX ORDER — SENNOSIDES 8.6 MG/1
8.6 TABLET ORAL DAILY
Status: DISCONTINUED | OUTPATIENT
Start: 2023-06-14 | End: 2023-06-14

## 2023-06-14 RX ORDER — MAGNESIUM SULFATE HEPTAHYDRATE 40 MG/ML
2 INJECTION, SOLUTION INTRAVENOUS ONCE
Status: COMPLETED | OUTPATIENT
Start: 2023-06-14 | End: 2023-06-14

## 2023-06-14 RX ORDER — SODIUM,POTASSIUM PHOSPHATES 280-250MG
2 POWDER IN PACKET (EA) ORAL
Status: COMPLETED | OUTPATIENT
Start: 2023-06-14 | End: 2023-06-14

## 2023-06-14 RX ADMIN — ACETAMINOPHEN 1000 MG: 500 TABLET ORAL at 12:06

## 2023-06-14 RX ADMIN — KETOROLAC TROMETHAMINE 15 MG: 15 INJECTION, SOLUTION INTRAMUSCULAR; INTRAVENOUS at 09:06

## 2023-06-14 RX ADMIN — GABAPENTIN 400 MG: 300 CAPSULE ORAL at 09:06

## 2023-06-14 RX ADMIN — SCOPALAMINE 1 PATCH: 1 PATCH, EXTENDED RELEASE TRANSDERMAL at 09:06

## 2023-06-14 RX ADMIN — KETOROLAC TROMETHAMINE 10 MG: 10 TABLET, FILM COATED ORAL at 05:06

## 2023-06-14 RX ADMIN — METHOCARBAMOL 750 MG: 750 TABLET ORAL at 12:06

## 2023-06-14 RX ADMIN — DEXTROSE, SODIUM CHLORIDE, AND POTASSIUM CHLORIDE: 5; .45; .15 INJECTION INTRAVENOUS at 05:06

## 2023-06-14 RX ADMIN — MUPIROCIN: 20 OINTMENT TOPICAL at 09:06

## 2023-06-14 RX ADMIN — POLYETHYLENE GLYCOL 3350 17 G: 17 POWDER, FOR SOLUTION ORAL at 09:06

## 2023-06-14 RX ADMIN — ACETAMINOPHEN 1000 MG: 500 TABLET ORAL at 05:06

## 2023-06-14 RX ADMIN — SENNOSIDES 8.6 MG: 8.6 TABLET, FILM COATED ORAL at 09:06

## 2023-06-14 RX ADMIN — METHOCARBAMOL 750 MG: 750 TABLET ORAL at 04:06

## 2023-06-14 RX ADMIN — METHOCARBAMOL 750 MG: 750 TABLET ORAL at 09:06

## 2023-06-14 RX ADMIN — ONDANSETRON 4 MG: 4 TABLET, ORALLY DISINTEGRATING ORAL at 01:06

## 2023-06-14 RX ADMIN — ENOXAPARIN SODIUM 40 MG: 40 INJECTION SUBCUTANEOUS at 04:06

## 2023-06-14 RX ADMIN — ACETAMINOPHEN 1000 MG: 500 TABLET ORAL at 01:06

## 2023-06-14 RX ADMIN — ACETAMINOPHEN 1000 MG: 500 TABLET ORAL at 11:06

## 2023-06-14 RX ADMIN — MAGNESIUM SULFATE 2 G: 2 INJECTION INTRAVENOUS at 06:06

## 2023-06-14 RX ADMIN — ATORVASTATIN CALCIUM 40 MG: 40 TABLET, FILM COATED ORAL at 09:06

## 2023-06-14 RX ADMIN — GABAPENTIN 400 MG: 300 CAPSULE ORAL at 04:06

## 2023-06-14 RX ADMIN — AMITRIPTYLINE HYDROCHLORIDE 25 MG: 25 TABLET, FILM COATED ORAL at 09:06

## 2023-06-14 RX ADMIN — BISACODYL 10 MG: 10 SUPPOSITORY RECTAL at 09:06

## 2023-06-14 RX ADMIN — ONDANSETRON 4 MG: 4 TABLET, ORALLY DISINTEGRATING ORAL at 05:06

## 2023-06-14 RX ADMIN — METHOCARBAMOL 500 MG: 100 INJECTION, SOLUTION INTRAMUSCULAR; INTRAVENOUS at 09:06

## 2023-06-14 RX ADMIN — ONDANSETRON 8 MG: 8 TABLET, ORALLY DISINTEGRATING ORAL at 11:06

## 2023-06-14 RX ADMIN — ONDANSETRON 8 MG: 8 TABLET, ORALLY DISINTEGRATING ORAL at 12:06

## 2023-06-14 RX ADMIN — POTASSIUM & SODIUM PHOSPHATES POWDER PACK 280-160-250 MG 2 PACKET: 280-160-250 PACK at 06:06

## 2023-06-14 RX ADMIN — LIDOCAINE 1 PATCH: 50 PATCH TOPICAL at 09:06

## 2023-06-14 RX ADMIN — FAMOTIDINE 20 MG: 10 INJECTION INTRAVENOUS at 11:06

## 2023-06-14 RX ADMIN — ONDANSETRON 8 MG: 8 TABLET, ORALLY DISINTEGRATING ORAL at 05:06

## 2023-06-14 RX ADMIN — KETOROLAC TROMETHAMINE 10 MG: 10 TABLET, FILM COATED ORAL at 03:06

## 2023-06-14 RX ADMIN — POTASSIUM & SODIUM PHOSPHATES POWDER PACK 280-160-250 MG 2 PACKET: 280-160-250 PACK at 12:06

## 2023-06-14 NOTE — PROGRESS NOTES
"[facility]  General Surgery  Progress Note    Subjective:     History of Present Illness:  No notes on file    Post-Op Info:  Procedure(s) (LRB):  REPAIR, HERNIA, INCISIONAL OR VENTRAL, WITH HISTORY OF PRIOR  REPAIR (WITH MESH AND POSTERIOR COMPONENTS SEPARATION) (N/A)  RELEASE-FASCIA -TRANSVERSUS ABDOMINIS (Bilateral)   2 Days Post-Op     Interval History: No issues overnight. Mild nausea without emesis or wretching. She was able to ambulate with PT. Is getting out of bed to restroom, needs assist sitting up. Tolerating some clears.   Drains benign    Medications:  Continuous Infusions:   lactated ringers 100 mL/hr at 06/13/23 0727     Scheduled Meds:   acetaminophen  1,000 mg Oral Q6H    amitriptyline  25 mg Oral QHS    atorvastatin  40 mg Oral Daily    enoxparin  40 mg Subcutaneous Q24H (prophylaxis, 1700)    gabapentin  400 mg Oral TID    ketorolac  10 mg Oral Q8H    LIDOcaine  1 patch Transdermal Q24H    methocarbamoL  750 mg Oral QID    mupirocin   Nasal BID    ondansetron  4 mg Oral Q6H    polyethylene glycol  17 g Oral BID    potassium, sodium phosphates  2 packet Oral QID (AC & HS)    scopolamine  1 patch Transdermal Q3 Days    senna  8.6 mg Oral Daily     PRN Meds:melatonin, morphine, oxyCODONE, oxyCODONE, sodium chloride 0.9%     Review of patient's allergies indicates:   Allergen Reactions    Adhesive Rash     Asking that we use Paper Tape,  Uses "Sensitive Skin" band aids    Amoxil [amoxicillin] Hives    Penicillins Hives and Swelling     Swelling of ears and neck    Sulfa (sulfonamide antibiotics) Hives and Itching     Objective:     Vital Signs (Most Recent):  Temp: 96.3 °F (35.7 °C) (06/14/23 0733)  Pulse: 88 (06/14/23 0733)  Resp: 18 (06/14/23 0733)  BP: (!) 141/65 (06/14/23 0733)  SpO2: 95 % (06/14/23 0733) Vital Signs (24h Range):  Temp:  [96.3 °F (35.7 °C)-98.1 °F (36.7 °C)] 96.3 °F (35.7 °C)  Pulse:  [85-88] 88  Resp:  [16-18] 18  SpO2:  [90 %-95 %] 95 %  BP: " (102-141)/(53-65) 141/65     Weight: 72.6 kg (160 lb)  Body mass index is 26.63 kg/m².    Intake/Output - Last 3 Shifts         06/12 0700  06/13 0659 06/13 0700 06/14 0659 06/14 0700  06/15 0659    I.V. (mL/kg) 2713 (37.4)      IV Piggyback 3800      Total Intake(mL/kg) 6513 (89.7)      Urine (mL/kg/hr) 1250 (0.7)      Drains 200 300     Total Output 1450 300     Net +5063 -300                    Physical Exam  Vitals and nursing note reviewed.   Constitutional:       General: She is not in acute distress.  Cardiovascular:      Rate and Rhythm: Normal rate.      Pulses: Normal pulses.   Pulmonary:      Effort: Pulmonary effort is normal. No respiratory distress.   Abdominal:      General: There is no distension.      Palpations: Abdomen is soft.      Tenderness: There is no abdominal tenderness.      Comments: Abdominal binder in place; midline incision with dressing in place; island dressing removed; staples intact with scant ss drainage;   LUIS MIGUEL drains with SS output; appropriately tender and mildly distended but soft abdomen   Neurological:      Mental Status: She is alert.        Significant Labs:  I have reviewed all pertinent lab results within the past 24 hours.  CBC:   Recent Labs   Lab 06/14/23 0210   WBC 9.62   RBC 3.75*   HGB 10.7*   HCT 34.5*      MCV 92   MCH 28.5   MCHC 31.0*       BMP:   Recent Labs   Lab 06/14/23  0210   GLU 89   *   K 3.8      CO2 25   BUN 11   CREATININE 0.7   CALCIUM 8.7   MG 1.8       CMP:   Recent Labs   Lab 06/14/23  0210   GLU 89   CALCIUM 8.7   ALBUMIN 2.8*   PROT 5.4*   *   K 3.8   CO2 25      BUN 11   CREATININE 0.7   ALKPHOS 71   ALT 11   AST 21   BILITOT 1.2*         Significant Diagnostics:  I have reviewed all pertinent imaging results/findings within the past 24 hours.    Assessment/Plan:     * Ventral hernia with obstruction and without gangrene  73 yo F s/p  ventral hernia repair, mesh explant with posterior component separation and TAR  6/12/2023. Doing well, moderately controlled pain.     - Scheduled multimodals, PRN narcotics  - Multimodal pain regimen: gabapentin, robaxin, toradol, lidocaine patch  - Scheduled antiemetics  - CLD with boost   - Routine LUIS MIGUEL drain care, record output   - Home meds   - OOB, ambulate  - PT/OT  - DVT PPX          Radha Bettencourt MD  General Surgery  [facility]

## 2023-06-14 NOTE — OP NOTE
DATE OF PROCEDURE: 6/12/2023    PRE OP DIAGNOSIS: Recurrent ventral hernia with obstruction and without gangrene    POST OP DIAGNOSIS: Recurrent ventral hernia with obstruction and without gangrene    PROCEDURE: Procedure(s) (LRB):  REPAIR, HERNIA, INCISIONAL, VENTRAL, WITH HISTORY OF PRIOR  REPAIR (WITH MESH AND POSTERIOR COMPONENTS SEPARATION) (N/A)  RELEASE-FASCIA -TRANSVERSUS ABDOMINIS (Bilateral)  MESH EXPLANTATION  LYSIS, ADHESIONS    Surgeon(s) and Role:     * Jody Ramírez MD - Primary     * Olga Nails MD - Resident - Assisting    ANESTHESIA: General    INDICATION: Patient is a 72 year-old woman with history of rectosigmoid cancer and multiple open ventral hernia repairs complicated by mesh infection s/p debridement who presented with a recurrent ventral hernia measuring almost 15-cm in width and recurrent small bowel obstructions managed nonoperatively. After discussing the risks, benefits and alternatives to complex hernia repair with components separation, abdominal wall reconstruction, and placement of mesh, she elected to proceed. Informed consent was obtained. Botox was injected 4-weeks pre-op to the external oblique, internal oblique, and transversus abdominis bilaterally to facilitate closure.     FINDINGS:  Complex recurrent ventral hernia measuring 15 cm wide x 22 cm long containing multiple loops of small bowel, colon, and omentum  Residual prior mesh bridging cephalad aspect of hernia defect, excised  Dense adhesions from small intestine to inferior edge of fascial defect and to pelvis, as well as multiple interloop adhesions, all divided  Transversus abdominis myofascial release performed bilaterally following posterior rectus sheath dissection  Reapproximation of posterior and anterior midline fascia without tension  Retromuscular placement of 30 x 30 cm Bard Soft mesh  Small defect in transversalis fascia in RUQ closed primarily with underlying omentum  Excision of hernia  sac and redundant skin     DESCRIPTION OF OPERATION: Patient was met in the pre-operative area and her identity and consent confirmed. She was then brought back to the Operating Room and placed in the supine position. General anesthesia was induced and she was intubated without incident. SCDs, a Jeffrey catheter, and a warming blanket were placed, and pre-op antibiotics were infused within 30 minutes of the incision. Her abdomen was prepped and draped in sterile fashion and a pre-procedural pause was performed by all members of the surgical and anesthesia teams. A midline incision was made the length of her prior midline scar which was excised. Dissection was continued through the subcutaneous tissue to the hernia sac taking care not to injure the underlying bowel. The sac was elevated and carefully entered and the bowel and omentum swept away. This was opened the full length of the fascial defect which measured 22-cm in craniocaudal dimension. Adhesiolysis was performed throughout the abdomen clearing omentum, small bowel, and colon from peritoneum, including multiple loops of incarcerated small bowel and a very dense adhesion to the left posterior aspect of the fascia at which point a small residual piece of mesh was noted, left on the bowel wall due to partial erosion, and this segment oversewn with a 3-0 vicryl. Once complete all interloop adhesions were divided and two very dense adhesions between the small bowel and pelvis were divided. The small bowel was run from LOT to TI with ease. All in all about 3 hours were spent on adhesiolysis. At the cranial aspect of the hernia defect residual mesh was encountered. This was excised in full along with multiple Prolene sutures, taking care to maintain the integrity of the posterior rectus fascia in areas where there was overlap. Once complete we proceeded with myofascial release and posterior components separation bilaterally. The posterior rectus sheath was excised  just lateral to the linea alba and bluntly  from the rectus muscle towards the semilunaris taking care to identify and preserve the perforating neurovascular bundles on both sides. The posterior rectus sheath was then incised high near the costal margin just medial to these neurovascular bundle identifying the transversus abdominis muscle. These muscle fibers were divided near the medial insertion to the posterior rectus sheath until the transversalis fascia was encountered. The myofascial release of the transversus abdominis muscle and aponeurosis was continued inferiorly and through the arcuate line taking care not to injure the transversalis fascia or peritoneum. Once complete, the transversus abdominis was bluntly elevated off the transversalis and this dissection plane carried far laterally, cephalad under the costal margin, and caudal underneath the pubis. Final inspection of the abdomen noted good hemostasis and no evidence of complication. The posterior rectus sheath was then brought together in the midline with running 2-0 PDS suture. This was achieved without any tension. A 1.5-cm defect was noted at the superior aspect of the transversalis and peritoneum at the transition to the posterior rectus sheath on this side. Omentum was underlying. This was closed with 3-0 vicryl. A 30 x 30 cm piece of Bard Soft mesh was then placed into the retromuscular space. This was secured with six interrupted 2-0 PDS suture and liberal use of Vistaseal fibrin glue. A 15Fr Hemanth drain was placed on either side overlying the mesh through bilateral lower abdominal stab incisions and secured in place with 2-0 Nylon suture. The linea alba was then closed with running 2-0 PDS which again came together without tension. Excess skin and hernia sac were excised and the midline skin closed with staples over which a sterile dressing was placed. The patient was awoken from anesthesia and extubated without incident. She was  brought to the PACU in good condition having tolerated the operation well     EBL: 5 mL  Specimen: 1. Scar 2. Explanted mesh 3. Hernia sac 4. Skin and soft tissue  Drains: LUIS MIGUEL x2 to retromuscular space, Jeffrey catheter  Complications: None apparent  Dispo: Surgical floor     Surgical sponge and needle count was correct at the end of the case. I was present and scrubbed for the entirety of the operation.    Jody Ramírez  6/12/23

## 2023-06-14 NOTE — PT/OT/SLP PROGRESS
Occupational Therapy   Treatment    Name: Tanesha Davis  MRN: [id number]  Admitting Diagnosis:  Ventral hernia with obstruction and without gangrene  2 Days Post-Op    Recommendations:     Discharge Recommendations: other (see comments)  Discharge Equipment Recommendations:  none  Barriers to discharge:       Assessment:     Tanesha Davis is a 72 y.o. female with a medical diagnosis of Ventral hernia with obstruction and without gangrene.  She presents with decreased assist req for bed mob, ambulation. Pt tolerated act Fair this date and able to perform G/H standing at sink c/ CGA. Performance deficits affecting function are weakness, impaired balance, impaired functional mobility, impaired self care skills, impaired endurance, pain, gait instability, decreased safety awareness.     Rehab Prognosis:  Good; patient would benefit from acute skilled OT services to address these deficits and reach maximum level of function.       Plan:     Patient to be seen 4 x/week to address the above listed problems via self-care/home management, therapeutic activities, therapeutic exercises  Plan of Care Expires: 07/12/23  Plan of Care Reviewed with: patient    Subjective     Chief Complaint: pain  Patient/Family Comments/goals: I hope this pain goes away    Pain/Comfort:  Pain Rating 1: 9/10  Location 1: abdomen  Pain Addressed 1: Reposition, Distraction, Cessation of Activity  Pain Rating Post-Intervention 1: 9/10  Pain Rating 2: 7/10  Location 2: head  Pain Addressed 2: Distraction, Cessation of Activity    Objective:     Communicated with: RN prior to session.  Patient found supine with LUIS MIGUEL drain, peripheral IV, SCD upon OT entry to room.    General Precautions: Standard, fall    Orthopedic Precautions:N/A  Braces: N/A  Respiratory Status: Room air     Occupational Performance:     Bed Mobility:    Patient completed Scooting/Bridging with stand by assistance  Patient completed Supine to Sit with minimum assistance  Patient  completed Sit to Supine with contact guard assistance     Functional Mobility/Transfers:  Patient completed Sit <> Stand Transfer with contact guard assistance  with  hand-held assist   Functional Mobility: ambulated c/ HHA (CGA) 20ft [id number] c/ standing rest break at sink      Activities of Daily Living:  Grooming: contact guard assistance standing at sink to complete oral care      [facility] 6 Click ADL: 17    Treatment & Education:  Pt educated on log roll for bed mob. Pt educated on safety c/ fx mob and pain management. Pt educated on pacing and importance of OOB act.     Patient left supine with all lines intact, call button in reach, and spouse present    GOALS:   Multidisciplinary Problems       Occupational Therapy Goals          Problem: Occupational Therapy    Goal Priority Disciplines Outcome Interventions   Occupational Therapy Goal     OT, PT/OT Ongoing, Progressing    Description: Goals to be met by: 6/27/2023     Patient will increase functional independence with ADLs by performing:    UE Dressing with Set-up Assistance.  LE Dressing with Stand-by Assistance.  Grooming while standing at sink with Supervision.  Toileting from toilet with Supervision for hygiene and clothing management.   Supine to sit with Stand-by Assistance.  Step transfer with Stand-by Assistance with or without AD.  Toilet transfer to toilet with Stand-by Assistance with or without AD.                         Time Tracking:     OT Date of Treatment: 06/14/23  OT Start Time: 0950  OT Stop Time: 1008  OT Total Time (min): 18 min    Billable Minutes:Self Care/Home Management 18    OT/OSMAR: OT          6/14/2023

## 2023-06-14 NOTE — SUBJECTIVE & OBJECTIVE
"Interval History: No issues overnight. Mild nausea without emesis or wretching. She was able to ambulate with PT. Is getting out of bed to restroom, needs assist sitting up. Tolerating some clears.   Drains benign    Medications:  Continuous Infusions:   lactated ringers 100 mL/hr at 06/13/23 0727     Scheduled Meds:   acetaminophen  1,000 mg Oral Q6H    amitriptyline  25 mg Oral QHS    atorvastatin  40 mg Oral Daily    enoxparin  40 mg Subcutaneous Q24H (prophylaxis, 1700)    gabapentin  400 mg Oral TID    ketorolac  10 mg Oral Q8H    LIDOcaine  1 patch Transdermal Q24H    methocarbamoL  750 mg Oral QID    mupirocin   Nasal BID    ondansetron  4 mg Oral Q6H    polyethylene glycol  17 g Oral BID    potassium, sodium phosphates  2 packet Oral QID (AC & HS)    scopolamine  1 patch Transdermal Q3 Days    senna  8.6 mg Oral Daily     PRN Meds:melatonin, morphine, oxyCODONE, oxyCODONE, sodium chloride 0.9%     Review of patient's allergies indicates:   Allergen Reactions    Adhesive Rash     Asking that we use Paper Tape,  Uses "Sensitive Skin" band aids    Amoxil [amoxicillin] Hives    Penicillins Hives and Swelling     Swelling of ears and neck    Sulfa (sulfonamide antibiotics) Hives and Itching     Objective:     Vital Signs (Most Recent):  Temp: 96.3 °F (35.7 °C) (06/14/23 0733)  Pulse: 88 (06/14/23 0733)  Resp: 18 (06/14/23 0733)  BP: (!) 141/65 (06/14/23 0733)  SpO2: 95 % (06/14/23 0733) Vital Signs (24h Range):  Temp:  [96.3 °F (35.7 °C)-98.1 °F (36.7 °C)] 96.3 °F (35.7 °C)  Pulse:  [85-88] 88  Resp:  [16-18] 18  SpO2:  [90 %-95 %] 95 %  BP: (102-141)/(53-65) 141/65     Weight: 72.6 kg (160 lb)  Body mass index is 26.63 kg/m².    Intake/Output - Last 3 Shifts         06/12 0700  06/13 0659 06/13 0700  06/14 0659 06/14 0700  06/15 0659    I.V. (mL/kg) 2713 (37.4)      IV Piggyback 3800      Total Intake(mL/kg) 6513 (89.7)      Urine (mL/kg/hr) 1250 (0.7)      Drains 200 300     Total Output 1450 300     Net +5063 " -300                     Physical Exam  Vitals and nursing note reviewed.   Constitutional:       General: She is not in acute distress.  Cardiovascular:      Rate and Rhythm: Normal rate.      Pulses: Normal pulses.   Pulmonary:      Effort: Pulmonary effort is normal. No respiratory distress.   Abdominal:      General: There is no distension.      Palpations: Abdomen is soft.      Tenderness: There is no abdominal tenderness.      Comments: Abdominal binder in place; midline incision with dressing in place; island dressing removed; staples intact with scant ss drainage;   LUIS MIGUEL drains with SS output; appropriately tender and mildly distended but soft abdomen   Neurological:      Mental Status: She is alert.        Significant Labs:  I have reviewed all pertinent lab results within the past 24 hours.  CBC:   Recent Labs   Lab 06/14/23 0210   WBC 9.62   RBC 3.75*   HGB 10.7*   HCT 34.5*      MCV 92   MCH 28.5   MCHC 31.0*       BMP:   Recent Labs   Lab 06/14/23 0210   GLU 89   *   K 3.8      CO2 25   BUN 11   CREATININE 0.7   CALCIUM 8.7   MG 1.8       CMP:   Recent Labs   Lab 06/14/23 0210   GLU 89   CALCIUM 8.7   ALBUMIN 2.8*   PROT 5.4*   *   K 3.8   CO2 25      BUN 11   CREATININE 0.7   ALKPHOS 71   ALT 11   AST 21   BILITOT 1.2*         Significant Diagnostics:  I have reviewed all pertinent imaging results/findings within the past 24 hours.

## 2023-06-14 NOTE — ASSESSMENT & PLAN NOTE
71 yo F s/p  ventral hernia repair, mesh explant with posterior component separation and TAR 6/12/2023. Doing well, moderately controlled pain.     - Scheduled multimodals, PRN narcotics  - Multimodal pain regimen: gabapentin, robaxin, toradol, lidocaine patch  - Scheduled antiemetics  - CLD with boost   - Routine LUIS MIGUEL drain care, record output   - Home meds   - OOB, ambulate  - PT/OT  - DVT PPX

## 2023-06-15 LAB
ALBUMIN SERPL BCP-MCNC: 2.7 G/DL (ref 3.5–5.2)
ALP SERPL-CCNC: 64 U/L (ref 55–135)
ALT SERPL W/O P-5'-P-CCNC: 9 U/L (ref 10–44)
ANION GAP SERPL CALC-SCNC: 7 MMOL/L (ref 8–16)
ANION GAP SERPL CALC-SCNC: 8 MMOL/L (ref 8–16)
AST SERPL-CCNC: 19 U/L (ref 10–40)
BASOPHILS # BLD AUTO: 0.05 K/UL (ref 0–0.2)
BASOPHILS NFR BLD: 0.5 % (ref 0–1.9)
BILIRUB SERPL-MCNC: 0.8 MG/DL (ref 0.1–1)
BUN SERPL-MCNC: 11 MG/DL (ref 8–23)
BUN SERPL-MCNC: 12 MG/DL (ref 8–23)
CALCIUM SERPL-MCNC: 8.3 MG/DL (ref 8.7–10.5)
CALCIUM SERPL-MCNC: 8.5 MG/DL (ref 8.7–10.5)
CHLORIDE SERPL-SCNC: 97 MMOL/L (ref 95–110)
CHLORIDE SERPL-SCNC: 99 MMOL/L (ref 95–110)
CO2 SERPL-SCNC: 22 MMOL/L (ref 23–29)
CO2 SERPL-SCNC: 25 MMOL/L (ref 23–29)
CREAT SERPL-MCNC: 0.6 MG/DL (ref 0.5–1.4)
CREAT SERPL-MCNC: 0.7 MG/DL (ref 0.5–1.4)
CREAT UR-MCNC: 34 MG/DL (ref 15–325)
DIFFERENTIAL METHOD: ABNORMAL
EOSINOPHIL # BLD AUTO: 0.3 K/UL (ref 0–0.5)
EOSINOPHIL NFR BLD: 2.8 % (ref 0–8)
ERYTHROCYTE [DISTWIDTH] IN BLOOD BY AUTOMATED COUNT: 13.1 % (ref 11.5–14.5)
EST. GFR  (NO RACE VARIABLE): >60 ML/MIN/1.73 M^2
EST. GFR  (NO RACE VARIABLE): >60 ML/MIN/1.73 M^2
FINAL PATHOLOGIC DIAGNOSIS: NORMAL
GLUCOSE SERPL-MCNC: 112 MG/DL (ref 70–110)
GLUCOSE SERPL-MCNC: 134 MG/DL (ref 70–110)
GROSS: NORMAL
HCT VFR BLD AUTO: 28.4 % (ref 37–48.5)
HGB BLD-MCNC: 9.2 G/DL (ref 12–16)
IMM GRANULOCYTES # BLD AUTO: 0.03 K/UL (ref 0–0.04)
IMM GRANULOCYTES NFR BLD AUTO: 0.3 % (ref 0–0.5)
LYMPHOCYTES # BLD AUTO: 1 K/UL (ref 1–4.8)
LYMPHOCYTES NFR BLD: 11 % (ref 18–48)
Lab: NORMAL
MAGNESIUM SERPL-MCNC: 1.8 MG/DL (ref 1.6–2.6)
MCH RBC QN AUTO: 28.4 PG (ref 27–31)
MCHC RBC AUTO-ENTMCNC: 32.4 G/DL (ref 32–36)
MCV RBC AUTO: 88 FL (ref 82–98)
MONOCYTES # BLD AUTO: 0.6 K/UL (ref 0.3–1)
MONOCYTES NFR BLD: 6.3 % (ref 4–15)
NEUTROPHILS # BLD AUTO: 7.3 K/UL (ref 1.8–7.7)
NEUTROPHILS NFR BLD: 79.1 % (ref 38–73)
NRBC BLD-RTO: 0 /100 WBC
OSMOLALITY UR: 174 MOSM/KG (ref 50–1200)
PHOSPHATE SERPL-MCNC: 3 MG/DL (ref 2.7–4.5)
PLATELET # BLD AUTO: 200 K/UL (ref 150–450)
PMV BLD AUTO: 10.5 FL (ref 9.2–12.9)
POTASSIUM SERPL-SCNC: 3.7 MMOL/L (ref 3.5–5.1)
POTASSIUM SERPL-SCNC: 4 MMOL/L (ref 3.5–5.1)
PROT SERPL-MCNC: 5.3 G/DL (ref 6–8.4)
RBC # BLD AUTO: 3.24 M/UL (ref 4–5.4)
SODIUM SERPL-SCNC: 127 MMOL/L (ref 136–145)
SODIUM SERPL-SCNC: 131 MMOL/L (ref 136–145)
SODIUM UR-SCNC: <10 MMOL/L (ref 20–250)
WBC # BLD AUTO: 9.27 K/UL (ref 3.9–12.7)

## 2023-06-15 PROCEDURE — 82570 ASSAY OF URINE CREATININE: CPT | Performed by: STUDENT IN AN ORGANIZED HEALTH CARE EDUCATION/TRAINING PROGRAM

## 2023-06-15 PROCEDURE — 94761 N-INVAS EAR/PLS OXIMETRY MLT: CPT

## 2023-06-15 PROCEDURE — 83935 ASSAY OF URINE OSMOLALITY: CPT | Performed by: STUDENT IN AN ORGANIZED HEALTH CARE EDUCATION/TRAINING PROGRAM

## 2023-06-15 PROCEDURE — 25000003 PHARM REV CODE 250: Performed by: SURGERY

## 2023-06-15 PROCEDURE — 36415 COLL VENOUS BLD VENIPUNCTURE: CPT | Performed by: SURGERY

## 2023-06-15 PROCEDURE — 25000003 PHARM REV CODE 250: Performed by: STUDENT IN AN ORGANIZED HEALTH CARE EDUCATION/TRAINING PROGRAM

## 2023-06-15 PROCEDURE — 80048 BASIC METABOLIC PNL TOTAL CA: CPT | Mod: XB | Performed by: SURGERY

## 2023-06-15 PROCEDURE — 63600175 PHARM REV CODE 636 W HCPCS: Performed by: SURGERY

## 2023-06-15 PROCEDURE — 85025 COMPLETE CBC W/AUTO DIFF WBC: CPT | Performed by: STUDENT IN AN ORGANIZED HEALTH CARE EDUCATION/TRAINING PROGRAM

## 2023-06-15 PROCEDURE — 83735 ASSAY OF MAGNESIUM: CPT | Performed by: STUDENT IN AN ORGANIZED HEALTH CARE EDUCATION/TRAINING PROGRAM

## 2023-06-15 PROCEDURE — 80053 COMPREHEN METABOLIC PANEL: CPT | Performed by: STUDENT IN AN ORGANIZED HEALTH CARE EDUCATION/TRAINING PROGRAM

## 2023-06-15 PROCEDURE — 11000001 HC ACUTE MED/SURG PRIVATE ROOM

## 2023-06-15 PROCEDURE — 36415 COLL VENOUS BLD VENIPUNCTURE: CPT | Performed by: STUDENT IN AN ORGANIZED HEALTH CARE EDUCATION/TRAINING PROGRAM

## 2023-06-15 PROCEDURE — 84300 ASSAY OF URINE SODIUM: CPT | Performed by: STUDENT IN AN ORGANIZED HEALTH CARE EDUCATION/TRAINING PROGRAM

## 2023-06-15 PROCEDURE — 84100 ASSAY OF PHOSPHORUS: CPT | Performed by: STUDENT IN AN ORGANIZED HEALTH CARE EDUCATION/TRAINING PROGRAM

## 2023-06-15 PROCEDURE — 63600175 PHARM REV CODE 636 W HCPCS: Performed by: STUDENT IN AN ORGANIZED HEALTH CARE EDUCATION/TRAINING PROGRAM

## 2023-06-15 RX ORDER — METHOCARBAMOL 500 MG/1
500 TABLET, FILM COATED ORAL 4 TIMES DAILY PRN
Status: DISCONTINUED | OUTPATIENT
Start: 2023-06-15 | End: 2023-06-16

## 2023-06-15 RX ORDER — ONDANSETRON 8 MG/1
8 TABLET, ORALLY DISINTEGRATING ORAL EVERY 6 HOURS PRN
Status: DISCONTINUED | OUTPATIENT
Start: 2023-06-15 | End: 2023-06-17 | Stop reason: HOSPADM

## 2023-06-15 RX ORDER — METHOCARBAMOL 500 MG/1
500 TABLET, FILM COATED ORAL 4 TIMES DAILY
Status: DISCONTINUED | OUTPATIENT
Start: 2023-06-15 | End: 2023-06-15

## 2023-06-15 RX ADMIN — ONDANSETRON 8 MG: 8 TABLET, ORALLY DISINTEGRATING ORAL at 05:06

## 2023-06-15 RX ADMIN — FAMOTIDINE 20 MG: 10 INJECTION INTRAVENOUS at 08:06

## 2023-06-15 RX ADMIN — AMITRIPTYLINE HYDROCHLORIDE 25 MG: 25 TABLET, FILM COATED ORAL at 11:06

## 2023-06-15 RX ADMIN — DEXTROSE, SODIUM CHLORIDE, AND POTASSIUM CHLORIDE: 5; .45; .15 INJECTION INTRAVENOUS at 05:06

## 2023-06-15 RX ADMIN — KETOROLAC TROMETHAMINE 15 MG: 15 INJECTION, SOLUTION INTRAMUSCULAR; INTRAVENOUS at 03:06

## 2023-06-15 RX ADMIN — ACETAMINOPHEN 1000 MG: 500 TABLET ORAL at 06:06

## 2023-06-15 RX ADMIN — METHOCARBAMOL 500 MG: 100 INJECTION, SOLUTION INTRAMUSCULAR; INTRAVENOUS at 05:06

## 2023-06-15 RX ADMIN — FAMOTIDINE 20 MG: 10 INJECTION INTRAVENOUS at 11:06

## 2023-06-15 RX ADMIN — ACETAMINOPHEN 1000 MG: 500 TABLET ORAL at 12:06

## 2023-06-15 RX ADMIN — ENOXAPARIN SODIUM 40 MG: 40 INJECTION SUBCUTANEOUS at 05:06

## 2023-06-15 RX ADMIN — KETOROLAC TROMETHAMINE 15 MG: 15 INJECTION, SOLUTION INTRAMUSCULAR; INTRAVENOUS at 11:06

## 2023-06-15 RX ADMIN — MUPIROCIN: 20 OINTMENT TOPICAL at 09:06

## 2023-06-15 RX ADMIN — ACETAMINOPHEN 1000 MG: 500 TABLET ORAL at 05:06

## 2023-06-15 RX ADMIN — ATORVASTATIN CALCIUM 40 MG: 40 TABLET, FILM COATED ORAL at 09:06

## 2023-06-15 RX ADMIN — KETOROLAC TROMETHAMINE 15 MG: 15 INJECTION, SOLUTION INTRAMUSCULAR; INTRAVENOUS at 05:06

## 2023-06-15 NOTE — PT/OT/SLP PROGRESS
Occupational Therapy      Patient Name:  Tanesha Davsi   MRN:  [id number]    Patient not seen today secondary to Testing/imaging (xray/CT/MRI). Will follow-up as scheduled per OT POC.    6/15/2023

## 2023-06-15 NOTE — SUBJECTIVE & OBJECTIVE
"Interval History: Fell this morning, hit head. She notes she was trying to walk to bathroom for a bowel movement when she slipped on IV tubing and slipped and hit her head. She notes some dizziness and headache on her right side of her forehead. No nausea/vomiting overnight.     Medications:  Continuous Infusions:   dextrose 5 % and 0.45 % NaCl with KCl 20 mEq 100 mL/hr at 06/15/23 0507     Scheduled Meds:   acetaminophen  1,000 mg Oral Q6H    amitriptyline  25 mg Oral QHS    atorvastatin  40 mg Oral Daily    enoxparin  40 mg Subcutaneous Q24H (prophylaxis, 1700)    famotidine (PF)  20 mg Intravenous BID    gabapentin  400 mg Oral TID    ketorolac  15 mg Intravenous Q8H    LIDOcaine  1 patch Transdermal Q24H    methocarbamol (ROBAXIN) IVPB  500 mg Intravenous Q8H    mupirocin   Nasal BID    ondansetron  8 mg Oral Q6H    polyethylene glycol  17 g Oral BID    scopolamine  1 patch Transdermal Q3 Days     PRN Meds:melatonin, morphine, oxyCODONE, oxyCODONE, sodium chloride 0.9%     Review of patient's allergies indicates:   Allergen Reactions    Adhesive Rash     Asking that we use Paper Tape,  Uses "Sensitive Skin" band aids    Amoxil [amoxicillin] Hives    Penicillins Hives and Swelling     Swelling of ears and neck    Sulfa (sulfonamide antibiotics) Hives and Itching     Objective:     Vital Signs (Most Recent):  Temp: 97.7 °F (36.5 °C) (06/15/23 0532)  Pulse: 96 (06/15/23 0658)  Resp: 16 (06/15/23 0532)  BP: (!) 153/67 (06/15/23 0658)  SpO2: (!) 92 % (06/15/23 0658) Vital Signs (24h Range):  Temp:  [96.3 °F (35.7 °C)-98.5 °F (36.9 °C)] 97.7 °F (36.5 °C)  Pulse:  [] 96  Resp:  [16-18] 16  SpO2:  [90 %-95 %] 92 %  BP: (131-153)/(63-70) 153/67     Weight: 72.6 kg (160 lb)  Body mass index is 26.63 kg/m².    Intake/Output - Last 3 Shifts         06/13 0700  06/14 0659 06/14 0700  06/15 0659 06/15 0700  06/16 0659    I.V. (mL/kg)       IV Piggyback       Total Intake(mL/kg)       Urine (mL/kg/hr)       Emesis/NG " output  400     Drains 300 210     Total Output 300 610     Net -300 -610            Emesis Occurrence  2 x              Physical Exam  Vitals and nursing note reviewed.   Constitutional:       General: She is not in acute distress.  Cardiovascular:      Rate and Rhythm: Normal rate.      Pulses: Normal pulses.   Pulmonary:      Effort: Pulmonary effort is normal. No respiratory distress.   Abdominal:      General: There is no distension.      Palpations: Abdomen is soft.      Tenderness: There is no abdominal tenderness.      Comments: Abdominal binder in place; LUIS MIGUEL drains x 2 with SS; soft non-distended abdomen with midline staples without drainage   Neurological:      General: No focal deficit present.      Mental Status: She is alert and oriented to person, place, and time.        Significant Labs:  I have reviewed all pertinent lab results within the past 24 hours.  CBC:   Recent Labs   Lab 06/15/23  0353   WBC 9.27   RBC 3.24*   HGB 9.2*   HCT 28.4*      MCV 88   MCH 28.4   MCHC 32.4     BMP:   Recent Labs   Lab 06/15/23  0353   *   *   K 4.0   CL 97   CO2 22*   BUN 12   CREATININE 0.6   CALCIUM 8.3*   MG 1.8     CMP:   Recent Labs   Lab 06/15/23  0353   *   CALCIUM 8.3*   ALBUMIN 2.7*   PROT 5.3*   *   K 4.0   CO2 22*   CL 97   BUN 12   CREATININE 0.6   ALKPHOS 64   ALT 9*   AST 19   BILITOT 0.8       Significant Diagnostics:  I have reviewed all pertinent imaging results/findings within the past 24 hours.

## 2023-06-15 NOTE — PROGRESS NOTES
"[facility]  General Surgery  Progress Note    Subjective:     Post-Op Info:  Procedure(s) (LRB):  REPAIR, HERNIA, INCISIONAL OR VENTRAL, WITH HISTORY OF PRIOR  REPAIR (WITH MESH AND POSTERIOR COMPONENTS SEPARATION) (N/A)  RELEASE-FASCIA -TRANSVERSUS ABDOMINIS (Bilateral)   3 Days Post-Op     Interval History: Fell this morning, hit head. She notes she was trying to walk to bathroom for a bowel movement when she slipped on IV tubing and slipped and hit her head. She notes some dizziness and headache on her right side of her forehead. No nausea/vomiting overnight.     Medications:  Continuous Infusions:   dextrose 5 % and 0.45 % NaCl with KCl 20 mEq 100 mL/hr at 06/15/23 0507     Scheduled Meds:   acetaminophen  1,000 mg Oral Q6H    amitriptyline  25 mg Oral QHS    atorvastatin  40 mg Oral Daily    enoxparin  40 mg Subcutaneous Q24H (prophylaxis, 1700)    famotidine (PF)  20 mg Intravenous BID    gabapentin  400 mg Oral TID    ketorolac  15 mg Intravenous Q8H    LIDOcaine  1 patch Transdermal Q24H    methocarbamol (ROBAXIN) IVPB  500 mg Intravenous Q8H    mupirocin   Nasal BID    ondansetron  8 mg Oral Q6H    polyethylene glycol  17 g Oral BID    scopolamine  1 patch Transdermal Q3 Days     PRN Meds:melatonin, morphine, oxyCODONE, oxyCODONE, sodium chloride 0.9%     Review of patient's allergies indicates:   Allergen Reactions    Adhesive Rash     Asking that we use Paper Tape,  Uses "Sensitive Skin" band aids    Amoxil [amoxicillin] Hives    Penicillins Hives and Swelling     Swelling of ears and neck    Sulfa (sulfonamide antibiotics) Hives and Itching     Objective:     Vital Signs (Most Recent):  Temp: 97.7 °F (36.5 °C) (06/15/23 0532)  Pulse: 96 (06/15/23 0658)  Resp: 16 (06/15/23 0532)  BP: (!) 153/67 (06/15/23 0658)  SpO2: (!) 92 % (06/15/23 0658) Vital Signs (24h Range):  Temp:  [96.3 °F (35.7 °C)-98.5 °F (36.9 °C)] 97.7 °F (36.5 °C)  Pulse:  [] 96  Resp:  [16-18] 16  SpO2:  [90 %-95 %] 92 %  BP: " (131-153)/(63-70) 153/67     Weight: 72.6 kg (160 lb)  Body mass index is 26.63 kg/m².    Intake/Output - Last 3 Shifts         06/13 0700 06/14 0659 06/14 0700  06/15 0659 06/15 0700  06/16 0659    I.V. (mL/kg)       IV Piggyback       Total Intake(mL/kg)       Urine (mL/kg/hr)       Emesis/NG output  400     Drains 300 210     Total Output 300 610     Net -300 -610            Emesis Occurrence  2 x              Physical Exam  Vitals and nursing note reviewed.   Constitutional:       General: She is not in acute distress.  Cardiovascular:      Rate and Rhythm: Normal rate.      Pulses: Normal pulses.   Pulmonary:      Effort: Pulmonary effort is normal. No respiratory distress.   Abdominal:      General: There is no distension.      Palpations: Abdomen is soft.      Tenderness: There is no abdominal tenderness.      Comments: Abdominal binder in place; LUIS MIGUEL drains x 2 with SS; soft non-distended abdomen with midline staples without drainage   Neurological:      General: No focal deficit present.      Mental Status: She is alert and oriented to person, place, and time.        Significant Labs:  I have reviewed all pertinent lab results within the past 24 hours.  CBC:   Recent Labs   Lab 06/15/23  0353   WBC 9.27   RBC 3.24*   HGB 9.2*   HCT 28.4*      MCV 88   MCH 28.4   MCHC 32.4     BMP:   Recent Labs   Lab 06/15/23  0353   *   *   K 4.0   CL 97   CO2 22*   BUN 12   CREATININE 0.6   CALCIUM 8.3*   MG 1.8     CMP:   Recent Labs   Lab 06/15/23  0353   *   CALCIUM 8.3*   ALBUMIN 2.7*   PROT 5.3*   *   K 4.0   CO2 22*   CL 97   BUN 12   CREATININE 0.6   ALKPHOS 64   ALT 9*   AST 19   BILITOT 0.8       Significant Diagnostics:  I have reviewed all pertinent imaging results/findings within the past 24 hours.    Assessment/Plan:     * Ventral hernia with obstruction and without gangrene  73 yo F s/p  ventral hernia repair, mesh explant with posterior component separation and TAR 6/12/2023.  Doing well. Return of bowel function this morning. Unfortunately fell on her way to bathroom this morning and hit her head, braced fall with right arm. Will obtain STAT imaging of her head and right arm.     - STAT CT Head, XR of right forearm/wrist/hand  - CLD with boost, advance to full liquid if continues to have regular bowel function   - Repeat BMP at noon to evaluate hyponatremia, will discuss medications with pharmacy that may be contributory  - Scheduled multimodals, PRN narcotics  - Multimodal pain regimen: gabapentin, robaxin, toradol, lidocaine patch  - Keep abdominal binder in place at all times, unless showering  - Scheduled antiemetics  - Routine LUIS MIGUEL drain care, record output   - Home meds   - OOB, ambulate  - PT/OT  - DVT PPX    UPDATE: Discussed results of XR of hand/forearm with Orthopedic Surgery on call. No evidence of acute fracture. Recs for soft splint for comfort and follow-up with Ortho as an outpatient.           Olga Nails MD  General Surgery  [facility]

## 2023-06-15 NOTE — PLAN OF CARE
[facility]  Initial Discharge Assessment       Primary Care Provider: Caleb Hernandez MD    Admission Diagnosis: Ventral hernia with obstruction and without gangrene [K43.6]  Ventral incisional hernia [K43.2]    Admission Date: 6/12/2023  Expected Discharge Date: 6/19/2023         Payor: HU[employer] MANAGED MEDICARE / Plan: [employer] MEDICARE HMO / Product Type: Capitation /     Extended Emergency Contact Information  Primary Emergency Contact: Dontae Davis  Address: [address] [place], [place] [address] [facility]  Home Phone: [phone]  Mobile Phone: [phone]  Relation: Spouse    Discharge Plan A: Home Health, Home with family  Discharge Plan B: Home with family      [id number] - [place] - [address]  [address]  [place] [address]  Phone: [phone] Fax: [phone]    [id number] - [place] - [address] AT [facility] [address]  [address]  [place] [address]  Phone: [phone] Fax: [phone]      Initial Assessment (most recent)       Adult Discharge Assessment - 06/15/23 1408          Discharge Assessment    Assessment Type Discharge Planning Assessment     Confirmed/corrected address, phone number and insurance Yes     Confirmed Demographics Correct on Facesheet     Source of Information patient;family     Communicated AMANDA with patient/caregiver Yes     People in Home spouse;child(ramo), adult     Do you expect to return to your current living situation? Yes     Do you have help at home or someone to help you manage your care at home? Yes     Prior to hospitilization cognitive status: Alert/Oriented     Current cognitive status: Alert/Oriented     Home Accessibility wheelchair accessible     Home Layout Able to live on 1st floor     Equipment Currently Used at Home none     Readmission within 30 days? No     Patient currently being followed by outpatient case management? No     Do you currently  have service(s) that help you manage your care at home? No     Do you take prescription medications? Yes     Do you have prescription coverage? Yes     Do you have any problems affording any of your prescribed medications? No     Is the patient taking medications as prescribed? yes     How do you get to doctors appointments? car, drives self;family or friend will provide     Are you on dialysis? No     Do you take coumadin? No     Discharge Plan A Home Health;Home with family     Discharge Plan B Home with family     DME Needed Upon Discharge  none;other (see comments)   TBD    Discharge Plan discussed with: Spouse/sig other;Patient        Physical Activity    On average, how many days per week do you engage in moderate to strenuous exercise (like a brisk walk)? 0 days     On average, how many minutes do you engage in exercise at this level? 0 min        Financial Resource Strain    How hard is it for you to pay for the very basics like food, housing, medical care, and heating? Not hard at all        Housing Stability    In the last 12 months, was there a time when you were not able to pay the mortgage or rent on time? No     In the last 12 months, was there a time when you did not have a steady place to sleep or slept in a shelter (including now)? No        Transportation Needs    In the past 12 months, has lack of transportation kept you from medical appointments or from getting medications? No     In the past 12 months, has lack of transportation kept you from meetings, work, or from getting things needed for daily living? No                       SW completed discharge planning assessment with the patient at bedside. RUSTAM verified demographic information listed on the pt.'s Face sheet. Patient reports living with her spouse in a single story home with 6 steps to enter wit rail. Pt declines using any equipment or need for equipment at this time. Pt is agreeable to Home Health services, reports no preference.     RUSTAM  faxed referral to [facility] via [facility] for review. SW will follow up as needed.    Elo Cohen LMSW  Case Management   [facility]   Ext. [phone]

## 2023-06-15 NOTE — PT/OT/SLP PROGRESS
Physical Therapy      Patient Name:  Tanesha Davis   MRN:  [id number]    Patient not seen today for PT services. Attempted to see pt in AM but pt has not gone for imaging after fall. Will follow up per POC to address therapy deficits.     6/15/2023

## 2023-06-15 NOTE — NURSING
0655- Ms Davis found on the floor by doctors rounding on patient. Ms Davis was attempting to go to the restroom when she fell. She claims she hit the front of her head on the floor. Doctors present and placed orders. Ms Davis was cleaned and put in bed with bed alarm on. Daytime nurse present for event and assisted.

## 2023-06-16 PROBLEM — R44.1 VISUAL HALLUCINATIONS: Status: ACTIVE | Noted: 2023-06-16

## 2023-06-16 PROBLEM — R41.0 DELIRIUM: Status: ACTIVE | Noted: 2023-06-16

## 2023-06-16 LAB
ALBUMIN SERPL BCP-MCNC: 2.7 G/DL (ref 3.5–5.2)
ALP SERPL-CCNC: 66 U/L (ref 55–135)
ALT SERPL W/O P-5'-P-CCNC: 15 U/L (ref 10–44)
ANION GAP SERPL CALC-SCNC: 7 MMOL/L (ref 8–16)
AST SERPL-CCNC: 25 U/L (ref 10–40)
BASOPHILS # BLD AUTO: 0.03 K/UL (ref 0–0.2)
BASOPHILS NFR BLD: 0.5 % (ref 0–1.9)
BILIRUB SERPL-MCNC: 0.7 MG/DL (ref 0.1–1)
BUN SERPL-MCNC: 8 MG/DL (ref 8–23)
CALCIUM SERPL-MCNC: 8.5 MG/DL (ref 8.7–10.5)
CHLORIDE SERPL-SCNC: 105 MMOL/L (ref 95–110)
CO2 SERPL-SCNC: 25 MMOL/L (ref 23–29)
CREAT SERPL-MCNC: 0.6 MG/DL (ref 0.5–1.4)
DIFFERENTIAL METHOD: ABNORMAL
EOSINOPHIL # BLD AUTO: 0.4 K/UL (ref 0–0.5)
EOSINOPHIL NFR BLD: 6.4 % (ref 0–8)
ERYTHROCYTE [DISTWIDTH] IN BLOOD BY AUTOMATED COUNT: 13 % (ref 11.5–14.5)
EST. GFR  (NO RACE VARIABLE): >60 ML/MIN/1.73 M^2
GLUCOSE SERPL-MCNC: 82 MG/DL (ref 70–110)
HCT VFR BLD AUTO: 28.9 % (ref 37–48.5)
HGB BLD-MCNC: 9.5 G/DL (ref 12–16)
IMM GRANULOCYTES # BLD AUTO: 0.02 K/UL (ref 0–0.04)
IMM GRANULOCYTES NFR BLD AUTO: 0.3 % (ref 0–0.5)
LYMPHOCYTES # BLD AUTO: 1 K/UL (ref 1–4.8)
LYMPHOCYTES NFR BLD: 17.2 % (ref 18–48)
MAGNESIUM SERPL-MCNC: 2 MG/DL (ref 1.6–2.6)
MCH RBC QN AUTO: 29.4 PG (ref 27–31)
MCHC RBC AUTO-ENTMCNC: 32.9 G/DL (ref 32–36)
MCV RBC AUTO: 90 FL (ref 82–98)
MONOCYTES # BLD AUTO: 0.4 K/UL (ref 0.3–1)
MONOCYTES NFR BLD: 7.5 % (ref 4–15)
NEUTROPHILS # BLD AUTO: 3.9 K/UL (ref 1.8–7.7)
NEUTROPHILS NFR BLD: 68.1 % (ref 38–73)
NRBC BLD-RTO: 0 /100 WBC
PHOSPHATE SERPL-MCNC: 2.9 MG/DL (ref 2.7–4.5)
PLATELET # BLD AUTO: 185 K/UL (ref 150–450)
PMV BLD AUTO: 10.6 FL (ref 9.2–12.9)
POTASSIUM SERPL-SCNC: 3.8 MMOL/L (ref 3.5–5.1)
PROT SERPL-MCNC: 5.4 G/DL (ref 6–8.4)
RBC # BLD AUTO: 3.23 M/UL (ref 4–5.4)
SODIUM SERPL-SCNC: 137 MMOL/L (ref 136–145)
WBC # BLD AUTO: 5.77 K/UL (ref 3.9–12.7)

## 2023-06-16 PROCEDURE — 36415 COLL VENOUS BLD VENIPUNCTURE: CPT | Performed by: STUDENT IN AN ORGANIZED HEALTH CARE EDUCATION/TRAINING PROGRAM

## 2023-06-16 PROCEDURE — 63600175 PHARM REV CODE 636 W HCPCS: Performed by: SURGERY

## 2023-06-16 PROCEDURE — 99223 1ST HOSP IP/OBS HIGH 75: CPT | Mod: ,,, | Performed by: PSYCHIATRY & NEUROLOGY

## 2023-06-16 PROCEDURE — 84100 ASSAY OF PHOSPHORUS: CPT | Performed by: STUDENT IN AN ORGANIZED HEALTH CARE EDUCATION/TRAINING PROGRAM

## 2023-06-16 PROCEDURE — 80053 COMPREHEN METABOLIC PANEL: CPT | Performed by: STUDENT IN AN ORGANIZED HEALTH CARE EDUCATION/TRAINING PROGRAM

## 2023-06-16 PROCEDURE — 85025 COMPLETE CBC W/AUTO DIFF WBC: CPT | Performed by: STUDENT IN AN ORGANIZED HEALTH CARE EDUCATION/TRAINING PROGRAM

## 2023-06-16 PROCEDURE — 25000003 PHARM REV CODE 250: Performed by: SURGERY

## 2023-06-16 PROCEDURE — 25500020 PHARM REV CODE 255: Performed by: SURGERY

## 2023-06-16 PROCEDURE — 25000003 PHARM REV CODE 250: Performed by: STUDENT IN AN ORGANIZED HEALTH CARE EDUCATION/TRAINING PROGRAM

## 2023-06-16 PROCEDURE — A9585 GADOBUTROL INJECTION: HCPCS | Performed by: SURGERY

## 2023-06-16 PROCEDURE — 83735 ASSAY OF MAGNESIUM: CPT | Performed by: STUDENT IN AN ORGANIZED HEALTH CARE EDUCATION/TRAINING PROGRAM

## 2023-06-16 PROCEDURE — 97530 THERAPEUTIC ACTIVITIES: CPT | Mod: CQ

## 2023-06-16 PROCEDURE — 99223 PR INITIAL HOSPITAL CARE,LEVL III: ICD-10-PCS | Mod: ,,, | Performed by: PSYCHIATRY & NEUROLOGY

## 2023-06-16 PROCEDURE — 63600175 PHARM REV CODE 636 W HCPCS: Performed by: STUDENT IN AN ORGANIZED HEALTH CARE EDUCATION/TRAINING PROGRAM

## 2023-06-16 PROCEDURE — 97116 GAIT TRAINING THERAPY: CPT | Mod: CQ

## 2023-06-16 PROCEDURE — 11000001 HC ACUTE MED/SURG PRIVATE ROOM

## 2023-06-16 RX ORDER — HALOPERIDOL 5 MG/ML
5 INJECTION INTRAMUSCULAR ONCE
Status: COMPLETED | OUTPATIENT
Start: 2023-06-16 | End: 2023-06-16

## 2023-06-16 RX ORDER — GADOBUTROL 604.72 MG/ML
8 INJECTION INTRAVENOUS
Status: COMPLETED | OUTPATIENT
Start: 2023-06-16 | End: 2023-06-16

## 2023-06-16 RX ADMIN — GADOBUTROL 8 ML: 604.72 INJECTION INTRAVENOUS at 10:06

## 2023-06-16 RX ADMIN — AMITRIPTYLINE HYDROCHLORIDE 25 MG: 25 TABLET, FILM COATED ORAL at 09:06

## 2023-06-16 RX ADMIN — ACETAMINOPHEN 1000 MG: 500 TABLET ORAL at 05:06

## 2023-06-16 RX ADMIN — ENOXAPARIN SODIUM 40 MG: 40 INJECTION SUBCUTANEOUS at 05:06

## 2023-06-16 RX ADMIN — FAMOTIDINE 20 MG: 10 INJECTION INTRAVENOUS at 11:06

## 2023-06-16 RX ADMIN — KETOROLAC TROMETHAMINE 15 MG: 15 INJECTION, SOLUTION INTRAMUSCULAR; INTRAVENOUS at 09:06

## 2023-06-16 RX ADMIN — POLYETHYLENE GLYCOL 3350 17 G: 17 POWDER, FOR SOLUTION ORAL at 09:06

## 2023-06-16 RX ADMIN — FAMOTIDINE 20 MG: 10 INJECTION INTRAVENOUS at 09:06

## 2023-06-16 RX ADMIN — HALOPERIDOL LACTATE 5 MG: 5 INJECTION, SOLUTION INTRAMUSCULAR at 09:06

## 2023-06-16 RX ADMIN — ATORVASTATIN CALCIUM 40 MG: 40 TABLET, FILM COATED ORAL at 11:06

## 2023-06-16 RX ADMIN — ACETAMINOPHEN 1000 MG: 500 TABLET ORAL at 11:06

## 2023-06-16 RX ADMIN — MUPIROCIN: 20 OINTMENT TOPICAL at 09:06

## 2023-06-16 NOTE — PROGRESS NOTES
"[facility]  General Surgery  Progress Note    Subjective:     History of Present Illness:  No notes on file    Post-Op Info:  Procedure(s) (LRB):  REPAIR, HERNIA, INCISIONAL OR VENTRAL, WITH HISTORY OF PRIOR  REPAIR (WITH MESH AND POSTERIOR COMPONENTS SEPARATION) (N/A)  RELEASE-FASCIA -TRANSVERSUS ABDOMINIS (Bilateral)   4 Days Post-Op     Interval History: Fall yesterday AM with negative CT head. altered mental status beginning yesterday afternoon and worsening into this am - oriented only to self, no focal deficits. Denies nausea. Endorses flatus. Vitals WNL.     Medications:  Continuous Infusions:      Scheduled Meds:   acetaminophen  1,000 mg Oral Q6H    amitriptyline  25 mg Oral QHS    atorvastatin  40 mg Oral Daily    enoxparin  40 mg Subcutaneous Q24H (prophylaxis, 1700)    famotidine (PF)  20 mg Intravenous BID    ketorolac  15 mg Intravenous Q8H    LIDOcaine  1 patch Transdermal Q24H    mupirocin   Nasal BID    polyethylene glycol  17 g Oral BID    scopolamine  1 patch Transdermal Q3 Days     PRN Meds:morphine, ondansetron, oxyCODONE, sodium chloride 0.9%     Review of patient's allergies indicates:   Allergen Reactions    Adhesive Rash     Asking that we use Paper Tape,  Uses "Sensitive Skin" band aids    Amoxil [amoxicillin] Hives    Penicillins Hives and Swelling     Swelling of ears and neck    Sulfa (sulfonamide antibiotics) Hives and Itching     Objective:     Vital Signs (Most Recent):  Temp: 96.2 °F (35.7 °C) (06/16/23 0536)  Pulse: 83 (06/16/23 0536)  Resp: 18 (06/16/23 0536)  BP: (!) 155/70 (06/16/23 0536)  SpO2: (!) 92 % (06/16/23 0536) Vital Signs (24h Range):  Temp:  [96.2 °F (35.7 °C)-98.6 °F (37 °C)] 96.2 °F (35.7 °C)  Pulse:  [76-84] 83  Resp:  [16-18] 18  SpO2:  [91 %-94 %] 92 %  BP: (128-156)/(60-70) 155/70     Weight: 72.6 kg (160 lb)  Body mass index is 26.63 kg/m².    Intake/Output - Last 3 Shifts         06/14 0700  06/15 0659 06/15 0700 06/16 0659 06/16 " 0700  06/17 0659    P.O.  1040     Total Intake(mL/kg)  1040 (14.3)     Urine (mL/kg/hr)  0 (0)     Emesis/NG output 400      Drains 210 105     Total Output 610 105     Net -610 +935            Urine Occurrence  6 x     Stool Occurrence  2 x     Emesis Occurrence 2 x              Physical Exam  Vitals and nursing note reviewed.   Constitutional:       General: She is not in acute distress.  Cardiovascular:      Rate and Rhythm: Normal rate.      Pulses: Normal pulses.   Pulmonary:      Effort: Pulmonary effort is normal. No respiratory distress.   Abdominal:      General: There is no distension.      Palpations: Abdomen is soft.      Tenderness: There is no abdominal tenderness.      Comments: Abdominal binder in place; LUIS MIGUEL drains x 2 with SS; soft non-distended abdomen with midline staples without drainage   Neurological:      General: No focal deficit present.      Mental Status: She is alert and oriented to person, place, and time.      Cranial Nerves: No cranial nerve deficit.      Motor: No weakness.        Significant Labs:  I have reviewed all pertinent lab results within the past 24 hours.  CBC:   Recent Labs   Lab 06/16/23  0410   WBC 5.77   RBC 3.23*   HGB 9.5*   HCT 28.9*      MCV 90   MCH 29.4   MCHC 32.9       BMP:   Recent Labs   Lab 06/16/23  0410   GLU 82      K 3.8      CO2 25   BUN 8   CREATININE 0.6   CALCIUM 8.5*   MG 2.0       CMP:   Recent Labs   Lab 06/16/23  0410   GLU 82   CALCIUM 8.5*   ALBUMIN 2.7*   PROT 5.4*      K 3.8   CO2 25      BUN 8   CREATININE 0.6   ALKPHOS 66   ALT 15   AST 25   BILITOT 0.7         Significant Diagnostics:  I have reviewed all pertinent imaging results/findings within the past 24 hours.    Assessment/Plan:     * Ventral hernia with obstruction and without gangrene  71 yo F s/p  ventral hernia repair, mesh explant with posterior component separation and TAR 6/12/2023. Had fall 6/15 with head trauma and obvious soft tissue swelling,  CT head negative 6/15 am for intracranial pathology. Now with altered mental status most consistent with delirium.    - Discussed AMS with [facility] neurology; likely delirium but given recent head trauma they desire imaging prior to evaluating patient. Will obtain MRI with PRN haldol given restlessness   - telesitter  - delirium precautions; normalize   - Multimodal pain regimen: toradol, tylenol, lidocaine patch; DC narcotics  - Scheduled antiemetics  - Regular diet   - Routine LUIS MIGUEL drain care, record output   - Home meds   - OOB, ambulate  - PT/OT  - DVT PPX          Radha Bettencourt MD  General Surgery  [facility]

## 2023-06-16 NOTE — SUBJECTIVE & OBJECTIVE
"    Past Medical History:   Diagnosis Date    Arthritis     Draining cutaneous sinus tract     Headache(784.0)     Incisional hernia     Insomnia     Nocturnal leg cramps     Nonhealing surgical wound     PONV (postoperative nausea and vomiting)     with last surgery per patient, "Severe" PONV 2-27-14    Postmenopausal status     Rectosigmoid cancer     Small bowel obstruction 11/8/2022     Past Surgical History:   Procedure Laterality Date    BREAST BIOPSY Left     CHOLECYSTECTOMY  02/24/2014    with incisional hernia repair with mesh    COLONOSCOPY N/A 8/23/2016    Procedure: COLONOSCOPY;  Surgeon: Cuco Meraz MD;  Location: [facility] ENDO (4TH FLR);  Service: Endoscopy;  Laterality: N/A;    COLONOSCOPY N/A 12/9/2019    Procedure: COLONOSCOPY;  Surgeon: KISHORE Curry MD;  Location: [facility] ENDO (4TH FLR);  Service: Endoscopy;  Laterality: N/A;  ADHESIVE Allergy    COLONOSCOPY N/A 5/9/2023    Procedure: COLONOSCOPY;  Surgeon: KISHORE Curry MD;  Location: [facility] ENDO (4TH FLR);  Service: Endoscopy;  Laterality: N/A;  Instructions to portal.EC  precall no answer 5/3 EB    ESOPHAGOGASTRODUODENOSCOPY N/A 4/22/2021    Procedure: EGD (ESOPHAGOGASTRODUODENOSCOPY);  Surgeon: Lukasz Lindquist MD;  Location: [facility] ENDO (4TH FLR);  Service: Endoscopy;  Laterality: N/A;  3[place]25-[place] [facility] [place] results to us to see if correct form of test-MS    HYSTERECTOMY      INJECTION OF ANESTHETIC AGENT AROUND NERVE Right 6/28/2022    Procedure: BLOCK, NERVE, RIGHT L2-L3-L4-L5;  Surgeon: Shabnam Skaggs MD;  Location: [facility] PAIN MGT;  Service: Pain Management;  Laterality: Right;    INJECTION OF ANESTHETIC AGENT AROUND NERVE Right 8/2/2022    Procedure: BLOCK, NERVE, RIGHT L2-L5 MEDIAL BRANCH;  Surgeon: Shabnam Skaggs MD;  Location: [facility] PAIN MGT;  Service: Pain Management;  Laterality: Right;    KNEE ARTHROSCOPY      KNEE SURGERY      right and left knee repair    OOPHORECTOMY      RADIOFREQUENCY ABLATION Right " "9/27/2022    Procedure: Radiofrequency Ablation Right L2-L5;  Surgeon: Shabnam Skaggs MD;  Location: [facility] PAIN MGT;  Service: Pain Management;  Laterality: Right;    RELEASE-FASCIA Bilateral 6/12/2023    Procedure: RELEASE-FASCIA -TRANSVERSUS ABDOMINIS;  Surgeon: Jody Ramírez MD;  [facility];  Service: General;  Laterality: Bilateral;    REPAIR OF RECURRENT INCISIONAL HERNIA  7/10/2018    Procedure: REPAIR, HERNIA, INCISIONAL, RECURRENT;  Surgeon: Cuco Meraz MD;  [facility];  Service: Colon and Rectal;;    REPAIR, HERNIA, INCISIONAL OR VENTRAL, WITHOUT HISTORY OF PRIOR REPAIR N/A 6/12/2023    Procedure: REPAIR, HERNIA, INCISIONAL OR VENTRAL, WITH HISTORY OF PRIOR  REPAIR (WITH MESH AND POSTERIOR COMPONENTS SEPARATION);  Surgeon: Jody Ramírez MD;  Location: [facility] OR [address] [place] [address];  Service: General;  Laterality: N/A;    SIGMOIDECTOMY  03/20/2006    LAR     Family History   Problem Relation Age of Onset    Breast cancer Mother 56    Cancer Mother         BREAST    Heart disease Father     Heart disease Maternal Grandmother     Diabetes Brother     Colon polyps Neg Hx     Ovarian cancer Neg Hx     Anesthesia problems Neg Hx      Social History     Tobacco Use    Smoking status: Never    Smokeless tobacco: Never   Substance Use Topics    Alcohol use: Yes     Alcohol/week: 1.0 standard drink     Types: 1 Glasses of wine per week     Comment: socially    Drug use: Never     Review of patient's allergies indicates:   Allergen Reactions    Adhesive Rash     Asking that we use Paper Tape,  Uses "Sensitive Skin" band aids    Amoxil [amoxicillin] Hives    Penicillins Hives and Swelling     Swelling of ears and neck    Sulfa (sulfonamide antibiotics) Hives and Itching       Medications: I have reviewed the current medication administration record.    Medications Prior to Admission   Medication Sig Dispense Refill Last Dose    amitriptyline (ELAVIL) 25 MG tablet Take 1 " tablet (25 mg total) by mouth every evening. 30 tablet 11 6/11/2023    atorvastatin (LIPITOR) 40 MG tablet Take 1 tablet (40 mg total) by mouth once daily. 90 tablet 3 6/11/2023    estradioL (ESTRACE) 0.5 MG tablet TAKE 1 TABLET BY MOUTH ONCE DAILY. 90 tablet 4 6/12/2023    linaCLOtide (LINZESS) 72 mcg Cap capsule Take 1 capsule (72 mcg total) by mouth once daily. 30 capsule 5 6/11/2023    ERGOCALCIFEROL, VITAMIN D2, (VITAMIN D ORAL) Take 1 capsule by mouth every morning.    6/9/2023    ferrous sulfate 325 (65 FE) MG EC tablet TAKE 1 TABLET BY MOUTH EVERY DAY 90 tablet 3 6/9/2023    L GASSERI/B BIFIDUM/B LONGUM (MENDENHALL [facility] [employer] ORAL) Take 1 tablet by mouth 2 (two) times daily.       magnesium oxide-pyridoxine (BEELITH) 362-20 mg Tab Take 1 tablet by mouth once daily. 90 tablet 1 6/9/2023    VITAMIN E ACETATE (VITAMIN E ORAL) Take 1 capsule by mouth every morning.    6/9/2023       Review of Systems   Constitutional:  Negative for activity change and fatigue.   HENT:  Negative for drooling and trouble swallowing.    Eyes:  Negative for photophobia and visual disturbance.   Respiratory:  Negative for cough and shortness of breath.    Cardiovascular:  Negative for chest pain and palpitations.   Gastrointestinal:  Negative for nausea and vomiting.   Musculoskeletal:  Negative for neck pain and neck stiffness.   Skin:  Negative for rash and wound.   Neurological:  Negative for facial asymmetry, speech difficulty, weakness and headaches.   Psychiatric/Behavioral:  Negative for confusion. The patient is not nervous/anxious.    Objective:     Vital Signs (Most Recent):  Temp: 98.2 °F (36.8 °C) (06/16/23 1152)  Pulse: 98 (06/16/23 1152)  Resp: 18 (06/16/23 1152)  BP: (!) 111/54 (06/16/23 1152)  SpO2: (!) 91 % (06/16/23 1152)    Vital Signs Range (Last 24H):  Temp:  [96.2 °F (35.7 °C)-98.6 °F (37 °C)]   Pulse:  [76-98]   Resp:  [16-18]   BP: (111-156)/(54-70)   SpO2:  [90 %-94 %]        Physical Exam  Constitutional:        General: She is not in acute distress.  HENT:      Head: Normocephalic.   Eyes:      Extraocular Movements: Extraocular movements intact.   Cardiovascular:      Rate and Rhythm: Normal rate.   Pulmonary:      Effort: Pulmonary effort is normal.   Abdominal:      General: Abdomen is flat.   Musculoskeletal:         General: Normal range of motion.   Skin:     General: Skin is warm and dry.   Neurological:      Mental Status: She is alert and oriented to person, place, and time.      Cranial Nerves: No dysarthria or facial asymmetry.      Motor: No weakness.      Coordination: Finger-Nose-Finger Test normal.   Psychiatric:         Mood and Affect: Mood normal.            Neurological Exam:   LOC: alert  Attention Span: Good   Language: No aphasia  Articulation: No dysarthria  Orientation: Person, Place, Time   Visual Fields: Full  EOM (CN III, IV, VI): Full/intact  Facial Movement (CN VII): Symmetric facial expression    Motor: Arm left  Normal 5/5  Leg left  Normal 5/5  Arm right  Normal 5/5  Leg right Normal 5/5      Laboratory:  CMP:   Recent Labs   Lab 06/16/23  0410   CALCIUM 8.5*   ALBUMIN 2.7*   PROT 5.4*      K 3.8   CO2 25      BUN 8   CREATININE 0.6   ALKPHOS 66   ALT 15   AST 25   BILITOT 0.7     CBC:   Recent Labs   Lab 06/16/23  0410   WBC 5.77   RBC 3.23*   HGB 9.5*   HCT 28.9*      MCV 90   MCH 29.4   MCHC 32.9     Lipid Panel: No results for input(s): CHOL, LDLCALC, HDL, TRIG in the last 168 hours.  Coagulation:   Recent Labs   Lab 06/12/23  1731   INR 1.0     Hgb A1C: No results for input(s): HGBA1C in the last 168 hours.  TSH: No results for input(s): TSH in the last 168 hours.    Diagnostic Results:      Brain imaging:  [facility] 6/15/2023  No acute intracranial findings allowing for artifact from motion specifically without evidence for acute intracranial hemorrhage or sulcal effacement to suggest large territory recent infarction    MRI Brain w wo 6/16/2023  Unremarkable motion  distorted MRI brain as detailed above specifically without evidence for acute infarction or intracranial enhancing lesion.

## 2023-06-16 NOTE — PT/OT/SLP PROGRESS
Physical Therapy Treatment    Patient Name:  Tanesha Davis   MRN:  [id number]    Recommendations:     Discharge Recommendations: other (see comments)  Discharge Equipment Recommendations: none  Barriers to discharge:  decreased strength/functional mobility    Assessment:     Tanesha Davis is a 72 y.o. female admitted with a medical diagnosis of Ventral hernia with obstruction and without gangrene.  She presents with the following impairments/functional limitations: weakness, impaired endurance, impaired self care skills, impaired functional mobility, gait instability, impaired balance, decreased coordination, decreased upper extremity function, decreased lower extremity function, decreased ROM, impaired coordination, impaired skin, decreased safety awareness Pt would continue to benefit from P.T. To address impairments listed above.  .    Rehab Prognosis: Fair; patient would benefit from acute skilled PT services to address these deficits and reach maximum level of function.    Recent Surgery: Procedure(s) (LRB):  REPAIR, HERNIA, INCISIONAL OR VENTRAL, WITH HISTORY OF PRIOR  REPAIR (WITH MESH AND POSTERIOR COMPONENTS SEPARATION) (N/A)  RELEASE-FASCIA -TRANSVERSUS ABDOMINIS (Bilateral) 4 Days Post-Op    Plan:     During this hospitalization, patient to be seen 3 x/week to address the identified rehab impairments via gait training, therapeutic activities, therapeutic exercises, neuromuscular re-education and progress toward the following goals:    Plan of Care Expires:  07/13/23    Subjective     Chief Complaint: RN  Patient/Family Comments/goals: Pt agreed to tx.  Pain/Comfort:  Pain Rating 1: 0/10  Pain Addressed 1: Pre-medicate for activity, Reposition, Distraction, Nurse notified  Pain Rating Post-Intervention 1: 8/10 (abdomen)      Objective:     Communicated with RN prior to session.  Patient found up in chair with LUIS MIGUEL drain, peripheral IV upon PT entry to room.     General Precautions: Standard,  fall  Orthopedic Precautions: N/A  Braces: N/A  Respiratory Status: Room air     Functional Mobility:  Transfers:     Sit to Stand:  contact guard assistance and minimum assistance with no AD, hand-held assist, and rolling walker  Gait: 20ft without A.D. and Jennifer/CGA for occasional instability with decreased curtis and NBOS.  40ft x 2 with RW and CGA with improved fluidity and improved (wider) KANG.  Pt ambulated above in non slip socks.  3rd bout, pt ambulated with her sandals that she usually wears without A.D. and improved stability and KANG, but still requires CGA (HHA).  Pt would benefit from continued gait training with sandals donned.    Balance: sitting good, standing fair/fair-, gait fair/fair-      AM-PAC 6 CLICK MOBILITY  Turning over in bed (including adjusting bedclothes, sheets and blankets)?: 2  Sitting down on and standing up from a chair with arms (e.g., wheelchair, bedside commode, etc.): 3  Moving from lying on back to sitting on the side of the bed?: 2  Moving to and from a bed to a chair (including a wheelchair)?: 3  Need to walk in hospital room?: 3  Climbing 3-5 steps with a railing?: 1  Basic Mobility Total Score: 14       Treatment & Education:  Pt required Jennifer on initial standing secondary to posterior lean.  Seated BLE therex: AP, LAQs, hip flexion 10 x 2 reps.  STAnding marching in place x 10 reps with HHA(Jennifer) secondary to posterior lean.  Gait as above.  Pt demonstrated improved stability when ambulating with sandals she normally wears at home or with RW.  VC's for  feet secondary to NBOS and ankle knocking when ambulating without A.D. or shoes.  Pt remained up in b/s chair after tx.    Patient left up in chair with all lines intact, call button in reach, RN notified, and pt's  present..    GOALS:   Multidisciplinary Problems       Physical Therapy Goals          Problem: Physical Therapy    Goal Priority Disciplines Outcome Goal Variances Interventions   Physical  Therapy Goal     PT, PT/OT Ongoing, Progressing     Description: Goals to met by 6/27/2023    1. Supine to sit with Modified [place]  2. Sit to supine with Modified [place]  3. Rolling to Left and Right with Modified [place].  4. Sit to stand transfer with Stand-by Assistance  5. Bed to chair transfer with Stand-by Assistance using LRAD  6. Gait  x 75 feet with Stand-by Assistance using LRAD   7. Ascend/descend 5 stair(s) with bilateral Handrails Contact Guard Assistance using No Assistive Device.   8. Stand for 10 minutes with Stand-by Assistance using LRAD  9. Lower extremity exercise program x15 reps per Instruction, with assistance as needed in order to facilitate improved strength, improved postural control, and improvement in functional independence                         Time Tracking:     PT Received On: 06/16/23  PT Start Time: 1209     PT Stop Time: 1234  PT Total Time (min): 25 min     Billable Minutes: Gait Training 15 and Therapeutic Activity 10    Treatment Type: Treatment  PT/PTA: PTA     Number of PTA visits since last PT visit: 1     06/16/2023

## 2023-06-16 NOTE — ASSESSMENT & PLAN NOTE
73 yo F s/p  ventral hernia repair, mesh explant with posterior component separation and TAR 6/12/2023. Had fall 6/15 with head trauma and obvious soft tissue swelling, CT head negative 6/15 am for intracranial pathology. Now with altered mental status most consistent with delirium.    - Discussed AMS with [facility] neurology; likely delirium but given recent head trauma they desire imaging prior to evaluating patient. Will obtain MRI with PRN haldol given restlessness   - telesitter  - delirium precautions; normalize   - Multimodal pain regimen: toradol, tylenol, lidocaine patch; DC narcotics  - Scheduled antiemetics  - Regular diet   - Routine LUIS MIGUEL drain care, record output   - Home meds   - OOB, ambulate  - PT/OT  - DVT PPX

## 2023-06-16 NOTE — CONSULTS
RD consulted for calorie count. Notified RN via private message. All paperwork taped to front of pt's door. Please collect/save all meal tickets and record % PO intake of all items provided on each tray and any outside food if provided. RD to collect and analyze results of calorie count on Monday (6/19). Please contact RD if any questions arise during this time. RD to continue to monitor and follow.    Thanks!    Shirley Gutierrez RDN,LDN

## 2023-06-16 NOTE — HPI
PMHx of recurrent incisional hernia, recent SBO, and colon cancer who is s/p ventral hernia repair 6/12. Patient had a fall on 6/15 with noted head trauma. CTH 6/15 was negative. However patient reported to have AMS oriented to self only that  worsen on 6/16. Per  patient is confused and having visual hallucinations. NIHSS 0 . Recommend MRI of brain to r/o AIS, and it showed no acute intracranial pathology.      No stroke on imaging to identified AMS. Recommend working patient up for hospital delirium vs encephalopathy.    Vascular Neurology will sign off.

## 2023-06-16 NOTE — CONSULTS
[facility] - Surgery  Vascular Neurology  [facility]  Consult Note    Inpatient consult to Vascular (Stroke) Neurology  Consult performed by: Vero Stewart NP  Consult ordered by: Radha Bettencourt MD        Assessment/Plan:     Patient is a 72 y.o. year old female with:    * Ventral hernia with obstruction and without gangrene  S/p repair    Delirium  PMHx of recurrent incisional hernia, recent SBO, and colon cancer who is s/p ventral hernia repair 6/12. Patient had a fall on 6/15 with noted head trauma. CTH 6/15 was negative. However patient reported to have AMS oriented to self only that  worsen on 6/16. Per  patient is confused and having visual hallucinations. NIHSS 0 . Recommend MRI of brain to r/o AIS, and it showed no acute intracranial pathology.      No stroke on imaging to identified AMS. Recommend working patient up for hospital delirium vs encephalopathy.    Vascular Neurology will sign off.         STROKE DOCUMENTATION          NIH Scale:  1a. Level of Consciousness: 0-->Alert, keenly responsive  1b. LOC Questions: 0-->Answers both questions correctly  1c. LOC Commands: 0-->Performs both tasks correctly  2. Best Gaze: 0-->Normal  3. Visual: 0-->No visual loss  4. Facial Palsy: 0-->Normal symmetrical movements  5a. Motor Arm, Left: 0-->No drift, limb holds 90 (or 45) degrees for full 10 secs  5b. Motor Arm, Right: 0-->No drift, limb holds 90 (or 45) degrees for full 10 secs  6a. Motor Leg, Left: 0-->No drift, leg holds 30 degree position for full 5 secs  6b. Motor Leg, Right: 0-->No drift, leg holds 30 degree position for full 5 secs  7. Limb Ataxia: 0-->Absent  8. Sensory: 0-->Normal, no sensory loss  9. Best Language: 0-->No aphasia, normal  10. Dysarthria: 0-->Normal  11. Extinction and Inattention (formerly Neglect): 0-->No abnormality  Total (NIH Stroke Scale): 0    Modified Kash Score: 0  Erika Coma Scale:    ABCD2 Score:    [id number] Score:   HAS -BLED Score:   ICH  "Score:   Hunt & Jacob Classification:       Thrombolysis Candidate? No, Out of window - Symptom onset > 4.5 hours    Delays to Thrombolysis?  No    Interventional Revascularization Candidate?   Is the patient eligible for mechanical endovascular reperfusion (JEFF)?  No; at this time symptoms not suggestive of large vessel occlusion    Delays to Thrombectomy? No    Hemorrhagic change of an Ischemic Stroke: Does this patient have an ischemic stroke with hemorrhagic changes? No     Subjective:     History of Present Illness:  PMHx of recurrent incisional hernia, recent SBO, and colon cancer who is s/p ventral hernia repair 6/12. Patient had a fall on 6/15 with noted head trauma. CTH 6/15 was negative. However patient reported to have AMS oriented to self only that  worsen on 6/16. Per  patient is confused and having visual hallucinations. NIHSS 0 . Recommend MRI of brain to r/o AIS, and it showed no acute intracranial pathology.      No stroke on imaging to identified AMS. Recommend working patient up for hospital delirium vs encephalopathy.    Vascular Neurology will sign off.           Past Medical History:   Diagnosis Date    Arthritis     Draining cutaneous sinus tract     Headache(784.0)     Incisional hernia     Insomnia     Nocturnal leg cramps     Nonhealing surgical wound     PONV (postoperative nausea and vomiting)     with last surgery per patient, "Severe" PONV 2-27-14    Postmenopausal status     Rectosigmoid cancer     Small bowel obstruction 11/8/2022     Past Surgical History:   Procedure Laterality Date    BREAST BIOPSY Left     CHOLECYSTECTOMY  02/24/2014    with incisional hernia repair with mesh    COLONOSCOPY N/A 8/23/2016    Procedure: COLONOSCOPY;  Surgeon: Cuco Meraz MD;  Location: [address]);  Service: Endoscopy;  Laterality: N/A;    COLONOSCOPY N/A 12/9/2019    Procedure: COLONOSCOPY;  Surgeon: KISHORE Curry MD;  Location: [address]);  Service: " Endoscopy;  Laterality: N/A;  ADHESIVE Allergy    COLONOSCOPY N/A 5/9/2023    Procedure: COLONOSCOPY;  Surgeon: KISHORE Curry MD;  Location: [facility] ENDO (4TH FLR);  Service: Endoscopy;  Laterality: N/A;  Instructions to portal.EC  precall no answer 5/3 EB    ESOPHAGOGASTRODUODENOSCOPY N/A 4/22/2021    Procedure: EGD (ESOPHAGOGASTRODUODENOSCOPY);  Surgeon: Lukasz Lindquist MD;  Location: [facility] ENDO (4TH FLR);  Service: Endoscopy;  Laterality: N/A;  3/25-covid + 7/2020 at [facility]- getting results to us to see if correct form of test-MS    HYSTERECTOMY      INJECTION OF ANESTHETIC AGENT AROUND NERVE Right 6/28/2022    Procedure: BLOCK, NERVE, RIGHT L2-L3-L4-L5;  Surgeon: Shabnam Skaggs MD;  Location: [facility] PAIN MGT;  Service: Pain Management;  Laterality: Right;    INJECTION OF ANESTHETIC AGENT AROUND NERVE Right 8/2/2022    Procedure: BLOCK, NERVE, RIGHT L2-L5 MEDIAL BRANCH;  Surgeon: Shabnam Skaggs MD;  Location: [facility] PAIN MGT;  Service: Pain Management;  Laterality: Right;    KNEE ARTHROSCOPY      KNEE SURGERY      right and left knee repair    OOPHORECTOMY      RADIOFREQUENCY ABLATION Right 9/27/2022    Procedure: Radiofrequency Ablation Right L2-L5;  Surgeon: Shabnam Skaggs MD;  Location: [facility] PAIN MGT;  Service: Pain Management;  Laterality: Right;    RELEASE-FASCIA Bilateral 6/12/2023    Procedure: RELEASE-FASCIA -TRANSVERSUS ABDOMINIS;  Surgeon: Jody Ramírez MD;  Location: [facility] OR 2ND FLR;  Service: General;  Laterality: Bilateral;    REPAIR OF RECURRENT INCISIONAL HERNIA  7/10/2018    Procedure: REPAIR, HERNIA, INCISIONAL, RECURRENT;  Surgeon: Cuco Meraz MD;  Location: [facility] OR 2ND FLR;  Service: Colon and Rectal;;    REPAIR, HERNIA, INCISIONAL OR VENTRAL, WITHOUT HISTORY OF PRIOR REPAIR N/A 6/12/2023    Procedure: REPAIR, HERNIA, INCISIONAL OR VENTRAL, WITH HISTORY OF PRIOR  REPAIR (WITH MESH AND POSTERIOR COMPONENTS SEPARATION);  Surgeon: Jody Ramírez MD;   "Location: [facility] OR [address] [place] [address];  Service: General;  Laterality: N/A;    SIGMOIDECTOMY  03/20/2006    LAR     Family History   Problem Relation Age of Onset    Breast cancer Mother 56    Cancer Mother         BREAST    Heart disease Father     Heart disease Maternal Grandmother     Diabetes Brother     Colon polyps Neg Hx     Ovarian cancer Neg Hx     Anesthesia problems Neg Hx      Social History     Tobacco Use    Smoking status: Never    Smokeless tobacco: Never   Substance Use Topics    Alcohol use: Yes     Alcohol/week: 1.0 standard drink     Types: 1 Glasses of wine per week     Comment: socially    Drug use: Never     Review of patient's allergies indicates:   Allergen Reactions    Adhesive Rash     Asking that we use Paper Tape,  Uses "Sensitive Skin" band aids    Amoxil [amoxicillin] Hives    Penicillins Hives and Swelling     Swelling of ears and neck    Sulfa (sulfonamide antibiotics) Hives and Itching       Medications: I have reviewed the current medication administration record.    Medications Prior to Admission   Medication Sig Dispense Refill Last Dose    amitriptyline (ELAVIL) 25 MG tablet Take 1 tablet (25 mg total) by mouth every evening. 30 tablet 11 6/11/2023    atorvastatin (LIPITOR) 40 MG tablet Take 1 tablet (40 mg total) by mouth once daily. 90 tablet 3 6/11/2023    estradioL (ESTRACE) 0.5 MG tablet TAKE 1 TABLET BY MOUTH ONCE DAILY. 90 tablet 4 6/12/2023    linaCLOtide (LINZESS) 72 mcg Cap capsule Take 1 capsule (72 mcg total) by mouth once daily. 30 capsule 5 6/11/2023    ERGOCALCIFEROL, VITAMIN D2, (VITAMIN D ORAL) Take 1 capsule by mouth every morning.    6/9/2023    ferrous sulfate 325 (65 FE) MG EC tablet TAKE 1 TABLET BY MOUTH EVERY DAY 90 tablet 3 6/9/2023    L GASSERI/B BIFIDUM/B LONGUM ([facility] COLON HEALTH ORAL) Take 1 tablet by mouth 2 (two) times daily.       magnesium oxide-pyridoxine (BEELITH) 362-20 mg Tab Take 1 tablet by mouth once daily. 90 tablet 1 " 6/9/2023    VITAMIN E ACETATE (VITAMIN E ORAL) Take 1 capsule by mouth every morning.    6/9/2023       Review of Systems   Constitutional:  Negative for activity change and fatigue.   HENT:  Negative for drooling and trouble swallowing.    Eyes:  Negative for photophobia and visual disturbance.   Respiratory:  Negative for cough and shortness of breath.    Cardiovascular:  Negative for chest pain and palpitations.   Gastrointestinal:  Negative for nausea and vomiting.   Musculoskeletal:  Negative for neck pain and neck stiffness.   Skin:  Negative for rash and wound.   Neurological:  Negative for facial asymmetry, speech difficulty, weakness and headaches.   Psychiatric/Behavioral:  Negative for confusion. The patient is not nervous/anxious.    Objective:     Vital Signs (Most Recent):  Temp: 98.2 °F (36.8 °C) (06/16/23 1152)  Pulse: 98 (06/16/23 1152)  Resp: 18 (06/16/23 1152)  BP: (!) 111/54 (06/16/23 1152)  SpO2: (!) 91 % (06/16/23 1152)    Vital Signs Range (Last 24H):  Temp:  [96.2 °F (35.7 °C)-98.6 °F (37 °C)]   Pulse:  [76-98]   Resp:  [16-18]   BP: (111-156)/(54-70)   SpO2:  [90 %-94 %]        Physical Exam  Constitutional:       General: She is not in acute distress.  HENT:      Head: Normocephalic.   Eyes:      Extraocular Movements: Extraocular movements intact.   Cardiovascular:      Rate and Rhythm: Normal rate.   Pulmonary:      Effort: Pulmonary effort is normal.   Abdominal:      General: Abdomen is flat.   Musculoskeletal:         General: Normal range of motion.   Skin:     General: Skin is warm and dry.   Neurological:      Mental Status: She is alert and oriented to person, place, and time.      Cranial Nerves: No dysarthria or facial asymmetry.      Motor: No weakness.      Coordination: Finger-Nose-Finger Test normal.   Psychiatric:         Mood and Affect: Mood normal.            Neurological Exam:   LOC: alert  Attention Span: Good   Language: No aphasia  Articulation: No  dysarthria  Orientation: Person, Place, Time   Visual Fields: Full  EOM (CN III, IV, VI): Full/intact  Facial Movement (CN VII): Symmetric facial expression    Motor: Arm left  Normal 5/5  Leg left  Normal 5/5  Arm right  Normal 5/5  Leg right Normal 5/5      Laboratory:  CMP:   Recent Labs   Lab 06/16/23  0410   CALCIUM 8.5*   ALBUMIN 2.7*   PROT 5.4*      K 3.8   CO2 25      BUN 8   CREATININE 0.6   ALKPHOS 66   ALT 15   AST 25   BILITOT 0.7     CBC:   Recent Labs   Lab 06/16/23  0410   WBC 5.77   RBC 3.23*   HGB 9.5*   HCT 28.9*      MCV 90   MCH 29.4   MCHC 32.9     Lipid Panel: No results for input(s): CHOL, LDLCALC, HDL, TRIG in the last 168 hours.  Coagulation:   Recent Labs   Lab 06/12/23  1731   INR 1.0     Hgb A1C: No results for input(s): HGBA1C in the last 168 hours.  TSH: No results for input(s): TSH in the last 168 hours.    Diagnostic Results:      Brain imaging:  [facility] 6/15/2023  No acute intracranial findings allowing for artifact from motion specifically without evidence for acute intracranial hemorrhage or sulcal effacement to suggest large territory recent infarction    MRI Brain w wo 6/16/2023  Unremarkable motion distorted MRI brain as detailed above specifically without evidence for acute infarction or intracranial enhancing lesion.        Vero Stewart NP  [facility]  Department of Vascular Neurology   [facility]

## 2023-06-16 NOTE — SUBJECTIVE & OBJECTIVE
"Interval History: Fall yesterday AM with negative CT head. altered mental status beginning yesterday afternoon and worsening into this am - oriented only to self, no focal deficits. Denies nausea. Endorses flatus. Vitals WNL.     Medications:  Continuous Infusions:      Scheduled Meds:   acetaminophen  1,000 mg Oral Q6H    amitriptyline  25 mg Oral QHS    atorvastatin  40 mg Oral Daily    enoxparin  40 mg Subcutaneous Q24H (prophylaxis, 1700)    famotidine (PF)  20 mg Intravenous BID    ketorolac  15 mg Intravenous Q8H    LIDOcaine  1 patch Transdermal Q24H    mupirocin   Nasal BID    polyethylene glycol  17 g Oral BID    scopolamine  1 patch Transdermal Q3 Days     PRN Meds:morphine, ondansetron, oxyCODONE, sodium chloride 0.9%     Review of patient's allergies indicates:   Allergen Reactions    Adhesive Rash     Asking that we use Paper Tape,  Uses "Sensitive Skin" band aids    Amoxil [amoxicillin] Hives    Penicillins Hives and Swelling     Swelling of ears and neck    Sulfa (sulfonamide antibiotics) Hives and Itching     Objective:     Vital Signs (Most Recent):  Temp: 96.2 °F (35.7 °C) (06/16/23 0536)  Pulse: 83 (06/16/23 0536)  Resp: 18 (06/16/23 0536)  BP: (!) 155/70 (06/16/23 0536)  SpO2: (!) 92 % (06/16/23 0536) Vital Signs (24h Range):  Temp:  [96.2 °F (35.7 °C)-98.6 °F (37 °C)] 96.2 °F (35.7 °C)  Pulse:  [76-84] 83  Resp:  [16-18] 18  SpO2:  [91 %-94 %] 92 %  BP: (128-156)/(60-70) 155/70     Weight: 72.6 kg (160 lb)  Body mass index is 26.63 kg/m².    Intake/Output - Last 3 Shifts         06/14 0700  06/15 0659 06/15 0700 06/16 0659 06/16 0700  06/17 0659    P.O.  1040     Total Intake(mL/kg)  1040 (14.3)     Urine (mL/kg/hr)  0 (0)     Emesis/NG output 400      Drains 210 105     Total Output 610 105     Net -610 +935            Urine Occurrence  6 x     Stool Occurrence  2 x     Emesis Occurrence 2 x               Physical Exam  Vitals and nursing note reviewed.   Constitutional:       General: She is " not in acute distress.  Cardiovascular:      Rate and Rhythm: Normal rate.      Pulses: Normal pulses.   Pulmonary:      Effort: Pulmonary effort is normal. No respiratory distress.   Abdominal:      General: There is no distension.      Palpations: Abdomen is soft.      Tenderness: There is no abdominal tenderness.      Comments: Abdominal binder in place; LUIS MIGUEL drains x 2 with SS; soft non-distended abdomen with midline staples without drainage   Neurological:      General: No focal deficit present.      Mental Status: She is alert and oriented to person, place, and time.      Cranial Nerves: No cranial nerve deficit.      Motor: No weakness.        Significant Labs:  I have reviewed all pertinent lab results within the past 24 hours.  CBC:   Recent Labs   Lab 06/16/23 0410   WBC 5.77   RBC 3.23*   HGB 9.5*   HCT 28.9*      MCV 90   MCH 29.4   MCHC 32.9       BMP:   Recent Labs   Lab 06/16/23  0410   GLU 82      K 3.8      CO2 25   BUN 8   CREATININE 0.6   CALCIUM 8.5*   MG 2.0       CMP:   Recent Labs   Lab 06/16/23  0410   GLU 82   CALCIUM 8.5*   ALBUMIN 2.7*   PROT 5.4*      K 3.8   CO2 25      BUN 8   CREATININE 0.6   ALKPHOS 66   ALT 15   AST 25   BILITOT 0.7         Significant Diagnostics:  I have reviewed all pertinent imaging results/findings within the past 24 hours.

## 2023-06-17 VITALS
WEIGHT: 160 LBS | TEMPERATURE: 98 F | RESPIRATION RATE: 18 BRPM | DIASTOLIC BLOOD PRESSURE: 76 MMHG | BODY MASS INDEX: 26.66 KG/M2 | HEIGHT: 65 IN | HEART RATE: 125 BPM | SYSTOLIC BLOOD PRESSURE: 173 MMHG | OXYGEN SATURATION: 95 %

## 2023-06-17 PROBLEM — R41.0 DELIRIUM: Status: RESOLVED | Noted: 2023-06-16 | Resolved: 2023-06-17

## 2023-06-17 LAB
ALBUMIN SERPL BCP-MCNC: 2.7 G/DL (ref 3.5–5.2)
ALP SERPL-CCNC: 90 U/L (ref 55–135)
ALT SERPL W/O P-5'-P-CCNC: 44 U/L (ref 10–44)
ANION GAP SERPL CALC-SCNC: 9 MMOL/L (ref 8–16)
AST SERPL-CCNC: 74 U/L (ref 10–40)
BASOPHILS # BLD AUTO: 0.03 K/UL (ref 0–0.2)
BASOPHILS NFR BLD: 0.6 % (ref 0–1.9)
BILIRUB SERPL-MCNC: 0.5 MG/DL (ref 0.1–1)
BUN SERPL-MCNC: 6 MG/DL (ref 8–23)
CALCIUM SERPL-MCNC: 8.3 MG/DL (ref 8.7–10.5)
CHLORIDE SERPL-SCNC: 109 MMOL/L (ref 95–110)
CO2 SERPL-SCNC: 26 MMOL/L (ref 23–29)
CREAT SERPL-MCNC: 0.6 MG/DL (ref 0.5–1.4)
DIFFERENTIAL METHOD: ABNORMAL
EOSINOPHIL # BLD AUTO: 0.3 K/UL (ref 0–0.5)
EOSINOPHIL NFR BLD: 5.5 % (ref 0–8)
ERYTHROCYTE [DISTWIDTH] IN BLOOD BY AUTOMATED COUNT: 13.1 % (ref 11.5–14.5)
EST. GFR  (NO RACE VARIABLE): >60 ML/MIN/1.73 M^2
GLUCOSE SERPL-MCNC: 97 MG/DL (ref 70–110)
HCT VFR BLD AUTO: 29.4 % (ref 37–48.5)
HGB BLD-MCNC: 9.3 G/DL (ref 12–16)
IMM GRANULOCYTES # BLD AUTO: 0.02 K/UL (ref 0–0.04)
IMM GRANULOCYTES NFR BLD AUTO: 0.4 % (ref 0–0.5)
LYMPHOCYTES # BLD AUTO: 1.3 K/UL (ref 1–4.8)
LYMPHOCYTES NFR BLD: 24.4 % (ref 18–48)
MAGNESIUM SERPL-MCNC: 2 MG/DL (ref 1.6–2.6)
MCH RBC QN AUTO: 29 PG (ref 27–31)
MCHC RBC AUTO-ENTMCNC: 31.6 G/DL (ref 32–36)
MCV RBC AUTO: 92 FL (ref 82–98)
MONOCYTES # BLD AUTO: 0.5 K/UL (ref 0.3–1)
MONOCYTES NFR BLD: 10 % (ref 4–15)
NEUTROPHILS # BLD AUTO: 3 K/UL (ref 1.8–7.7)
NEUTROPHILS NFR BLD: 59.1 % (ref 38–73)
NRBC BLD-RTO: 0 /100 WBC
PHOSPHATE SERPL-MCNC: 3.4 MG/DL (ref 2.7–4.5)
PLATELET # BLD AUTO: 205 K/UL (ref 150–450)
PMV BLD AUTO: 10.7 FL (ref 9.2–12.9)
POTASSIUM SERPL-SCNC: 3.5 MMOL/L (ref 3.5–5.1)
PROT SERPL-MCNC: 5.3 G/DL (ref 6–8.4)
RBC # BLD AUTO: 3.21 M/UL (ref 4–5.4)
SODIUM SERPL-SCNC: 144 MMOL/L (ref 136–145)
WBC # BLD AUTO: 5.12 K/UL (ref 3.9–12.7)

## 2023-06-17 PROCEDURE — 80053 COMPREHEN METABOLIC PANEL: CPT | Performed by: STUDENT IN AN ORGANIZED HEALTH CARE EDUCATION/TRAINING PROGRAM

## 2023-06-17 PROCEDURE — 97530 THERAPEUTIC ACTIVITIES: CPT | Mod: CQ

## 2023-06-17 PROCEDURE — 63600175 PHARM REV CODE 636 W HCPCS: Performed by: SURGERY

## 2023-06-17 PROCEDURE — 25000003 PHARM REV CODE 250: Performed by: STUDENT IN AN ORGANIZED HEALTH CARE EDUCATION/TRAINING PROGRAM

## 2023-06-17 PROCEDURE — 85025 COMPLETE CBC W/AUTO DIFF WBC: CPT | Performed by: STUDENT IN AN ORGANIZED HEALTH CARE EDUCATION/TRAINING PROGRAM

## 2023-06-17 PROCEDURE — 83735 ASSAY OF MAGNESIUM: CPT | Performed by: STUDENT IN AN ORGANIZED HEALTH CARE EDUCATION/TRAINING PROGRAM

## 2023-06-17 PROCEDURE — 36415 COLL VENOUS BLD VENIPUNCTURE: CPT | Performed by: STUDENT IN AN ORGANIZED HEALTH CARE EDUCATION/TRAINING PROGRAM

## 2023-06-17 PROCEDURE — 84100 ASSAY OF PHOSPHORUS: CPT | Performed by: STUDENT IN AN ORGANIZED HEALTH CARE EDUCATION/TRAINING PROGRAM

## 2023-06-17 RX ORDER — ONDANSETRON 8 MG/1
8 TABLET, ORALLY DISINTEGRATING ORAL EVERY 6 HOURS PRN
Qty: 15 TABLET | Refills: 0 | Status: SHIPPED | OUTPATIENT
Start: 2023-06-17 | End: 2023-06-17 | Stop reason: SDUPTHER

## 2023-06-17 RX ORDER — POLYETHYLENE GLYCOL 3350 17 G/17G
17 POWDER, FOR SOLUTION ORAL DAILY
Qty: 30 EACH | Refills: 0 | Status: SHIPPED | OUTPATIENT
Start: 2023-06-17 | End: 2023-06-17 | Stop reason: SDUPTHER

## 2023-06-17 RX ORDER — IBUPROFEN 600 MG/1
600 TABLET ORAL EVERY 6 HOURS PRN
COMMUNITY
Start: 2023-06-17 | End: 2023-07-03

## 2023-06-17 RX ORDER — ACETAMINOPHEN 500 MG
1000 TABLET ORAL EVERY 6 HOURS
Refills: 0 | COMMUNITY
Start: 2023-06-17 | End: 2023-06-17 | Stop reason: SDUPTHER

## 2023-06-17 RX ORDER — ACETAMINOPHEN 500 MG
1000 TABLET ORAL EVERY 6 HOURS
Refills: 0 | COMMUNITY
Start: 2023-06-17 | End: 2023-07-03

## 2023-06-17 RX ORDER — POLYETHYLENE GLYCOL 3350 17 G/17G
17 POWDER, FOR SOLUTION ORAL DAILY
Qty: 30 EACH | Refills: 0 | Status: SHIPPED | OUTPATIENT
Start: 2023-06-17 | End: 2023-07-03

## 2023-06-17 RX ORDER — ONDANSETRON 8 MG/1
8 TABLET, ORALLY DISINTEGRATING ORAL EVERY 6 HOURS PRN
Qty: 10 TABLET | Refills: 0 | Status: SHIPPED | OUTPATIENT
Start: 2023-06-17 | End: 2023-07-03

## 2023-06-17 RX ADMIN — ACETAMINOPHEN 1000 MG: 500 TABLET ORAL at 05:06

## 2023-06-17 RX ADMIN — KETOROLAC TROMETHAMINE 15 MG: 15 INJECTION, SOLUTION INTRAMUSCULAR; INTRAVENOUS at 05:06

## 2023-06-17 NOTE — DISCHARGE SUMMARY
[facility]  General Surgery  Discharge Summary      Patient Name: Tanesha Davis  MRN: [id number]  Admission Date: 6/12/2023  Hospital Length of Stay: 5 days  Discharge Date and Time:  06/17/2023 7:55 AM  Attending Physician: Jody Ramírez*   Discharging Provider: Olga Nails MD  Primary Care Provider: Caleb Hernandez MD     HPI: Tanesha Davis is a 72y.o. female with PMHx of recurrent incisional hernia, recent SBO, and colon cancer who presents to the clinic today complaining of pain from a known incisional hernia which she first noticed back in 2015. Patient describes the pain as intermittent (1-2x weekly) and rates it as 8/10 in severity. Patient states the sxs are aggravated by standing too long, long car rides and alleviated by laying flat. She denies fever, chills, unintentional weight loss, n/v/d, constipation, hematochezia, dysuria, hematuria, CP, SOB, and all other symptoms. Patient reports being compliant with home medication regimen.      Pt initially presented to Dr Muse for an incisional hernia repair in 2014. Pt reports the wound had difficulty healing; she had to return to the OR with Dr Muse a year later and then again in 2018 with Dr. Meraz, at which point they removed mesh. The wound at that point stopped draining, so the patient stopped pursuing surgical treatments despite the hernia persisting. She was hospitalized at [facility] Nov 7-10, 22 for an SBO which resolved with conservative management. CT at [facility] showed a transition point near her hernia.      She is concerned about another hernia repair because she does not want to go through another four years of poor wound healing.     Procedure(s) (LRB):  REPAIR, HERNIA, INCISIONAL OR VENTRAL, WITH HISTORY OF PRIOR  REPAIR (WITH MESH AND POSTERIOR COMPONENTS SEPARATION) (N/A)  RELEASE-FASCIA -TRANSVERSUS ABDOMINIS (Bilateral)     Hospital Course: Patient was admitted to the general surgery service  for the above stated procedure. Please see op note for full details. The patient tolerated the procedure well, was extubated in the OR, and transferred to the PACU. On post-operative day one patient is doing well, pain well controlled, diet advanced to clear liquids. Bowel regimen initiated while awaiting return of bowel function and working on ambulation. On 6/16/23, patient fell while trying to ambulate to the bathroom for a bowel movement. CT head without acute findings, however patient had confusion prompting vascular neurology consultation on 6/16/23. Recommended MRI brain which was normal. Return of bowel function on 6/16/23, diet advanced to a regular diet which she tolerated  well. On 6/17/23, after meeting appropriate milestones, patient was deemed ready for discharge. She will follow-up with Dr. Ramírez  an outpatient.       Consults (From admission, onward)          Status Ordering Provider     Inpatient consult to Registered Dietitian/Nutritionist  Once        Provider:  (Not yet assigned)    Completed WHITNEY VILLALPANDO     Inpatient consult to Vascular (Stroke) Neurology  Once        Provider:  (Not yet assigned)    Completed LILLIANA PRESCOTT            Significant Diagnostic Studies: Labs: BMP:   Recent Labs   Lab 06/15/23  1719 06/16/23  0410 06/17/23  0308   * 82 97   * 137 144   K 3.7 3.8 3.5   CL 99 105 109   CO2 25 25 26   BUN 11 8 6*   CREATININE 0.7 0.6 0.6   CALCIUM 8.5* 8.5* 8.3*   MG  --  2.0 2.0   , CMP   Recent Labs   Lab 06/15/23  1719 06/16/23  0410 06/17/23  0308   * 137 144   K 3.7 3.8 3.5   CL 99 105 109   CO2 25 25 26   * 82 97   BUN 11 8 6*   CREATININE 0.7 0.6 0.6   CALCIUM 8.5* 8.5* 8.3*   PROT  --  5.4* 5.3*   ALBUMIN  --  2.7* 2.7*   BILITOT  --  0.7 0.5   ALKPHOS  --  66 90   AST  --  25 74*   ALT  --  15 44   ANIONGAP 7* 7* 9   , and CBC   Recent Labs   Lab 06/16/23 0410 06/17/23  0308   WBC 5.77 5.12   HGB 9.5* 9.3*   HCT 28.9* 29.4*    205        Pending Diagnostic Studies:       None          Final Active Diagnoses:    Diagnosis Date Noted POA    PRINCIPAL PROBLEM:  Ventral hernia with obstruction and without gangrene [K43.6] 11/22/2022 Yes      Problems Resolved During this Admission:    Diagnosis Date Noted Date Resolved POA    Delirium [R41.0] 06/16/2023 06/17/2023 No      Discharged Condition: good    Disposition: Home or Self Care    Follow Up:   Follow-up Information       Jody Ramírez MD Follow up in 2 week(s).    Specialties: General Surgery, Bariatrics  Why: Post-op  Contact information:  [address]  [facility] [address]  [phone]                           Patient Instructions:      Diet general     Lifting restrictions   Order Comments: No more than 10 lbs for 6 weeks     Call MD for:  temperature >100.4     Call MD for:  persistent nausea and vomiting     Call MD for:  severe uncontrolled pain     Call MD for:  redness, tenderness, or signs of infection (pain, swelling, redness, odor or green/yellow discharge around incision site)     No dressing needed   Order Comments: Keep abdominal binder in place at all times unless showering. Staples will be removed at post-operative visit     Activity as tolerated     Medications:  Reconciled Home Medications:      Medication List        START taking these medications      acetaminophen 500 MG tablet  Commonly known as: TYLENOL  Take 2 tablets (1,000 mg total) by mouth every 6 (six) hours.     ibuprofen 600 MG tablet  Commonly known as: ADVIL,MOTRIN  Take 1 tablet (600 mg total) by mouth every 6 (six) hours as needed for Pain.     ondansetron 8 MG Tbdl  Commonly known as: ZOFRAN-ODT  Take 1 tablet (8 mg total) by mouth every 6 (six) hours as needed (nausea).     polyethylene glycol 17 gram Pwpk  Commonly known as: GLYCOLAX  Take 17 g by mouth once daily.            CONTINUE taking these medications      amitriptyline 25 MG tablet  Commonly known as: ELAVIL  Take 1 tablet (25  mg total) by mouth every evening.     atorvastatin 40 MG tablet  Commonly known as: LIPITOR  Take 1 tablet (40 mg total) by mouth once daily.     estradioL 0.5 MG tablet  Commonly known as: ESTRACE  TAKE 1 TABLET BY MOUTH ONCE DAILY.     ferrous sulfate 325 (65 FE) MG EC tablet  TAKE 1 TABLET BY MOUTH EVERY DAY     linaCLOtide 72 mcg Cap capsule  Commonly known as: LINZESS  Take 1 capsule (72 mcg total) by mouth once daily.     magnesium oxide-pyridoxine 362-20 mg Tab  Commonly known as: BEELITH  Take 1 tablet by mouth once daily.     [facility] COLON HEALTH ORAL  Take 1 tablet by mouth 2 (two) times daily.     VITAMIN D ORAL  Take 1 capsule by mouth every morning.     VITAMIN E ORAL  Take 1 capsule by mouth every morning.              Olga Nails MD  General Surgery  [facility]

## 2023-06-17 NOTE — NURSING
Reviewed DC instructions with pt and partner. Removed IV site. No concerns stated. Transport requested.

## 2023-06-17 NOTE — PLAN OF CARE
Met with pt to review discharge recommendation of HH and is agreeable to plan    Provided list of facilities in-network with patient's payor plan. Notified that referral sent to below listed facilities from in-network list based on proximity to home/family support:     [facility]  [facility]  [facility]      Pt instructed to identify preference.    Preferred Facility: (if more than 1, listed in order of descending preference)  [facility]     If an additional preferred facility not listed above is identified, additional referral to be sent. If above facilities unable to accept, will send additional referrals to in-network providers.     Светлана Mcknight, SUNDAY, LCSW  [facility] [phone]

## 2023-06-17 NOTE — PROGRESS NOTES
"[facility] - [facility]  General Surgery  Progress Note    Subjective:       Post-Op Info:  Procedure(s) (LRB):  REPAIR, HERNIA, INCISIONAL OR VENTRAL, WITH HISTORY OF PRIOR  REPAIR (WITH MESH AND POSTERIOR COMPONENTS SEPARATION) (N/A)  RELEASE-FASCIA -TRANSVERSUS ABDOMINIS (Bilateral)   5 Days Post-Op     Interval History: No issues overnight. Feeling well this morning. Tolerating a regular diet, no nausea/emesis. Continues to have bowel movements     Medications:  Continuous Infusions:  Scheduled Meds:   acetaminophen  1,000 mg Oral Q6H    amitriptyline  25 mg Oral QHS    atorvastatin  40 mg Oral Daily    enoxparin  40 mg Subcutaneous Q24H (prophylaxis, 1700)    famotidine (PF)  20 mg Intravenous BID    ketorolac  15 mg Intravenous Q8H    LIDOcaine  1 patch Transdermal Q24H    mupirocin   Nasal BID    polyethylene glycol  17 g Oral BID    scopolamine  1 patch Transdermal Q3 Days     PRN Meds:ondansetron, sodium chloride 0.9%     Review of patient's allergies indicates:   Allergen Reactions    Adhesive Rash     Asking that we use Paper Tape,  Uses "Sensitive Skin" band aids    Amoxil [amoxicillin] Hives    Penicillins Hives and Swelling     Swelling of ears and neck    Sulfa (sulfonamide antibiotics) Hives and Itching     Objective:     Vital Signs (Most Recent):  Temp: 97.9 °F (36.6 °C) (06/17/23 0017)  Pulse: 84 (06/17/23 0017)  Resp: 16 (06/17/23 0017)  BP: (!) 152/68 (06/17/23 0017)  SpO2: (!) 94 % (06/17/23 0017) Vital Signs (24h Range):  Temp:  [97.6 °F (36.4 °C)-98.2 °F (36.8 °C)] 97.9 °F (36.6 °C)  Pulse:  [83-98] 84  Resp:  [16-19] 16  SpO2:  [90 %-94 %] 94 %  BP: (111-156)/(54-70) 152/68     Weight: 72.6 kg (160 lb)  Body mass index is 26.63 kg/m².    Intake/Output - Last 3 Shifts         06/15 0700  06/16 0659 06/16 0700  06/17 0659 06/17 0700  06/18 0659    P.O. 1040 1300     Total Intake(mL/kg) 1040 (14.3) 1300 (17.9)     Urine (mL/kg/hr) 0 (0) 0 (0)     Emesis/NG output       Drains 105 35  "    Stool  0     Total Output 105 35     Net +935 +1265            Urine Occurrence 6 x 6 x     Stool Occurrence 2 x 0 x              Physical Exam  Vitals and nursing note reviewed.   Constitutional:       General: She is not in acute distress.  Cardiovascular:      Rate and Rhythm: Normal rate.      Pulses: Normal pulses.   Pulmonary:      Effort: Pulmonary effort is normal. No respiratory distress.   Abdominal:      General: There is no distension.      Palpations: Abdomen is soft.      Tenderness: There is no abdominal tenderness.      Comments: Midline staples with some mild bruising at inferior aspect; LUIS MIGUEL drains with thin SS output; soft non-tender and non-distended abdomen; R LUIS MIGUEL with 15 ; L LUIS MIGUEL with 20output / 24 hrs  Neurological:      Mental Status: She is alert.        Significant Labs:  I have reviewed all pertinent lab results within the past 24 hours.  CBC:   Recent Labs   Lab 06/17/23  0308   WBC 5.12   RBC 3.21*   HGB 9.3*   HCT 29.4*      MCV 92   MCH 29.0   MCHC 31.6*     BMP:   Recent Labs   Lab 06/17/23  0308   GLU 97      K 3.5      CO2 26   BUN 6*   CREATININE 0.6   CALCIUM 8.3*   MG 2.0     CMP:   Recent Labs   Lab 06/17/23  0308   GLU 97   CALCIUM 8.3*   ALBUMIN 2.7*   PROT 5.3*      K 3.5   CO2 26      BUN 6*   CREATININE 0.6   ALKPHOS 90   ALT 44   AST 74*   BILITOT 0.5       Significant Diagnostics:  I have reviewed all pertinent imaging results/findings within the past 24 hours.    Assessment/Plan:     * Ventral hernia with obstruction and without gangrene  71 yo F s/p  ventral hernia repair, mesh explant with posterior component separation and TAR 6/12/2023. Had fall 6/15 with head trauma and obvious soft tissue swelling, CT head negative 6/15 am for intracranial pathology. Now with altered mental status most consistent with delirium. Vascular Neurology consulted, MRI without acute findings, likely hospital delirium that is now clinically improved.     -  Multimodal pain regimen: toradol, tylenol, lidocaine patch; DC narcotics  - Scheduled antiemetics  - Regular diet   - Home meds   - OOB, ambulate  - PT/OT  - DVT PPX    Dispo: Will DC LUIS MIGUEL drains and DC today. Follow-up to clinic in 2 weeks           Olga Nails MD  General Surgery  [facility] - Surgery

## 2023-06-17 NOTE — SUBJECTIVE & OBJECTIVE
"Interval History: No issues overnight. Feeling well this morning. Tolerating a regular diet, no nausea/emesis. Continues to have bowel movements     Medications:  Continuous Infusions:  Scheduled Meds:   acetaminophen  1,000 mg Oral Q6H    amitriptyline  25 mg Oral QHS    atorvastatin  40 mg Oral Daily    enoxparin  40 mg Subcutaneous Q24H (prophylaxis, 1700)    famotidine (PF)  20 mg Intravenous BID    ketorolac  15 mg Intravenous Q8H    LIDOcaine  1 patch Transdermal Q24H    mupirocin   Nasal BID    polyethylene glycol  17 g Oral BID    scopolamine  1 patch Transdermal Q3 Days     PRN Meds:ondansetron, sodium chloride 0.9%     Review of patient's allergies indicates:   Allergen Reactions    Adhesive Rash     Asking that we use Paper Tape,  Uses "Sensitive Skin" band aids    Amoxil [amoxicillin] Hives    Penicillins Hives and Swelling     Swelling of ears and neck    Sulfa (sulfonamide antibiotics) Hives and Itching     Objective:     Vital Signs (Most Recent):  Temp: 97.9 °F (36.6 °C) (06/17/23 0017)  Pulse: 84 (06/17/23 0017)  Resp: 16 (06/17/23 0017)  BP: (!) 152/68 (06/17/23 0017)  SpO2: (!) 94 % (06/17/23 0017) Vital Signs (24h Range):  Temp:  [97.6 °F (36.4 °C)-98.2 °F (36.8 °C)] 97.9 °F (36.6 °C)  Pulse:  [83-98] 84  Resp:  [16-19] 16  SpO2:  [90 %-94 %] 94 %  BP: (111-156)/(54-70) 152/68     Weight: 72.6 kg (160 lb)  Body mass index is 26.63 kg/m².    Intake/Output - Last 3 Shifts         06/15 0700  06/16 0659 06/16 0700  06/17 0659 06/17 0700  06/18 0659    P.O. 1040 1300     Total Intake(mL/kg) 1040 (14.3) 1300 (17.9)     Urine (mL/kg/hr) 0 (0) 0 (0)     Emesis/NG output       Drains 105 35     Stool  0     Total Output 105 35     Net +935 +1265            Urine Occurrence 6 x 6 x     Stool Occurrence 2 x 0 x              Physical Exam  Vitals and nursing note reviewed.   Constitutional:       General: She is not in acute distress.  Cardiovascular:      Rate and Rhythm: Normal rate.      Pulses: Normal " pulses.   Pulmonary:      Effort: Pulmonary effort is normal. No respiratory distress.   Abdominal:      General: There is no distension.      Palpations: Abdomen is soft.      Tenderness: There is no abdominal tenderness.      Comments: Midline staples with some mild bruising at inferior aspect; LUIS MIGUEL drains with thin SS output; soft non-tender and non-distended abdomen; R LUIS MIGUEL with ____; L LUIS MIGUEL with ___ output overnight   Neurological:      Mental Status: She is alert.        Significant Labs:  I have reviewed all pertinent lab results within the past 24 hours.  CBC:   Recent Labs   Lab 06/17/23 0308   WBC 5.12   RBC 3.21*   HGB 9.3*   HCT 29.4*      MCV 92   MCH 29.0   MCHC 31.6*     BMP:   Recent Labs   Lab 06/17/23 0308   GLU 97      K 3.5      CO2 26   BUN 6*   CREATININE 0.6   CALCIUM 8.3*   MG 2.0     CMP:   Recent Labs   Lab 06/17/23 0308   GLU 97   CALCIUM 8.3*   ALBUMIN 2.7*   PROT 5.3*      K 3.5   CO2 26      BUN 6*   CREATININE 0.6   ALKPHOS 90   ALT 44   AST 74*   BILITOT 0.5       Significant Diagnostics:  I have reviewed all pertinent imaging results/findings within the past 24 hours.

## 2023-06-17 NOTE — ASSESSMENT & PLAN NOTE
71 yo F s/p  ventral hernia repair, mesh explant with posterior component separation and TAR 6/12/2023. Had fall 6/15 with head trauma and obvious soft tissue swelling, CT head negative 6/15 am for intracranial pathology. Now with altered mental status most consistent with delirium. Vascular Neurology consulted, MRI without acute findings, likely hospital delirium that is now clinically improved.     - Multimodal pain regimen: toradol, tylenol, lidocaine patch; DC narcotics  - Scheduled antiemetics  - Regular diet   - Home meds   - OOB, ambulate  - PT/OT  - DVT PPX    Dispo: Will DC LUIS MIGUEL drains and DC today. Follow-up to clinic in 2 weeks

## 2023-06-17 NOTE — PLAN OF CARE
"[facility] - Surgery    HOME HEALTH ORDERS  FACE TO FACE ENCOUNTER    Patient Name: Tanesha Davis  YOB: 1950    PCP: Caleb Hernandez MD   PCP Address: [address] [place] [address] / [facility] [address]  PCP Phone Number: [phone]  PCP Fax: [phone]    Encounter Date: 06/17/2023    Admit to Home Health    Diagnoses:  Active Hospital Problems    Diagnosis  POA    *Ventral hernia with obstruction and without gangrene [K43.6]  Yes      Resolved Hospital Problems    Diagnosis Date Resolved POA    Delirium [R41.0] 06/17/2023 No       Future Appointments   Date Time Provider Department Center   7/5/2023  1:45 PM Jody Ramírez MD [facility]      Follow-up Information       Jody Ramírez MD Follow up in 2 week(s).    Specialties: General Surgery, Bariatrics  Why: Post-op  Contact information:  [address]  [facility] [id number] [phone]                               I have seen and examined this patient face to face today. My clinical findings that support the need for the home health skilled services and home bound status are the following:  Weakness/numbness causing balance and gait disturbance due to Surgery making it taxing to leave home.    Allergies:  Review of patient's allergies indicates:   Allergen Reactions    Adhesive Rash     Asking that we use Paper Tape,  Uses "Sensitive Skin" band aids    Amoxil [amoxicillin] Hives    Penicillins Hives and Swelling     Swelling of ears and neck    Sulfa (sulfonamide antibiotics) Hives and Itching       Diet: regular diet    Activities: activity as tolerated    Nursing:   SN to complete comprehensive assessment including routine vital signs. Instruct on disease process and s/s of complications to report to MD. Review/verify medication list sent home with the patient at time of discharge  and instruct patient/caregiver as needed. Frequency may be adjusted depending on start of care date. If patient has " enteral feeding tube (NG, PEG, J-tube, G-tube), flush tube before and after feeding and/or medication administration with 20-30 mL of water.    Notify MD if SBP > 160 or < 90; DBP > 90 or < 50; HR > 120 or < 50; Temp > 101; Other:          CONSULTS:    Physical Therapy to evaluate and treat. Evaluate for home safety and equipment needs; Establish/upgrade home exercise program. Perform / instruct on therapeutic exercises, gait training, transfer training, and Range of Motion.  Occupational Therapy to evaluate and treat. Evaluate home environment for safety and equipment needs. Perform/Instruct on transfers, ADL training, ROM, and therapeutic exercises.    MISCELLANEOUS CARE:  N/A    WOUND CARE ORDERS  no      Medications: Review discharge medications with patient and family and provide education.      Current Discharge Medication List        START taking these medications    Details   acetaminophen (TYLENOL) 500 MG tablet Take 2 tablets (1,000 mg total) by mouth every 6 (six) hours.  Refills: 0      ibuprofen (ADVIL,MOTRIN) 600 MG tablet Take 1 tablet (600 mg total) by mouth every 6 (six) hours as needed for Pain.      ondansetron (ZOFRAN-ODT) 8 MG TbDL Take 1 tablet (8 mg total) by mouth every 6 (six) hours as needed (nausea).  Qty: 10 tablet, Refills: 0      polyethylene glycol (GLYCOLAX) 17 gram PwPk Take 17 g by mouth once daily.  Qty: 30 each, Refills: 0           CONTINUE these medications which have NOT CHANGED    Details   amitriptyline (ELAVIL) 25 MG tablet Take 1 tablet (25 mg total) by mouth every evening.  Qty: 30 tablet, Refills: 11      atorvastatin (LIPITOR) 40 MG tablet Take 1 tablet (40 mg total) by mouth once daily.  Qty: 90 tablet, Refills: 3      estradioL (ESTRACE) 0.5 MG tablet TAKE 1 TABLET BY MOUTH ONCE DAILY.  Qty: 90 tablet, Refills: 4    Associated Diagnoses: Menopausal symptoms      linaCLOtide (LINZESS) 72 mcg Cap capsule Take 1 capsule (72 mcg total) by mouth once daily.  Qty: 30  capsule, Refills: 5      ERGOCALCIFEROL, VITAMIN D2, (VITAMIN D ORAL) Take 1 capsule by mouth every morning.       ferrous sulfate 325 (65 FE) MG EC tablet TAKE 1 TABLET BY MOUTH EVERY DAY  Qty: 90 tablet, Refills: 3    Associated Diagnoses: RLS (restless legs syndrome)      L GASSERI/B BIFIDUM/B LONGUM ([facility] ORAL) Take 1 tablet by mouth 2 (two) times daily.      magnesium oxide-pyridoxine (BEELITH) 362-20 mg Tab Take 1 tablet by mouth once daily.  Qty: 90 tablet, Refills: 1    Associated Diagnoses: Nocturnal muscle cramp      VITAMIN E ACETATE (VITAMIN E ORAL) Take 1 capsule by mouth every morning.              I certify that this patient is confined to her home and needs physical therapy and occupational therapy.

## 2023-06-17 NOTE — PLAN OF CARE
[facility] - [facility]  Discharge Final Note    Primary Care Provider: Caleb Hernandez MD    Expected Discharge Date: 6/17/2023    Final Discharge Note (most recent)       Final Note - 06/17/23 1210          Final Note    Assessment Type Final Discharge Note     Anticipated Discharge Disposition [facility] Care [facility] [place] [facility]     Hospital Resources/Appts/Education Provided Provided patient/caregiver with written discharge plan information        Post-Acute Status    Post-Acute Authorization Home Health     Home Health Status Set-up Complete/Auth obtained     Discharge Delays None known at this time                     Important Message from Medicare             Contact Info       Jody Ramírez MD   Specialty: General Surgery, Bariatrics    [address]  [facility] [address]   Phone: [phone]       Next Steps: Follow up in 2 week(s)    Instructions: Post-op          [facility] accepted pt for admit tomorrow, 6/18.    Светлана Mcknight, MSW, LCSW  [facility]  [facility] [phone]

## 2023-06-17 NOTE — PT/OT/SLP PROGRESS
Physical Therapy Treatment    Patient Name:  Tanesha Davis   MRN:  [id number]    Recommendations:     Discharge Recommendations: other (see comments)  Discharge Equipment Recommendations: none  Barriers to discharge: decreased strength/functional mobility    Assessment:     Tanesha Davis is a 72 y.o. female admitted with a medical diagnosis of Ventral hernia with obstruction and without gangrene.  She presents with the following impairments/functional limitations: weakness, impaired endurance, impaired self care skills, impaired functional mobility, gait instability, impaired balance, decreased coordination, decreased upper extremity function, decreased lower extremity function, decreased ROM, impaired coordination, impaired skin, decreased safety awareness . pt session limited due totransport arriving for  discharg upon attempt. Patient remains appropriate for continued skilled services within the acute environment and goals remain appropriate.    Rehab Prognosis: Good; patient would benefit from acute skilled PT services to address these deficits and reach maximum level of function.    Recent Surgery: Procedure(s) (LRB):  REPAIR, HERNIA, INCISIONAL OR VENTRAL, WITH HISTORY OF PRIOR  REPAIR (WITH MESH AND POSTERIOR COMPONENTS SEPARATION) (N/A)  RELEASE-FASCIA -TRANSVERSUS ABDOMINIS (Bilateral) 5 Days Post-Op    Plan:     During this hospitalization, patient to be seen 3 x/week to address the identified rehab impairments via gait training, therapeutic activities, therapeutic exercises, neuromuscular re-education and progress toward the following goals:    Plan of Care Expires:  07/13/23    Subjective     Patient/Family Comments/goals: I am leaving soon  Pain/Comfort:  Pain Rating 1: 0/10  Pain Rating Post-Intervention 1: 0/10      Objective:     Communicated with RN prior to session.  Patient found up in chair with peripheral IV, LUIS MIGUEL drain upon PT entry to room.     General Precautions: Standard, fall  Orthopedic  Precautions: N/A  Braces: N/A  Respiratory Status: Room air     Functional Mobility:  Transfers:     Sit to Stand:  stand by assistance with no AD and rolling walker  Gait: pt amb with RW x  11 ft with SBA      AM-PAC 6 CLICK MOBILITY  Turning over in bed (including adjusting bedclothes, sheets and blankets)?: 2  Sitting down on and standing up from a chair with arms (e.g., wheelchair, bedside commode, etc.): 3  Moving from lying on back to sitting on the side of the bed?: 2  Moving to and from a bed to a chair (including a wheelchair)?: 3  Need to walk in hospital room?: 3  Climbing 3-5 steps with a railing?: 1  Basic Mobility Total Score: 14       Treatment & Education:  Therapist provided instruction and educated of  patient on progress, safety,d/c,PT POC,   proper body mechanics, energy conservation, and fall prevention strategies during tasks listed above, on the effects of prolonged immobility and the importance of performing OOB activity and exercises to promote healing and reduce recovery time.log rolling to decrease stress to abdominal region     Questions/concerns addressed within PTA scope of practice; patient  with no further questions. Time was provided for active listening, discussion of health disposition, and discussion of safe discharge. Pt?verbalized?agreement .  Pt issued and instructed to perform  supine and seated HEP 2-3 times daily, with verabal understanding    Patient left  seated in w/c  with  spouse and transport  present..    GOALS:   Multidisciplinary Problems       Physical Therapy Goals          Problem: Physical Therapy    Goal Priority Disciplines Outcome Goal Variances Interventions   Physical Therapy Goal     PT, PT/OT Ongoing, Progressing     Description: Goals to met by 6/27/2023    1. Supine to sit with Modified [place]  2. Sit to supine with Modified [place]  3. Rolling to Left and Right with Modified [place] [address].  4. Sit to stand transfer with Stand-by  Assistance  5. Bed to chair transfer with Stand-by Assistance using LRAD  6. Gait  x 75 feet with Stand-by Assistance using LRAD   7. Ascend/descend 5 stair(s) with bilateral Handrails Contact Guard Assistance using No Assistive Device.   8. Stand for 10 minutes with Stand-by Assistance using LRAD  9. Lower extremity exercise program x15 reps per Instruction, with assistance as needed in order to facilitate improved strength, improved postural control, and improvement in functional independence                         Time Tracking:     PT Received On: 06/17/23  PT Start Time: 0939     PT Stop Time: 0947  PT Total Time (min): 8 min     Billable Minutes: Therapeutic Activity 8    Treatment Type: Treatment  PT/PTA: PTA     Number of PTA visits since last PT visit: 2     06/17/2023

## 2023-06-19 ENCOUNTER — PATIENT OUTREACH (OUTPATIENT)
Dept: ADMINISTRATIVE | Facility: CLINIC | Age: 73
End: 2023-06-19
Payer: MEDICARE

## 2023-06-19 DIAGNOSIS — K43.6 VENTRAL HERNIA WITH OBSTRUCTION AND WITHOUT GANGRENE: Primary | ICD-10-CM

## 2023-06-19 PROCEDURE — G0180 MD CERTIFICATION HHA PATIENT: HCPCS | Mod: ,,, | Performed by: SURGERY

## 2023-06-19 PROCEDURE — G0180 PR HOME HEALTH MD CERTIFICATION: ICD-10-PCS | Mod: ,,, | Performed by: SURGERY

## 2023-06-19 NOTE — PROGRESS NOTES
C3 nurse attempted to contact Tanesha Davis for a TCC post hospital discharge follow up call. The patient is unable to conduct the call @ this time. The patient requested a callback.    The patient does not have a scheduled HOSFU appointment within 5-7 days post hospital discharge date 06/17/2023. Message sent to Physician staff to assist with HOSFU appointment scheduling.

## 2023-06-19 NOTE — PROGRESS NOTES
C3 nurse spoke with Tanesha Davis for a TCC post hospital discharge follow up call. The patient does not have a scheduled HOSFU appointment with Caleb Hernandez MD within 5-7 days post hospital discharge date 06/17/2023. C3 nurse was unable to schedule HOS[facility] appointment in [facility].    Message sent to PCP staff requesting they contact patient and schedule follow up appointment.     NP home referral placed.

## 2023-06-20 NOTE — TELEPHONE ENCOUNTER
[facility]ed [facility] Davis. [facility] Will check to see if she can get a ride.

## 2023-06-23 ENCOUNTER — PES CALL (OUTPATIENT)
Dept: ADMINISTRATIVE | Facility: CLINIC | Age: 73
End: 2023-06-23
Payer: MEDICARE

## 2023-07-03 ENCOUNTER — OFFICE VISIT (OUTPATIENT)
Dept: INTERNAL MEDICINE | Facility: CLINIC | Age: 73
End: 2023-07-03
Attending: INTERNAL MEDICINE
Payer: MEDICARE

## 2023-07-03 VITALS
DIASTOLIC BLOOD PRESSURE: 62 MMHG | BODY MASS INDEX: 26.04 KG/M2 | OXYGEN SATURATION: 100 % | HEIGHT: 65 IN | HEART RATE: 78 BPM | SYSTOLIC BLOOD PRESSURE: 108 MMHG | WEIGHT: 156.31 LBS

## 2023-07-03 DIAGNOSIS — R07.89 ACUTE CHEST WALL PAIN: Primary | ICD-10-CM

## 2023-07-03 DIAGNOSIS — K59.04 FUNCTIONAL CONSTIPATION: ICD-10-CM

## 2023-07-03 DIAGNOSIS — Z85.038 HISTORY OF COLON CANCER: ICD-10-CM

## 2023-07-03 PROCEDURE — 99213 PR OFFICE/OUTPT VISIT, EST, LEVL III, 20-29 MIN: ICD-10-PCS | Mod: S$GLB,,, | Performed by: INTERNAL MEDICINE

## 2023-07-03 PROCEDURE — 3078F PR MOST RECENT DIASTOLIC BLOOD PRESSURE < 80 MM HG: ICD-10-PCS | Mod: CPTII,S$GLB,, | Performed by: INTERNAL MEDICINE

## 2023-07-03 PROCEDURE — 3008F PR BODY MASS INDEX (BMI) DOCUMENTED: ICD-10-PCS | Mod: CPTII,S$GLB,, | Performed by: INTERNAL MEDICINE

## 2023-07-03 PROCEDURE — 1160F PR REVIEW ALL MEDS BY PRESCRIBER/CLIN PHARMACIST DOCUMENTED: ICD-10-PCS | Mod: CPTII,S$GLB,, | Performed by: INTERNAL MEDICINE

## 2023-07-03 PROCEDURE — 1100F PR PT FALLS ASSESS DOC 2+ FALLS/FALL W/INJURY/YR: ICD-10-PCS | Mod: CPTII,S$GLB,, | Performed by: INTERNAL MEDICINE

## 2023-07-03 PROCEDURE — 1159F PR MEDICATION LIST DOCUMENTED IN MEDICAL RECORD: ICD-10-PCS | Mod: CPTII,S$GLB,, | Performed by: INTERNAL MEDICINE

## 2023-07-03 PROCEDURE — 3078F DIAST BP <80 MM HG: CPT | Mod: CPTII,S$GLB,, | Performed by: INTERNAL MEDICINE

## 2023-07-03 PROCEDURE — 1111F PR DISCHARGE MEDS RECONCILED W/ CURRENT OUTPATIENT MED LIST: ICD-10-PCS | Mod: CPTII,S$GLB,, | Performed by: INTERNAL MEDICINE

## 2023-07-03 PROCEDURE — 1111F DSCHRG MED/CURRENT MED MERGE: CPT | Mod: CPTII,S$GLB,, | Performed by: INTERNAL MEDICINE

## 2023-07-03 PROCEDURE — 3288F FALL RISK ASSESSMENT DOCD: CPT | Mod: CPTII,S$GLB,, | Performed by: INTERNAL MEDICINE

## 2023-07-03 PROCEDURE — 1159F MED LIST DOCD IN RCRD: CPT | Mod: CPTII,S$GLB,, | Performed by: INTERNAL MEDICINE

## 2023-07-03 PROCEDURE — 3074F PR MOST RECENT SYSTOLIC BLOOD PRESSURE < 130 MM HG: ICD-10-PCS | Mod: CPTII,S$GLB,, | Performed by: INTERNAL MEDICINE

## 2023-07-03 PROCEDURE — 1125F PR PAIN SEVERITY QUANTIFIED, PAIN PRESENT: ICD-10-PCS | Mod: CPTII,S$GLB,, | Performed by: INTERNAL MEDICINE

## 2023-07-03 PROCEDURE — 3288F PR FALLS RISK ASSESSMENT DOCUMENTED: ICD-10-PCS | Mod: CPTII,S$GLB,, | Performed by: INTERNAL MEDICINE

## 2023-07-03 PROCEDURE — 3074F SYST BP LT 130 MM HG: CPT | Mod: CPTII,S$GLB,, | Performed by: INTERNAL MEDICINE

## 2023-07-03 PROCEDURE — 99999 PR PBB SHADOW E&M-EST. PATIENT-LVL III: CPT | Mod: PBBFAC,,, | Performed by: INTERNAL MEDICINE

## 2023-07-03 PROCEDURE — 99213 OFFICE O/P EST LOW 20 MIN: CPT | Mod: S$GLB,,, | Performed by: INTERNAL MEDICINE

## 2023-07-03 PROCEDURE — 1100F PTFALLS ASSESS-DOCD GE2>/YR: CPT | Mod: CPTII,S$GLB,, | Performed by: INTERNAL MEDICINE

## 2023-07-03 PROCEDURE — 99999 PR PBB SHADOW E&M-EST. PATIENT-LVL III: ICD-10-PCS | Mod: PBBFAC,,, | Performed by: INTERNAL MEDICINE

## 2023-07-03 PROCEDURE — 1160F RVW MEDS BY RX/DR IN RCRD: CPT | Mod: CPTII,S$GLB,, | Performed by: INTERNAL MEDICINE

## 2023-07-03 PROCEDURE — 3008F BODY MASS INDEX DOCD: CPT | Mod: CPTII,S$GLB,, | Performed by: INTERNAL MEDICINE

## 2023-07-03 PROCEDURE — 1125F AMNT PAIN NOTED PAIN PRSNT: CPT | Mod: CPTII,S$GLB,, | Performed by: INTERNAL MEDICINE

## 2023-07-03 RX ORDER — CYCLOSPORINE 0.5 MG/ML
1 EMULSION OPHTHALMIC 2 TIMES DAILY
COMMUNITY
Start: 2023-06-05

## 2023-07-03 RX ORDER — ONDANSETRON 8 MG/1
8 TABLET, ORALLY DISINTEGRATING ORAL EVERY 6 HOURS PRN
Qty: 10 TABLET | Refills: 0 | Status: CANCELLED | OUTPATIENT
Start: 2023-07-03

## 2023-07-03 NOTE — PROGRESS NOTES
Subjective:       Patient ID: Tanesha Davis is a 72 y.o. female.    Chief Complaint: Hospital Follow Up    Here for routine f/u    73 yo F with PMHx of recurrent incisional hernia, recent SBO, and colon cancer    3 weeks post op. Right chest wall hurts when she uses her right arm. She fell onto this area while in hospital. Hospital course below.   On 6/16/23, patient fell while trying to ambulate to the bathroom for a bowel movement. CT head without acute findings, however patient had confusion prompting vascular neurology consultation on 6/16/23. Recommended MRI brain which was normal      ### Hx colon Ca ###  -s/p T3N0 with rectosigmoid mod diff adenocarcionma that was treated with anterior resection 3/30/06.  currently getting colo every 3 years. last done 12/19/2019. Followed by Dr. Meraz.  Aside from constipation she has no GI symptoms.  Last colonoscopy 5/9/23     ### Constipation ###  -taking colace, metamucil, probiotics, and consumes 8-12 12oz water/day. She has a BM once every 2 days, improved from once a week.  -Linzess qd currently. No acute issues.       ### REMY ###  First seen 4/2013 01/2020  TIBC 482, ferritin -22 for possible RLS workup. Hx of colon CA, last colonoscopy 12/9/2019. Abd pain as above. No new upper GI symptoms. No BRBRP, melena, SOB at rest or with exertion, dizziness,   Nexium every other day for several years             FERRITIN                 39                  02/12/2021 10:45 AM        FERRITIN                 22                  01/30/2020 12:30 PM        FERRITIN                 53                  11/07/2016 10:38 AM     ### ventral hernia-recurrent ###   12/04/2019 Ct abd/pelvis: Recurrent large ventral abdominal hernia containing mesenteric fat and loops of small bowel without associated complication.             Review of Systems   Constitutional:  Negative for chills, fatigue, fever and unexpected weight change.   HENT:  Negative for ear pain, hearing loss, postnasal  "drip, tinnitus, trouble swallowing and voice change.    Respiratory:  Negative for cough, chest tightness, shortness of breath and wheezing.    Cardiovascular:  Negative for chest pain, palpitations and leg swelling.   Gastrointestinal:  Negative for abdominal pain, blood in stool, diarrhea, nausea and vomiting.   Endocrine: Negative for polydipsia, polyphagia and polyuria.   Genitourinary:  Negative for difficulty urinating, dysuria, hematuria and vaginal bleeding.   Skin:  Negative for rash.   Allergic/Immunologic: Negative for food allergies.   Neurological:  Negative for dizziness, numbness and headaches.   Hematological:  Does not bruise/bleed easily.   Psychiatric/Behavioral:  The patient is not nervous/anxious.      Objective:      Vitals:    07/03/23 0949   BP: 108/62   Pulse: 78   SpO2: 100%   Weight: 70.9 kg (156 lb 4.9 oz)   Height: 5' 5" (1.651 m)      Physical Exam  Constitutional:       General: She is not in acute distress.     Appearance: Normal appearance. She is well-developed. She is not diaphoretic.   HENT:      Head: Normocephalic and atraumatic.   Eyes:      General: No scleral icterus.        Right eye: No discharge.         Left eye: No discharge.      Conjunctiva/sclera: Conjunctivae normal.   Pulmonary:      Effort: Pulmonary effort is normal. No respiratory distress.   Abdominal:      General: There is no distension.   Skin:     General: Skin is warm and dry.   Neurological:      Mental Status: She is alert and oriented to person, place, and time.   Psychiatric:         Speech: Speech normal.       Assessment:       1. Acute chest wall pain    2. Functional constipation    3. History of colon cancer        Plan:       Tanesha was seen today for hospital follow up.    Diagnoses and all orders for this visit:    Acute chest wall pain   Improving. No s/s of PNA, PTX, or Fx    Functional constipation   .[id number]    History of colon cancer               Caleb Ceballos MD  Internal " Medicine-[facility]        Side effects of medication(s) were discussed in detail and patient voiced understanding.  Patient will call back for any issues or complications.

## 2023-07-05 ENCOUNTER — OFFICE VISIT (OUTPATIENT)
Dept: SURGERY | Facility: CLINIC | Age: 73
End: 2023-07-05
Payer: MEDICARE

## 2023-07-05 VITALS
DIASTOLIC BLOOD PRESSURE: 61 MMHG | WEIGHT: 155.56 LBS | BODY MASS INDEX: 25.92 KG/M2 | HEIGHT: 65 IN | HEART RATE: 76 BPM | SYSTOLIC BLOOD PRESSURE: 131 MMHG

## 2023-07-05 DIAGNOSIS — Z87.19 S/P REPAIR OF RECURRENT VENTRAL HERNIA: ICD-10-CM

## 2023-07-05 DIAGNOSIS — K43.2 VENTRAL INCISIONAL HERNIA: ICD-10-CM

## 2023-07-05 DIAGNOSIS — K43.6 VENTRAL HERNIA WITH OBSTRUCTION AND WITHOUT GANGRENE: Primary | ICD-10-CM

## 2023-07-05 DIAGNOSIS — Z98.890 S/P REPAIR OF RECURRENT VENTRAL HERNIA: ICD-10-CM

## 2023-07-05 DIAGNOSIS — Z85.038 HISTORY OF COLON CANCER: ICD-10-CM

## 2023-07-05 PROCEDURE — 1101F PT FALLS ASSESS-DOCD LE1/YR: CPT | Mod: CPTII,S$GLB,, | Performed by: SURGERY

## 2023-07-05 PROCEDURE — 1101F PR PT FALLS ASSESS DOC 0-1 FALLS W/OUT INJ PAST YR: ICD-10-PCS | Mod: CPTII,S$GLB,, | Performed by: SURGERY

## 2023-07-05 PROCEDURE — 1159F MED LIST DOCD IN RCRD: CPT | Mod: CPTII,S$GLB,, | Performed by: SURGERY

## 2023-07-05 PROCEDURE — 1126F PR PAIN SEVERITY QUANTIFIED, NO PAIN PRESENT: ICD-10-PCS | Mod: CPTII,S$GLB,, | Performed by: SURGERY

## 2023-07-05 PROCEDURE — 1159F PR MEDICATION LIST DOCUMENTED IN MEDICAL RECORD: ICD-10-PCS | Mod: CPTII,S$GLB,, | Performed by: SURGERY

## 2023-07-05 PROCEDURE — 3288F PR FALLS RISK ASSESSMENT DOCUMENTED: ICD-10-PCS | Mod: CPTII,S$GLB,, | Performed by: SURGERY

## 2023-07-05 PROCEDURE — 3075F SYST BP GE 130 - 139MM HG: CPT | Mod: CPTII,S$GLB,, | Performed by: SURGERY

## 2023-07-05 PROCEDURE — 99999 PR PBB SHADOW E&M-EST. PATIENT-LVL III: CPT | Mod: PBBFAC,,, | Performed by: SURGERY

## 2023-07-05 PROCEDURE — 1160F RVW MEDS BY RX/DR IN RCRD: CPT | Mod: CPTII,S$GLB,, | Performed by: SURGERY

## 2023-07-05 PROCEDURE — 1126F AMNT PAIN NOTED NONE PRSNT: CPT | Mod: CPTII,S$GLB,, | Performed by: SURGERY

## 2023-07-05 PROCEDURE — 3078F DIAST BP <80 MM HG: CPT | Mod: CPTII,S$GLB,, | Performed by: SURGERY

## 2023-07-05 PROCEDURE — 3288F FALL RISK ASSESSMENT DOCD: CPT | Mod: CPTII,S$GLB,, | Performed by: SURGERY

## 2023-07-05 PROCEDURE — 99999 PR PBB SHADOW E&M-EST. PATIENT-LVL III: ICD-10-PCS | Mod: PBBFAC,,, | Performed by: SURGERY

## 2023-07-05 PROCEDURE — 1160F PR REVIEW ALL MEDS BY PRESCRIBER/CLIN PHARMACIST DOCUMENTED: ICD-10-PCS | Mod: CPTII,S$GLB,, | Performed by: SURGERY

## 2023-07-05 PROCEDURE — 3078F PR MOST RECENT DIASTOLIC BLOOD PRESSURE < 80 MM HG: ICD-10-PCS | Mod: CPTII,S$GLB,, | Performed by: SURGERY

## 2023-07-05 PROCEDURE — 1111F DSCHRG MED/CURRENT MED MERGE: CPT | Mod: CPTII,S$GLB,, | Performed by: SURGERY

## 2023-07-05 PROCEDURE — 3008F PR BODY MASS INDEX (BMI) DOCUMENTED: ICD-10-PCS | Mod: CPTII,S$GLB,, | Performed by: SURGERY

## 2023-07-05 PROCEDURE — 99024 PR POST-OP FOLLOW-UP VISIT: ICD-10-PCS | Mod: S$GLB,,, | Performed by: SURGERY

## 2023-07-05 PROCEDURE — 99024 POSTOP FOLLOW-UP VISIT: CPT | Mod: S$GLB,,, | Performed by: SURGERY

## 2023-07-05 PROCEDURE — 3075F PR MOST RECENT SYSTOLIC BLOOD PRESS GE 130-139MM HG: ICD-10-PCS | Mod: CPTII,S$GLB,, | Performed by: SURGERY

## 2023-07-05 PROCEDURE — 3008F BODY MASS INDEX DOCD: CPT | Mod: CPTII,S$GLB,, | Performed by: SURGERY

## 2023-07-05 PROCEDURE — 1111F PR DISCHARGE MEDS RECONCILED W/ CURRENT OUTPATIENT MED LIST: ICD-10-PCS | Mod: CPTII,S$GLB,, | Performed by: SURGERY

## 2023-07-05 RX ORDER — ONDANSETRON 4 MG/1
4 TABLET, ORALLY DISINTEGRATING ORAL ONCE
Qty: 1 TABLET | Refills: 0 | Status: SHIPPED | OUTPATIENT
Start: 2023-07-05 | End: 2023-07-05

## 2023-07-05 NOTE — PROGRESS NOTES
"Surgery Clinic Note    Subjective:     Tanesha Davis   No diagnosis found.  Review of patient's allergies indicates:   Allergen Reactions    Adhesive Rash     Asking that we use Paper Tape,  Uses "Sensitive Skin" band aids    Amoxil [amoxicillin] Hives    Penicillins Hives and Swelling     Swelling of ears and neck    Sulfa (sulfonamide antibiotics) Hives and Itching       Tanesha Davis is a 72 y.o. female who presents to clinic for follow up s/p open recurrent incisional hernia repair 6/12/23. She was found to have a 22cm defect requiring TAR and 30cm x 30cm mesh placement.  The patient reports that she is tolerating a regular diet. She continues to take stool softeners and has 1 loose BM every other day.  She denies fever or chills. She has no pain. She denies drainage or redness of her incision.       Objective:     PHYSICAL EXAM:  Vital Signs (Most Recent)  Pulse: 76 (07/05/23 1348)  BP: 131/61 (07/05/23 1348)    Physical Exam  Constitutional:       General: She is not in acute distress.     Appearance: Normal appearance. She is not ill-appearing.   HENT:      Head: Normocephalic and atraumatic.   Eyes:      Pupils: Pupils are equal, round, and reactive to light.   Cardiovascular:      Rate and Rhythm: Normal rate and regular rhythm.      Pulses: Normal pulses.   Pulmonary:      Effort: Pulmonary effort is normal. No respiratory distress.   Abdominal:      General: There is no distension.      Tenderness: There is no abdominal tenderness. There is no guarding.      Comments: Soft, non-tender, non distended. Midline incision with some resolving ecchymosis inferiorly. Wound is intact and healing well without signs of infection. Staples in place. No recurrent hernia   Neurological:      General: No focal deficit present.      Mental Status: She is alert and oriented to person, place, and time.   Psychiatric:         Mood and Affect: Mood normal.         Behavior: Behavior normal.         Thought Content: " Thought content normal.         Judgment: Judgment normal.              Assessment:     72 y.o. female s/p open repair of large ventral hernia with TAR and mesh.    Plan:     - Doing well. Pain is resolved, tolerating regular diet  - staples removed  - occasional nausea, will send zofran   - Patient is advised to avoid heavy lifting or strenuous activity for another 2-4 weeks.  - Patient may bathe and continue to take a regular diet.   - Return to clinic as needed  - All questions answered; patient is comfortable with follow-up plan.    Urbano Mathew MD   [facility]

## 2023-07-07 PROBLEM — K43.6 VENTRAL HERNIA WITH OBSTRUCTION AND WITHOUT GANGRENE: Status: RESOLVED | Noted: 2022-11-22 | Resolved: 2023-07-07

## 2023-07-07 PROBLEM — K43.6 INCARCERATED VENTRAL HERNIA: Status: RESOLVED | Noted: 2023-02-01 | Resolved: 2023-07-07

## 2023-07-12 ENCOUNTER — EXTERNAL HOME HEALTH (OUTPATIENT)
Dept: HOME HEALTH SERVICES | Facility: HOSPITAL | Age: 73
End: 2023-07-12
Payer: MEDICARE

## 2023-07-21 ENCOUNTER — PES CALL (OUTPATIENT)
Dept: ADMINISTRATIVE | Facility: CLINIC | Age: 73
End: 2023-07-21
Payer: MEDICARE

## 2023-07-28 ENCOUNTER — PATIENT MESSAGE (OUTPATIENT)
Dept: RESEARCH | Facility: HOSPITAL | Age: 73
End: 2023-07-28
Payer: MEDICARE

## 2023-08-28 ENCOUNTER — TELEPHONE (OUTPATIENT)
Dept: INTERNAL MEDICINE | Facility: CLINIC | Age: 73
End: 2023-08-28

## 2023-08-28 ENCOUNTER — OFFICE VISIT (OUTPATIENT)
Dept: INTERNAL MEDICINE | Facility: CLINIC | Age: 73
End: 2023-08-28
Payer: MEDICARE

## 2023-08-28 ENCOUNTER — TELEPHONE (OUTPATIENT)
Dept: SURGERY | Facility: CLINIC | Age: 73
End: 2023-08-28
Payer: MEDICARE

## 2023-08-28 VITALS — HEIGHT: 65 IN | BODY MASS INDEX: 26.19 KG/M2 | WEIGHT: 157.19 LBS

## 2023-08-28 DIAGNOSIS — R19.7 DIARRHEA, UNSPECIFIED TYPE: Primary | ICD-10-CM

## 2023-08-28 PROCEDURE — 1126F PR PAIN SEVERITY QUANTIFIED, NO PAIN PRESENT: ICD-10-PCS | Mod: HCNC,CPTII,95, | Performed by: INTERNAL MEDICINE

## 2023-08-28 PROCEDURE — 1101F PR PT FALLS ASSESS DOC 0-1 FALLS W/OUT INJ PAST YR: ICD-10-PCS | Mod: HCNC,CPTII,95, | Performed by: INTERNAL MEDICINE

## 2023-08-28 PROCEDURE — 3288F PR FALLS RISK ASSESSMENT DOCUMENTED: ICD-10-PCS | Mod: HCNC,CPTII,95, | Performed by: INTERNAL MEDICINE

## 2023-08-28 PROCEDURE — 1159F MED LIST DOCD IN RCRD: CPT | Mod: HCNC,CPTII,95, | Performed by: INTERNAL MEDICINE

## 2023-08-28 PROCEDURE — 3008F BODY MASS INDEX DOCD: CPT | Mod: HCNC,CPTII,95, | Performed by: INTERNAL MEDICINE

## 2023-08-28 PROCEDURE — 1159F PR MEDICATION LIST DOCUMENTED IN MEDICAL RECORD: ICD-10-PCS | Mod: HCNC,CPTII,95, | Performed by: INTERNAL MEDICINE

## 2023-08-28 PROCEDURE — 99214 PR OFFICE/OUTPT VISIT, EST, LEVL IV, 30-39 MIN: ICD-10-PCS | Mod: HCNC,95,, | Performed by: INTERNAL MEDICINE

## 2023-08-28 PROCEDURE — 1126F AMNT PAIN NOTED NONE PRSNT: CPT | Mod: HCNC,CPTII,95, | Performed by: INTERNAL MEDICINE

## 2023-08-28 PROCEDURE — 1160F PR REVIEW ALL MEDS BY PRESCRIBER/CLIN PHARMACIST DOCUMENTED: ICD-10-PCS | Mod: HCNC,CPTII,95, | Performed by: INTERNAL MEDICINE

## 2023-08-28 PROCEDURE — 1101F PT FALLS ASSESS-DOCD LE1/YR: CPT | Mod: HCNC,CPTII,95, | Performed by: INTERNAL MEDICINE

## 2023-08-28 PROCEDURE — 3288F FALL RISK ASSESSMENT DOCD: CPT | Mod: HCNC,CPTII,95, | Performed by: INTERNAL MEDICINE

## 2023-08-28 PROCEDURE — 3008F PR BODY MASS INDEX (BMI) DOCUMENTED: ICD-10-PCS | Mod: HCNC,CPTII,95, | Performed by: INTERNAL MEDICINE

## 2023-08-28 PROCEDURE — 99214 OFFICE O/P EST MOD 30 MIN: CPT | Mod: HCNC,95,, | Performed by: INTERNAL MEDICINE

## 2023-08-28 PROCEDURE — 1160F RVW MEDS BY RX/DR IN RCRD: CPT | Mod: HCNC,CPTII,95, | Performed by: INTERNAL MEDICINE

## 2023-08-28 RX ORDER — ONDANSETRON 4 MG/1
4 TABLET, ORALLY DISINTEGRATING ORAL ONCE
COMMUNITY
Start: 2023-07-05

## 2023-08-28 NOTE — TELEPHONE ENCOUNTER
States has had diarrhea since Wednesday and continues today. Given virtual appointment with Dr. Durán for 1 PM today

## 2023-08-28 NOTE — TELEPHONE ENCOUNTER
----- Message from Anton Frye sent at 8/28/2023  9:31 AM CDT -----  .Type:  Needs Medical Advice    Who Called: pt    Would the patient rather a call back or a response via [facility]? Call back  Best Call Back Number: [phone]  Additional Information:     Pt stated she started with diarrhea on Wednesday and couldn't leave the house all weekend and not sure if she has an infection or picked up a virus please call pt back

## 2023-08-28 NOTE — TELEPHONE ENCOUNTER
----- Message from Anton Frye sent at 8/28/2023  9:34 AM CDT -----  .Type:  Needs Medical Advice    Who Called: pt    Would the patient rather a call back or a response via [facility]? Call back  Best Call Back Number: [phone]  Additional Information:     Pt stated she started with diarrhea on Wednesday and couldn't leave the house all weekend and not sure if she has an infection or picked up a virus please call pt back

## 2023-08-28 NOTE — PROGRESS NOTES
The patient location is: [place]  The chief complaint leading to consultation is: Diarrhea, Fever, and Chills    Visit type: Virtual visit with synchronous audio and video  Contact time spent with patient: 17 minutes  Each patient to whom he or she provides medical services by telemedicine is:  (1) informed of the relationship between the physician and patient and the respective role of any other health care provider with respect to management of the patient; and (2) notified that he or she may decline to receive medical services by telemedicine and may withdraw from such care at any time.    Subjective:       Patient ID: Tanesha Davis is a 72 y.o. female who  has a past medical history of Arthritis, Draining cutaneous sinus tract, Headache(784.0), Incarcerated ventral hernia (2/1/2023), Incisional hernia, Insomnia, Nocturnal leg cramps, Nonhealing surgical wound, PONV (postoperative nausea and vomiting), Postmenopausal status, Rectosigmoid cancer, Small bowel obstruction (11/8/2022), and Ventral hernia with obstruction and without gangrene (11/22/2022).    Chief Complaint: Diarrhea, Fever, and Chills     History was obtained from the patient and supplemented through chart review.  She is a patient of Dr. Hernandez.  Was last seen 7/2023 for post op f/u.    Diarrhea   Pertinent negatives include no abdominal pain, arthralgias, headaches or vomiting.     H/o rectosigmoid adenocarcinoma, multiple abdominal wall surgeries, incisional hernia repair, chronic mesh infection.  S/p open ventral hernia repair 06/12/2023.  BMs are usually varies between solid and soft 1/day.  She takes Linzess, probiotics, Elavil.    She was in her USOH and didn't eat anything unusual recently.  Diarrhea, F/C started 5 days ago to the point that she was unable to leave the house all weekend d/t many BMs, fecal incontinence.  Stools are watery and malodorous, which is unusual.  Occurs 5-7 times/day. 9/night.      No abdominal pain, rectal pain,    She had slight nausea at the beginning of sx onset, which has resolved.  No vomiting.   No hematochezia, melena.  + F/C on Weds and Friday to 101. No fevers since then.  She feels drained.    She has been eating light, d/t having BM after eating.  She is staying hydrated.  Decreased appetite.  No sick contacts.  No travel, camping.  Triggers:  meat, chicken, but hasn't eaten this recently.    She stopped taking Linzess, probiotics d/t diarrhea.  No relief with taking immodium q4 hrs.    Was on antibiotics after the sx 6/2023.    Denies CP, SOB, SAN, lightheadedness, dizziness, palpitations.    Denies dysuria, hematuria, cloudy appearing urine, urinary odor.    Cscope 5/2023 at [facility] with diverticulosis.  CT abdomen/pelvis from  with ventral abdominal wall hernia.    Review of Systems   Constitutional:  Positive for activity change, fatigue and unexpected weight change.   HENT:  Negative for hearing loss, rhinorrhea and trouble swallowing.    Eyes:  Negative for discharge and visual disturbance.   Respiratory:  Negative for chest tightness, shortness of breath and wheezing.    Cardiovascular:  Negative for chest pain and palpitations.   Gastrointestinal:  Positive for diarrhea. Negative for abdominal pain, blood in stool, constipation, nausea, rectal pain and vomiting.   Endocrine: Negative for polydipsia and polyuria.   Genitourinary:  Negative for difficulty urinating, dysuria, hematuria and menstrual problem.   Musculoskeletal:  Negative for arthralgias, joint swelling and neck pain.   Neurological:  Positive for weakness. Negative for headaches.   Psychiatric/Behavioral:  Negative for confusion and dysphoric mood.          Past Medical History:   Diagnosis Date    Arthritis     Draining cutaneous sinus tract     Headache(784.0)     Incarcerated ventral hernia 2/1/2023    Incisional hernia     Insomnia     Nocturnal leg cramps     Nonhealing surgical wound     PONV (postoperative nausea and vomiting)   "   with last surgery per patient, "Severe" PONV 2-27-14    Postmenopausal status     Rectosigmoid cancer     Small bowel obstruction 11/8/2022    Ventral hernia with obstruction and without gangrene 11/22/2022     Past Surgical History:   Procedure Laterality Date    BREAST BIOPSY Left     CHOLECYSTECTOMY  02/24/2014    with incisional hernia repair with mesh    COLON SURGERY      COLONOSCOPY N/A 08/23/2016    Procedure: COLONOSCOPY;  Surgeon: Cuco Meraz MD;  Location: [facility] ENDO (4TH FLR);  Service: Endoscopy;  Laterality: N/A;    COLONOSCOPY N/A 12/09/2019    Procedure: COLONOSCOPY;  Surgeon: KISHORE Curry MD;  Location: [facility] ENDO (4TH FLR);  Service: Endoscopy;  Laterality: N/A;  ADHESIVE Allergy    COLONOSCOPY N/A 05/09/2023    Procedure: COLONOSCOPY;  Surgeon: KISHORE Curry MD;  Location: [facility] ENDO (4TH FLR);  Service: Endoscopy;  Laterality: N/A;  Instructions to portal.EC  precall no answer 5/3 EB    ESOPHAGOGASTRODUODENOSCOPY N/A 04/22/2021    Procedure: EGD (ESOPHAGOGASTRODUODENOSCOPY);  Surgeon: Lukasz Lindquist MD;  Location: [facility] ENDO (4TH FLR);  Service: Endoscopy;  Laterality: N/A;  3/25-covid + 7/2020 at [facility]-pt getting results to us to see if correct form of test-MS    HERNIA REPAIR      HYSTERECTOMY      INJECTION OF ANESTHETIC AGENT AROUND NERVE Right 06/28/2022    Procedure: BLOCK, NERVE, RIGHT L2-L3-L4-L5;  Surgeon: Shabnam Skaggs MD;  Location: [facility] PAIN MGT;  Service: Pain Management;  Laterality: Right;    INJECTION OF ANESTHETIC AGENT AROUND NERVE Right 08/02/2022    Procedure: BLOCK, NERVE, RIGHT L2-L5 MEDIAL BRANCH;  Surgeon: Shabnam Skaggs MD;  Location: [facility] PAIN MGT;  Service: Pain Management;  Laterality: Right;    KNEE ARTHROSCOPY      KNEE SURGERY      right and left knee repair    OOPHORECTOMY      RADIOFREQUENCY ABLATION Right 09/27/2022    Procedure: Radiofrequency Ablation Right L2-L5;  Surgeon: Shabnam Skaggs MD;  Location: [facility] PAIN MGT;  Service: Pain " Management;  Laterality: Right;    RELEASE-FASCIA Bilateral 06/12/2023    Procedure: RELEASE-FASCIA -TRANSVERSUS ABDOMINIS;  Surgeon: Jody Ramírez MD;  Location: [facility] OR 2ND FLR;  Service: General;  Laterality: Bilateral;    REPAIR OF RECURRENT INCISIONAL HERNIA  07/10/2018    Procedure: REPAIR, HERNIA, INCISIONAL, RECURRENT;  Surgeon: Cuco Meraz MD;  Location: [facility] OR 2ND FLR;  Service: Colon and Rectal;;    REPAIR, HERNIA, INCISIONAL OR VENTRAL, WITHOUT HISTORY OF PRIOR REPAIR N/A 06/12/2023    Procedure: REPAIR, HERNIA, INCISIONAL OR VENTRAL, WITH HISTORY OF PRIOR  REPAIR (WITH MESH AND POSTERIOR COMPONENTS SEPARATION);  Surgeon: Jody Ramírez MD;  Location: [facility] OR 2ND FLR;  Service: General;  Laterality: N/A;    SIGMOIDECTOMY  03/20/2006    LAR    SPINE SURGERY  9/27/2022    Back abalisons L3, L4, L5, L6     Family History   Problem Relation Age of Onset    Breast cancer Mother 56    Cancer Mother         BREAST    Arthritis Mother     Heart disease Father     Heart disease Maternal Grandmother     Diabetes Brother     Learning disabilities Son     Colon polyps Neg Hx     Ovarian cancer Neg Hx     Anesthesia problems Neg Hx      Social History     Socioeconomic History    Marital status:      Spouse name: francis    Number of children: 2   Occupational History    Occupation: Retired     Employer: Self   Tobacco Use    Smoking status: Never    Smokeless tobacco: Never   Substance and Sexual Activity    Alcohol use: Not Currently     Alcohol/week: 1.0 standard drink of alcohol     Types: 1 Glasses of wine per week     Comment: socially    Drug use: No    Sexual activity: Not Currently     Partners: Male     Birth control/protection: Post-menopausal     Social Determinants of Health     Financial Resource Strain: Unknown (8/28/2023)    Overall Financial Resource Strain (CARDIA)     Difficulty of Paying Living Expenses: Patient refused   Food Insecurity: Unknown (8/28/2023)     "Hunger Vital Sign     Worried About Running Out of Food in the Last Year: Patient refused     Ran Out of Food in the Last Year: Patient refused   Transportation Needs: No Transportation Needs (8/28/2023)    PRAPARE - Transportation     Lack of Transportation (Medical): No     Lack of Transportation (Non-Medical): No   Physical Activity: Insufficiently Active (8/28/2023)    Exercise Vital Sign     Days of Exercise per Week: 3 days     Minutes of Exercise per Session: 10 min   Stress: No Stress Concern Present (8/28/2023)    [place] [employer] - Occupational Stress Questionnaire     Feeling of Stress : Only a little   Social Connections: Unknown (8/28/2023)    Social Connection and Isolation Panel [NHANES]     Frequency of Communication with Friends and Family: Three times a week     Frequency of Social Gatherings with Friends and Family: Once a week     Active Member of Clubs or Organizations: Yes     Attends Club or Organization Meetings: 1 to 4 times per year     Marital Status:    Housing Stability: Unknown (8/28/2023)    Housing Stability Vital Sign     Unable to Pay for Housing in the Last Year: No     Unstable Housing in the Last Year: No     Objective:      Vitals:    08/28/23 1308   Weight: 71.3 kg (157 lb 3 oz)   Height: 5' 5" (1.651 m)      Physical Exam  Constitutional:       General: She is not in acute distress.     Appearance: She is well-developed. She is not diaphoretic.   HENT:      Head: Normocephalic and atraumatic.   Eyes:      General:         Right eye: No discharge.         Left eye: No discharge.   Pulmonary:      Effort: Pulmonary effort is normal. No respiratory distress.   Skin:     Coloration: Skin is not pale.      Findings: No erythema.   Neurological:      Mental Status: She is alert.   Psychiatric:         Behavior: Behavior normal.           Lab Results   Component Value Date    WBC 5.12 06/17/2023    HGB 9.3 (L) 06/17/2023    HCT 29.4 (L) 06/17/2023    "  06/17/2023    CHOL 237 (H) 10/26/2022    TRIG 146 10/26/2022    HDL 56 10/26/2022    ALT 44 06/17/2023    AST 74 (H) 06/17/2023     06/17/2023    K 3.5 06/17/2023     06/17/2023    CREATININE 0.6 06/17/2023    BUN 6 (L) 06/17/2023    CO2 26 06/17/2023    TSH 2.236 10/26/2022    INR 1.0 06/12/2023    HGBA1C 5.1 10/26/2022       The 10-year ASCVD risk score (Leelee ANDERSON, et al., 2019) is: 10.4%    Values used to calculate the score:      Age: 72 years      Sex: Female      Is Non- : No      Diabetic: No      Tobacco smoker: No      Systolic Blood Pressure: 131 mmHg      Is BP treated: No      HDL Cholesterol: 56 mg/dL      Total Cholesterol: 237 mg/dL    (Imaging have been independently reviewed)  CT abdomen/pelvis from  with ventral abdominal wall hernia.    Assessment:       1. Diarrhea, unspecified type          Plan:       Tanesha was seen today for diarrhea, fever and chills.    Diagnoses and all orders for this visit:    Diarrhea, unspecified type  Comments:  Stool studies, C diff given hx complicated abd sx, abx. Avoid antidiarrheals for now in case of C diff. Hydration, advance diet as tolerated. Addtl note below.   Orders:  -     Clostridium difficile EIA; Future  -     Stool Exam-Ova,Cysts,Parasites; Future  -     Stool culture; Future  -     WBC, Stool; Future  -     Tissue Transglutaminase Abs, IgA/IgG; Future  -     Giardia / Cryptosporidum, EIA; Future  -     Calprotectin, Stool; Future         Take probiotic. Resched c GI. Discussed return prompts.    Side effects of medication(s) were discussed in detail and patient voiced understanding.  Patient will call back for any issues or complications.     RTC PRN if sx persist.

## 2023-08-28 NOTE — TELEPHONE ENCOUNTER
Returned pt phone call.  Informed pt that this is not related to her recent surgery and really should contact her pcp.  She agrees and states hat she has a virtual appointment scheduled for today.

## 2023-08-29 NOTE — TELEPHONE ENCOUNTER
Informed of need to  containers for stool. States will send relative as  ill. Given GI appointment 09/20/23 at 0920

## 2023-08-29 NOTE — TELEPHONE ENCOUNTER
----- Message from Sandy Durán MD sent at 8/28/2023  1:28 PM CDT -----  1.  Please dispense and tell her how to do the stool tests.  2.  Please schedule a visit with [facility] ASAP for diarrhea.  Thank you!

## 2023-08-29 NOTE — TELEPHONE ENCOUNTER
She is already an established patient of [facility].  Would that open up an earlier appt slot?  If not, please add her to the wait list for GI.  Thank you!

## 2023-08-30 ENCOUNTER — LAB VISIT (OUTPATIENT)
Dept: LAB | Facility: OTHER | Age: 73
End: 2023-08-30
Attending: INTERNAL MEDICINE
Payer: MEDICARE

## 2023-08-30 ENCOUNTER — TELEPHONE (OUTPATIENT)
Dept: INTERNAL MEDICINE | Facility: CLINIC | Age: 73
End: 2023-08-30
Payer: MEDICARE

## 2023-08-30 DIAGNOSIS — R19.7 DIARRHEA, UNSPECIFIED TYPE: ICD-10-CM

## 2023-08-30 PROCEDURE — 36415 COLL VENOUS BLD VENIPUNCTURE: CPT | Mod: HCNC | Performed by: INTERNAL MEDICINE

## 2023-08-30 PROCEDURE — 86364 TISS TRNSGLTMNASE EA IG CLAS: CPT | Mod: 59,HCNC | Performed by: INTERNAL MEDICINE

## 2023-08-30 NOTE — TELEPHONE ENCOUNTER
Returned call to Ms. Davis. Informed that she does not have a fitkit, but cups to submit stool specimens from one

## 2023-08-31 ENCOUNTER — LAB VISIT (OUTPATIENT)
Dept: LAB | Facility: OTHER | Age: 73
End: 2023-08-31
Attending: INTERNAL MEDICINE
Payer: MEDICARE

## 2023-08-31 DIAGNOSIS — R19.7 DIARRHEA, UNSPECIFIED TYPE: ICD-10-CM

## 2023-08-31 LAB
C DIFF GDH STL QL: NEGATIVE
C DIFF TOX A+B STL QL IA: NEGATIVE
WBC #/AREA STL HPF: NORMAL /[HPF]

## 2023-08-31 PROCEDURE — 87077 CULTURE AEROBIC IDENTIFY: CPT | Mod: HCNC | Performed by: INTERNAL MEDICINE

## 2023-08-31 PROCEDURE — 83993 ASSAY FOR CALPROTECTIN FECAL: CPT | Mod: HCNC | Performed by: INTERNAL MEDICINE

## 2023-08-31 PROCEDURE — 89055 LEUKOCYTE ASSESSMENT FECAL: CPT | Mod: HCNC | Performed by: INTERNAL MEDICINE

## 2023-08-31 PROCEDURE — 87449 NOS EACH ORGANISM AG IA: CPT | Mod: 91,HCNC | Performed by: INTERNAL MEDICINE

## 2023-08-31 PROCEDURE — 87329 GIARDIA AG IA: CPT | Mod: HCNC | Performed by: INTERNAL MEDICINE

## 2023-08-31 PROCEDURE — 87449 NOS EACH ORGANISM AG IA: CPT | Mod: HCNC | Performed by: INTERNAL MEDICINE

## 2023-08-31 PROCEDURE — 87427 SHIGA-LIKE TOXIN AG IA: CPT | Mod: 59,HCNC | Performed by: INTERNAL MEDICINE

## 2023-08-31 PROCEDURE — 87184 SC STD DISK METHOD PER PLATE: CPT | Mod: HCNC | Performed by: INTERNAL MEDICINE

## 2023-08-31 PROCEDURE — 87209 SMEAR COMPLEX STAIN: CPT | Mod: HCNC | Performed by: INTERNAL MEDICINE

## 2023-08-31 PROCEDURE — 87046 STOOL CULTR AEROBIC BACT EA: CPT | Mod: HCNC | Performed by: INTERNAL MEDICINE

## 2023-08-31 PROCEDURE — 87045 FECES CULTURE AEROBIC BACT: CPT | Mod: HCNC | Performed by: INTERNAL MEDICINE

## 2023-09-01 LAB
CRYPTOSP AG STL QL IA: NEGATIVE
E COLI SXT1 STL QL IA: NEGATIVE
E COLI SXT2 STL QL IA: NEGATIVE
G LAMBLIA AG STL QL IA: NEGATIVE

## 2023-09-04 LAB
BACTERIA STL CULT: ABNORMAL
BACTERIA STL CULT: ABNORMAL

## 2023-09-06 LAB
O+P STL MICRO: NORMAL
TTG IGA SER-ACNC: 2.7 U/ML
TTG IGG SER-ACNC: <0.6 U/ML

## 2023-09-08 LAB — CALPROTECTIN STL-MCNT: 330.6 MCG/G

## 2023-10-11 ENCOUNTER — OFFICE VISIT (OUTPATIENT)
Dept: INTERNAL MEDICINE | Facility: CLINIC | Age: 73
End: 2023-10-11
Attending: INTERNAL MEDICINE
Payer: MEDICARE

## 2023-10-11 ENCOUNTER — LAB VISIT (OUTPATIENT)
Dept: LAB | Facility: OTHER | Age: 73
End: 2023-10-11
Attending: INTERNAL MEDICINE
Payer: MEDICARE

## 2023-10-11 VITALS
OXYGEN SATURATION: 100 % | DIASTOLIC BLOOD PRESSURE: 70 MMHG | WEIGHT: 158.06 LBS | HEIGHT: 65 IN | HEART RATE: 68 BPM | BODY MASS INDEX: 26.33 KG/M2 | SYSTOLIC BLOOD PRESSURE: 110 MMHG

## 2023-10-11 DIAGNOSIS — K59.04 FUNCTIONAL CONSTIPATION: ICD-10-CM

## 2023-10-11 DIAGNOSIS — R79.9 ABNORMAL FINDING OF BLOOD CHEMISTRY, UNSPECIFIED: ICD-10-CM

## 2023-10-11 DIAGNOSIS — G47.62 NOCTURNAL LEG CRAMPS: ICD-10-CM

## 2023-10-11 DIAGNOSIS — D50.8 OTHER IRON DEFICIENCY ANEMIA: ICD-10-CM

## 2023-10-11 DIAGNOSIS — Z85.038 HISTORY OF COLON CANCER: Primary | ICD-10-CM

## 2023-10-11 DIAGNOSIS — R23.4 CHANGES IN SKIN TEXTURE: ICD-10-CM

## 2023-10-11 LAB
ALBUMIN SERPL BCP-MCNC: 3.9 G/DL (ref 3.5–5.2)
ALP SERPL-CCNC: 84 U/L (ref 55–135)
ALT SERPL W/O P-5'-P-CCNC: 10 U/L (ref 10–44)
ANION GAP SERPL CALC-SCNC: 6 MMOL/L (ref 8–16)
AST SERPL-CCNC: 17 U/L (ref 10–40)
BASOPHILS # BLD AUTO: 0.05 K/UL (ref 0–0.2)
BASOPHILS NFR BLD: 0.9 % (ref 0–1.9)
BILIRUB SERPL-MCNC: 0.5 MG/DL (ref 0.1–1)
BUN SERPL-MCNC: 10 MG/DL (ref 8–23)
CALCIUM SERPL-MCNC: 9.6 MG/DL (ref 8.7–10.5)
CHLORIDE SERPL-SCNC: 107 MMOL/L (ref 95–110)
CHOLEST SERPL-MCNC: 151 MG/DL (ref 120–199)
CHOLEST/HDLC SERPL: 2.5 {RATIO} (ref 2–5)
CO2 SERPL-SCNC: 29 MMOL/L (ref 23–29)
CREAT SERPL-MCNC: 0.8 MG/DL (ref 0.5–1.4)
DIFFERENTIAL METHOD: ABNORMAL
EOSINOPHIL # BLD AUTO: 0.1 K/UL (ref 0–0.5)
EOSINOPHIL NFR BLD: 2.4 % (ref 0–8)
ERYTHROCYTE [DISTWIDTH] IN BLOOD BY AUTOMATED COUNT: 13.5 % (ref 11.5–14.5)
EST. GFR  (NO RACE VARIABLE): >60 ML/MIN/1.73 M^2
ESTIMATED AVG GLUCOSE: 100 MG/DL (ref 68–131)
FERRITIN SERPL-MCNC: 41 NG/ML (ref 20–300)
GLUCOSE SERPL-MCNC: 83 MG/DL (ref 70–110)
HBA1C MFR BLD: 5.1 % (ref 4–5.6)
HCT VFR BLD AUTO: 40.6 % (ref 37–48.5)
HDLC SERPL-MCNC: 60 MG/DL (ref 40–75)
HDLC SERPL: 39.7 % (ref 20–50)
HGB BLD-MCNC: 12.5 G/DL (ref 12–16)
IMM GRANULOCYTES # BLD AUTO: 0.02 K/UL (ref 0–0.04)
IMM GRANULOCYTES NFR BLD AUTO: 0.4 % (ref 0–0.5)
IRON SERPL-MCNC: 43 UG/DL (ref 30–160)
LDLC SERPL CALC-MCNC: 82 MG/DL (ref 63–159)
LYMPHOCYTES # BLD AUTO: 1.4 K/UL (ref 1–4.8)
LYMPHOCYTES NFR BLD: 25.1 % (ref 18–48)
MCH RBC QN AUTO: 27.8 PG (ref 27–31)
MCHC RBC AUTO-ENTMCNC: 30.8 G/DL (ref 32–36)
MCV RBC AUTO: 90 FL (ref 82–98)
MONOCYTES # BLD AUTO: 0.4 K/UL (ref 0.3–1)
MONOCYTES NFR BLD: 7.9 % (ref 4–15)
NEUTROPHILS # BLD AUTO: 3.5 K/UL (ref 1.8–7.7)
NEUTROPHILS NFR BLD: 63.3 % (ref 38–73)
NONHDLC SERPL-MCNC: 91 MG/DL
NRBC BLD-RTO: 0 /100 WBC
PLATELET # BLD AUTO: 225 K/UL (ref 150–450)
PMV BLD AUTO: 10.1 FL (ref 9.2–12.9)
POTASSIUM SERPL-SCNC: 4.3 MMOL/L (ref 3.5–5.1)
PROT SERPL-MCNC: 6.8 G/DL (ref 6–8.4)
RBC # BLD AUTO: 4.5 M/UL (ref 4–5.4)
SATURATED IRON: 12 % (ref 20–50)
SODIUM SERPL-SCNC: 142 MMOL/L (ref 136–145)
TOTAL IRON BINDING CAPACITY: 351 UG/DL (ref 250–450)
TRANSFERRIN SERPL-MCNC: 237 MG/DL (ref 200–375)
TRIGL SERPL-MCNC: 45 MG/DL (ref 30–150)
TSH SERPL DL<=0.005 MIU/L-ACNC: 2.88 UIU/ML (ref 0.4–4)
WBC # BLD AUTO: 5.45 K/UL (ref 3.9–12.7)

## 2023-10-11 PROCEDURE — 1126F AMNT PAIN NOTED NONE PRSNT: CPT | Mod: HCNC,CPTII,S$GLB, | Performed by: INTERNAL MEDICINE

## 2023-10-11 PROCEDURE — 1160F PR REVIEW ALL MEDS BY PRESCRIBER/CLIN PHARMACIST DOCUMENTED: ICD-10-PCS | Mod: HCNC,CPTII,S$GLB, | Performed by: INTERNAL MEDICINE

## 2023-10-11 PROCEDURE — 3078F PR MOST RECENT DIASTOLIC BLOOD PRESSURE < 80 MM HG: ICD-10-PCS | Mod: HCNC,CPTII,S$GLB, | Performed by: INTERNAL MEDICINE

## 2023-10-11 PROCEDURE — 1126F PR PAIN SEVERITY QUANTIFIED, NO PAIN PRESENT: ICD-10-PCS | Mod: HCNC,CPTII,S$GLB, | Performed by: INTERNAL MEDICINE

## 2023-10-11 PROCEDURE — 99999 PR PBB SHADOW E&M-EST. PATIENT-LVL III: ICD-10-PCS | Mod: PBBFAC,HCNC,, | Performed by: INTERNAL MEDICINE

## 2023-10-11 PROCEDURE — 1159F PR MEDICATION LIST DOCUMENTED IN MEDICAL RECORD: ICD-10-PCS | Mod: HCNC,CPTII,S$GLB, | Performed by: INTERNAL MEDICINE

## 2023-10-11 PROCEDURE — 80061 LIPID PANEL: CPT | Mod: HCNC | Performed by: INTERNAL MEDICINE

## 2023-10-11 PROCEDURE — 82728 ASSAY OF FERRITIN: CPT | Mod: HCNC | Performed by: INTERNAL MEDICINE

## 2023-10-11 PROCEDURE — 36415 COLL VENOUS BLD VENIPUNCTURE: CPT | Mod: HCNC | Performed by: INTERNAL MEDICINE

## 2023-10-11 PROCEDURE — 99214 PR OFFICE/OUTPT VISIT, EST, LEVL IV, 30-39 MIN: ICD-10-PCS | Mod: HCNC,S$GLB,, | Performed by: INTERNAL MEDICINE

## 2023-10-11 PROCEDURE — 3008F PR BODY MASS INDEX (BMI) DOCUMENTED: ICD-10-PCS | Mod: HCNC,CPTII,S$GLB, | Performed by: INTERNAL MEDICINE

## 2023-10-11 PROCEDURE — 3008F BODY MASS INDEX DOCD: CPT | Mod: HCNC,CPTII,S$GLB, | Performed by: INTERNAL MEDICINE

## 2023-10-11 PROCEDURE — 1159F MED LIST DOCD IN RCRD: CPT | Mod: HCNC,CPTII,S$GLB, | Performed by: INTERNAL MEDICINE

## 2023-10-11 PROCEDURE — 85025 COMPLETE CBC W/AUTO DIFF WBC: CPT | Mod: HCNC | Performed by: INTERNAL MEDICINE

## 2023-10-11 PROCEDURE — 3078F DIAST BP <80 MM HG: CPT | Mod: HCNC,CPTII,S$GLB, | Performed by: INTERNAL MEDICINE

## 2023-10-11 PROCEDURE — 3074F PR MOST RECENT SYSTOLIC BLOOD PRESSURE < 130 MM HG: ICD-10-PCS | Mod: HCNC,CPTII,S$GLB, | Performed by: INTERNAL MEDICINE

## 2023-10-11 PROCEDURE — 1101F PR PT FALLS ASSESS DOC 0-1 FALLS W/OUT INJ PAST YR: ICD-10-PCS | Mod: HCNC,CPTII,S$GLB, | Performed by: INTERNAL MEDICINE

## 2023-10-11 PROCEDURE — 80053 COMPREHEN METABOLIC PANEL: CPT | Mod: HCNC | Performed by: INTERNAL MEDICINE

## 2023-10-11 PROCEDURE — 1101F PT FALLS ASSESS-DOCD LE1/YR: CPT | Mod: HCNC,CPTII,S$GLB, | Performed by: INTERNAL MEDICINE

## 2023-10-11 PROCEDURE — 99214 OFFICE O/P EST MOD 30 MIN: CPT | Mod: HCNC,S$GLB,, | Performed by: INTERNAL MEDICINE

## 2023-10-11 PROCEDURE — 3288F FALL RISK ASSESSMENT DOCD: CPT | Mod: HCNC,CPTII,S$GLB, | Performed by: INTERNAL MEDICINE

## 2023-10-11 PROCEDURE — 1160F RVW MEDS BY RX/DR IN RCRD: CPT | Mod: HCNC,CPTII,S$GLB, | Performed by: INTERNAL MEDICINE

## 2023-10-11 PROCEDURE — 99999 PR PBB SHADOW E&M-EST. PATIENT-LVL III: CPT | Mod: PBBFAC,HCNC,, | Performed by: INTERNAL MEDICINE

## 2023-10-11 PROCEDURE — 83540 ASSAY OF IRON: CPT | Mod: HCNC | Performed by: INTERNAL MEDICINE

## 2023-10-11 PROCEDURE — 3074F SYST BP LT 130 MM HG: CPT | Mod: HCNC,CPTII,S$GLB, | Performed by: INTERNAL MEDICINE

## 2023-10-11 PROCEDURE — 84466 ASSAY OF TRANSFERRIN: CPT | Mod: HCNC | Performed by: INTERNAL MEDICINE

## 2023-10-11 PROCEDURE — 83036 HEMOGLOBIN GLYCOSYLATED A1C: CPT | Mod: HCNC | Performed by: INTERNAL MEDICINE

## 2023-10-11 PROCEDURE — 84443 ASSAY THYROID STIM HORMONE: CPT | Mod: HCNC | Performed by: INTERNAL MEDICINE

## 2023-10-11 PROCEDURE — 3288F PR FALLS RISK ASSESSMENT DOCUMENTED: ICD-10-PCS | Mod: HCNC,CPTII,S$GLB, | Performed by: INTERNAL MEDICINE

## 2023-10-11 NOTE — PROGRESS NOTES
Subjective:       Patient ID: Tanesha Davis is a 72 y.o. female.    Chief Complaint: Annual Exam    Here for annual exam    71yo M with PMHx of Arthritis, Draining cutaneous sinus tract, Headache(784.0), Incarcerated ventral hernia (2/1/2023), Incisional hernia, Insomnia, Nocturnal leg cramps, Nonhealing surgical wound, PONV (postoperative nausea and vomiting), Postmenopausal status, Rectosigmoid cancer, Small bowel obstruction (11/8/2022), and Ventral hernia with obstruction and without gangrene (11/22/2022)  Several year hx of abdominal symptoms resolved after mesh revision 06/2023. Constipation persist and still requires linzess but no longer has associated pains.    Amitriptyline causes dry mouth. She is taking every other day due to SE. We can stop and monitor her sleep and leg cramps.    ### Hx colon Ca ###  -s/p T3N0 with rectosigmoid mod diff adenocarcionma that was treated with anterior resection 3/30/06.  currently getting colo every 3 years.  Followed by Dr. Meraz.    Last colonoscopy 5/9/23     ### Constipation ###  -taking colace, metamucil, probiotics, and consumes 8-12 12oz water/day. She has a BM once every 2 days, improved from once a week.  -Linzess qd currently. No acute issues.        ### REMY ###  First seen 4/2013 01/2020  TIBC 482, ferritin -22 for possible RLS workup. Hx of colon CA, last colonoscopy 12/9/2019. Abd pain as above. No new upper GI symptoms. No BRBRP, melena, SOB at rest or with exertion, dizziness,   Nexium every other day for several years                      FERRITIN                 39                  02/12/2021 10:45 AM        FERRITIN                 22                  01/30/2020 12:30 PM        FERRITIN                 53                  11/07/2016 10:38 AM        ### ventral hernia-recurrent ###   12/04/2019 Ct abd/pelvis: Recurrent large ventral abdominal hernia containing mesenteric fat and loops of small bowel without associated complication.  "                Review of Systems   Constitutional:  Negative for chills, fatigue, fever and unexpected weight change.   HENT:  Negative for ear pain, hearing loss, postnasal drip, tinnitus, trouble swallowing and voice change.    Respiratory:  Negative for cough, chest tightness, shortness of breath and wheezing.    Cardiovascular:  Negative for chest pain, palpitations and leg swelling.   Gastrointestinal:  Negative for abdominal pain, blood in stool, diarrhea, nausea and vomiting.   Endocrine: Negative for polydipsia, polyphagia and polyuria.   Genitourinary:  Negative for difficulty urinating, dysuria, hematuria and vaginal bleeding.   Skin:  Negative for rash.   Allergic/Immunologic: Negative for food allergies.   Neurological:  Negative for dizziness, numbness and headaches.   Hematological:  Does not bruise/bleed easily.   Psychiatric/Behavioral:  The patient is not nervous/anxious.        Objective:      Vitals:    10/11/23 0901   BP: 110/70   BP Location: Right arm   Patient Position: Sitting   Pulse: 68   SpO2: 100%   Weight: 71.7 kg (158 lb 1.1 oz)   Height: 5' 5" (1.651 m)      Physical Exam  Vitals and nursing note reviewed.   Constitutional:       General: She is not in acute distress.     Appearance: Normal appearance. She is well-developed.   HENT:      Head: Normocephalic and atraumatic.      Mouth/Throat:      Pharynx: No oropharyngeal exudate.   Eyes:      General: No scleral icterus.     Conjunctiva/sclera: Conjunctivae normal.      Pupils: Pupils are equal, round, and reactive to light.   Neck:      Thyroid: No thyromegaly.   Cardiovascular:      Rate and Rhythm: Normal rate and regular rhythm.      Heart sounds: Normal heart sounds. No murmur heard.  Pulmonary:      Effort: Pulmonary effort is normal.      Breath sounds: Normal breath sounds. No wheezing or rales.   Abdominal:      General: There is no distension.   Musculoskeletal:         General: No tenderness.   Lymphadenopathy:      " Cervical: No cervical adenopathy.   Skin:     General: Skin is warm and dry.   Neurological:      Mental Status: She is alert and oriented to person, place, and time.   Psychiatric:         Behavior: Behavior normal.         Assessment:       1. History of colon cancer    2. Other iron deficiency anemia    3. Functional constipation    4. Body mass index (BMI) 33.0-33.9, adult    5. Abnormal finding of blood chemistry, unspecified    6. Changes in skin texture    7. Nocturnal leg cramps        Plan:       Tanesha was seen today for annual exam.    Diagnoses and all orders for this visit:    History of colon cancer    Other iron deficiency anemia  -     Ferritin; Future  -     Iron and TIBC; Future    Functional constipation    Body mass index (BMI) 33.0-33.9, adult  -     Comprehensive Metabolic Panel; Future  -     Lipid Panel; Future  -     TSH; Future  -     CBC Auto Differential; Future  -     Hemoglobin A1C; Future    Abnormal finding of blood chemistry, unspecified  -     Lipid Panel; Future  -     Hemoglobin A1C; Future    Changes in skin texture  -     TSH; Future    Nocturnal leg cramps   Controlled. Monitor.         Caleb Ceballos MD  Internal Medicine-[facility]        Side effects of medication(s) were discussed in detail and patient voiced understanding.  Patient will call back for any issues or complications.

## 2023-12-23 DIAGNOSIS — R79.9 ABNORMAL FINDING OF BLOOD CHEMISTRY, UNSPECIFIED: Primary | ICD-10-CM

## 2023-12-23 NOTE — TELEPHONE ENCOUNTER
No care due was identified.  [facility] Embedded Care Due Messages. Reference number: [id number].   12/23/2023 2:05:15 PM CST

## 2023-12-26 RX ORDER — ATORVASTATIN CALCIUM 40 MG/1
40 TABLET, FILM COATED ORAL DAILY
Qty: 90 TABLET | Refills: 3 | Status: SHIPPED | OUTPATIENT
Start: 2023-12-26 | End: 2024-12-25

## 2023-12-26 NOTE — TELEPHONE ENCOUNTER
Refill Decision Note   Tanesha Davis  is requesting a refill authorization.  Brief Assessment and Rationale for Refill:  Approve     Medication Therapy Plan:         Comments:     Note composed:11:41 AM 12/26/2023

## 2024-06-11 ENCOUNTER — OFFICE VISIT (OUTPATIENT)
Dept: INTERNAL MEDICINE | Facility: CLINIC | Age: 74
End: 2024-06-11
Payer: MEDICARE

## 2024-06-11 ENCOUNTER — TELEPHONE (OUTPATIENT)
Dept: ORTHOPEDICS | Facility: CLINIC | Age: 74
End: 2024-06-11
Payer: MEDICARE

## 2024-06-11 ENCOUNTER — LAB VISIT (OUTPATIENT)
Dept: LAB | Facility: OTHER | Age: 74
End: 2024-06-11
Attending: STUDENT IN AN ORGANIZED HEALTH CARE EDUCATION/TRAINING PROGRAM
Payer: MEDICARE

## 2024-06-11 VITALS
WEIGHT: 168.63 LBS | HEART RATE: 76 BPM | HEIGHT: 65 IN | DIASTOLIC BLOOD PRESSURE: 59 MMHG | OXYGEN SATURATION: 95 % | BODY MASS INDEX: 28.1 KG/M2 | SYSTOLIC BLOOD PRESSURE: 131 MMHG

## 2024-06-11 DIAGNOSIS — R26.81 UNSTEADINESS ON FEET: ICD-10-CM

## 2024-06-11 DIAGNOSIS — Z01.811 PRE-OP CHEST EXAM: ICD-10-CM

## 2024-06-11 DIAGNOSIS — R29.6 FALLS FREQUENTLY: ICD-10-CM

## 2024-06-11 DIAGNOSIS — R29.6 FALLS FREQUENTLY: Primary | ICD-10-CM

## 2024-06-11 LAB
ALBUMIN SERPL BCP-MCNC: 3.7 G/DL (ref 3.5–5.2)
ALP SERPL-CCNC: 80 U/L (ref 55–135)
ALT SERPL W/O P-5'-P-CCNC: 16 U/L (ref 10–44)
ANION GAP SERPL CALC-SCNC: 8 MMOL/L (ref 8–16)
AST SERPL-CCNC: 20 U/L (ref 10–40)
BASOPHILS # BLD AUTO: 0.06 K/UL (ref 0–0.2)
BASOPHILS NFR BLD: 1 % (ref 0–1.9)
BILIRUB SERPL-MCNC: 0.3 MG/DL (ref 0.1–1)
BUN SERPL-MCNC: 9 MG/DL (ref 8–23)
CALCIUM SERPL-MCNC: 9.2 MG/DL (ref 8.7–10.5)
CHLORIDE SERPL-SCNC: 107 MMOL/L (ref 95–110)
CO2 SERPL-SCNC: 27 MMOL/L (ref 23–29)
CREAT SERPL-MCNC: 0.7 MG/DL (ref 0.5–1.4)
DIFFERENTIAL METHOD BLD: ABNORMAL
EOSINOPHIL # BLD AUTO: 0.2 K/UL (ref 0–0.5)
EOSINOPHIL NFR BLD: 2.6 % (ref 0–8)
ERYTHROCYTE [DISTWIDTH] IN BLOOD BY AUTOMATED COUNT: 12.9 % (ref 11.5–14.5)
EST. GFR  (NO RACE VARIABLE): >60 ML/MIN/1.73 M^2
GLUCOSE SERPL-MCNC: 105 MG/DL (ref 70–110)
HCT VFR BLD AUTO: 38.3 % (ref 37–48.5)
HGB BLD-MCNC: 12 G/DL (ref 12–16)
IMM GRANULOCYTES # BLD AUTO: 0.01 K/UL (ref 0–0.04)
IMM GRANULOCYTES NFR BLD AUTO: 0.2 % (ref 0–0.5)
LYMPHOCYTES # BLD AUTO: 1.6 K/UL (ref 1–4.8)
LYMPHOCYTES NFR BLD: 26.7 % (ref 18–48)
MCH RBC QN AUTO: 28.5 PG (ref 27–31)
MCHC RBC AUTO-ENTMCNC: 31.3 G/DL (ref 32–36)
MCV RBC AUTO: 91 FL (ref 82–98)
MONOCYTES # BLD AUTO: 0.5 K/UL (ref 0.3–1)
MONOCYTES NFR BLD: 9 % (ref 4–15)
NEUTROPHILS # BLD AUTO: 3.5 K/UL (ref 1.8–7.7)
NEUTROPHILS NFR BLD: 60.5 % (ref 38–73)
NRBC BLD-RTO: 0 /100 WBC
PLATELET # BLD AUTO: 237 K/UL (ref 150–450)
PMV BLD AUTO: 10.1 FL (ref 9.2–12.9)
POTASSIUM SERPL-SCNC: 3.8 MMOL/L (ref 3.5–5.1)
PROT SERPL-MCNC: 6.6 G/DL (ref 6–8.4)
RBC # BLD AUTO: 4.21 M/UL (ref 4–5.4)
SODIUM SERPL-SCNC: 142 MMOL/L (ref 136–145)
VIT B12 SERPL-MCNC: 1910 PG/ML (ref 210–950)
WBC # BLD AUTO: 5.8 K/UL (ref 3.9–12.7)

## 2024-06-11 PROCEDURE — 99214 OFFICE O/P EST MOD 30 MIN: CPT | Mod: S$GLB,,, | Performed by: STUDENT IN AN ORGANIZED HEALTH CARE EDUCATION/TRAINING PROGRAM

## 2024-06-11 PROCEDURE — 3288F FALL RISK ASSESSMENT DOCD: CPT | Mod: CPTII,S$GLB,, | Performed by: STUDENT IN AN ORGANIZED HEALTH CARE EDUCATION/TRAINING PROGRAM

## 2024-06-11 PROCEDURE — 80053 COMPREHEN METABOLIC PANEL: CPT | Mod: HCNC | Performed by: STUDENT IN AN ORGANIZED HEALTH CARE EDUCATION/TRAINING PROGRAM

## 2024-06-11 PROCEDURE — 3008F BODY MASS INDEX DOCD: CPT | Mod: CPTII,S$GLB,, | Performed by: STUDENT IN AN ORGANIZED HEALTH CARE EDUCATION/TRAINING PROGRAM

## 2024-06-11 PROCEDURE — 3078F DIAST BP <80 MM HG: CPT | Mod: CPTII,S$GLB,, | Performed by: STUDENT IN AN ORGANIZED HEALTH CARE EDUCATION/TRAINING PROGRAM

## 2024-06-11 PROCEDURE — 82607 VITAMIN B-12: CPT | Mod: HCNC | Performed by: STUDENT IN AN ORGANIZED HEALTH CARE EDUCATION/TRAINING PROGRAM

## 2024-06-11 PROCEDURE — 3075F SYST BP GE 130 - 139MM HG: CPT | Mod: CPTII,S$GLB,, | Performed by: STUDENT IN AN ORGANIZED HEALTH CARE EDUCATION/TRAINING PROGRAM

## 2024-06-11 PROCEDURE — 1159F MED LIST DOCD IN RCRD: CPT | Mod: CPTII,S$GLB,, | Performed by: STUDENT IN AN ORGANIZED HEALTH CARE EDUCATION/TRAINING PROGRAM

## 2024-06-11 PROCEDURE — 99999 PR PBB SHADOW E&M-EST. PATIENT-LVL III: CPT | Mod: PBBFAC,HCNC,, | Performed by: STUDENT IN AN ORGANIZED HEALTH CARE EDUCATION/TRAINING PROGRAM

## 2024-06-11 PROCEDURE — 85025 COMPLETE CBC W/AUTO DIFF WBC: CPT | Mod: HCNC | Performed by: STUDENT IN AN ORGANIZED HEALTH CARE EDUCATION/TRAINING PROGRAM

## 2024-06-11 PROCEDURE — 1126F AMNT PAIN NOTED NONE PRSNT: CPT | Mod: CPTII,S$GLB,, | Performed by: STUDENT IN AN ORGANIZED HEALTH CARE EDUCATION/TRAINING PROGRAM

## 2024-06-11 PROCEDURE — 36415 COLL VENOUS BLD VENIPUNCTURE: CPT | Mod: HCNC | Performed by: STUDENT IN AN ORGANIZED HEALTH CARE EDUCATION/TRAINING PROGRAM

## 2024-06-11 PROCEDURE — 1100F PTFALLS ASSESS-DOCD GE2>/YR: CPT | Mod: CPTII,S$GLB,, | Performed by: STUDENT IN AN ORGANIZED HEALTH CARE EDUCATION/TRAINING PROGRAM

## 2024-06-12 ENCOUNTER — OFFICE VISIT (OUTPATIENT)
Dept: PODIATRY | Facility: CLINIC | Age: 74
End: 2024-06-12
Payer: MEDICARE

## 2024-06-12 ENCOUNTER — APPOINTMENT (OUTPATIENT)
Dept: RADIOLOGY | Facility: OTHER | Age: 74
End: 2024-06-12
Attending: PODIATRIST
Payer: MEDICARE

## 2024-06-12 VITALS
HEIGHT: 65 IN | HEART RATE: 73 BPM | BODY MASS INDEX: 28.1 KG/M2 | SYSTOLIC BLOOD PRESSURE: 129 MMHG | WEIGHT: 168.63 LBS | DIASTOLIC BLOOD PRESSURE: 77 MMHG

## 2024-06-12 DIAGNOSIS — R29.6 FALLS FREQUENTLY: ICD-10-CM

## 2024-06-12 DIAGNOSIS — G60.9 IDIOPATHIC PERIPHERAL NEUROPATHY: ICD-10-CM

## 2024-06-12 DIAGNOSIS — G60.9 IDIOPATHIC PERIPHERAL NEUROPATHY: Primary | ICD-10-CM

## 2024-06-12 PROCEDURE — 3074F SYST BP LT 130 MM HG: CPT | Mod: CPTII,S$GLB,, | Performed by: PODIATRIST

## 2024-06-12 PROCEDURE — 3008F BODY MASS INDEX DOCD: CPT | Mod: CPTII,S$GLB,, | Performed by: PODIATRIST

## 2024-06-12 PROCEDURE — 3078F DIAST BP <80 MM HG: CPT | Mod: CPTII,S$GLB,, | Performed by: PODIATRIST

## 2024-06-12 PROCEDURE — 73630 X-RAY EXAM OF FOOT: CPT | Mod: 26,50,HCNC, | Performed by: RADIOLOGY

## 2024-06-12 PROCEDURE — 1159F MED LIST DOCD IN RCRD: CPT | Mod: CPTII,S$GLB,, | Performed by: PODIATRIST

## 2024-06-12 PROCEDURE — 3288F FALL RISK ASSESSMENT DOCD: CPT | Mod: CPTII,S$GLB,, | Performed by: PODIATRIST

## 2024-06-12 PROCEDURE — 73630 X-RAY EXAM OF FOOT: CPT | Mod: TC,50,HCNC,PN

## 2024-06-12 PROCEDURE — 1100F PTFALLS ASSESS-DOCD GE2>/YR: CPT | Mod: CPTII,S$GLB,, | Performed by: PODIATRIST

## 2024-06-12 PROCEDURE — 1160F RVW MEDS BY RX/DR IN RCRD: CPT | Mod: CPTII,S$GLB,, | Performed by: PODIATRIST

## 2024-06-12 PROCEDURE — 99999 PR PBB SHADOW E&M-EST. PATIENT-LVL IV: CPT | Mod: PBBFAC,HCNC,, | Performed by: PODIATRIST

## 2024-06-12 PROCEDURE — 1126F AMNT PAIN NOTED NONE PRSNT: CPT | Mod: CPTII,S$GLB,, | Performed by: PODIATRIST

## 2024-06-12 PROCEDURE — 99204 OFFICE O/P NEW MOD 45 MIN: CPT | Mod: S$GLB,,, | Performed by: PODIATRIST

## 2024-06-12 NOTE — PROGRESS NOTES
Subjective:      Patient ID: Tanesha Davis is a 73 y.o. female.    Chief Complaint: Foot Problem (Falls frequently)     Numbness of the toes and forefoot right and left feet.  Gradual onset, worsening over the past several months.  Aggravated by prolonged standing and by lying down at night.  Mid range activity toe seem normal.  She denies trauma and surgery of the feet.  She does have bunion and toe deformity which is minimally symptomatic presently.  She has interested in the numbness as it may or may not contribute to frequent falls recently.    Review of Systems   Constitutional: Negative for chills, diaphoresis, fever, malaise/fatigue and night sweats.   Cardiovascular:  Negative for claudication, cyanosis, leg swelling and syncope.   Skin:  Negative for color change, dry skin, nail changes, rash, suspicious lesions and unusual hair distribution.   Musculoskeletal:  Negative for falls, joint pain, joint swelling, muscle cramps, muscle weakness and stiffness.   Gastrointestinal:  Negative for constipation, diarrhea, nausea and vomiting.   Neurological:  Positive for numbness and sensory change. Negative for brief paralysis, disturbances in coordination, focal weakness, paresthesias and tremors.         Objective:      Physical Exam  Constitutional:       General: She is not in acute distress.     Appearance: She is well-developed. She is not diaphoretic.   Cardiovascular:      Pulses:           Popliteal pulses are 2+ on the right side and 2+ on the left side.        Dorsalis pedis pulses are 2+ on the right side and 2+ on the left side.        Posterior tibial pulses are 2+ on the right side and 2+ on the left side.      Comments: Capillary refill 3 seconds all toes/distal feet, all toes/both feet warm to touch.      Negative lymphadenopathy bilateral popliteal fossa and tarsal tunnel.      Negavie lower extremity edema bilateral.    Musculoskeletal:      Right ankle: No swelling, deformity, ecchymosis or  "lacerations. Normal range of motion. Normal pulse.      Right Achilles Tendon: Normal. No defects. Landers's test negative.      Comments:   Patient has bunion deformity and crossover 2nd digit worse on the right than the left without significant symptom today to palpation range of motion.  Those deformities reducible bilateral.   Lymphadenopathy:      Lower Body: No right inguinal adenopathy. No left inguinal adenopathy.      Comments: Negative lymphadenopathy bilateral popliteal fossa and tarsal tunnel.    Negative lymphangitic streaking bilateral feet/ankles/legs.   Skin:     General: Skin is warm and dry.      Capillary Refill: Capillary refill takes 2 to 3 seconds.      Coloration: Skin is not pale.      Findings: No abrasion, bruising, burn, ecchymosis, erythema, laceration, lesion or rash.      Nails: There is no clubbing.      Comments: Skin is normal age and health appropriate color, turgor, texture, and temperature bilateral lower extremities without ulceration, hyperpigmentation, discoloration, masses nodules or cords palpated.  No ecchymosis, erythema, edema, or cardinal signs of infection bilateral lower extremities.      Neurological:      Mental Status: She is alert and oriented to person, place, and time.      Sensory: No sensory deficit.      Motor: No tremor, atrophy or abnormal muscle tone.      Gait: Gait normal.      Deep Tendon Reflexes:      Reflex Scores:       Patellar reflexes are 2+ on the right side and 2+ on the left side.       Achilles reflexes are 2+ on the right side and 2+ on the left side.     Comments:   Subjective numbness with out loss of protective sensation to 10 g monofilament all 10 toes   Psychiatric:         Behavior: Behavior is cooperative.           Assessment:       Encounter Diagnoses   Name Primary?    Falls frequently     Idiopathic peripheral neuropathy Yes         Plan:       Tanesha "Alice" was seen today for foot problem.    Diagnoses and all orders for this " visit:    Idiopathic peripheral neuropathy  -     Ambulatory referral/consult to Physical Medicine Rehab; Future  -     X-Ray Foot Complete Bilateral; Future    Falls frequently  -     Ambulatory referral/consult to Podiatry  -     Ambulatory referral/consult to Physical Medicine Rehab; Future  -     X-Ray Foot Complete Bilateral; Future      I counseled the patient on her conditions, their implications and medical management.      We discussed that while there are some things about her feet that are not text book, I see nothing here that shoe really contribute to falls in his significant way.  Plenty of patients with severe neuropathy they do not have problems with falls balance.  Also, her protective sensation today sitting is normal in her shoes seem adequate.  She does not have pain to her evaluation today despite the bunion and hammertoe deformities.      Recommend arch supports stretches to maximize contact plantar feet and ankle range of motion to reduce load on the plantar forefoot which may be responsible for part of the nerve symptoms.      Consult Physical Medicine rehab for for workup for falls to include evaluation for balance and other body segments besides the feet.      Discussed conservative treatment with shoes of adequate dimensions, material, and style to alleviate symptoms and delay or prevent surgical intervention.    Patient will obtain over the counter arch supports and wear them in shoes whenever possible.  Athletic shoes intended for walking or running are usually best.    Patient will stretch the tendo achilles complex three times daily as demonstrated in the office.  Literature was dispensed illustrating proper stretching technique.    xrays    No follow-ups on file.

## 2024-06-17 DIAGNOSIS — N95.1 MENOPAUSAL SYMPTOMS: ICD-10-CM

## 2024-06-17 RX ORDER — ESTRADIOL 0.5 MG/1
0.5 TABLET ORAL
Qty: 90 TABLET | Refills: 0 | Status: SHIPPED | OUTPATIENT
Start: 2024-06-17

## 2024-06-21 ENCOUNTER — PATIENT MESSAGE (OUTPATIENT)
Dept: INTERNAL MEDICINE | Facility: CLINIC | Age: 74
End: 2024-06-21
Payer: MEDICARE

## 2024-07-11 ENCOUNTER — HOSPITAL ENCOUNTER (OUTPATIENT)
Dept: RADIOLOGY | Facility: HOSPITAL | Age: 74
Discharge: HOME | End: 2024-07-11
Attending: INTERNAL MEDICINE
Payer: MEDICARE

## 2024-07-11 DIAGNOSIS — Z12.31 ENCOUNTER FOR SCREENING MAMMOGRAM FOR BREAST CANCER: ICD-10-CM

## 2024-07-11 PROCEDURE — 77063 BREAST TOMOSYNTHESIS BI: CPT | Mod: 26,HCNC,, | Performed by: RADIOLOGY

## 2024-07-11 PROCEDURE — 77067 SCR MAMMO BI INCL CAD: CPT | Mod: TC,HCNC

## 2024-07-11 PROCEDURE — 77067 SCR MAMMO BI INCL CAD: CPT | Mod: 26,HCNC,, | Performed by: RADIOLOGY

## 2024-08-14 ENCOUNTER — OFFICE VISIT (OUTPATIENT)
Dept: INTERNAL MEDICINE | Facility: CLINIC | Age: 74
End: 2024-08-14
Attending: INTERNAL MEDICINE
Payer: MEDICARE

## 2024-08-14 VITALS
WEIGHT: 172.63 LBS | DIASTOLIC BLOOD PRESSURE: 66 MMHG | OXYGEN SATURATION: 98 % | SYSTOLIC BLOOD PRESSURE: 116 MMHG | BODY MASS INDEX: 28.76 KG/M2 | HEIGHT: 65 IN | HEART RATE: 70 BPM

## 2024-08-14 DIAGNOSIS — R14.0 BLOATING: ICD-10-CM

## 2024-08-14 DIAGNOSIS — M54.9 MUSCULOSKELETAL BACK PAIN: Primary | ICD-10-CM

## 2024-08-14 DIAGNOSIS — Z79.890 POSTMENOPAUSAL HORMONE REPLACEMENT THERAPY: ICD-10-CM

## 2024-08-14 DIAGNOSIS — R26.9 ABNORMALITY OF GAIT AND MOBILITY: ICD-10-CM

## 2024-08-14 DIAGNOSIS — Z78.0 POSTMENOPAUSAL STATUS: ICD-10-CM

## 2024-08-14 DIAGNOSIS — M25.562 CHRONIC PAIN OF BOTH KNEES: ICD-10-CM

## 2024-08-14 DIAGNOSIS — M47.816 SPONDYLOSIS WITHOUT MYELOPATHY OR RADICULOPATHY, LUMBAR REGION: ICD-10-CM

## 2024-08-14 DIAGNOSIS — Z00.00 ENCOUNTER FOR PREVENTIVE HEALTH EXAMINATION: ICD-10-CM

## 2024-08-14 DIAGNOSIS — G47.00 INSOMNIA, UNSPECIFIED TYPE: ICD-10-CM

## 2024-08-14 DIAGNOSIS — M25.561 CHRONIC PAIN OF BOTH KNEES: ICD-10-CM

## 2024-08-14 DIAGNOSIS — G47.62 NOCTURNAL LEG CRAMPS: ICD-10-CM

## 2024-08-14 DIAGNOSIS — H91.90 HEARING LOSS, UNSPECIFIED HEARING LOSS TYPE, UNSPECIFIED LATERALITY: ICD-10-CM

## 2024-08-14 DIAGNOSIS — Z00.00 ENCOUNTER FOR MEDICARE ANNUAL WELLNESS EXAM: ICD-10-CM

## 2024-08-14 DIAGNOSIS — I70.0 AORTIC ATHEROSCLEROSIS: ICD-10-CM

## 2024-08-14 DIAGNOSIS — Z85.038 HISTORY OF COLON CANCER: ICD-10-CM

## 2024-08-14 DIAGNOSIS — K59.04 FUNCTIONAL CONSTIPATION: ICD-10-CM

## 2024-08-14 DIAGNOSIS — G89.29 CHRONIC PAIN OF BOTH KNEES: ICD-10-CM

## 2024-08-14 PROCEDURE — 99999 PR PBB SHADOW E&M-EST. PATIENT-LVL V: CPT | Mod: PBBFAC,HCNC,, | Performed by: NURSE PRACTITIONER

## 2024-08-14 PROCEDURE — 3078F DIAST BP <80 MM HG: CPT | Mod: HCNC,CPTII,S$GLB, | Performed by: NURSE PRACTITIONER

## 2024-08-14 PROCEDURE — 3074F SYST BP LT 130 MM HG: CPT | Mod: HCNC,CPTII,S$GLB, | Performed by: NURSE PRACTITIONER

## 2024-08-14 PROCEDURE — 3288F FALL RISK ASSESSMENT DOCD: CPT | Mod: HCNC,CPTII,S$GLB, | Performed by: NURSE PRACTITIONER

## 2024-08-14 PROCEDURE — 1100F PTFALLS ASSESS-DOCD GE2>/YR: CPT | Mod: HCNC,CPTII,S$GLB, | Performed by: NURSE PRACTITIONER

## 2024-08-14 PROCEDURE — 1158F ADVNC CARE PLAN TLK DOCD: CPT | Mod: HCNC,CPTII,S$GLB, | Performed by: NURSE PRACTITIONER

## 2024-08-14 PROCEDURE — 1126F AMNT PAIN NOTED NONE PRSNT: CPT | Mod: HCNC,CPTII,S$GLB, | Performed by: NURSE PRACTITIONER

## 2024-08-14 PROCEDURE — 1170F FXNL STATUS ASSESSED: CPT | Mod: HCNC,CPTII,S$GLB, | Performed by: NURSE PRACTITIONER

## 2024-08-14 PROCEDURE — G0439 PPPS, SUBSEQ VISIT: HCPCS | Mod: HCNC,S$GLB,, | Performed by: NURSE PRACTITIONER

## 2024-08-14 PROCEDURE — 1159F MED LIST DOCD IN RCRD: CPT | Mod: HCNC,CPTII,S$GLB, | Performed by: NURSE PRACTITIONER

## 2024-08-14 PROCEDURE — 1160F RVW MEDS BY RX/DR IN RCRD: CPT | Mod: HCNC,CPTII,S$GLB, | Performed by: NURSE PRACTITIONER

## 2024-08-14 NOTE — PATIENT INSTRUCTIONS
Counseling and Referral of Other Preventative  (Italic type indicates deductible and co-insurance are waived)    Patient Name: Tanesha Davis  Today's Date: 8/14/2024    Health Maintenance       Date Due Completion Date    RSV Vaccine (Age 60+ and Pregnant patients) (1 - 1-dose 60+ series) 08/14/2024 (Originally 11/23/2012) ---    Shingles Vaccine (1 of 2) 08/14/2025 (Originally 11/23/2002) ---    COVID-19 Vaccine (3 - 2023-24 season) 08/14/2025 (Originally 9/1/2023) 3/26/2021    Pneumococcal Vaccines (Age 65+) (1 of 2 - PCV) 08/14/2025 (Originally 11/23/1956) ---    Influenza Vaccine (1) 09/01/2024 11/6/2019    Mammogram 07/11/2025 7/11/2024    DEXA Scan 01/12/2026 1/12/2023    Colorectal Cancer Screening 05/09/2026 5/9/2023    Lipid Panel 10/11/2028 10/11/2023    TETANUS VACCINE 02/23/2034 2/23/2024        Orders Placed This Encounter   Procedures    Ambulatory referral/consult to ENT    Ambulatory referral/consult to Audiology     The following information is provided to all patients.  This information is to help you find resources for any of the problems found today that may be affecting your health:                  Living healthy guide: www.[facility].[place].gov      Understanding Diabetes: www.diabetes.org      Eating healthy: www.cdc.gov/healthyweight      CDC home safety checklist: www.cdc.gov/steadi/patient.html      Agency on Aging: www.goea.[place].[facility]      Alcoholics anonymous (AA): www.aa.org      Physical Activity: www.nicole.nih.gov/et8riva      Tobacco use: www.quitwithusla.org

## 2024-08-14 NOTE — PROGRESS NOTES
"  Tanesha Davis presented for an initial Medicare AWV today. The following components were reviewed and updated:    Medical history  Family History  Social history  Allergies and Current Medications  Health Risk Assessment  Health Maintenance  Care Team    **See Completed Assessments for Annual Wellness visit with in the encounter summary    The following assessments were completed:  Depression Screening  Cognitive function Screening  Timed Get Up Test  Whisper Test      Opioid documentation:      Patient does not have a current opioid prescription.          Vitals:    08/14/24 0940   BP: 116/66   Pulse: 70   SpO2: 98%   Weight: 78.3 kg (172 lb 9.9 oz)   Height: 5' 5" (1.651 m)     Body mass index is 28.73 kg/m².       Physical Exam  Constitutional:       Appearance: Normal appearance.   Pulmonary:      Effort: Pulmonary effort is normal.      Breath sounds: Normal breath sounds.   Neurological:      Mental Status: She is alert.           Diagnoses and health risks identified today and associated recommendations/orders:  1. Encounter for Medicare annual wellness exam  Annual Health Risk Assessment (HRA) visit today.  Counseling and referral of health maintenance and preventative health measures performed.  Patient given annual wellness paperwork to take home.  Encouraged to return in 1 year for subsequent HRA visit.   - Ambulatory Referral/Consult to Enhanced Annual Wellness Visit (eAWV)    2. Musculoskeletal back pain  Chronic. Stable. Continue current treatment plan as previously prescribed by PCP.    3. Chronic pain of both knees  Chronic. Stable. Continue current treatment plan as previously prescribed by PCP.    4. Functional constipation  Chronic. Stable. Continue current treatment plan as previously prescribed by PCP.    5. Bloating  Chronic. Stable. Continue current treatment plan as previously prescribed by PCP.    6. History of colon cancer  Chronic. Stable. Continue current treatment plan as previously " prescribed by PCP.    7. Postmenopausal status  Chronic. Stable. Continue current treatment plan as previously prescribed by PCP.    8. Postmenopausal hormone replacement therapy  Chronic. Stable. Continue current treatment plan as previously prescribed by PCP.    9. Spondylosis without myelopathy or radiculopathy, lumbar region  Chronic. Stable. Continue current treatment plan as previously prescribed by PCP.    10. Insomnia, unspecified type  Chronic. Stable. Continue current treatment plan as previously prescribed by PCP.    11. Nocturnal leg cramps  Chronic. Stable. Continue current treatment plan as previously prescribed by PCP.    12. Aortic atherosclerosis   Chronic. Stable. Continue current treatment plan as previously prescribed by PCP.    Provided Tanesha with a 5-10 year written screening schedule and personal prevention plan. Recommendations were developed using the USPSTF age appropriate recommendations. Education, counseling, and referrals were provided as needed.  After Visit Summary printed and given to patient which includes a list of additional screenings\tests needed.    No follow-ups on file.      Timothy Mireles NP

## 2024-08-30 ENCOUNTER — OFFICE VISIT (OUTPATIENT)
Dept: PHYSICAL MEDICINE AND REHAB | Facility: CLINIC | Age: 74
End: 2024-08-30
Payer: MEDICARE

## 2024-08-30 ENCOUNTER — HOSPITAL ENCOUNTER (OUTPATIENT)
Dept: RADIOLOGY | Facility: HOSPITAL | Age: 74
Discharge: HOME | End: 2024-08-30
Attending: PHYSICAL MEDICINE & REHABILITATION
Payer: MEDICARE

## 2024-08-30 VITALS — HEIGHT: 65 IN | WEIGHT: 173.38 LBS | OXYGEN SATURATION: 98 % | BODY MASS INDEX: 28.89 KG/M2

## 2024-08-30 DIAGNOSIS — R29.6 FALLS FREQUENTLY: Primary | ICD-10-CM

## 2024-08-30 DIAGNOSIS — M48.061 SPINAL STENOSIS OF LUMBAR REGION WITHOUT NEUROGENIC CLAUDICATION: ICD-10-CM

## 2024-08-30 DIAGNOSIS — M51.36 DDD (DEGENERATIVE DISC DISEASE), LUMBAR: ICD-10-CM

## 2024-08-30 DIAGNOSIS — M48.061 NEURAL FORAMINAL STENOSIS OF LUMBAR SPINE: ICD-10-CM

## 2024-08-30 DIAGNOSIS — M43.16 SPONDYLOLISTHESIS, LUMBAR REGION: ICD-10-CM

## 2024-08-30 DIAGNOSIS — M47.816 LUMBAR FACET ARTHROPATHY: ICD-10-CM

## 2024-08-30 DIAGNOSIS — G60.9 IDIOPATHIC PERIPHERAL NEUROPATHY: ICD-10-CM

## 2024-08-30 PROCEDURE — 72110 X-RAY EXAM L-2 SPINE 4/>VWS: CPT | Mod: TC,HCNC

## 2024-08-30 PROCEDURE — 72110 X-RAY EXAM L-2 SPINE 4/>VWS: CPT | Mod: 26,HCNC,, | Performed by: RADIOLOGY

## 2024-08-30 PROCEDURE — 99999 PR PBB SHADOW E&M-EST. PATIENT-LVL III: CPT | Mod: PBBFAC,HCNC,, | Performed by: PHYSICAL MEDICINE & REHABILITATION

## 2024-08-30 NOTE — PROGRESS NOTES
Subjective:       Patient ID: Tanesha Davis is a 73 y.o. female.    Chief Complaint: No chief complaint on file.      HPI      Mrs. Davis is a 73-year-old female with past medical history of arthritis, ventral hernia status post repair in 06/2023, colon cancer status post treatment in 2006, chronic low back pain, idiopathic peripheral neuropathy.  She was referred to the Physical Medicine Clinic by Podiatry for help with C and frequent falls    The patient has history of chronic low back pain of few years' duration.  He was previously followed up at the pain clinic.  MRI of the lumbar spine on 5/6/2022 showed multilevel degenerative disc disease, facet arthropathy, spinal canal stenosis, moderate at L4-5 and mild-to-moderate at L3-4), and multilevel neural foraminal stenosis (mostly mi).  She is status post diagnostic right L3-5 medial branch blocks on 6/28/2022 and 8/22/2022, followed by right L3-5 medial branch RFA on 09/27/2022.  She reports resolution of her back and flank pain since.    She also has history of bilateral foot numbness of couple years duration.  It happens mostly at night.  It is mild.  She denies any history of diabetes.  Is no history of alcohol abuse.  Was referred to Podiatry and evaluated on 6/12/2024.  Protective sensation to 10 g monofilament was normal.  He had some bunion deformities.  She reported a history of falls and was referred to our clinic.      The patient reports she started to having falls in 04/2024.  It happened after tripping.  However, in 05/2024 she fell forward again.  06/2024 she had another fall with blunt trauma to her lips causing laceration and requiring stitching.  He had couple other falls since without serious injuries.  During these falls, she denies any loss of consciousness.  She denies seizure activity.  She denies palpitations.  She denies any leg weakness or her legs giving out on her.  She denies any flare-up of her back or knee pain.  She denies any  "sciatica.  Review of her medication does not show that she is on any potentially sedating medications.      Past Medical History:   Diagnosis Date    Arthritis     Draining cutaneous sinus tract     Headache(784.0)     Incarcerated ventral hernia 02/01/2023    Incisional hernia     Insomnia     Nocturnal leg cramps     Nonhealing surgical wound     PONV (postoperative nausea and vomiting)     with last surgery per patient, "Severe" PONV 2-27-14    Postmenopausal status     Rectosigmoid cancer 2005    Small bowel obstruction 11/08/2022    Ventral hernia with obstruction and without gangrene 11/22/2022        Review of patient's allergies indicates:   Allergen Reactions    Adhesive Rash     Asking that we use Paper Tape,  Uses "Sensitive Skin" band aids    Amoxil [amoxicillin] Hives    Penicillins Hives and Swelling     Swelling of ears and neck    Sulfa (sulfonamide antibiotics) Hives and Itching        Review of Systems   Constitutional:  Positive for fatigue. Negative for chills and fever.   Eyes:  Negative for visual disturbance.   Respiratory:  Negative for shortness of breath.    Cardiovascular:  Negative for chest pain.   Gastrointestinal:  Negative for nausea and vomiting.   Genitourinary:  Negative for difficulty urinating.   Musculoskeletal:  Negative for arthralgias, back pain, gait problem and neck pain.   Neurological:  Positive for headaches. Negative for dizziness, vertigo, tremors, seizures and weakness.   Psychiatric/Behavioral:  Negative for behavioral problems and sleep disturbance.            Objective:      Physical Exam  Vitals reviewed.   Constitutional:       Appearance: She is well-developed.   HENT:      Head: Normocephalic and atraumatic.   Eyes:      Extraocular Movements: Extraocular movements intact.   Musculoskeletal:      Cervical back: Normal range of motion. No tenderness.      Comments: BUE:  ROM:   RUE: full.   LUE: full.  Strength:    RUE: 5/5 at shoulder abduction, 5 elbow " flexion, 5 elbow extension, 5 hand .   LUE: 5/5 at shoulder abduction, 5 elbow flexion, 5 elbow extension, 5 hand .  Sensation to pinprick:   RUE: intact.   LUE: intact.  DTR:    RUE: +1 biceps, +1 triceps.   LUE:  +1 biceps, +1 triceps.  Day:   RUE: -ve.   LUE: -ve.    BLE:  ROM:   RLE: full.   LLE: full.  Knee crepitus:   RLE: -ve.   LLE: -ve.   Strength:    RLE: 5/5 at hip flexion, 5 knee extension, 5 ankle DF, 5 PF, 5 EHL.   LLE: 5/5 at hip flexion, 5 knee extension, 5 ankle DF, 5 PF, 5 EHL.  Sensation to pinprick:     RLE: intact.      LLE: intact.   DTR:     RLE: +1 knee, +1 ankle.    LLE: +1 knee, +1 ankle.  Clonus:    Rt ankle: -ve.    Lt ankle: -ve.  SLR:      RLE: -ve at 70 degrees.      LLE: -ve at 70 degrees.   RADHA:     RLE: -ve.      LLE: -ve.  -ve tenderness over lumbar spine.  No leg length discrepancy.    Directional Preference:  Spine flexion: 90 degrees , no pain in back.  Spine extension: 20 degrees, no pain in back.  Lateral bending: no pain to Right, no pain to Left.      Heel walking: WNL.  Toe walking: WNL.  Gait: WNL     Skin:     General: Skin is warm.   Neurological:      General: No focal deficit present.      Mental Status: She is alert and oriented to person, place, and time.      Cranial Nerves: No cranial nerve deficit.      Sensory: No sensory deficit.      Motor: No weakness.      Coordination: Coordination normal.      Gait: Gait normal.      Deep Tendon Reflexes: Reflexes normal.      Comments: Romberg's test: -ve.  Finger to nose: WNL bilaterally.  Heel to toe walking: WNL.       Psychiatric:         Behavior: Behavior normal.         IMAGING STUDIES:      MRI LUMBAR SPINE WITHOUT CONTRAST (5/6/2022):     CLINICAL HISTORY:  Low back pain, symptoms persist with > 6wks conservative treatment; Dorsalgia, unspecified     TECHNIQUE:  Multiplanar, multisequence MR images were acquired from the thoracolumbar junction to the sacrum without the administration of contrast.      COMPARISON:  Radiograph 04/25/2022, MRI 04/26/2021.     FINDINGS:  Lumbar spine alignment demonstrates mild dextroscoliosis with grade 1 retrolisthesis of T12 on L1 and grade 1 anterolisthesis of L3 on L4 and L4 on L5, similar when compared to the previous MRI.  No spondylolysis.  Vertebral body heights are well maintained without evidence for fracture.  No marrow signal abnormality to suggest an infiltrative process.     There is degenerative disc space narrowing and desiccation throughout the visualized thoracolumbar spine most pronounced at T11-T12, T12-L1 and L5-S1 noting chronic degenerative endplate changes.  No endplate edema.     Distal spinal cord demonstrates normal contour and signal intensity.  Cauda equina appears normal without findings to suggest arachnoiditis.  Conus medullaris terminates at L1.     Multiple T2 hyperintense bilateral cortical renal lesions, likely cysts.  Remaining visualized abdominal/pelvic structures demonstrate no significant abnormalities.  There is mild fatty degeneration of the posterior paraspinal musculature.  SI joints are symmetric.     T12-L1: Grade 1 retrolisthesis.  Circumferential disc bulge that encroaches into the right neural foramen.  Right facet arthropathy and bilateral ligamentum flavum buckling.  Findings contribute to mild right neural foraminal narrowing.  No spinal canal stenosis.     L1-L2: Circumferential disc bulge encroaches into the left neural foramen.  Bilateral facet arthropathy and bilateral ligamentum flavum buckling.  Findings contribute to mild left neural foraminal narrowing.  No spinal canal stenosis.     L2-L3: Circumferential disc bulge, bilateral facet arthropathy and bilateral ligamentum flavum buckling.  Findings contribute to mild left neural foraminal narrowing.  No spinal canal stenosis.     L3-L4: Grade 1 anterolisthesis with uncovering of the intervertebral discs.  Bilateral facet arthropathy and bilateral ligamentum flavum  buckling.  Findings contribute to mild-to-moderate spinal canal stenosis.  No neural foraminal narrowing.     L4-L5: Grade 1 anterolisthesis with uncovering of the intervertebral disc.  Bilateral facet arthropathy and bilateral ligamentum flavum buckling.  Findings contribute to moderate spinal canal stenosis and mild left neural foraminal narrowing.     L5-S1: Circumferential disc bulge encroaches into the bilateral foraminal zones and abuts the right descending S1 nerve root.  Bilateral facet arthropathy.  Findings contribute to mild bilateral neural foraminal narrowing.     Impression:     1. Lumbar degenerative changes, most pronounced at L3-L4 and L4-L5 and slightly progressed when compared to MRI dated 04/26/2021.            Assessment:       1. Falls frequently    2. Idiopathic peripheral neuropathy    3. DDD (degenerative disc disease), lumbar    4. Lumbar facet arthropathy    5. Spinal stenosis of lumbar region without neurogenic claudication    6. Neural foraminal stenosis of lumbar spine    7. Spondylolisthesis, lumbar region        Plan:       73-year-old female with a history of neuropathic pain in her feet for couple years duration.  No apparent cause.  History of falls with the last 4 months without any apparent neurological, musculoskeletal, cardiac cause.  No cerebellar findings.  Physical exam is nonrevealing.  Has history of chronic low back pain that has been recently under good control, but with imaging studies positive for spinal stenosis.  I will order x-ray of the lumbar spine with flexion-extension just to follow-up on her history of DJD and spondylolisthesis.  Worsening spinal stenosis could possibly cause falls.  I will refer her to Neurology for more detailed workup of neuropathy falls.  Neuropathic pain is mild.  I will hold off on treatment with gabapentin to avoid increasing the risk of falls.  She is to return to the clinic in 3-4 months' time for follow-up      This was a 45 minute  visit, 50% of which was spent educating the patient about the diagnosis and the treatment plan.    This note was partly generated with [employer] voice recognition software. I apologize for any possible typographical errors.

## 2024-09-14 DIAGNOSIS — N95.1 MENOPAUSAL SYMPTOMS: ICD-10-CM

## 2024-09-16 ENCOUNTER — OFFICE VISIT (OUTPATIENT)
Dept: OTOLARYNGOLOGY | Facility: CLINIC | Age: 74
End: 2024-09-16
Payer: MEDICARE

## 2024-09-16 ENCOUNTER — LAB VISIT (OUTPATIENT)
Dept: LAB | Facility: HOSPITAL | Age: 74
End: 2024-09-16
Attending: NURSE PRACTITIONER
Payer: MEDICARE

## 2024-09-16 ENCOUNTER — CLINICAL SUPPORT (OUTPATIENT)
Dept: AUDIOLOGY | Facility: CLINIC | Age: 74
End: 2024-09-16
Payer: MEDICARE

## 2024-09-16 VITALS
BODY MASS INDEX: 28.91 KG/M2 | DIASTOLIC BLOOD PRESSURE: 74 MMHG | WEIGHT: 173.5 LBS | SYSTOLIC BLOOD PRESSURE: 126 MMHG | HEIGHT: 65 IN

## 2024-09-16 DIAGNOSIS — H90.3 ASYMMETRICAL SENSORINEURAL HEARING LOSS: Primary | ICD-10-CM

## 2024-09-16 DIAGNOSIS — H91.90 HEARING LOSS, UNSPECIFIED HEARING LOSS TYPE, UNSPECIFIED LATERALITY: ICD-10-CM

## 2024-09-16 DIAGNOSIS — H90.42 SENSORINEURAL HEARING LOSS (SNHL) OF LEFT EAR WITH UNRESTRICTED HEARING OF RIGHT EAR: ICD-10-CM

## 2024-09-16 DIAGNOSIS — H90.3 ASYMMETRICAL SENSORINEURAL HEARING LOSS: ICD-10-CM

## 2024-09-16 LAB
CREAT SERPL-MCNC: 0.8 MG/DL (ref 0.5–1.4)
EST. GFR  (NO RACE VARIABLE): >60 ML/MIN/1.73 M^2

## 2024-09-16 PROCEDURE — 3008F BODY MASS INDEX DOCD: CPT | Mod: CPTII,S$GLB,, | Performed by: NURSE PRACTITIONER

## 2024-09-16 PROCEDURE — 3288F FALL RISK ASSESSMENT DOCD: CPT | Mod: CPTII,S$GLB,, | Performed by: NURSE PRACTITIONER

## 2024-09-16 PROCEDURE — 1126F AMNT PAIN NOTED NONE PRSNT: CPT | Mod: CPTII,S$GLB,, | Performed by: NURSE PRACTITIONER

## 2024-09-16 PROCEDURE — 92567 TYMPANOMETRY: CPT | Mod: S$GLB,,,

## 2024-09-16 PROCEDURE — 1100F PTFALLS ASSESS-DOCD GE2>/YR: CPT | Mod: CPTII,S$GLB,, | Performed by: NURSE PRACTITIONER

## 2024-09-16 PROCEDURE — 36415 COLL VENOUS BLD VENIPUNCTURE: CPT | Mod: HCNC | Performed by: NURSE PRACTITIONER

## 2024-09-16 PROCEDURE — 82565 ASSAY OF CREATININE: CPT | Mod: HCNC | Performed by: NURSE PRACTITIONER

## 2024-09-16 PROCEDURE — 1159F MED LIST DOCD IN RCRD: CPT | Mod: CPTII,S$GLB,, | Performed by: NURSE PRACTITIONER

## 2024-09-16 PROCEDURE — 3078F DIAST BP <80 MM HG: CPT | Mod: CPTII,S$GLB,, | Performed by: NURSE PRACTITIONER

## 2024-09-16 PROCEDURE — 3074F SYST BP LT 130 MM HG: CPT | Mod: CPTII,S$GLB,, | Performed by: NURSE PRACTITIONER

## 2024-09-16 PROCEDURE — 92557 COMPREHENSIVE HEARING TEST: CPT | Mod: S$GLB,,,

## 2024-09-16 PROCEDURE — 99213 OFFICE O/P EST LOW 20 MIN: CPT | Mod: S$GLB,,, | Performed by: NURSE PRACTITIONER

## 2024-09-16 RX ORDER — ESTRADIOL 0.5 MG/1
0.5 TABLET ORAL
Qty: 90 TABLET | Refills: 0 | Status: SHIPPED | OUTPATIENT
Start: 2024-09-16

## 2024-09-16 NOTE — PROGRESS NOTES
"OTOLARYNGOLOGY CLINIC NOTE  Date:  09/16/2024     Chief complaint:  Chief Complaint   Patient presents with    Hearing Loss     Hearing loss recently, found by pcp during annual     History of Present Illness  Tanesha Davis is a 73 y.o. female  presenting today for a new evaluation and treatment of hearing loss.  Pt reports she did not notice any change in her hearing but at her annual wellness exam she was advised she needed a hearing test.  Pt reports denies any otalgia, otorrhea, or tinnitus. Pt reports recently having fall where the left side of her face hit the cement with a loud bang at the time.  Pt reports her left side was severely bruised.  Pt reports she is having eye surgery on Wednesday as a result of the fall.  Pt reports she hadn't notice any changes in her hearing at the time or now.      Past Medical History  Past Medical History:   Diagnosis Date    Arthritis     Draining cutaneous sinus tract     Headache(784.0)     Incarcerated ventral hernia 02/01/2023    Incisional hernia     Insomnia     Nocturnal leg cramps     Nonhealing surgical wound     PONV (postoperative nausea and vomiting)     with last surgery per patient, "Severe" PONV 2-27-14    Postmenopausal status     Rectosigmoid cancer 2005    Small bowel obstruction 11/08/2022    Ventral hernia with obstruction and without gangrene 11/22/2022      Past Surgical History  Past Surgical History:   Procedure Laterality Date    BREAST BIOPSY Left 1999    CHOLECYSTECTOMY  02/24/2014    with incisional hernia repair with mesh    COLON SURGERY      COLONOSCOPY N/A 08/23/2016    Procedure: COLONOSCOPY;  Surgeon: Cuco Meraz MD;  Location: [address]);  Service: Endoscopy;  Laterality: N/A;    COLONOSCOPY N/A 12/09/2019    Procedure: COLONOSCOPY;  Surgeon: KISHORE Curry MD;  Location: [address]);  Service: Endoscopy;  Laterality: N/A;  ADHESIVE Allergy    COLONOSCOPY N/A 05/09/2023    Procedure: COLONOSCOPY;  Surgeon: KISHORE Norwood" MD Patricio;  Location: [facility] ENDO (4TH FLR);  Service: Endoscopy;  Laterality: N/A;  Instructions to portal.EC  precall no answer 5/3 EB    ESOPHAGOGASTRODUODENOSCOPY N/A 04/22/2021    Procedure: EGD (ESOPHAGOGASTRODUODENOSCOPY);  Surgeon: Lukasz Lindquist MD;  Location: [facility] ENDO (4TH FLR);  Service: Endoscopy;  Laterality: N/A;  3/25-covid + 7/2020 at [facility] getting results to us to see if correct form of test-MS    HERNIA REPAIR      HYSTERECTOMY      INJECTION OF ANESTHETIC AGENT AROUND NERVE Right 06/28/2022    Procedure: BLOCK, NERVE, RIGHT L2-L3-L4-L5;  Surgeon: Shabnam Skaggs MD;  Location: [facility] PAIN MGT;  Service: Pain Management;  Laterality: Right;    INJECTION OF ANESTHETIC AGENT AROUND NERVE Right 08/02/2022    Procedure: BLOCK, NERVE, RIGHT L2-L5 MEDIAL BRANCH;  Surgeon: Shabnam Skaggs MD;  Location: [facility] PAIN MGT;  Service: Pain Management;  Laterality: Right;    KNEE ARTHROSCOPY      KNEE SURGERY      right and left knee repair    OOPHORECTOMY      RADIOFREQUENCY ABLATION Right 09/27/2022    Procedure: Radiofrequency Ablation Right L2-L5;  Surgeon: Shabnam Skaggs MD;  Location: [facility] PAIN MGT;  Service: Pain Management;  Laterality: Right;    RELEASE-FASCIA Bilateral 06/12/2023    Procedure: RELEASE-FASCIA -TRANSVERSUS ABDOMINIS;  Surgeon: Jody Ramírez MD;  [facility];  Service: General;  Laterality: Bilateral;    REPAIR OF RECURRENT INCISIONAL HERNIA  07/10/2018    Procedure: REPAIR, HERNIA, INCISIONAL, RECURRENT;  Surgeon: Cuco Meraz MD;  [facility];  Service: Colon and Rectal;;    REPAIR, HERNIA, INCISIONAL OR VENTRAL, WITHOUT HISTORY OF PRIOR REPAIR N/A 06/12/2023    Procedure: REPAIR, HERNIA, INCISIONAL OR VENTRAL, WITH HISTORY OF PRIOR  REPAIR (WITH MESH AND POSTERIOR COMPONENTS SEPARATION);  Surgeon: Jody Ramírez MD;  Location: [facility] OR 2ND FLR;  Service: General;  Laterality: N/A;    SIGMOIDECTOMY  03/20/2006    LAR     SPINE SURGERY  9/27/2022    Back abalisons L3, L4, L5, L6      Medications  Current Outpatient Medications on File Prior to Visit   Medication Sig Dispense Refill    atorvastatin (LIPITOR) 40 MG tablet Take 1 tablet (40 mg total) by mouth once daily. 90 tablet 3    ERGOCALCIFEROL, VITAMIN D2, (VITAMIN D ORAL) Take 1 capsule by mouth every morning.       estradioL (ESTRACE) 0.5 MG tablet TAKE 1 TABLET BY MOUTH EVERY DAY 90 tablet 0    L GASSERI/B BIFIDUM/B LONGUM ([employer] ORAL) Take 1 tablet by mouth 2 (two) times daily.      linaCLOtide (LINZESS) 72 mcg Cap capsule Take 1 capsule (72 mcg total) by mouth once daily. (Patient taking differently: Take 72 mcg by mouth once daily. prn) 30 capsule 5    VITAMIN E ACETATE (VITAMIN E ORAL) Take 1 capsule by mouth every morning.        No current facility-administered medications on file prior to visit.     Review of Systems  Review of Systems   Constitutional:  Positive for malaise/fatigue.   HENT: Negative.     Eyes:         Left eye floaters    Respiratory: Negative.     Cardiovascular: Negative.    Gastrointestinal: Negative.    Genitourinary: Negative.    Musculoskeletal:  Positive for back pain.   Skin: Negative.    Psychiatric/Behavioral:  The patient is nervous/anxious.       Social History   reports that she has never smoked. She has never used smokeless tobacco. She reports current alcohol use of about 1.0 standard drink of alcohol per week. She reports that she does not use drugs.     Family History  Family History   Problem Relation Name Age of Onset    Breast cancer Mother Rhonda Vora 56    Cancer Mother Rhonda Vora         BREAST    Arthritis Mother Rhonda Vora     Heart disease Father Noel Jennings     Diabetes Brother Mahnaz Alexander     Learning disabilities Son Germain Garcia     Heart disease Maternal Grandmother Carol Vora     Colon polyps Neg Hx      Ovarian cancer Neg Hx      Anesthesia problems Neg Hx        Physical Exam   Vitals:     "09/16/24 1009   BP: 126/74    Body mass index is 28.87 kg/m².  Weight: 78.7 kg (173 lb 8 oz)   Height: 5' 5" (165.1 cm)     GENERAL: no acute distress.  HEAD: normocephalic.   EYES: lids and lashes normal. No scleral icterus  EARS: external ear without lesion, normal pinna shape and position.  External auditory canal with normal cerumen, tympanic membrane fully visible, no perforation , no retraction. No middle ear effusion. Ossicles intact.   NOSE: external nose without significant bony abnormality  ORAL CAVITY/OROPHARYNX: tongue midline and mobile. Symmetric palate rise. Uvula midline.   NECK: trachea midline.   LYMPH NODES:No cervical lymphadenopathy.  RESPIRATORY: no stridor, no stertor. Voice normal. Respirations nonlabored.  NEURO: alert, responds to questions appropriately.   Cranial nerve exam as indicated in above sections and additionally showed facial movement symmetric with good eye closure and symmetric smile.   PSYCH:mood appropriate    Imaging:  The patient does not have any pertinent and/or recent imaging of the head and neck.     Labs:  CBC  Recent Labs   Lab 06/17/23  0308 10/11/23  0928 06/11/24  1555   WBC 5.12 5.45 5.80   Hemoglobin 9.3 L 12.5 12.0   Hematocrit 29.4 L 40.6 38.3   MCV 92 90 91   Platelets 205 225 237     BMP  Recent Labs   Lab 06/15/23  0353 06/15/23  1719 06/16/23  0410 06/17/23  0308 10/11/23  0928 06/11/24  1555   Glucose 112 H   < > 82 97 83 105   Sodium 127 L   < > 137 144 142 142   Potassium 4.0   < > 3.8 3.5 4.3 3.8   Chloride 97   < > 105 109 107 107   CO2 22 L   < > 25 26 29 27   BUN 12   < > 8 6 L 10 9   Creatinine 0.6   < > 0.6 0.6 0.8 0.7   Calcium 8.3 L   < > 8.5 L 8.3 L 9.6 9.2   Phosphorus 3.0  --  2.9 3.4  --   --    Magnesium 1.8  --  2.0 2.0  --   --     < > = values in this interval not displayed.     COAGS  Recent Labs   Lab 06/12/23  1731   INR 1.0       AUDIOLOGY RESULTS  Audiometric evaluation including audiogram, tympanometry, acoustic reflexes, and speech " discrimination which was performed  was personally reviewed and interpreted.  Notable findings on the audiogram were essentially normal hearing sensitivity on the right with mild hearing loss at 8K Hz.  Normal sloping to moderate sensorineural hearing loss (SNHL) on the left.  Tympanometry revealed Type A tympanogram on the left and Type A tympanogram on the right. Speech discrimination was 100%  on the left, and 100% on the right.   Report of the audiologist performing this audiometric testing was also reviewed.    Assessment  1. Asymmetrical sensorineural hearing loss  - Creatinine, Serum; Future    2. Sensorineural hearing loss (SNHL) of left ear with unrestricted hearing of right ear  - MRI IAC/Temporal Bones W W/O Contrast; Future    3. Hearing loss, unspecified hearing loss type, unspecified laterality  - Ambulatory referral/consult to ENT     Plan:  ASNHL:  Audiogram reviewed and discussed with patient. Pt advised she will need a MRI to check for acoustic neuroma.  Recheck hearing in 1 year or sooner if subjective change noted. Encouraged hearing protection while in noisy environments.  Additionally, amplification with hearing aids is generally the best option for hearing rehabilitation, except where the hearing loss is profound.  Discussed plan of care with patient in detail and all questions answered. Patient reported understanding of plan of care.

## 2024-09-16 NOTE — PROGRESS NOTES
Please click on link to view Audiogram:  Document on 9/16/2024 9:49 AM by Cassandra Starr AU.D: Audiogram    Ms. Tanesha Davis, a 73 y.o. female, was seen in the clinic today for an audiological evaluation.     Otoscopy clear bilaterally. Tympanometry testing revealed a Type A tympanogram for the right ear and a Type A tympanogram for the left ear.     Audiological testing revealed a mild high frequency sensorineural hearing loss (SNHL) for the right ear and a mild to moderately-severe high frequency SNHL for the left ear. A speech reception threshold was obtained at 30 dBHL for the right ear and at 30 dBHL for the left ear. Speech discrimination was 100% for the right ear and 100% for the left ear.      Recommendations:  1. Otologic evaluation  2. Annual audiological evaluation or sooner if hearing changes  3. Hearing protection when in noise   4. Hearing aid consultation following medical clearance if Ms. Davis feels hearing loss negatively impacts quality of life

## 2024-09-30 ENCOUNTER — HOSPITAL ENCOUNTER (OUTPATIENT)
Dept: RADIOLOGY | Facility: HOSPITAL | Age: 74
Discharge: HOME | End: 2024-09-30
Attending: NURSE PRACTITIONER
Payer: MEDICARE

## 2024-09-30 DIAGNOSIS — H90.42 SENSORINEURAL HEARING LOSS (SNHL) OF LEFT EAR WITH UNRESTRICTED HEARING OF RIGHT EAR: ICD-10-CM

## 2024-09-30 PROCEDURE — 70553 MRI BRAIN STEM W/O & W/DYE: CPT | Mod: 26,HCNC,, | Performed by: RADIOLOGY

## 2024-09-30 PROCEDURE — 25500020 PHARM REV CODE 255: Mod: HCNC | Performed by: NURSE PRACTITIONER

## 2024-09-30 PROCEDURE — A9585 GADOBUTROL INJECTION: HCPCS | Mod: HCNC | Performed by: NURSE PRACTITIONER

## 2024-09-30 PROCEDURE — 70553 MRI BRAIN STEM W/O & W/DYE: CPT | Mod: TC,HCNC

## 2024-09-30 RX ORDER — GADOBUTROL 604.72 MG/ML
9 INJECTION INTRAVENOUS
Status: COMPLETED | OUTPATIENT
Start: 2024-09-30 | End: 2024-09-30

## 2024-09-30 RX ADMIN — GADOBUTROL 9 ML: 604.72 INJECTION INTRAVENOUS at 12:09

## 2024-12-15 DIAGNOSIS — N95.1 MENOPAUSAL SYMPTOMS: ICD-10-CM

## 2024-12-16 RX ORDER — ESTRADIOL 0.5 MG/1
0.5 TABLET ORAL
Qty: 90 TABLET | Refills: 0 | Status: SHIPPED | OUTPATIENT
Start: 2024-12-16

## 2024-12-30 DIAGNOSIS — N95.1 MENOPAUSAL SYMPTOMS: ICD-10-CM

## 2024-12-30 RX ORDER — ESTRADIOL 0.5 MG/1
0.5 TABLET ORAL
Qty: 90 TABLET | Refills: 0 | Status: SHIPPED | OUTPATIENT
Start: 2024-12-30

## 2025-01-04 DIAGNOSIS — R79.9 ABNORMAL FINDING OF BLOOD CHEMISTRY, UNSPECIFIED: Primary | ICD-10-CM

## 2025-01-04 NOTE — TELEPHONE ENCOUNTER
Care Due:                  Date            Visit Type   Department     Provider  --------------------------------------------------------------------------------                                MYCHART                              FOLLOWUP/OF  [facility] INTERNAL  Last Visit: 06-      FICE VISIT   MEDICINE       Chance Jassrenny Carpenter  Next Visit: None Scheduled  None         None Found                                                            Last  Test          Frequency    Reason                     Performed    Due Date  --------------------------------------------------------------------------------    Lipid Panel.  12 months..  atorvastatin.............  10-   10-    [facility] Embedded Care Due Messages. Reference number: [id number].   1/04/2025 12:29:30 AM CST

## 2025-01-06 RX ORDER — ATORVASTATIN CALCIUM 40 MG/1
40 TABLET, FILM COATED ORAL DAILY
Qty: 90 TABLET | Refills: 3 | Status: SHIPPED | OUTPATIENT
Start: 2025-01-06 | End: 2026-01-06

## 2025-01-06 NOTE — TELEPHONE ENCOUNTER
Pended lab needed from the care gap.    LOV:08/14/2024 with CLAY Mireles  Allergies confirmed

## 2025-01-17 ENCOUNTER — LAB VISIT (OUTPATIENT)
Dept: LAB | Facility: HOSPITAL | Age: 75
End: 2025-01-17
Attending: PSYCHIATRY & NEUROLOGY
Payer: MEDICARE

## 2025-01-17 ENCOUNTER — OFFICE VISIT (OUTPATIENT)
Facility: CLINIC | Age: 75
End: 2025-01-17
Payer: MEDICARE

## 2025-01-17 VITALS
DIASTOLIC BLOOD PRESSURE: 74 MMHG | BODY MASS INDEX: 29.41 KG/M2 | HEIGHT: 65 IN | HEART RATE: 64 BPM | WEIGHT: 176.5 LBS | SYSTOLIC BLOOD PRESSURE: 123 MMHG

## 2025-01-17 DIAGNOSIS — R29.6 FALLS FREQUENTLY: ICD-10-CM

## 2025-01-17 DIAGNOSIS — G60.9 IDIOPATHIC PERIPHERAL NEUROPATHY: ICD-10-CM

## 2025-01-17 DIAGNOSIS — R25.2 CRAMPS OF LOWER EXTREMITY: ICD-10-CM

## 2025-01-17 DIAGNOSIS — M48.061 SPINAL STENOSIS OF LUMBAR REGION WITHOUT NEUROGENIC CLAUDICATION: ICD-10-CM

## 2025-01-17 DIAGNOSIS — R25.2 CRAMPS OF LOWER EXTREMITY: Primary | ICD-10-CM

## 2025-01-17 DIAGNOSIS — G25.81 RESTLESS LEG SYNDROME: ICD-10-CM

## 2025-01-17 LAB
CK SERPL-CCNC: 78 U/L (ref 20–180)
FOLATE SERPL-MCNC: 11.1 NG/ML (ref 4–24)

## 2025-01-17 PROCEDURE — 84425 ASSAY OF VITAMIN B-1: CPT | Mod: HCNC | Performed by: PSYCHIATRY & NEUROLOGY

## 2025-01-17 PROCEDURE — 1126F AMNT PAIN NOTED NONE PRSNT: CPT | Mod: HCNC,CPTII,S$GLB, | Performed by: PSYCHIATRY & NEUROLOGY

## 2025-01-17 PROCEDURE — 3078F DIAST BP <80 MM HG: CPT | Mod: HCNC,CPTII,S$GLB, | Performed by: PSYCHIATRY & NEUROLOGY

## 2025-01-17 PROCEDURE — 86334 IMMUNOFIX E-PHORESIS SERUM: CPT | Mod: 26,HCNC,, | Performed by: PATHOLOGY

## 2025-01-17 PROCEDURE — 99999 PR PBB SHADOW E&M-EST. PATIENT-LVL III: CPT | Mod: PBBFAC,HCNC,, | Performed by: PSYCHIATRY & NEUROLOGY

## 2025-01-17 PROCEDURE — G2211 COMPLEX E/M VISIT ADD ON: HCPCS | Mod: HCNC,S$GLB,, | Performed by: PSYCHIATRY & NEUROLOGY

## 2025-01-17 PROCEDURE — 3074F SYST BP LT 130 MM HG: CPT | Mod: HCNC,CPTII,S$GLB, | Performed by: PSYCHIATRY & NEUROLOGY

## 2025-01-17 PROCEDURE — 82746 ASSAY OF FOLIC ACID SERUM: CPT | Mod: HCNC | Performed by: PSYCHIATRY & NEUROLOGY

## 2025-01-17 PROCEDURE — 83521 IG LIGHT CHAINS FREE EACH: CPT | Mod: 59,HCNC | Performed by: PSYCHIATRY & NEUROLOGY

## 2025-01-17 PROCEDURE — 84165 PROTEIN E-PHORESIS SERUM: CPT | Mod: HCNC | Performed by: PSYCHIATRY & NEUROLOGY

## 2025-01-17 PROCEDURE — 82550 ASSAY OF CK (CPK): CPT | Mod: HCNC | Performed by: PSYCHIATRY & NEUROLOGY

## 2025-01-17 PROCEDURE — 84165 PROTEIN E-PHORESIS SERUM: CPT | Mod: 26,HCNC,, | Performed by: PATHOLOGY

## 2025-01-17 PROCEDURE — 1100F PTFALLS ASSESS-DOCD GE2>/YR: CPT | Mod: HCNC,CPTII,S$GLB, | Performed by: PSYCHIATRY & NEUROLOGY

## 2025-01-17 PROCEDURE — 99215 OFFICE O/P EST HI 40 MIN: CPT | Mod: HCNC,S$GLB,, | Performed by: PSYCHIATRY & NEUROLOGY

## 2025-01-17 PROCEDURE — 86334 IMMUNOFIX E-PHORESIS SERUM: CPT | Mod: HCNC | Performed by: PSYCHIATRY & NEUROLOGY

## 2025-01-17 PROCEDURE — 82175 ASSAY OF ARSENIC: CPT | Mod: HCNC | Performed by: PSYCHIATRY & NEUROLOGY

## 2025-01-17 PROCEDURE — 3288F FALL RISK ASSESSMENT DOCD: CPT | Mod: HCNC,CPTII,S$GLB, | Performed by: PSYCHIATRY & NEUROLOGY

## 2025-01-17 PROCEDURE — 84207 ASSAY OF VITAMIN B-6: CPT | Mod: HCNC | Performed by: PSYCHIATRY & NEUROLOGY

## 2025-01-17 PROCEDURE — 1159F MED LIST DOCD IN RCRD: CPT | Mod: HCNC,CPTII,S$GLB, | Performed by: PSYCHIATRY & NEUROLOGY

## 2025-01-17 PROCEDURE — 3008F BODY MASS INDEX DOCD: CPT | Mod: HCNC,CPTII,S$GLB, | Performed by: PSYCHIATRY & NEUROLOGY

## 2025-01-17 NOTE — PROGRESS NOTES
[facility] NEUROLOGY [facility]TH [facility] [place]    Date: 1/17/25  Patient Name: Tanesha Davis   MRN: [id number]   Referring Provider: Leroy Lewis MD    Thank you so much Leroy Lewis MD for your patient referral to Neuromuscular team at [facility]. We take pride in our care coordination and look forward to your feedback and questions.    Assessment:   74-year-old female with spinal stenosis, lumbosacral radiculopathy, mild sensory predominant polyneuropathy with a recurrent falls for evaluation and 2nd opinion.  I will complete her peripheral neuropathy workup as well as MRI cervical spine for cervical myelopathy/radiculopathy.  I will do sleep study with parasomnia montage for evaluation of REM sleep behavioral disorder as neurodegenerative condition could be reason for recurrent falls as well despite her gait and stride is normal.    I discussed about fall precautions and need for Physiotherapy. I addressed her complaints. I provided information about fall precautions and healthy lifestyle. I would wish her very best for improvement/recovery in her condition.    Future direction based on feedback:    Plan:     Problem List Items Addressed This Visit    None  Visit Diagnoses       Cramps of lower extremity    -  Primary    Relevant Orders    MRI Cervical Spine Without Contrast    Polysomnogram (CPAP will be added if patient meets diagnostic criteria.)    CK (Completed)    Calcitriol (1,25 di-OH Vitamin D)    Falls frequently        Relevant Orders    EMG W/ ULTRASOUND AND NERVE CONDUCTION TEST 3 Extremities    MRI Cervical Spine Without Contrast    Polysomnogram (CPAP will be added if patient meets diagnostic criteria.)    Idiopathic peripheral neuropathy        Relevant Orders    VITAMIN B1 (Completed)    VITAMIN B6 (Completed)    FOLATE (Completed)    IMMUNOGLOBULIN FREE LT CHAINS BLOOD (Completed)    IMMUNOFIXATION ELECTROPHORESIS, SERUM (Completed)    PROTEIN  ELECTROPHORESIS, SERUM (Completed)    HEAVY METALS SCREEN, BLOOD (QUANTITATIVE) (Completed)    JUAN ALBERTO Comprehensive Panel    EMG W/ ULTRASOUND AND NERVE CONDUCTION TEST 3 Extremities    Polysomnogram (CPAP will be added if patient meets diagnostic criteria.)    Spinal stenosis of lumbar region without neurogenic claudication        Relevant Orders    EMG W/ ULTRASOUND AND NERVE CONDUCTION TEST 3 Extremities    Restless leg syndrome        Relevant Orders    Polysomnogram (CPAP will be added if patient meets diagnostic criteria.)          Richard Aguirre MD    This evaluation was completed in >65  Minutes over 50% of the time spent on education & counseling. This includes face to face time and non-face to face time preparing to see the patient (eg, review of tests), obtaining and/or reviewing separately obtained history, documenting clinical information in the electronic or other health record, independently interpreting results and communicating results to the patient/family/caregiver, or care coordinator.    Visit today is associated with current or anticipated ongoing medical care related to this patient's single serious condition/complex condition (frequent falls and mild peripheral sensory neuropathy). Follow up: 3  months    Patient note was created using MModal Dictation.  Any errors in syntax or even information may not have been identified and edited on initial review prior to signing this note.    Details provided by:    Patient    Reason for visit:  Recurrent falls for evaluation    HISTORY OF PRESENT ILLNESS   Alice Davis is 73 y/o female with a medical history significant for spinal stenosis, knee surgery, cataracts (surgically corrected), colon cancer status post treatment in 2006 and peripheral neuropathy who presents to clinic for evaluation of frequent falls.     She reports 3 falls occurring in February, March, and May of 2024. During these falls, she denies tripping over any obstacles, loss of  consciousness, palpitations, focal neurological deficits, muscle pain or weakness, or any peripheral neuropathy at the time. Witnesses also denied any jerking movements, any loss of bowel or bladder control, or any period of confusion. She does report some lightheadedness during the falls. Patient had cataracts, but she does not believe her vision was affected enough to contribute to the falls. She also had them surgically corrected before her fall in May.     Her peripheral neuropathy has been going on for 5 years, of which she experiences tingling in her toes, mostly at night. It is also triggered by prolonged standing (4-5 hours). She does not smoke or drink alcohol. She says she maintains hydration and eats well. 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     Pertinent work up based on chart review for current condition:  Comprehensive metabolic panel normal   CBC with MCV 91   Vitamin B12 level 1910   Iron studies were saturated iron 12   Hemoglobin A1c 5.1   TSH 2.8   Lipid profile with triglycerides 45   Stool calprotectin level 330 positive   Stool WBC normal   Tissue transglutaminase antibody negative     NCS/EMG on 06/07/2021.    This is a normal electrophysiologic study of the bilateral lower extremities.    EKG on 10/26/2022.    Normal    MRI temporal bone on 09/30/2024.    Normal MRI of the brain and temporal bones.  No cause for hearing loss identified.    MRI brain with and without contrast on 06/16/2023.    Unremarkable motion distorted MRI brain as detailed above specifically without evidence for acute infarction or intracranial enhancing lesion.    MRI lumbar spine on 05/06/2022.     Lumbar degenerative changes, most pronounced at L3-L4 and L4-L5 and slightly progressed when compared to MRI dated 04/26/2021.    Review of Systems:  12 system review of systems is negative except for the symptoms mentioned in HPI.     PHYSICAL EXAMINATION     Vitals:    01/17/25 1019   BP: 123/74  "  BP Location: Right arm   Patient Position: Sitting   Pulse: 64   Weight: 80 kg (176 lb 7.7 oz)   Height: 5' 5" (1.651 m)       Body mass index is 29.37 kg/m².     GENERAL/CONSTITUTIONAL/SYSTEMIC:    -Well appearing; well nourished    HIGHER INTEGRATIVE FUNCTIONS:   -Attention & concentration: Normal   -Orientation: Oriented to person, place & time  -Memory: Normal  -Language: Normal   -Fund of Knowledge: Normal     CRANIAL NERVES:   -CN 2: Visual fields full  -CN 2,3: PERRL  -CN 3,4,6: EOMI  -CN 5: Facial sensation intact bilaterally  -CN 7: Facial strength/movement intact bilaterally  -CN 8: Hearing normal bilaterally  -CN 9,10: Palate elevates symmetrically  -CN 11: Normal shoulder shrug and head turn  -CN 12: Tongue protrudes midline     MOTOR:   -Tone: normal in upper and lower extremities  -UE/LE motor: 5/5 throughout, no pronator drift     SENSATION:   -Vibration: Mildly diminished in lower extremities   -Pinprick: Normal in upper and lower extremities    REFLEXES:   -2/4 upper and lower extremities bilaterally  -Flexor plantar reflex bilaterally    COORDINATION:   -FNF normal bilaterally    GAIT:   -Normal casual gait. Able to stand on heels and toes without difficulty.  -Mild sway on right but Romberg negative    Physical Exam         Scheduled Follow-up :  No future appointments.    After Visit Medication List :     Medication List            Accurate as of January 17, 2025 10:07 AM. If you have any questions, ask your nurse or doctor.                CHANGE how you take these medications      linaCLOtide 72 mcg Cap capsule  Commonly known as: LINZESS  Take 1 capsule (72 mcg total) by mouth once daily.  What changed: additional instructions            CONTINUE taking these medications      atorvastatin 40 MG tablet  Commonly known as: LIPITOR  Take 1 tablet (40 mg total) by mouth once daily.     estradioL 0.5 MG tablet  Commonly known as: ESTRACE  TAKE 1 TABLET BY MOUTH EVERY DAY     [facility] " ORAL     VITAMIN D ORAL     VITAMIN E ORAL              Signing Physician:      Richard Aguirre MD  , [employer] [employer] [facility] [employer]  Neuromuscular Medicine Section. [facility].   [address] [address]th [place] [address].    This note was generated with the assistance of ambient listening technology. Verbal consent was obtained by the patient and accompanying visitor(s) for the recording of patient appointment to facilitate this note. I attest to having reviewed and edited the generated note for accuracy, though some syntax or spelling errors may persist. Please contact the author of this note for any clarification.

## 2025-01-18 LAB
ARSENIC BLD-MCNC: 1 NG/ML
CADMIUM BLD-MCNC: 0.6 NG/ML
CITY: NORMAL
COUNTY: NORMAL
GUARDIAN FIRST NAME: NORMAL
GUARDIAN LAST NAME: NORMAL
HOME PHONE: NORMAL
LEAD BLD-MCNC: 1 MCG/DL
MERCURY BLD-MCNC: 1 NG/ML
RACE: NORMAL
STATE: NORMAL
STREET ADDRESS: NORMAL
VENOUS/CAPILLARY: NORMAL
ZIP: NORMAL

## 2025-01-21 LAB — PYRIDOXAL SERPL-MCNC: 13 UG/L (ref 5–50)

## 2025-01-22 LAB
ALBUMIN SERPL ELPH-MCNC: 4.23 G/DL (ref 3.35–5.55)
ALPHA1 GLOB SERPL ELPH-MCNC: 0.28 G/DL (ref 0.17–0.41)
ALPHA2 GLOB SERPL ELPH-MCNC: 0.62 G/DL (ref 0.43–0.99)
B-GLOBULIN SERPL ELPH-MCNC: 0.9 G/DL (ref 0.5–1.1)
GAMMA GLOB SERPL ELPH-MCNC: 0.97 G/DL (ref 0.67–1.58)
INTERPRETATION SERPL IFE-IMP: NORMAL
KAPPA LC SER QL IA: 2.57 MG/DL (ref 0.33–1.94)
KAPPA LC/LAMBDA SER IA: 1.15 (ref 0.26–1.65)
LAMBDA LC SER QL IA: 2.23 MG/DL (ref 0.57–2.63)
PATHOLOGIST INTERPRETATION IFE: NORMAL
PATHOLOGIST INTERPRETATION SPE: NORMAL
PROT SERPL-MCNC: 7 G/DL (ref 6–8.4)
VIT B1 BLD-MCNC: 72 UG/L (ref 38–122)

## 2025-01-25 ENCOUNTER — PATIENT MESSAGE (OUTPATIENT)
Facility: CLINIC | Age: 75
End: 2025-01-25
Payer: MEDICARE

## 2025-02-03 ENCOUNTER — HOSPITAL ENCOUNTER (OUTPATIENT)
Dept: RADIOLOGY | Facility: HOSPITAL | Age: 75
Discharge: HOME | End: 2025-02-03
Attending: PSYCHIATRY & NEUROLOGY
Payer: MEDICARE

## 2025-02-03 DIAGNOSIS — R25.2 CRAMPS OF LOWER EXTREMITY: ICD-10-CM

## 2025-02-03 DIAGNOSIS — R29.6 FALLS FREQUENTLY: ICD-10-CM

## 2025-02-03 PROCEDURE — 72141 MRI NECK SPINE W/O DYE: CPT | Mod: TC,HCNC

## 2025-02-03 PROCEDURE — 72141 MRI NECK SPINE W/O DYE: CPT | Mod: 26,HCNC,, | Performed by: RADIOLOGY

## 2025-02-18 NOTE — TELEPHONE ENCOUNTER
Good morning. Scheduled Mrs. Alice Davis MRN [id number] for a Colonoscopy with Dr. Curry on 5/9/2023. Pt. Thought she may need EGD but I don't see any referral for that or orders. Please advise. Thank you.   -Recent admission for acute respiratory failure in the setting of grand mal seizures, influenza, PNA. S/p intubation in MICU, was extubated to AVAPS  -Now COVID +  -CXR with low lung volumes, mild congestion. Possible underlying infiltrate  -CT chest with b/l GGO, new since 1/30/25. Likely COVID PNA +/- superimposed bacterial PNA.   -Continue bronchodilators/mucolytics   -Continue ABX  -Continue Dexamethasone 6 mg qd x 10 days  -Pt with increased WOB this AM, placed back on AVAPS per RN  -Continue AVAPS  ePAP 5 RR 14 FIo2 50% Max Pressure 35 Min Pressure 15 qHS and PRN daytime for increased WOB. ABG 2/17 acceptable, no co2 retention  -Keep sats >90% with O2 when off AVAPS. If unable to maintain sat on nasal cannula suggest start HFNC.

## 2025-03-06 ENCOUNTER — TELEPHONE (OUTPATIENT)
Facility: CLINIC | Age: 75
End: 2025-03-06
Payer: MEDICARE

## 2025-03-06 NOTE — TELEPHONE ENCOUNTER
Left a message for patient informing they have been scheduled an appointment for EMG on 4/15 @ 1:30pm.  And asked to call back to reschedule if that day/time does not work for them

## 2025-03-27 ENCOUNTER — OFFICE VISIT (OUTPATIENT)
Dept: NEUROSURGERY | Facility: CLINIC | Age: 75
End: 2025-03-27
Payer: MEDICARE

## 2025-03-27 VITALS — HEART RATE: 63 BPM | SYSTOLIC BLOOD PRESSURE: 126 MMHG | DIASTOLIC BLOOD PRESSURE: 78 MMHG

## 2025-03-27 DIAGNOSIS — M54.12 CERVICAL MYELOPATHY WITH CERVICAL RADICULOPATHY: ICD-10-CM

## 2025-03-27 DIAGNOSIS — G95.9 CERVICAL MYELOPATHY WITH CERVICAL RADICULOPATHY: ICD-10-CM

## 2025-03-27 PROCEDURE — 99999 PR PBB SHADOW E&M-EST. PATIENT-LVL III: CPT | Mod: PBBFAC,HCNC,,

## 2025-03-27 PROCEDURE — 1100F PTFALLS ASSESS-DOCD GE2>/YR: CPT | Mod: CPTII,HCNC,S$GLB,

## 2025-03-27 PROCEDURE — 99214 OFFICE O/P EST MOD 30 MIN: CPT | Mod: HCNC,S$GLB,,

## 2025-03-27 PROCEDURE — 1126F AMNT PAIN NOTED NONE PRSNT: CPT | Mod: CPTII,HCNC,S$GLB,

## 2025-03-27 PROCEDURE — 3288F FALL RISK ASSESSMENT DOCD: CPT | Mod: CPTII,HCNC,S$GLB,

## 2025-03-27 PROCEDURE — 1159F MED LIST DOCD IN RCRD: CPT | Mod: CPTII,HCNC,S$GLB,

## 2025-03-27 PROCEDURE — 3074F SYST BP LT 130 MM HG: CPT | Mod: CPTII,HCNC,S$GLB,

## 2025-03-27 PROCEDURE — 3078F DIAST BP <80 MM HG: CPT | Mod: CPTII,HCNC,S$GLB,

## 2025-04-17 ENCOUNTER — PROCEDURE VISIT (OUTPATIENT)
Facility: CLINIC | Age: 75
End: 2025-04-17
Payer: MEDICARE

## 2025-04-17 DIAGNOSIS — G60.8 SENSORY POLYNEUROPATHY: ICD-10-CM

## 2025-04-17 DIAGNOSIS — R29.6 FALLS FREQUENTLY: ICD-10-CM

## 2025-04-17 DIAGNOSIS — M54.17 RIGHT LUMBOSACRAL RADICULOPATHY: ICD-10-CM

## 2025-04-17 DIAGNOSIS — G60.9 IDIOPATHIC PERIPHERAL NEUROPATHY: ICD-10-CM

## 2025-04-17 DIAGNOSIS — M54.12 RIGHT CERVICAL RADICULOPATHY: Primary | ICD-10-CM

## 2025-04-17 DIAGNOSIS — M48.061 SPINAL STENOSIS OF LUMBAR REGION WITHOUT NEUROGENIC CLAUDICATION: ICD-10-CM

## 2025-04-17 PROCEDURE — 95885 MUSC TST DONE W/NERV TST LIM: CPT | Mod: HCNC,S$GLB,, | Performed by: PSYCHIATRY & NEUROLOGY

## 2025-04-17 PROCEDURE — 95913 NRV CNDJ TEST 13/> STUDIES: CPT | Mod: HCNC,S$GLB,, | Performed by: PSYCHIATRY & NEUROLOGY

## 2025-04-17 NOTE — PROCEDURES
Department of Neurology  Phone No: [phone], Fax: [phone]    Neurography & Electromyography Report        Full Name: Tanesha Davis Gender: Female  Patient ID: [id number] YOB: 1950      Visit Date: 4/17/2025 11:51 AM  Age: 74 Years  Examining Physician: Richard Aguirre MD  Height: 5 feet 5 inch  Weight: 176 lbs    Tanesha Davis [id number] 4/17/2025 11:51 AM     Reason for Referral:    Concern for polyneuropathy and lumbar/cervical radiculopathy.      Relevant medical diagnoses:    Multilevel radiculopathy.    History of colon cancer.      Surgical procedures:    History of sigmoidectomy on 03/20/2006.      Tanesha Davis [id number] 4/17/2025 11:51 AM       History and Examination:    74-year-old female with recurrent falls.  On examination, normal strength, deep tendon reflexes but mildly reduced vibration in toes.      Technical Difficulties:    None.      Interpretation:     Abnormal study.  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  Additionally, there is evidence of mild to moderate right lumbosacral radiculopathy and mild right lower cervical radiculopathy. The differential diagnosis for sensory polyneuropathy may include vitamin deficiency, immune mediated, paraneoplastic, toxic/metabolic neuropathy.    Please correlate clinically.      Richard Aguirre MD, FRCPE, FCPS, CHCQM  Neuromuscular Consultant  [facility]    Tanesha Davis [id number] 4/17/2025 11:51 AM              Sensory NCS      Nerve / Sites Rec. Site Segments Onset Lat Peak Lat Onset Nabil Temp. Amp Distance      ms ms m/s °C µV mm   R Median - Dig II (Antidromic)      Wrist Index Wrist - Index 2.60 3.65 53.8 32.1 26.4 140      Ref.  Ref. <=3.30 <=4.00   >=7.0    R Ulnar - Dig V (Antidromic)      Wrist Dig V Wrist - Dig V 2.24 3.18 62.5 32.2 29.6 140      Ref.  Ref. <=3.10 <=4.00   >=5.0    R Radial - Superficial (Antidromic)      Forearm Wrist Forearm - Wrist 1.30 1.82 76.8 31.9  32.1 100      Ref.  Ref. <=2.20 <=2.80   >=7.0    R Sural - (Antidromic)      Calf Ankle Calf - Ankle NR NR NR 31       Ref.  Ref. <=3.60 <=4.50   >=4.0    L Sural - (Antidromic)      Calf Ankle Calf - Ankle 2.81 3.80 49.8 31 16.8 140      Ref.  Ref. <=3.60 <=4.50   >=4.0    R Superficial peroneal - (Antidromic)      Lat leg Ankle Lat leg - Ankle NR NR NR 31       Ref.  Ref.  <=4.40   >=5.0    R Median, Ulnar - Transcarpal comparison      Median Palm Wrist Median Palm - Wrist 1.61 2.19 49.5 32.2 32.5 80      Ulnar Palm Wrist Ulnar Palm - Wrist 1.35 1.98 59.1 32.1 19.8 80     Median Palm - Ulnar Palm             Motor NCS      Nerve / Sites Muscle Segments Latency Ref. Velocity Ref. Amplitude Ref. Temp. Dur. Distance      ms ms m/s m/s mV mV °C ms mm   R Median - APB      Wrist APB Wrist - APB 2.63 <=4.40   3.9 >=3.8 32.1 5.4 80      Elbow APB Elbow - Wrist 7.04  57 >=51 3.6  32.1 5.2 250   R Ulnar - ADM      Wrist ADM Wrist - ADM 2.17 <=3.70   10.5 >=7.9 32.1 6.7 80      B.Elbow ADM B.Elbow - Wrist 5.75  59 >=52 9.2  32.1 7.1 210      A.Elbow ADM A.Elbow - B.Elbow 7.27  66 >=43 8.8  32.1 6.8 100     A.Elbow - Wrist       32.1     R Peroneal - EDB      Ankle EDB Ankle - EDB 3.46 <=6.50   3.2 >=1.1 31 4.9 80      B. Fib Head EDB B. Fib Head - Ankle 10.44  47 >=39 2.8  31 5.2 330      A. Fib Head EDB A. Fib Head - B. Fib Head 12.21  51 >=42 2.8  31 5.3 90   L Peroneal - EDB      Ankle EDB Ankle - EDB 3.67 <=6.50   3.2 >=1.1 30.8 4.6 80      B. Fib Head EDB B. Fib Head - Ankle 11.10  43 >=39 3.0  31 5.0 320      A. Fib Head EDB A. Fib Head - B. Fib Head 12.73  55 >=42 2.9  30.8 4.8 90   R Tibial - AH      Ankle AH Ankle - AH 4.44 <=6.10   5.7 >=1.1 31 6.2 80   L Tibial - AH      Ankle AH Ankle - AH 4.08 <=6.10   3.9 >=1.1 30.8 5.8 80       F  Wave      Nerve F Latency Ref. M Latency F - M Lat    ms ms ms ms   R Median - APB 25.4 <=30.3 3.0 22.4   R Ulnar - ADM 26.4 <=30.9 2.2 24.2   R Peroneal - EDB 46.6  <=58.8 4.1 42.5   R Tibial - AH 57.9 <=57.5 5.0 52.9   L Peroneal - EDB 50.0 <=58.8 4.2 45.8   L Tibial - AH 53.2 <=57.5 4.8 48.4       EMG Summary Table     Spontaneous Recruitment MUAP   Muscle Nerve Roots IA Fib PSW Fasc Other Pattern Amp Dur. PPP   R. Tibialis anterior Deep peroneal (Fibular) L4-L5 N None None None N N N N N   R. Gastrocnemius Tibial S1-S2 N 1+ 1+ None N N N N N   R. Quadriceps Femoral L2-L4 N None None None N N N N N   L. Tibialis anterior Deep peroneal (Fibular) L4-L5 N None None None N N N N N   L. Gastrocnemius Tibial S1-S2 N None None None N N N N N   R. First dorsal interosseous Ulnar C8-T1 1+ None 1+ None N N N N N   R. Biceps brachii Musculocutaneous C5-C6 N None None None N N N N N   R. Triceps brachii Radial C6-C8 N None None None N N N N N   R. L5 paraspinal Spinal L5- N None None None N       R. S1 paraspinal Spinal S1- N None None None N       R. Gluteus medius Superior gluteal L4-S1 N None None None N N N N N

## 2025-04-17 NOTE — PATIENT INSTRUCTIONS
Walk and exercise  Physiotherapy of back.    In case of any question, plz contact through [facility].

## 2025-05-15 NOTE — PROGRESS NOTES
"Neurosurgery  History & Physical    SUBJECTIVE:     Chief Complaint: falls    History of Present Illness:  Tanesha Davis is a 74 y.o. female who presents as a referral from Neurology to evaluate for falls. Patient reports 3 falls, occurring once a month in April, May and June of last year. She has had no falls since. She reports one fall happened due to her tripping over a box; however, the other two falls occurred without cause. She denies gait difficulties, feeling off balance, light headedness, dizziness, vision disturbances, hand weakness or leg weakness. She denies bowel/bladder dysfunction. She does have pain in her R hand which makes it difficult to open jars some times but otherwise is able to button shirts and hold things without dropping them. She has tingling in her toes at night which has been present for years. 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    Review of patient's allergies indicates:   Allergen Reactions    Adhesive Rash     Asking that we use Paper Tape,  Uses "Sensitive Skin" band aids    Amoxil [amoxicillin] Hives    Penicillins Hives and Swelling     Swelling of ears and neck    Sulfa (sulfonamide antibiotics) Hives and Itching       Current Medications[1]    Past Medical History:   Diagnosis Date    Arthritis     Draining cutaneous sinus tract     Headache(784.0)     Incarcerated ventral hernia 02/01/2023    Incisional hernia     Insomnia     Nocturnal leg cramps     Nonhealing surgical wound     PONV (postoperative nausea and vomiting)     with last surgery per patient, "Severe" PONV 2-27-14    Postmenopausal status     Rectosigmoid cancer 2005    Small bowel obstruction 11/08/2022    Ventral hernia with obstruction and without gangrene 11/22/2022     Past Surgical History:   Procedure Laterality Date    BREAST BIOPSY Left 1999    CHOLECYSTECTOMY  02/24/2014    with incisional hernia repair with mesh    COLON SURGERY      COLONOSCOPY N/A 08/23/2016    Procedure: " COLONOSCOPY;  Surgeon: Cuco Meraz MD;  Location: [facility] ENDO (4TH FLR);  Service: Endoscopy;  Laterality: N/A;    COLONOSCOPY N/A 12/09/2019    Procedure: COLONOSCOPY;  Surgeon: KISHORE Curry MD;  Location: [facility] ENDO (4TH FLR);  Service: Endoscopy;  Laterality: N/A;  ADHESIVE Allergy    COLONOSCOPY N/A 05/09/2023    Procedure: COLONOSCOPY;  Surgeon: KISHORE Curry MD;  Location: [facility] ENDO (4TH FLR);  Service: Endoscopy;  Laterality: N/A;  Instructions to portal.EC  precall no answer 5/3 EB    ESOPHAGOGASTRODUODENOSCOPY N/A 04/22/2021    Procedure: EGD (ESOPHAGOGASTRODUODENOSCOPY);  Surgeon: Lukasz Lindquist MD;  Location: [facility] ENDO (4TH FLR);  Service: Endoscopy;  Laterality: N/A;  3/25-covid + 7/2020 at [facility] [place] getting results to us to see if correct form of test-MS    HERNIA REPAIR      HYSTERECTOMY      INJECTION OF ANESTHETIC AGENT AROUND NERVE Right 06/28/2022    Procedure: BLOCK, NERVE, RIGHT L2-L3-L4-L5;  Surgeon: Shabnam Skaggs MD;  Location: [facility] PAIN MGT;  Service: Pain Management;  Laterality: Right;    INJECTION OF ANESTHETIC AGENT AROUND NERVE Right 08/02/2022    Procedure: BLOCK, NERVE, RIGHT L2-L5 MEDIAL BRANCH;  Surgeon: Shabnam Skaggs MD;  Location: [facility] PAIN MGT;  Service: Pain Management;  Laterality: Right;    KNEE ARTHROSCOPY      KNEE SURGERY      right and left knee repair    OOPHORECTOMY      RADIOFREQUENCY ABLATION Right 09/27/2022    Procedure: Radiofrequency Ablation Right L2-L5;  Surgeon: Shabnam Skaggs MD;  Location: [facility] PAIN MGT;  Service: Pain Management;  Laterality: Right;    RELEASE-FASCIA Bilateral 06/12/2023    Procedure: RELEASE-FASCIA -TRANSVERSUS ABDOMINIS;  Surgeon: Jody Ramírez MD;  Location: [facility] OR 2ND FLR;  Service: General;  Laterality: Bilateral;    REPAIR OF RECURRENT INCISIONAL HERNIA  07/10/2018    Procedure: REPAIR, HERNIA, INCISIONAL, RECURRENT;  Surgeon: Cuco Meraz MD;  Location: [facility] OR 2ND FLR;  Service: Colon and Rectal;;     REPAIR, HERNIA, INCISIONAL OR VENTRAL, WITHOUT HISTORY OF PRIOR REPAIR N/A 06/12/2023    Procedure: REPAIR, HERNIA, INCISIONAL OR VENTRAL, WITH HISTORY OF PRIOR  REPAIR (WITH MESH AND POSTERIOR COMPONENTS SEPARATION);  Surgeon: Jody Ramírez MD;  Location: [facility] OR [address] [place] [address];  Service: General;  Laterality: N/A;    SIGMOIDECTOMY  03/20/2006    LAR    SPINE SURGERY  9/27/2022    Back abalisons L3, L4, L5, L6     Family History       Problem Relation (Age of Onset)    Arthritis Mother    Breast cancer Mother (56)    Cancer Mother    Diabetes Brother    Heart disease Father, Maternal Grandmother    Learning disabilities Son          Social History     Socioeconomic History    Marital status:      Spouse name: francis    Number of children: 2   Occupational History    Occupation: Retired     Employer: Self   Tobacco Use    Smoking status: Never    Smokeless tobacco: Never   Substance and Sexual Activity    Alcohol use: Yes     Alcohol/week: 1.0 standard drink of alcohol     Types: 1 Glasses of wine per week     Comment: socially    Drug use: No    Sexual activity: Not Currently     Partners: Male     Birth control/protection: Post-menopausal     Social Drivers of Health     Financial Resource Strain: Low Risk  (6/10/2024)    Overall Financial Resource Strain (CARDIA)     Difficulty of Paying Living Expenses: Not hard at all   Food Insecurity: No Food Insecurity (6/10/2024)    Hunger Vital Sign     Worried About Running Out of Food in the Last Year: Never true     Ran Out of Food in the Last Year: Never true   Transportation Needs: No Transportation Needs (10/4/2023)    PRAPARE - Transportation     Lack of Transportation (Medical): No     Lack of Transportation (Non-Medical): No   Physical Activity: Unknown (6/10/2024)    Exercise Vital Sign     Days of Exercise per Week: 4 days   Recent Concern: Physical Activity - Insufficiently Active (6/10/2024)    Exercise Vital Sign     Days of Exercise per Week: 4  days     Minutes of Exercise per Session: 10 min   Stress: No Stress Concern Present (6/10/2024)    [employer] - Occupational Stress Questionnaire     Feeling of Stress : Only a little   Housing Stability: Unknown (10/4/2023)    Housing Stability Vital Sign     Unable to Pay for Housing in the Last Year: No     Unstable Housing in the Last Year: No       Review of Systems  Positive per HPI, otherwise a pertinent ROS was performed and was negative.    OBJECTIVE:     Vital Signs  Pulse: 63  BP: 126/78  Pain Score: 0-No pain  There is no height or weight on file to calculate BMI.      Neurosurgery Physical Exam  General: Well developed, well nourished, no distress.   Head: Normocephalic, atraumatic.  Neurologic: Alert and oriented. Thought content appropriate.  GCS: E4 V5 M6; Total: 15  Mental Status: Awake, Alert, Oriented x 4.  Language: No aphasia.  Speech: No dysarthria.  Cranial nerves: Face symmetric, tongue midline, CN II-XII grossly intact.   Eyes: Pupils equal, round, reactive to light with accomodation, EOMI.   Pulmonary: Normal respirations, no signs of respiratory distress.  Abdomen: Soft, non-distended, not tender to palpation.  Vascular: Pulses 2+ and symmetric radial and dorsalis pedis. No LE edema.   Skin: Skin is warm, dry and intact.  Sensory: Intact to light touch throughout  Motor Strength: Moves all extremities spontaneously with good tone. Full strength upper and lower extremities. No abnormal movements seen.     Strength  Deltoids Triceps Biceps Wrist Extension Wrist Flexion Hand    Upper: R 5/5 5/5 5/5 5/5 5/5 5/5    L 5/5 5/5 5/5 5/5 5/5 5/5     Iliopsoas Quadriceps Knee  Flexion Tibialis  anterior Gastro- cnemius EHL   Lower: R 5/5 5/5 5/5 5/5 5/5 5/5    L 5/5 5/5 5/5 5/5 5/5 5/5     Reflexes:   DTR: 2+ symmetrically throughout.  Day's: Negative bilaterally.  Clonus: Negative bilaterally.  Babinski's: Negative bilaterally.     Coordination/Cerebellar:    Finger-to-nose: Intact bilaterally.  Pronator drift: Absent bilaterally.  Gait: Stable.    Cervical:   ROM: Full with flexion, extension, lateral rotation and ear-to-shoulder bend.       Diagnostic Results:  Imaging was independently reviewed by myself.  MRI cervical spine 2/3/25: Multilevel degenerative change throughout the cervical spine. Moderate spinal canal stenosis at C6/7. No cord signal change.     ASSESSMENT/PLAN:     1. Cervical myelopathy with cervical radiculopathy        Tanesha Davis is a 74 y.o. female with primary c/o falls. Her imaging shows multilevel degenerative change and moderate spinal canal stenosis at C6/7 with no cord signal change. Patient is largely asymptomatic from her cervical stenosis. I do not feel strongly that her 3 falls are a indicative of progressive myelopathy at this time. Imaging reviewed with patient. I have discussed signs and symptoms that should prompt the patient to reach back out to us and she voiced understanding. Will review with Dr. Bishop.         Diagnoses addressed and orders placed this encounter:      Cervical myelopathy with cervical radiculopathy  -     Ambulatory referral/consult to Neurosurgery      I spent a total of 30 minutes on the day of the visit.  This includes face to face time and non-face to face time preparing to see the patient (eg, review of tests), obtaining and/or reviewing separately obtained history, documenting clinical information in the electronic or other health record, independently interpreting results and communicating results to the patient/family/caregiver, or care coordinator.    Bere Kevin PA-C  Neurosurgery   [facility]            [1]   Current Outpatient Medications   Medication Sig Dispense Refill    ERGOCALCIFEROL, VITAMIN D2, (VITAMIN D ORAL) Take 1 capsule by mouth every morning.       estradioL (ESTRACE) 0.5 MG tablet TAKE 1 TABLET BY MOUTH EVERY DAY 90 tablet 0    linaCLOtide (LINZESS)  72 mcg Cap capsule Take 1 capsule (72 mcg total) by mouth once daily. 30 capsule 5    VITAMIN E ACETATE (VITAMIN E ORAL) Take 1 capsule by mouth every morning.       atorvastatin (LIPITOR) 40 MG tablet Take 1 tablet (40 mg total) by mouth once daily. (Patient not taking: Reported on 3/27/2025) 90 tablet 3    L GASSERI/B BIFIDUM/B LONGUM ([employer] ORAL) Take 1 tablet by mouth 2 (two) times daily.       No current facility-administered medications for this visit.

## 2025-06-12 DIAGNOSIS — N95.1 MENOPAUSAL SYMPTOMS: ICD-10-CM

## 2025-06-12 RX ORDER — ESTRADIOL 0.5 MG/1
0.5 TABLET ORAL
Qty: 90 TABLET | Refills: 0 | Status: SHIPPED | OUTPATIENT
Start: 2025-06-12

## 2025-08-14 ENCOUNTER — LAB VISIT (OUTPATIENT)
Dept: LAB | Facility: OTHER | Age: 75
End: 2025-08-14
Attending: INTERNAL MEDICINE
Payer: MEDICARE

## 2025-08-14 ENCOUNTER — OFFICE VISIT (OUTPATIENT)
Dept: INTERNAL MEDICINE | Facility: CLINIC | Age: 75
End: 2025-08-14
Attending: INTERNAL MEDICINE
Payer: MEDICARE

## 2025-08-14 VITALS
SYSTOLIC BLOOD PRESSURE: 134 MMHG | DIASTOLIC BLOOD PRESSURE: 61 MMHG | HEART RATE: 65 BPM | OXYGEN SATURATION: 98 % | WEIGHT: 177.94 LBS | BODY MASS INDEX: 29.65 KG/M2 | HEIGHT: 65 IN

## 2025-08-14 DIAGNOSIS — J31.0 RHINITIS, UNSPECIFIED TYPE: ICD-10-CM

## 2025-08-14 DIAGNOSIS — R79.9 ABNORMAL FINDING OF BLOOD CHEMISTRY, UNSPECIFIED: ICD-10-CM

## 2025-08-14 DIAGNOSIS — I70.0 AORTIC ATHEROSCLEROSIS: ICD-10-CM

## 2025-08-14 DIAGNOSIS — G60.8 SENSORY POLYNEUROPATHY: ICD-10-CM

## 2025-08-14 DIAGNOSIS — R23.4 CHANGES IN SKIN TEXTURE: ICD-10-CM

## 2025-08-14 DIAGNOSIS — M79.89 LEG SWELLING: Primary | ICD-10-CM

## 2025-08-14 DIAGNOSIS — Z12.31 BREAST CANCER SCREENING BY MAMMOGRAM: ICD-10-CM

## 2025-08-14 DIAGNOSIS — D50.8 OTHER IRON DEFICIENCY ANEMIA: ICD-10-CM

## 2025-08-14 DIAGNOSIS — R22.42 LOCALIZED SWELLING, MASS AND LUMP, LEFT LOWER LIMB: ICD-10-CM

## 2025-08-14 DIAGNOSIS — Z85.038 HISTORY OF COLON CANCER: ICD-10-CM

## 2025-08-14 LAB
ABSOLUTE EOSINOPHIL (OHS): 0.12 K/UL
ABSOLUTE MONOCYTE (OHS): 0.56 K/UL (ref 0.3–1)
ABSOLUTE NEUTROPHIL COUNT (OHS): 4.84 K/UL (ref 1.8–7.7)
ALBUMIN SERPL BCP-MCNC: 4.3 G/DL (ref 3.5–5.2)
ALP SERPL-CCNC: 86 UNIT/L (ref 40–150)
ALT SERPL W/O P-5'-P-CCNC: 16 UNIT/L (ref 10–44)
ANION GAP (OHS): 4 MMOL/L (ref 8–16)
AST SERPL-CCNC: 31 UNIT/L (ref 11–45)
BASOPHILS # BLD AUTO: 0.06 K/UL
BASOPHILS NFR BLD AUTO: 0.8 %
BILIRUB SERPL-MCNC: 0.6 MG/DL (ref 0.1–1)
BUN SERPL-MCNC: 11 MG/DL (ref 8–23)
CALCIUM SERPL-MCNC: 9.2 MG/DL (ref 8.7–10.5)
CHLORIDE SERPL-SCNC: 107 MMOL/L (ref 95–110)
CHOLEST SERPL-MCNC: 152 MG/DL (ref 120–199)
CHOLEST/HDLC SERPL: 2.4 {RATIO} (ref 2–5)
CO2 SERPL-SCNC: 29 MMOL/L (ref 23–29)
CREAT SERPL-MCNC: 0.7 MG/DL (ref 0.5–1.4)
EAG (OHS): 105 MG/DL (ref 68–131)
ERYTHROCYTE [DISTWIDTH] IN BLOOD BY AUTOMATED COUNT: 13.2 % (ref 11.5–14.5)
FERRITIN SERPL-MCNC: 46 NG/ML (ref 20–300)
GFR SERPLBLD CREATININE-BSD FMLA CKD-EPI: >60 ML/MIN/1.73/M2
GLUCOSE SERPL-MCNC: 90 MG/DL (ref 70–110)
HBA1C MFR BLD: 5.3 % (ref 4–5.6)
HCT VFR BLD AUTO: 41.2 % (ref 37–48.5)
HDLC SERPL-MCNC: 63 MG/DL (ref 40–75)
HDLC SERPL: 41.4 % (ref 20–50)
HGB BLD-MCNC: 13.1 GM/DL (ref 12–16)
IMM GRANULOCYTES # BLD AUTO: 0.02 K/UL (ref 0–0.04)
IMM GRANULOCYTES NFR BLD AUTO: 0.3 % (ref 0–0.5)
IRON SATN MFR SERPL: 16 % (ref 20–50)
IRON SERPL-MCNC: 63 UG/DL (ref 30–160)
LDLC SERPL CALC-MCNC: 71.6 MG/DL (ref 63–159)
LYMPHOCYTES # BLD AUTO: 1.72 K/UL (ref 1–4.8)
MCH RBC QN AUTO: 29 PG (ref 27–31)
MCHC RBC AUTO-ENTMCNC: 31.8 G/DL (ref 32–36)
MCV RBC AUTO: 91 FL (ref 82–98)
NONHDLC SERPL-MCNC: 89 MG/DL
NUCLEATED RBC (/100WBC) (OHS): 0 /100 WBC
PLATELET # BLD AUTO: 244 K/UL (ref 150–450)
PMV BLD AUTO: 10.8 FL (ref 9.2–12.9)
POTASSIUM SERPL-SCNC: 3.9 MMOL/L (ref 3.5–5.1)
PROT SERPL-MCNC: 7.3 GM/DL (ref 6–8.4)
RBC # BLD AUTO: 4.52 M/UL (ref 4–5.4)
RELATIVE EOSINOPHIL (OHS): 1.6 %
RELATIVE LYMPHOCYTE (OHS): 23.5 % (ref 18–48)
RELATIVE MONOCYTE (OHS): 7.7 % (ref 4–15)
RELATIVE NEUTROPHIL (OHS): 66.1 % (ref 38–73)
SODIUM SERPL-SCNC: 140 MMOL/L (ref 136–145)
TIBC SERPL-MCNC: 403 UG/DL (ref 250–450)
TRANSFERRIN SERPL-MCNC: 272 MG/DL (ref 200–375)
TRIGL SERPL-MCNC: 87 MG/DL (ref 30–150)
TSH SERPL-ACNC: 2.62 UIU/ML (ref 0.4–4)
WBC # BLD AUTO: 7.32 K/UL (ref 3.9–12.7)

## 2025-08-14 PROCEDURE — G2211 COMPLEX E/M VISIT ADD ON: HCPCS | Mod: HCNC,S$GLB,, | Performed by: INTERNAL MEDICINE

## 2025-08-14 PROCEDURE — 1101F PT FALLS ASSESS-DOCD LE1/YR: CPT | Mod: CPTII,HCNC,S$GLB, | Performed by: INTERNAL MEDICINE

## 2025-08-14 PROCEDURE — 82728 ASSAY OF FERRITIN: CPT | Mod: HCNC

## 2025-08-14 PROCEDURE — 3008F BODY MASS INDEX DOCD: CPT | Mod: CPTII,HCNC,S$GLB, | Performed by: INTERNAL MEDICINE

## 2025-08-14 PROCEDURE — 1159F MED LIST DOCD IN RCRD: CPT | Mod: CPTII,HCNC,S$GLB, | Performed by: INTERNAL MEDICINE

## 2025-08-14 PROCEDURE — 3075F SYST BP GE 130 - 139MM HG: CPT | Mod: CPTII,HCNC,S$GLB, | Performed by: INTERNAL MEDICINE

## 2025-08-14 PROCEDURE — 3078F DIAST BP <80 MM HG: CPT | Mod: CPTII,HCNC,S$GLB, | Performed by: INTERNAL MEDICINE

## 2025-08-14 PROCEDURE — 85025 COMPLETE CBC W/AUTO DIFF WBC: CPT | Mod: HCNC

## 2025-08-14 PROCEDURE — 80061 LIPID PANEL: CPT | Mod: HCNC

## 2025-08-14 PROCEDURE — 84443 ASSAY THYROID STIM HORMONE: CPT | Mod: HCNC

## 2025-08-14 PROCEDURE — 99214 OFFICE O/P EST MOD 30 MIN: CPT | Mod: HCNC,S$GLB,, | Performed by: INTERNAL MEDICINE

## 2025-08-14 PROCEDURE — 3288F FALL RISK ASSESSMENT DOCD: CPT | Mod: CPTII,HCNC,S$GLB, | Performed by: INTERNAL MEDICINE

## 2025-08-14 PROCEDURE — 1160F RVW MEDS BY RX/DR IN RCRD: CPT | Mod: CPTII,HCNC,S$GLB, | Performed by: INTERNAL MEDICINE

## 2025-08-14 PROCEDURE — 1125F AMNT PAIN NOTED PAIN PRSNT: CPT | Mod: CPTII,HCNC,S$GLB, | Performed by: INTERNAL MEDICINE

## 2025-08-14 PROCEDURE — 83540 ASSAY OF IRON: CPT | Mod: HCNC

## 2025-08-14 PROCEDURE — 83036 HEMOGLOBIN GLYCOSYLATED A1C: CPT | Mod: HCNC

## 2025-08-14 PROCEDURE — 36415 COLL VENOUS BLD VENIPUNCTURE: CPT | Mod: HCNC

## 2025-08-14 PROCEDURE — 80053 COMPREHEN METABOLIC PANEL: CPT | Mod: HCNC

## 2025-08-14 PROCEDURE — 99999 PR PBB SHADOW E&M-EST. PATIENT-LVL IV: CPT | Mod: PBBFAC,HCNC,, | Performed by: INTERNAL MEDICINE

## 2025-09-03 ENCOUNTER — HOSPITAL ENCOUNTER (OUTPATIENT)
Dept: RADIOLOGY | Facility: OTHER | Age: 75
Discharge: HOME | End: 2025-09-03
Attending: INTERNAL MEDICINE
Payer: MEDICARE

## 2025-09-03 VITALS — WEIGHT: 177 LBS | BODY MASS INDEX: 29.49 KG/M2 | HEIGHT: 65 IN

## 2025-09-03 DIAGNOSIS — M79.89 LEG SWELLING: ICD-10-CM

## 2025-09-03 DIAGNOSIS — Z12.31 BREAST CANCER SCREENING BY MAMMOGRAM: ICD-10-CM

## 2025-09-03 DIAGNOSIS — R22.42 LOCALIZED SWELLING, MASS AND LUMP, LEFT LOWER LIMB: ICD-10-CM

## 2025-09-03 PROCEDURE — 93971 EXTREMITY STUDY: CPT | Mod: 26,HCNC,LT, | Performed by: RADIOLOGY

## 2025-09-03 PROCEDURE — 93971 EXTREMITY STUDY: CPT | Mod: TC,HCNC,LT

## 2025-09-03 PROCEDURE — 77063 BREAST TOMOSYNTHESIS BI: CPT | Mod: TC,HCNC

## (undated) DEVICE — DRESSING LEUKOPLAST FLEX 1X3IN

## (undated) DEVICE — SEE MEDLINE ITEM 156902

## (undated) DEVICE — SUT MONOCRYL 4-0 PS-1 UND

## (undated) DEVICE — TRAY MINOR GEN SURG OMC

## (undated) DEVICE — SEE MEDLINE ITEM 152622

## (undated) DEVICE — SEE MEDLINE ITEM 146417

## (undated) DEVICE — NDL HYPO REG 25G X 1 1/2

## (undated) DEVICE — SUT VICRYL 3-0 27 SH

## (undated) DEVICE — SEE MEDLINE ITEM 157117

## (undated) DEVICE — SEALANT VISTASEAL FIBRIN 10ML

## (undated) DEVICE — SYR ONLY LUER LOCK 20CC

## (undated) DEVICE — NDL 22GA X1 1/2 REG BEVEL

## (undated) DEVICE — TRAY FOLEY 16FR INFECTION CONT

## (undated) DEVICE — GOWN POLY REINF BRTH SLV XL

## (undated) DEVICE — SEE MEDLINE ITEM 154981

## (undated) DEVICE — DRAPE ABDOMINAL TIBURON 14X11

## (undated) DEVICE — SUT ETHILON 2-0 PSLX 30IN

## (undated) DEVICE — TIP YANKAUERS BULB NO VENT

## (undated) DEVICE — SPONGE COTTON TRAY 4X4IN

## (undated) DEVICE — EVACUATOR WOUND BULB 100CC

## (undated) DEVICE — ELECTRODE REM PLYHSV RETURN 9

## (undated) DEVICE — TOWEL OR DISP STRL BLUE 4/PK

## (undated) DEVICE — BLADE SURG CARBON STEEL SZ11

## (undated) DEVICE — SUT 2-0 12-18IN SILK

## (undated) DEVICE — SUT MCRYL PLUS 4-0 PS2 27IN

## (undated) DEVICE — SUT PDS II 1 TP-1 VIL

## (undated) DEVICE — GOWN SURGICAL X-LARGE

## (undated) DEVICE — DRAIN CHANNEL ROUND 15FR

## (undated) DEVICE — ADHESIVE DERMABOND ADVANCED

## (undated) DEVICE — SUT PROLENE 0 MO6 30IN BLUE

## (undated) DEVICE — LUBRICANT SURGILUBE 2 OZ